# Patient Record
Sex: MALE | Race: WHITE | Employment: UNEMPLOYED | URBAN - METROPOLITAN AREA
[De-identification: names, ages, dates, MRNs, and addresses within clinical notes are randomized per-mention and may not be internally consistent; named-entity substitution may affect disease eponyms.]

---

## 2017-06-23 ENCOUNTER — HOSPITAL ENCOUNTER (EMERGENCY)
Age: 27
Discharge: STILL ADMITTED OR ADMINISTRATIVE | End: 2017-06-23
Attending: EMERGENCY MEDICINE | Admitting: EMERGENCY MEDICINE

## 2017-06-23 PROCEDURE — 75810000275 HC EMERGENCY DEPT VISIT NO LEVEL OF CARE

## 2017-06-23 NOTE — ED NOTES
Pt called into to be triaged. \"im leaving, I feel much better. \" encouraged to comeback if needed.

## 2017-06-24 ENCOUNTER — HOSPITAL ENCOUNTER (EMERGENCY)
Age: 27
Discharge: HOME HEALTH | End: 2017-06-24
Attending: EMERGENCY MEDICINE
Payer: SELF-PAY

## 2017-06-24 VITALS
TEMPERATURE: 98.4 F | HEIGHT: 68 IN | RESPIRATION RATE: 15 BRPM | DIASTOLIC BLOOD PRESSURE: 81 MMHG | WEIGHT: 180 LBS | BODY MASS INDEX: 27.28 KG/M2 | SYSTOLIC BLOOD PRESSURE: 124 MMHG | OXYGEN SATURATION: 98 % | HEART RATE: 92 BPM

## 2017-06-24 DIAGNOSIS — J45.21 MILD INTERMITTENT ASTHMA WITH ACUTE EXACERBATION: Primary | ICD-10-CM

## 2017-06-24 PROCEDURE — 99282 EMERGENCY DEPT VISIT SF MDM: CPT

## 2017-06-24 PROCEDURE — 74011000250 HC RX REV CODE- 250: Performed by: EMERGENCY MEDICINE

## 2017-06-24 PROCEDURE — 77030029684 HC NEB SM VOL KT MONA -A

## 2017-06-24 PROCEDURE — 94640 AIRWAY INHALATION TREATMENT: CPT

## 2017-06-24 RX ORDER — IPRATROPIUM BROMIDE AND ALBUTEROL SULFATE 2.5; .5 MG/3ML; MG/3ML
3 SOLUTION RESPIRATORY (INHALATION)
Status: COMPLETED | OUTPATIENT
Start: 2017-06-24 | End: 2017-06-24

## 2017-06-24 RX ORDER — IPRATROPIUM BROMIDE AND ALBUTEROL SULFATE 2.5; .5 MG/3ML; MG/3ML
3 SOLUTION RESPIRATORY (INHALATION)
Qty: 30 NEBULE | Refills: 3 | Status: SHIPPED | OUTPATIENT
Start: 2017-06-24 | End: 2017-12-30

## 2017-06-24 RX ORDER — ALBUTEROL SULFATE 90 UG/1
2 AEROSOL, METERED RESPIRATORY (INHALATION)
Qty: 1 INHALER | Refills: 6 | Status: SHIPPED | OUTPATIENT
Start: 2017-06-24 | End: 2017-09-05

## 2017-06-24 RX ORDER — IPRATROPIUM BROMIDE AND ALBUTEROL SULFATE 2.5; .5 MG/3ML; MG/3ML
3 SOLUTION RESPIRATORY (INHALATION)
COMMUNITY
End: 2017-06-24

## 2017-06-24 RX ORDER — FLUTICASONE PROPIONATE AND SALMETEROL 100; 50 UG/1; UG/1
1 POWDER RESPIRATORY (INHALATION) EVERY 12 HOURS
Qty: 1 INHALER | Refills: 6 | Status: SHIPPED | OUTPATIENT
Start: 2017-06-24 | End: 2017-06-29

## 2017-06-24 RX ORDER — FLUTICASONE PROPIONATE AND SALMETEROL 100; 50 UG/1; UG/1
1 POWDER RESPIRATORY (INHALATION) EVERY 12 HOURS
COMMUNITY
End: 2017-06-24

## 2017-06-24 RX ORDER — ALBUTEROL SULFATE 90 UG/1
AEROSOL, METERED RESPIRATORY (INHALATION)
COMMUNITY
End: 2017-06-24

## 2017-06-24 RX ADMIN — IPRATROPIUM BROMIDE AND ALBUTEROL SULFATE 3 ML: .5; 3 SOLUTION RESPIRATORY (INHALATION) at 09:44

## 2017-06-24 NOTE — ED PROVIDER NOTES
HPI Comments: 9:26 AM Moni Kern is a 33 y.o. male with a hx of asthma and COPD who presents to the ED  c/o SOB due to allergies. The pt is also complaining of wheezes. He states that he ran out of the following medications and is requesting a refill: ADVAIR DISKUS, VENTOLIN HFA and DUO-NEB. No other complaints or concerns at this time. PCP:  Caitlyn Farah MD      The history is provided by the patient. Past Medical History:   Diagnosis Date    Asthma     Chronic obstructive pulmonary disease ([address]        Past Surgical History:   Procedure Laterality Date    HX FREE SKIN GRAFT      HX ORTHOPAEDIC      facial reconstruction         History reviewed. No pertinent family history. Social History     Social History    Marital status: SINGLE     Spouse name: N/A    Number of children: N/A    Years of education: N/A     Occupational History    Not on file. Social History Main Topics    Smoking status: Never Smoker    Smokeless tobacco: Former User    Alcohol use Yes    Drug use: No    Sexual activity: Not Currently     Other Topics Concern    Not on file     Social History Narrative    No narrative on file         ALLERGIES: Penicillin g    Review of Systems   Constitutional: Negative. HENT: Negative. Eyes: Negative. Respiratory: Positive for shortness of breath and wheezing. Cardiovascular: Negative. Gastrointestinal: Negative. Endocrine: Negative. Genitourinary: Negative. Musculoskeletal: Negative. Skin: Negative. Allergic/Immunologic: Negative. Neurological: Negative. Hematological: Negative. Psychiatric/Behavioral: Negative. All other systems reviewed and are negative. Vitals:    06/24/17 0915   BP: 129/85   Pulse: 92   Resp: 16   Temp: 98.1 °F (36.7 °C)   SpO2: 95%   Weight: 81.6 kg (180 lb)   Height: 5' 8\" (1.727 m)            Physical Exam   Constitutional: He is oriented to person, place, and time.  He appears well-developed and well-nourished. HENT:   Head: Normocephalic and atraumatic. Nose: Nose normal.   Mouth/Throat: Oropharynx is clear and moist.   Eyes: Conjunctivae and EOM are normal. Pupils are equal, round, and reactive to light. Neck: Normal range of motion. Neck supple. Cardiovascular: Normal rate, regular rhythm, normal heart sounds and intact distal pulses. Pulmonary/Chest: Effort normal. He has wheezes in the right upper field, the right middle field, the right lower field, the left upper field, the left middle field and the left lower field. Abdominal: Soft. Bowel sounds are normal.   Neurological: He is alert and oriented to person, place, and time. Skin: Skin is warm and dry. Psychiatric: He has a normal mood and affect. His behavior is normal. Judgment and thought content normal.   Nursing note and vitals reviewed. [facility]  ED Course       Procedures    Vitals:  Patient Vitals for the past 12 hrs:   Temp Pulse Resp BP SpO2   06/24/17 0915 98.1 °F (36.7 °C) 92 16 129/85 95 %   95% on RA, indicating adequate oxygenation. Medications ordered:   Medications   albuterol-ipratropium (DUO-NEB) 2.5 MG-0.5 MG/3 ML (not administered)         Lab findings:  No results found for this or any previous visit (from the past 12 hour(s)). EKG interpretation by ED Physician:        X-Ray, CT or other radiology findings or impressions:  No orders to display         Progress notes, Consult notes or additional Procedure notes:  9:46 AM I have reassessed the patient and discussed their results and diagnosis. Pt will be discharged in stable condition. Patient is to return to emergency department if any new or worsening condition. Patient understands and verbalizes agreement with plan. Disposition:  Diagnosis:   1.  Mild intermittent asthma with acute exacerbation        Disposition: Discharge     SCRIBE ATTESTATION STATEMENT  Documented by: Caitlyn Day scribing for, and in the presence of, Geeta Conner MD 06/24/17 9:45 AM     Signed by: Meryle Maizes, Scribe, 06/24/17 9:30 AM     PROVIDER ATTESTATION STATEMENT  I personally performed the services described in the documentation, reviewed the documentation, as recorded by the scribe in my presence, and it accurately and completely records my words and actions.   Geeta Conner MD

## 2017-06-24 NOTE — DISCHARGE INSTRUCTIONS
Asthma Attack: Care Instructions  Your Care Instructions    During an asthma attack, the airways swell and narrow. This makes it hard to breathe. Severe asthma attacks can be life-threatening, but you can help prevent them by keeping your asthma under control and treating symptoms before they get bad. Symptoms include being short of breath, having chest tightness, coughing, and wheezing. Noting and treating these symptoms can also help you avoid future trips to the emergency room. The doctor has checked you carefully, but problems can develop later. If you notice any problems or new symptoms, get medical treatment right away. Follow-up care is a key part of your treatment and safety. Be sure to make and go to all appointments, and call your doctor if you are having problems. It's also a good idea to know your test results and keep a list of the medicines you take. How can you care for yourself at home? · Follow your asthma action plan to prevent and treat attacks. If you don't have an asthma action plan, work with your doctor to create one. · Take your asthma medicines exactly as prescribed. Talk to your doctor right away if you have any questions about how to take them. ¨ Use your quick-relief medicine when you have symptoms of an attack. Quick-relief medicine is usually an albuterol inhaler. Some people need to use quick-relief medicine before they exercise. ¨ Take your controller medicine every day, not just when you have symptoms. Controller medicine is usually an inhaled corticosteroid. The goal is to prevent problems before they occur. Don't use your controller medicine to treat an attack that has already started. It doesn't work fast enough to help. ¨ If your doctor prescribed corticosteroid pills to use during an attack, take them exactly as prescribed. It may take hours for the pills to work, but they may make the episode shorter and help you breathe better.   ¨ Keep your quick-relief medicine with you at all times. · Talk to your doctor before using other medicines. Some medicines, such as aspirin, can cause asthma attacks in some people. · If you have a peak flow meter, use it to check how well you are breathing. This can help you predict when an asthma attack is going to occur. Then you can take medicine to prevent the asthma attack or make it less severe. · Do not smoke or allow others to smoke around you. Avoid smoky places. Smoking makes asthma worse. If you need help quitting, talk to your doctor about stop-smoking programs and medicines. These can increase your chances of quitting for good. · Learn what triggers an asthma attack for you, and avoid the triggers when you can. Common triggers include colds, smoke, air pollution, dust, pollen, mold, pets, cockroaches, stress, and cold air. · Avoid colds and the flu. Get a pneumococcal vaccine shot. If you have had one before, ask your doctor if you need a second dose. Get a flu vaccine every fall. If you must be around people with colds or the flu, wash your hands often. When should you call for help? Call 911 anytime you think you may need emergency care. For example, call if:  · You have severe trouble breathing. Call your doctor now or seek immediate medical care if:  · Your symptoms do not get better after you have followed your asthma action plan. · You have new or worse trouble breathing. · Your coughing and wheezing get worse. · You cough up dark brown or bloody mucus (sputum). · You have a new or higher fever. Watch closely for changes in your health, and be sure to contact your doctor if:  · You need to use quick-relief medicine on more than 2 days a week (unless it is just for exercise). · You cough more deeply or more often, especially if you notice more mucus or a change in the color of your mucus. · You are not getting better as expected. Where can you learn more?   Go to http://jason-maritza.info/. Enter T871 in the search box to learn more about \"Asthma Attack: Care Instructions. \"  Current as of: March 25, 2017  Content Version: 11.3  © 2805-8057 [employer] Care instructions adapted under license by [employer] (which disclaims liability or warranty for this information). If you have questions about a medical condition or this instruction, always ask your healthcare professional. [place] [address] any warranty or liability for your use of this information. Learning About Asthma Triggers  What are triggers? When you have asthma, certain things can make your symptoms worse. These are called triggers. They include:  · Cigarette smoke or air pollution. · Things you are allergic to, such as:  ¨ Pollen, mold, or dust mites. ¨ Pet hair, skin, or saliva. · Illnesses, like colds, flu, or pneumonia. · Exercise. · Dry, cold air. How do triggers affect asthma? Triggers can make it harder for your lungs to work as they should and can lead to sudden difficulty breathing and other symptoms. When you are around a trigger, an asthma attack is more likely. If your symptoms are severe, you may need emergency treatment or have to go to the hospital for treatment. If you know what your triggers are and can avoid them, you may be able to prevent asthma attacks, reduce how often you have them, and make them less severe. What can you do to avoid triggers? The first thing is to know your triggers. When you are having symptoms, note the things around you that might be causing them. Then look for patterns in what may be triggering your symptoms. Record your triggers on a piece of paper or in an asthma diary. When you have your list of possible triggers, work with your doctor to find ways to avoid them. You also can check how well your lungs are working by measuring your peak expiratory flow (PEF) throughout the day.  Your PEF may drop when you are near things that trigger symptoms. Here are some ways to avoid a few common triggers. · Do not smoke or allow others to smoke around you. If you need help quitting, talk to your doctor about stop-smoking programs and medicines. These can increase your chances of quitting for good. · If there is a lot of pollution, pollen, or dust outside, stay at home and keep your windows closed. Use an air conditioner or air filter in your home. Check your local weather report or newspaper for air quality and pollen reports. · Get a flu shot every year. Talk to your doctor about getting a pneumococcal shot. Wash your hands often to prevent infections. · Avoid exercising outdoors in cold weather. If you are outdoors in cold weather, wear a scarf around your face and breathe through your nose. How can you manage an asthma attack? · If you have an asthma action plan, follow the plan. In general:  ¨ Use your quick-relief inhaler as directed by your doctor. If your symptoms do not get better after you use your medicine, have someone take you to the emergency room. Call an ambulance if needed. ¨ If your doctor has given you other inhaled medicines or steroid pills, take them as directed. Where can you learn more? Go to Redeem[employer]Get[employer]be  Enter M564 in the search box to learn more about \"Learning About Asthma Triggers. \"   © 7580-0332 [employer]. Care instructions adapted under license by [employer] (which disclaims liability or warranty for this information). This care instruction is for use with your licensed healthcare professional. If you have questions about a medical condition or this instruction, always ask your healthcare professional. [place] [address] any warranty or liability for your use of this information. Content Version: 27.3.314225;  Last Revised: February 23, 2012

## 2017-06-28 ENCOUNTER — HOSPITAL ENCOUNTER (EMERGENCY)
Age: 27
Discharge: HOME | End: 2017-06-29
Attending: EMERGENCY MEDICINE | Admitting: EMERGENCY MEDICINE
Payer: SELF-PAY

## 2017-06-28 ENCOUNTER — APPOINTMENT (OUTPATIENT)
Dept: GENERAL RADIOLOGY | Age: 27
End: 2017-06-28
Attending: EMERGENCY MEDICINE
Payer: SELF-PAY

## 2017-06-28 DIAGNOSIS — J45.901 ASTHMA WITH SEVERE EXACERBATION: Primary | ICD-10-CM

## 2017-06-28 LAB
ANION GAP BLD CALC-SCNC: 8 MMOL/L (ref 3–18)
BASOPHILS # BLD AUTO: 0 K/UL (ref 0–0.1)
BASOPHILS # BLD: 0 % (ref 0–3)
BUN SERPL-MCNC: 12 MG/DL (ref 7–18)
BUN/CREAT SERPL: 11 (ref 12–20)
CALCIUM SERPL-MCNC: 9.3 MG/DL (ref 8.5–10.1)
CHLORIDE SERPL-SCNC: 106 MMOL/L (ref 100–108)
CO2 SERPL-SCNC: 27 MMOL/L (ref 21–32)
CREAT SERPL-MCNC: 1.09 MG/DL (ref 0.6–1.3)
DIFFERENTIAL METHOD BLD: ABNORMAL
EOSINOPHIL # BLD: 2 K/UL (ref 0–0.4)
EOSINOPHIL NFR BLD: 8 % (ref 0–5)
ERYTHROCYTE [DISTWIDTH] IN BLOOD BY AUTOMATED COUNT: 13.1 % (ref 11.6–14.5)
GLUCOSE SERPL-MCNC: 127 MG/DL (ref 74–99)
HCT VFR BLD AUTO: 55.9 % (ref 36–48)
HGB BLD-MCNC: 19.3 G/DL (ref 13–16)
LYMPHOCYTES # BLD AUTO: 35 % (ref 20–51)
LYMPHOCYTES # BLD: 8.9 K/UL (ref 0.8–3.5)
MCH RBC QN AUTO: 28.7 PG (ref 24–34)
MCHC RBC AUTO-ENTMCNC: 34.5 G/DL (ref 31–37)
MCV RBC AUTO: 83.1 FL (ref 74–97)
MONOCYTES # BLD: 0.5 K/UL (ref 0–1)
MONOCYTES NFR BLD AUTO: 2 % (ref 2–9)
NEUTS SEG # BLD: 14 K/UL (ref 1.8–8)
NEUTS SEG NFR BLD AUTO: 55 % (ref 42–75)
PLATELET # BLD AUTO: 365 K/UL (ref 135–420)
PLATELET COMMENTS,PCOM: ABNORMAL
PMV BLD AUTO: 9.8 FL (ref 9.2–11.8)
POTASSIUM SERPL-SCNC: 4.4 MMOL/L (ref 3.5–5.5)
RBC # BLD AUTO: 6.73 M/UL (ref 4.7–5.5)
RBC MORPH BLD: ABNORMAL
SODIUM SERPL-SCNC: 141 MMOL/L (ref 136–145)
WBC # BLD AUTO: 25.4 K/UL (ref 4.6–13.2)

## 2017-06-28 PROCEDURE — 99285 EMERGENCY DEPT VISIT HI MDM: CPT

## 2017-06-28 PROCEDURE — 71010 XR CHEST PORT: CPT

## 2017-06-28 PROCEDURE — 74011000250 HC RX REV CODE- 250: Performed by: EMERGENCY MEDICINE

## 2017-06-28 PROCEDURE — 94640 AIRWAY INHALATION TREATMENT: CPT

## 2017-06-28 PROCEDURE — 96375 TX/PRO/DX INJ NEW DRUG ADDON: CPT

## 2017-06-28 PROCEDURE — 74011000250 HC RX REV CODE- 250

## 2017-06-28 PROCEDURE — 96365 THER/PROPH/DIAG IV INF INIT: CPT

## 2017-06-28 PROCEDURE — 80048 BASIC METABOLIC PNL TOTAL CA: CPT | Performed by: EMERGENCY MEDICINE

## 2017-06-28 PROCEDURE — 74011250636 HC RX REV CODE- 250/636: Performed by: EMERGENCY MEDICINE

## 2017-06-28 PROCEDURE — 96361 HYDRATE IV INFUSION ADD-ON: CPT

## 2017-06-28 PROCEDURE — 85025 COMPLETE CBC W/AUTO DIFF WBC: CPT | Performed by: EMERGENCY MEDICINE

## 2017-06-28 RX ORDER — IPRATROPIUM BROMIDE AND ALBUTEROL SULFATE 2.5; .5 MG/3ML; MG/3ML
SOLUTION RESPIRATORY (INHALATION)
Status: COMPLETED
Start: 2017-06-28 | End: 2017-06-28

## 2017-06-28 RX ORDER — IPRATROPIUM BROMIDE 0.5 MG/2.5ML
0.5 SOLUTION RESPIRATORY (INHALATION)
Status: COMPLETED | OUTPATIENT
Start: 2017-06-28 | End: 2017-06-28

## 2017-06-28 RX ORDER — ALBUTEROL SULFATE 0.83 MG/ML
10 SOLUTION RESPIRATORY (INHALATION)
Status: COMPLETED | OUTPATIENT
Start: 2017-06-28 | End: 2017-06-28

## 2017-06-28 RX ORDER — ALBUTEROL SULFATE 0.83 MG/ML
5 SOLUTION RESPIRATORY (INHALATION)
Status: COMPLETED | OUTPATIENT
Start: 2017-06-28 | End: 2017-06-28

## 2017-06-28 RX ORDER — ALBUTEROL SULFATE 0.83 MG/ML
7.5 SOLUTION RESPIRATORY (INHALATION)
Status: COMPLETED | OUTPATIENT
Start: 2017-06-28 | End: 2017-06-28

## 2017-06-28 RX ORDER — MAGNESIUM SULFATE HEPTAHYDRATE 40 MG/ML
2 INJECTION, SOLUTION INTRAVENOUS ONCE
Status: COMPLETED | OUTPATIENT
Start: 2017-06-28 | End: 2017-06-28

## 2017-06-28 RX ADMIN — SODIUM CHLORIDE 2000 ML: 900 INJECTION, SOLUTION INTRAVENOUS at 21:40

## 2017-06-28 RX ADMIN — SODIUM CHLORIDE 1000 ML: 900 INJECTION, SOLUTION INTRAVENOUS at 21:03

## 2017-06-28 RX ADMIN — ALBUTEROL SULFATE 10 MG: 2.5 SOLUTION RESPIRATORY (INHALATION) at 21:05

## 2017-06-28 RX ADMIN — METHYLPREDNISOLONE SODIUM SUCCINATE 125 MG: 125 INJECTION, POWDER, FOR SOLUTION INTRAMUSCULAR; INTRAVENOUS at 21:18

## 2017-06-28 RX ADMIN — ALBUTEROL SULFATE 7.5 MG: 2.5 SOLUTION RESPIRATORY (INHALATION) at 21:19

## 2017-06-28 RX ADMIN — MAGNESIUM SULFATE IN WATER 2 G: 40 INJECTION, SOLUTION INTRAVENOUS at 21:03

## 2017-06-28 RX ADMIN — ALBUTEROL SULFATE 5 MG: 2.5 SOLUTION RESPIRATORY (INHALATION) at 22:31

## 2017-06-28 RX ADMIN — IPRATROPIUM BROMIDE 0.5 MG: 0.5 SOLUTION RESPIRATORY (INHALATION) at 21:19

## 2017-06-28 RX ADMIN — IPRATROPIUM BROMIDE 0.5 MG: 0.5 SOLUTION RESPIRATORY (INHALATION) at 21:10

## 2017-06-28 RX ADMIN — IPRATROPIUM BROMIDE AND ALBUTEROL SULFATE: .5; 3 SOLUTION RESPIRATORY (INHALATION) at 21:00

## 2017-06-29 VITALS
RESPIRATION RATE: 17 BRPM | HEIGHT: 68 IN | SYSTOLIC BLOOD PRESSURE: 112 MMHG | WEIGHT: 175 LBS | BODY MASS INDEX: 26.52 KG/M2 | TEMPERATURE: 98.1 F | HEART RATE: 105 BPM | OXYGEN SATURATION: 99 % | DIASTOLIC BLOOD PRESSURE: 78 MMHG

## 2017-06-29 RX ORDER — BUDESONIDE 0.25 MG/2ML
500 INHALANT ORAL 2 TIMES DAILY
Qty: 120 ML | Refills: 6 | Status: SHIPPED | OUTPATIENT
Start: 2017-06-29 | End: 2017-08-12

## 2017-06-29 RX ORDER — ALBUTEROL SULFATE 0.83 MG/ML
2.5 SOLUTION RESPIRATORY (INHALATION)
Qty: 24 EACH | Refills: 6 | Status: SHIPPED | OUTPATIENT
Start: 2017-06-29 | End: 2017-09-05

## 2017-06-29 RX ORDER — PREDNISONE 10 MG/1
TABLET ORAL
Qty: 63 TAB | Refills: 0 | Status: SHIPPED | OUTPATIENT
Start: 2017-06-29 | End: 2017-08-12

## 2017-06-29 NOTE — ED TRIAGE NOTES
Patient presents to the ED in severe respiratory distress with labored respirations and utilizing accessory muscles to breathe. Hx of asthma. Reports progressive exacerbation throughout the day.

## 2017-06-29 NOTE — ED PROVIDER NOTES
HPI Comments: Mekhi Hernandez is a 33 y.o. Male with h/o asthma with c/o severe sob, wheezing that started earlier today not relieved with home treatments. Admitted over a year ago. No intubations. No recent steroids. Sx are constant, severe, worse with any exertion. No recent fever, vomiting, diarrhea, syncope    The history is provided by the patient and the spouse. Past Medical History:   Diagnosis Date    Asthma     Chronic obstructive pulmonary disease ([address]        Past Surgical History:   Procedure Laterality Date    HX FREE SKIN GRAFT      HX ORTHOPAEDIC      facial reconstruction         No family history on file. Social History     Social History    Marital status: SINGLE     Spouse name: N/A    Number of children: N/A    Years of education: N/A     Occupational History    Not on file. Social History Main Topics    Smoking status: Never Smoker    Smokeless tobacco: Former User    Alcohol use Yes    Drug use: No    Sexual activity: Not Currently     Other Topics Concern    Not on file     Social History Narrative    No narrative on file         ALLERGIES: Penicillin g    Review of Systems   Constitutional: Negative for fever. HENT: Negative for sore throat and trouble swallowing. Eyes: Negative for visual disturbance. Respiratory: Positive for cough, chest tightness, shortness of breath and wheezing. Cardiovascular: Negative for leg swelling. Gastrointestinal: Negative for abdominal pain. Endocrine: Negative for polyuria. Genitourinary: Negative for difficulty urinating. Musculoskeletal: Negative for gait problem. Skin: Negative for rash. Allergic/Immunologic: Negative for immunocompromised state. Neurological: Negative for syncope. Hematological: Does not bruise/bleed easily. Psychiatric/Behavioral: Positive for sleep disturbance.        Vitals:    06/29/17 0010 06/29/17 0020 06/29/17 0030 06/29/17 0039   BP: 113/73 107/71 112/78    Pulse: (!) 111 (!) 113 (!) 105    Resp: 19 21 17    Temp:    98.1 °F (36.7 °C)   SpO2: 95% 98% 99%    Weight:       Height:                Physical Exam   Constitutional: He is oriented to person, place, and time. Non-toxic appearance. He does not appear ill. He appears distressed. HENT:   Head: Normocephalic and atraumatic. Right Ear: External ear normal.   Left Ear: External ear normal.   Nose: Nose normal.   Mouth/Throat: Oropharynx is clear and moist. No oropharyngeal exudate. Eyes: Conjunctivae are normal.   Neck: Normal range of motion. Neck supple. Cardiovascular: Regular rhythm, normal heart sounds and intact distal pulses. Tachycardia present. Pulmonary/Chest: Accessory muscle usage present. No stridor. Tachypnea noted. He is in respiratory distress. He has decreased breath sounds. He has wheezes. He has no rhonchi. He has no rales. Abdominal: Soft. There is no tenderness. Musculoskeletal: Normal range of motion. He exhibits no edema. Neurological: He is alert and oriented to person, place, and time. Skin: Skin is warm. He is diaphoretic. Psychiatric: His behavior is normal.   Nursing note and vitals reviewed.        [facility]  ED Course       Procedures           Vitals:  Patient Vitals for the past 12 hrs:   Temp Pulse Resp BP SpO2   06/29/17 0039 98.1 °F (36.7 °C) - - - -   06/29/17 0030 - (!) 105 17 112/78 99 %   06/29/17 0020 - (!) 113 21 107/71 98 %   06/29/17 0010 - (!) 111 19 113/73 95 %   06/29/17 0000 - (!) 108 23 109/72 100 %   06/28/17 2330 - (!) 111 18 110/67 100 %   06/28/17 2315 - (!) 115 21 - 100 %   06/28/17 2300 - (!) 116 20 127/68 100 %   06/28/17 2245 - (!) 116 24 - 100 %   06/28/17 2230 - (!) 115 22 104/77 100 %   06/28/17 2215 - (!) 119 25 - 100 %   06/28/17 2200 - (!) 121 21 116/78 100 %   06/28/17 2145 - (!) 129 25 - 100 %   06/28/17 2130 - (!) 130 19 128/82 -   06/28/17 2115 - (!) 127 28 - 98 %   06/28/17 2105 - - - (!) 141/112 -   06/28/17 2100 - (!) 135 (!) 40 (!) 141/112 91 %         Medications ordered:   Medications   albuterol-ipratropium (DUO-NEB) 2.5 mg-0.5 mg/3 ml nebulizer solution (  Given 6/28/17 2100)   albuterol (PROVENTIL VENTOLIN) nebulizer solution 10 mg (10 mg Nebulization Given 6/28/17 2105)   ipratropium (ATROVENT) 0.02 % nebulizer solution 0.5 mg (0.5 mg Nebulization Given 6/28/17 2110)   methylPREDNISolone (PF) (SOLU-MEDROL) injection 125 mg (125 mg IntraVENous Given 6/28/17 2118)   magnesium sulfate 2 g/50 ml IVPB (premix or compounded) (0 g IntraVENous IV Completed 6/28/17 2143)   sodium chloride 0.9 % bolus infusion 1,000 mL (0 mL IntraVENous IV Completed 6/28/17 2204)   albuterol (PROVENTIL VENTOLIN) nebulizer solution 7.5 mg (7.5 mg Nebulization Given 6/28/17 2119)   ipratropium (ATROVENT) 0.02 % nebulizer solution 0.5 mg (0.5 mg Nebulization Given 6/28/17 2119)   sodium chloride 0.9 % bolus infusion 2,000 mL (0 mL IntraVENous IV Completed 6/28/17 2339)   albuterol (PROVENTIL VENTOLIN) nebulizer solution 5 mg (5 mg Nebulization Given 6/28/17 2231)         Lab findings:  Recent Results (from the past 12 hour(s))   CBC WITH AUTOMATED DIFF    Collection Time: 06/28/17  9:00 PM   Result Value Ref Range    WBC 25.4 (H) 4.6 - 13.2 K/uL    RBC 6.73 (H) 4.70 - 5.50 M/uL    HGB 19.3 (H) 13.0 - 16.0 g/dL    HCT 55.9 (HH) 36.0 - 48.0 %    MCV 83.1 74.0 - 97.0 FL    MCH 28.7 24.0 - 34.0 PG    MCHC 34.5 31.0 - 37.0 g/dL    RDW 13.1 11.6 - 14.5 %    PLATELET 565 366 - 806 K/uL    MPV 9.8 9.2 - 11.8 FL    NEUTROPHILS 55 42 - 75 %    LYMPHOCYTES 35 20 - 51 %    MONOCYTES 2 2 - 9 %    EOSINOPHILS 8 (H) 0 - 5 %    BASOPHILS 0 0 - 3 %    ABS. NEUTROPHILS 14.0 (H) 1.8 - 8.0 K/UL    ABS. LYMPHOCYTES 8.9 (H) 0.8 - 3.5 K/UL    ABS. MONOCYTES 0.5 0 - 1.0 K/UL    ABS. EOSINOPHILS 2.0 (H) 0.0 - 0.4 K/UL    ABS.  BASOPHILS 0.0 0.0 - 0.1 K/UL    PLATELET COMMENTS ADEQUATE PLATELETS      RBC COMMENTS NORMOCYTIC, NORMOCHROMIC      DF MANUAL     METABOLIC PANEL, BASIC    Collection Time: 06/28/17  9:00 PM   Result Value Ref Range    Sodium 141 136 - 145 mmol/L    Potassium 4.4 3.5 - 5.5 mmol/L    Chloride 106 100 - 108 mmol/L    CO2 27 21 - 32 mmol/L    Anion gap 8 3.0 - 18 mmol/L    Glucose 127 (H) 74 - 99 mg/dL    BUN 12 7.0 - 18 MG/DL    Creatinine 1.09 0.6 - 1.3 MG/DL    BUN/Creatinine ratio 11 (L) 12 - 20      GFR est AA >60 >60 ml/min/1.73m2    GFR est non-AA >60 >60 ml/min/1.73m2    Calcium 9.3 8.5 - 10.1 MG/DL       EKG interpretation by ED Physician:      X-Ray, CT or other radiology findings or impressions:  XR CHEST PORT    (Results Pending)   nl appearance to me    Progress notes, Consult notes or additional Procedure notes:   Sig improved, mult nebs, iv meds, fluids  ED Critical Care Note    System at risk for life threatening failure:cardiac, pulm, neuro  Associated problems: tachycardia, hypoxia, metabolix    Critical Care services provided: bedside management, documentation, mult visits to bedside, d/w case  Excluded procedures (time not included in critical care): pulse ox interp    Total Critical Care Time (in minutes) 47      Doubt need for admission. Will place on prednisone taper, additional meds for home  I have discussed with patient and/or family/sig other the results, interpretation of any imaging if performed, suspected diagnosis and treatment plan to include instructions regarding the diagnoses listed to which understanding was expressed with all questions answered      Reevaluation of patient:   Stable for dc    Disposition:  Diagnosis:   1. Asthma with severe exacerbation        Disposition: home      Follow-up Information     Follow up With Details Comments Contact Info    [facility] EMERGENCY DEPT  If symptoms worsen [id number] [address] [phone]            Patient's Medications   Start Taking    ALBUTEROL (PROVENTIL VENTOLIN) 2.5 MG /3 ML (0.083 %) NEBULIZER SOLUTION    3 mL by Nebulization route every four (4) hours as needed for Wheezing. BUDESONIDE (PULMICORT) 0.25 MG/2 ML NBSP    4 mL by Nebulization route two (2) times a day. PREDNISONE (DELTASONE) 10 MG TABLET    6 po every day f3, 5 po every day f3, 4 po every day f3, 3 po every day f3, 2 po every day f3, 1 po every day f3   Continue Taking    ALBUTEROL (VENTOLIN HFA) 90 MCG/ACTUATION INHALER    Take 2 Puffs by inhalation every six (6) hours as needed for Wheezing. ALBUTEROL-IPRATROPIUM (DUO-NEB) 2.5 MG-0.5 MG/3 ML NEBU    3 mL by Nebulization route every six (6) hours as needed. These Medications have changed    No medications on file   Stop Taking    FLUTICASONE-SALMETEROL (ADVAIR DISKUS) 100-50 MCG/DOSE DISKUS INHALER    Take 1 Puff by inhalation every twelve (12) hours.

## 2017-08-12 ENCOUNTER — HOSPITAL ENCOUNTER (EMERGENCY)
Age: 27
Discharge: HOME | End: 2017-08-12
Attending: EMERGENCY MEDICINE
Payer: SELF-PAY

## 2017-08-12 VITALS
DIASTOLIC BLOOD PRESSURE: 83 MMHG | OXYGEN SATURATION: 97 % | TEMPERATURE: 97.8 F | HEIGHT: 68 IN | BODY MASS INDEX: 25.76 KG/M2 | SYSTOLIC BLOOD PRESSURE: 126 MMHG | RESPIRATION RATE: 22 BRPM | WEIGHT: 170 LBS | HEART RATE: 131 BPM

## 2017-08-12 DIAGNOSIS — J45.21 MILD INTERMITTENT ASTHMA WITH ACUTE EXACERBATION: Primary | ICD-10-CM

## 2017-08-12 PROCEDURE — 74011000250 HC RX REV CODE- 250

## 2017-08-12 PROCEDURE — 94640 AIRWAY INHALATION TREATMENT: CPT

## 2017-08-12 PROCEDURE — 74011250636 HC RX REV CODE- 250/636: Performed by: EMERGENCY MEDICINE

## 2017-08-12 PROCEDURE — 96365 THER/PROPH/DIAG IV INF INIT: CPT

## 2017-08-12 PROCEDURE — 99284 EMERGENCY DEPT VISIT MOD MDM: CPT

## 2017-08-12 PROCEDURE — 74011000250 HC RX REV CODE- 250: Performed by: EMERGENCY MEDICINE

## 2017-08-12 PROCEDURE — 77030029684 HC NEB SM VOL KT MONA -A

## 2017-08-12 PROCEDURE — 96375 TX/PRO/DX INJ NEW DRUG ADDON: CPT

## 2017-08-12 RX ORDER — PREDNISONE 20 MG/1
60 TABLET ORAL DAILY
Qty: 15 TAB | Refills: 0 | Status: SHIPPED | OUTPATIENT
Start: 2017-08-12 | End: 2017-08-17

## 2017-08-12 RX ORDER — IPRATROPIUM BROMIDE AND ALBUTEROL SULFATE 2.5; .5 MG/3ML; MG/3ML
SOLUTION RESPIRATORY (INHALATION)
Status: COMPLETED
Start: 2017-08-12 | End: 2017-08-12

## 2017-08-12 RX ORDER — ALBUTEROL SULFATE 90 UG/1
2 AEROSOL, METERED RESPIRATORY (INHALATION)
Qty: 1 INHALER | Refills: 0 | Status: SHIPPED | OUTPATIENT
Start: 2017-08-12 | End: 2017-12-30

## 2017-08-12 RX ORDER — MAGNESIUM SULFATE HEPTAHYDRATE 40 MG/ML
2 INJECTION, SOLUTION INTRAVENOUS
Status: COMPLETED | OUTPATIENT
Start: 2017-08-12 | End: 2017-08-12

## 2017-08-12 RX ORDER — IPRATROPIUM BROMIDE AND ALBUTEROL SULFATE 2.5; .5 MG/3ML; MG/3ML
3 SOLUTION RESPIRATORY (INHALATION) ONCE
Status: COMPLETED | OUTPATIENT
Start: 2017-08-12 | End: 2017-08-12

## 2017-08-12 RX ADMIN — IPRATROPIUM BROMIDE AND ALBUTEROL SULFATE 3 ML: .5; 3 SOLUTION RESPIRATORY (INHALATION) at 02:16

## 2017-08-12 RX ADMIN — MAGNESIUM SULFATE HEPTAHYDRATE 2 G: 40 INJECTION, SOLUTION INTRAVENOUS at 02:06

## 2017-08-12 RX ADMIN — IPRATROPIUM BROMIDE AND ALBUTEROL SULFATE 3 ML: .5; 3 SOLUTION RESPIRATORY (INHALATION) at 02:06

## 2017-08-12 RX ADMIN — METHYLPREDNISOLONE SODIUM SUCCINATE 125 MG: 125 INJECTION, POWDER, FOR SOLUTION INTRAMUSCULAR; INTRAVENOUS at 02:05

## 2017-08-12 RX ADMIN — IPRATROPIUM BROMIDE AND ALBUTEROL SULFATE 3 ML: .5; 3 SOLUTION RESPIRATORY (INHALATION) at 01:56

## 2017-08-12 RX ADMIN — IPRATROPIUM BROMIDE AND ALBUTEROL SULFATE 3 ML: 2.5; .5 SOLUTION RESPIRATORY (INHALATION) at 01:56

## 2017-08-12 NOTE — LETTER
NOTIFICATION RETURN TO WORK 
 
8/12/2017 3:48 AM 
 
Mr. Constance Everett [address] [id number] To Whom It May Concern: 
 
Constance Everett is currently under the care of [facility] EMERGENCY DEPT. He is permitted to go to work on 08/12/17, but has spent long portion of the night in the emergency department. Mr. Viktoriya Rolle may be fatigued. If there are questions or concerns please have the patient contact our office.  
 
 
 
 
Sincerely, 
 
 
 
 
Marsha Hyde MD

## 2017-08-12 NOTE — DISCHARGE INSTRUCTIONS
SPECIFIC PATIENT INSTRUCTIONS FROM THE PHYSICIAN WHO TREATED YOU IN THE ER TODAY:  1. Return if any concerns or worsening of condition(s)  2. Prednisone as prescribed until finished. 3. Use your albuterol inhaler, if prescribed, and/or home nebulizer machine (if you have one) for wheezing. 4. FOLLOW UP APPOINTMENT:  Your primary doctor in 1-2 days. Asthma Attack: Care Instructions  Your Care Instructions    During an asthma attack, the airways swell and narrow. This makes it hard to breathe. Severe asthma attacks can be life-threatening, but you can help prevent them by keeping your asthma under control and treating symptoms before they get bad. Symptoms include being short of breath, having chest tightness, coughing, and wheezing. Noting and treating these symptoms can also help you avoid future trips to the emergency room. The doctor has checked you carefully, but problems can develop later. If you notice any problems or new symptoms, get medical treatment right away. Follow-up care is a key part of your treatment and safety. Be sure to make and go to all appointments, and call your doctor if you are having problems. It's also a good idea to know your test results and keep a list of the medicines you take. How can you care for yourself at home? · Follow your asthma action plan to prevent and treat attacks. If you don't have an asthma action plan, work with your doctor to create one. · Take your asthma medicines exactly as prescribed. Talk to your doctor right away if you have any questions about how to take them. ¨ Use your quick-relief medicine when you have symptoms of an attack. Quick-relief medicine is usually an albuterol inhaler. Some people need to use quick-relief medicine before they exercise. ¨ Take your controller medicine every day, not just when you have symptoms. Controller medicine is usually an inhaled corticosteroid. The goal is to prevent problems before they occur.  Don't use your controller medicine to treat an attack that has already started. It doesn't work fast enough to help. ¨ If your doctor prescribed corticosteroid pills to use during an attack, take them exactly as prescribed. It may take hours for the pills to work, but they may make the episode shorter and help you breathe better. ¨ Keep your quick-relief medicine with you at all times. · Talk to your doctor before using other medicines. Some medicines, such as aspirin, can cause asthma attacks in some people. · If you have a peak flow meter, use it to check how well you are breathing. This can help you predict when an asthma attack is going to occur. Then you can take medicine to prevent the asthma attack or make it less severe. · Do not smoke or allow others to smoke around you. Avoid smoky places. Smoking makes asthma worse. If you need help quitting, talk to your doctor about stop-smoking programs and medicines. These can increase your chances of quitting for good. · Learn what triggers an asthma attack for you, and avoid the triggers when you can. Common triggers include colds, smoke, air pollution, dust, pollen, mold, pets, cockroaches, stress, and cold air. · Avoid colds and the flu. Get a pneumococcal vaccine shot. If you have had one before, ask your doctor if you need a second dose. Get a flu vaccine every fall. If you must be around people with colds or the flu, wash your hands often. When should you call for help? Call 911 anytime you think you may need emergency care. For example, call if:  · You have severe trouble breathing. Call your doctor now or seek immediate medical care if:  · Your symptoms do not get better after you have followed your asthma action plan. · You have new or worse trouble breathing. · Your coughing and wheezing get worse. · You cough up dark brown or bloody mucus (sputum). · You have a new or higher fever.   Watch closely for changes in your health, and be sure to contact your doctor if:  · You need to use quick-relief medicine on more than 2 days a week (unless it is just for exercise). · You cough more deeply or more often, especially if you notice more mucus or a change in the color of your mucus. · You are not getting better as expected. Where can you learn more? Go to http://jason-maritza.info/. Enter W593 in the search box to learn more about \"Asthma Attack: Care Instructions. \"  Current as of: March 25, 2017  Content Version: 11.3  © 6617-0828 [employer]. Care instructions adapted under license by [employer] (which disclaims liability or warranty for this information). If you have questions about a medical condition or this instruction, always ask your healthcare professional. [address] any warranty or liability for your use of this information. Learning About Asthma Triggers  What are asthma triggers? When you have asthma, certain things can make your symptoms worse. These are called triggers. Learn what triggers an asthma attack for you, and avoid the triggers when you can. Common triggers include colds, smoke, air pollution, dust, pollen, pets, stress, and cold air. How do asthma triggers affect you? Triggers can make it harder for your lungs to work as they should. They can lead to sudden breathing problems and other symptoms. When you are around a trigger, an asthma attack is more likely. If your symptoms are severe, you may need emergency treatment or have to go to the hospital for treatment. What can you do to avoid triggers? The first thing is to know your triggers. When you are having symptoms, note the things around you that might be causing them. Then look for patterns that may be triggering your symptoms. Record your triggers on a piece of paper or in an asthma diary. When you have your list of possible triggers, work with your doctor to find ways to avoid them.   Avoid colds and flu. Get a pneumococcal vaccine shot. If you have had one before, ask your doctor whether you need a second dose. Get a flu vaccine every year, as soon as it's available. If you must be around people with colds or the flu, wash your hands often. Here are some ways to avoid a few common triggers. · Do not smoke or allow others to smoke around you. If you need help quitting, talk to your doctor about stop-smoking programs and medicines. These can increase your chances of quitting for good. · If there is a lot of pollution, pollen, or dust outside, stay at home and keep your windows closed. Use an air conditioner or air filter in your home. Check your local weather report or newspaper for air quality and pollen reports. What else should you know? · Take your controller medicine every day, not just when you have symptoms. It helps prevent problems before they occur. · Your doctor may suggest that you check how well your lungs are working by measuring your peak expiratory flow (PEF) throughout the day. Your PEF may drop when you are near things that trigger symptoms. Where can you learn more? Go to http://jasonAvokiamaritza.info/. Enter S768 in the search box to learn more about \"Learning About Asthma Triggers. \"  Current as of: March 25, 2017  Content Version: 11.3  © 4204-4922 [employer]. Care instructions adapted under license by [employer] (which disclaims liability or warranty for this information). If you have questions about a medical condition or this instruction, always ask your healthcare professional. [place] [address] any warranty or liability for your use of this information. Asthma Action Plan: After Your Visit  Your Care Instructions  An asthma action plan is based on peak flow and asthma symptoms.  Sorting symptoms and peak flow into red, yellow, and green \"zones\" can help you know how bad your asthma is and what actions you should take. Work with your doctor to make your plan. An action plan may include:  · The peak flow readings and symptoms for each zone. · What medicines to take in each zone. · When to call a doctor. · A list of emergency contact numbers. · A list of your asthma triggers. Follow-up care is a key part of your treatment and safety. Be sure to make and go to all appointments, and call your doctor if you are having problems. It's also a good idea to know your test results and keep a list of the medicines you take. How can you care for yourself at home? · Take your daily medicines to help minimize long-term damage and avoid asthma attacks. · Check your peak flow every morning and evening. This is the best way to know how well your lungs are working. · Check your action plan to see what zone you are in.  ¨ If you are in the green zone, keep taking your daily asthma medicines as prescribed. ¨ If you are in the yellow zone, you may be having or will soon have an asthma attack. You may not have any symptoms, but your lungs are not working as well as they should. Take the medicines listed in your action plan. If you stay in the yellow zone, your doctor may need to increase the dose or add a medicine. ¨ If you are in the red zone, follow your action plan. If your symptoms or peak flow don't improve soon, you may need to go to the emergency room or be admitted to the hospital.  · Use an asthma diary. Write down your peak flow readings in the asthma diary. If you have an attack, write down what caused it (if you know), the symptoms, and what medicine you took. · Make sure you know how and when to call your doctor or go to the hospital.  · Take both the asthma action plan and the asthma diary--along with your peak flow meter and medicines--when you see your doctor. Tell your doctor if you are having trouble following your action plan. When should you call for help? Call 911 anytime you think you may need emergency care. For example, call if:  · You have severe trouble breathing. Call your doctor now or seek immediate medical care if:  · Your symptoms do not get better after you have followed your asthma action plan. · You cough up yellow, dark brown, or bloody mucus (sputum). Watch closely for changes in your health, and be sure to contact your doctor if:  · Your coughing and wheezing get worse. · You need to use quick-relief medicine on more than 2 days a week (unless it is just for exercise). · You need help figuring out what is triggering your asthma attacks. Where can you learn more? Go to COTA.be  Enter B511 in the search box to learn more about \"Asthma Action Plan: After Your Visit. \"   © 2186-4220 [employer]. Care instructions adapted under license by [employer] (which disclaims liability or warranty for this information). This care instruction is for use with your licensed healthcare professional. If you have questions about a medical condition or this instruction, always ask your healthcare professional. [place] [address] any warranty or liability for your use of this information. Content Version: 15.3.108228;  Last Revised: March 9, 2012

## 2017-08-12 NOTE — ED NOTES
Wheezing. Hx of asthma. Pt states this happens to him monthly for the past 2 years.  Had 3 duo neb treatments at home prior to arrival.

## 2017-08-12 NOTE — ED PROVIDER NOTES
[facility] EMERGENCY DEPT      33 y.o. male with noted past medical history of asthma and COPD, who presents to the emergency department with wheezing. Patient has no associated symptoms. Patient states that allergens trigger his asthma. Patient states that a friend had moved into his home and they own pets which may have triggered his asthma. Patient claims he does not smoke or use drugs. Patient claims to occasionally consume alcohol and used to used smokeless tobacco. Patient uses an inhaler. No other complaints. Nursing nurses regarding the HPI and triage nursing notes were reviewed. Current Facility-Administered Medications   Medication Dose Route Frequency    albuterol-ipratropium (DUO-NEB) 2.5 mg-0.5 mg/3 ml nebulizer solution         Current Outpatient Prescriptions   Medication Sig    albuterol (PROVENTIL VENTOLIN) 2.5 mg /3 mL (0.083 %) nebulizer solution 3 mL by Nebulization route every four (4) hours as needed for Wheezing.  albuterol (VENTOLIN HFA) 90 mcg/actuation inhaler Take 2 Puffs by inhalation every six (6) hours as needed for Wheezing.  albuterol-ipratropium (DUO-NEB) 2.5 mg-0.5 mg/3 ml nebu 3 mL by Nebulization route every six (6) hours as needed. Past Medical History:   Diagnosis Date    Asthma     Chronic obstructive pulmonary disease ([address]        Past Surgical History:   Procedure Laterality Date    HX FREE SKIN GRAFT      HX ORTHOPAEDIC      facial reconstruction       History reviewed. No pertinent family history. Social History     Social History    Marital status: SINGLE     Spouse name: N/A    Number of children: N/A    Years of education: N/A     Occupational History    Not on file.      Social History Main Topics    Smoking status: Never Smoker    Smokeless tobacco: Former User    Alcohol use Yes    Drug use: No    Sexual activity: Not Currently     Other Topics Concern    Not on file     Social History Narrative       Allergies Allergen Reactions    Penicillin G Anaphylaxis       Patient's primary care provider (as noted in EPIC):  None    REVIEW OF SYSTEMS:    Constitutional:  Negative for diaphoresis. Eyes:  Negative for diploplia. HENT:  Negative for congestion. Respiratory:  Negative for stridor. Cardiovascular:  Negative for palpitations. Gastrointestinal:  Negative for diarrhea. Genitourinary:  Negative for flank pain. Musculoskeletal:  Negative for back pain. Skin:  Negative for pallor. Neurological:  Negative for weakness. Psychiatric:  Negative for hallucinations. Visit Vitals    /81 (BP 1 Location: Right arm, BP Patient Position: At rest)    Pulse (!) 131    Temp 97.8 °F (36.6 °C)    Resp 22    Ht 5' 8\" (1.727 m)    Wt 77.1 kg (170 lb)    SpO2 96%    BMI 25.85 kg/m2       PHYSICAL EXAM:    CONSTITUTIONAL:  Alert, in no apparent distress;  well developed;  well nourished. HEAD:  Normocephalic, atraumatic. EYES:  EOMI. Non-icteric sclera. Normal conjunctiva. ENTM:  Nose:  no rhinorrhea. Throat:  no erythema or exudate, mucous membranes moist.  NECK:  No JVD. Supple    RESPIRATORY:  Expiratory wheezes but good air movement. No rales or rhonchi. CARDIOVASCULAR:  Regular rate and rhythm. No murmurs, rubs, or gallops. GI:  Normal bowel sounds, abdomen soft and non-tender. No rebound or guarding. BACK:  Non-tender. UPPER EXT:  Normal inspection. LOWER EXT:  No edema, no calf tenderness. Distal pulses intact. NEURO:  Moves all four extremities, and grossly normal motor exam.  SKIN:  No rashes;  Normal for age. PSYCH:  Alert and normal affect. DIFFERENTIAL DIAGNOSES/ MEDICAL DECISION MAKING:  Acute asthma exacerbation, acute bronchitis, pneumonia, upper respiratory infection, pulmonary embolism, bronchospasm, various cardiac etiologies verus numerous other etiologies versus combination of the above.     Abnormal lab results from this emergency department encounter:  Labs Reviewed - No data to display    Lab values for this patient within approximately the last 12 hours:  No results found for this or any previous visit (from the past 12 hour(s)). Radiologist and cardiologist interpretations if available at time of this note:  No results found. Medication(s) ordered for patient during this emergency visit encounter:  Medications   albuterol-ipratropium (DUO-NEB) 2.5 mg-0.5 mg/3 ml nebulizer solution (not administered)       ED COURSE AND MEDICAL DECISION MAKING:  Patient's breathing improved and wheezing resolved in the emergency department with the noted albuterol and atrovent HHN treatments. Patient's initial steroid therapy may have been started in the ED as well. Patient is well and can be discharged home. There are no other medical conditions that I believe are significantly contributing to the patient's shortness of breath except the noted acute asthma exacerbation and any noted triggers. IMPRESSION AND MEDICAL DECISION MAKING:  Based upon the patient's presentation with noted HPI and PE, along with the work up done in the emergency department, I believe that the patient is having an acute asthma exacerbation in an asthmatic patient. DIAGNOSIS:  1. Acute asthma exacerbation. SPECIFIC PATIENT INSTRUCTIONS FROM THE PHYSICIAN WHO TREATED YOU IN THE ER TODAY:  1. Return if any concerns or worsening of condition(s)  2. Prednisone as prescribed until finished. 3. Use your albuterol inhaler, if prescribed, and/or home nebulizer machine (if you have one) for wheezing. 4. FOLLOW UP APPOINTMENT:  Your primary doctor in 1-2 days. Alice Spaulding M.D.     Provider Attestation:  If a scribe was utilized in generation of this patient record, I personally performed the services described in the documentation, reviewed the documentation, as recorded by the scribe in my presence, and it accurately records the patient's history of presenting illness, review of systems, patient physical examination, and procedures performed by me as the attending physician. Purnima Hernandez M.D. AB Board Certified Emergency Physician  8/12/2017.  1:54 AM    Scribe [address] [place] [address] acting as a scribe for and in the presence of Prince Bautista MD      August 12, 2017 at 2:01 AM       Provider Attestation:      I personally performed the services described in the documentation, reviewed the documentation, as recorded by the scribe in my presence, and it accurately and completely records my words and actions.  August 12, 2017 at 2:01 AM - Prince Bautista MD

## 2017-09-05 ENCOUNTER — HOSPITAL ENCOUNTER (EMERGENCY)
Age: 27
Discharge: HOME | End: 2017-09-05
Attending: EMERGENCY MEDICINE
Payer: SELF-PAY

## 2017-09-05 ENCOUNTER — HOSPITAL ENCOUNTER (INPATIENT)
Age: 27
LOS: 2 days | Discharge: HOME | DRG: 202 | End: 2017-09-07
Attending: EMERGENCY MEDICINE | Admitting: HOSPITALIST
Payer: SELF-PAY

## 2017-09-05 ENCOUNTER — APPOINTMENT (OUTPATIENT)
Dept: GENERAL RADIOLOGY | Age: 27
End: 2017-09-05
Attending: EMERGENCY MEDICINE
Payer: SELF-PAY

## 2017-09-05 VITALS
SYSTOLIC BLOOD PRESSURE: 127 MMHG | DIASTOLIC BLOOD PRESSURE: 59 MMHG | RESPIRATION RATE: 26 BRPM | OXYGEN SATURATION: 97 % | HEART RATE: 144 BPM

## 2017-09-05 DIAGNOSIS — J45.32 MILD PERSISTENT ASTHMA WITH STATUS ASTHMATICUS: Primary | ICD-10-CM

## 2017-09-05 DIAGNOSIS — J06.9 VIRAL UPPER RESPIRATORY TRACT INFECTION: ICD-10-CM

## 2017-09-05 DIAGNOSIS — R06.03 RESPIRATORY DISTRESS: ICD-10-CM

## 2017-09-05 DIAGNOSIS — J45.31 MILD PERSISTENT ASTHMA WITH ACUTE EXACERBATION: Primary | ICD-10-CM

## 2017-09-05 DIAGNOSIS — R00.0 TACHYCARDIA: ICD-10-CM

## 2017-09-05 PROBLEM — E87.20 METABOLIC ACIDOSIS WITH RESPIRATORY ALKALOSIS: Status: ACTIVE | Noted: 2017-09-05

## 2017-09-05 PROBLEM — E87.3 METABOLIC ACIDOSIS WITH RESPIRATORY ALKALOSIS: Status: ACTIVE | Noted: 2017-09-05

## 2017-09-05 PROBLEM — E87.20 LACTIC ACIDOSIS: Status: ACTIVE | Noted: 2017-09-05

## 2017-09-05 PROBLEM — J45.902 STATUS ASTHMATICUS: Status: ACTIVE | Noted: 2017-09-05

## 2017-09-05 LAB
ALBUMIN SERPL-MCNC: 3.3 G/DL (ref 3.4–5)
ALBUMIN/GLOB SERPL: 0.9 {RATIO} (ref 0.8–1.7)
ALP SERPL-CCNC: 102 U/L (ref 45–117)
ALT SERPL-CCNC: 37 U/L (ref 16–61)
AMPHET UR QL SCN: NEGATIVE
ANION GAP SERPL CALC-SCNC: 13 MMOL/L (ref 3–18)
ARTERIAL PATENCY WRIST A: YES
AST SERPL-CCNC: 13 U/L (ref 15–37)
ATRIAL RATE: 153 BPM
BARBITURATES UR QL SCN: NEGATIVE
BASE DEFICIT BLD-SCNC: 5 MMOL/L
BDY SITE: ABNORMAL
BENZODIAZ UR QL: NEGATIVE
BILIRUB SERPL-MCNC: 0.3 MG/DL (ref 0.2–1)
BNP SERPL-MCNC: 281 PG/ML (ref 0–450)
BODY TEMPERATURE: 98
BUN SERPL-MCNC: 10 MG/DL (ref 7–18)
BUN/CREAT SERPL: 9 (ref 12–20)
CALCIUM SERPL-MCNC: 9.2 MG/DL (ref 8.5–10.1)
CALCULATED P AXIS, ECG09: 67 DEGREES
CALCULATED R AXIS, ECG10: -87 DEGREES
CALCULATED T AXIS, ECG11: 72 DEGREES
CANNABINOIDS UR QL SCN: NEGATIVE
CHLORIDE SERPL-SCNC: 108 MMOL/L (ref 100–108)
CO2 SERPL-SCNC: 21 MMOL/L (ref 21–32)
COCAINE UR QL SCN: NEGATIVE
CREAT SERPL-MCNC: 1.06 MG/DL (ref 0.6–1.3)
DIAGNOSIS, 93000: NORMAL
ERYTHROCYTE [DISTWIDTH] IN BLOOD BY AUTOMATED COUNT: 13.3 % (ref 11.6–14.5)
FLUAV AG NPH QL IA: NEGATIVE
FLUBV AG NOSE QL IA: NEGATIVE
GAS FLOW.O2 O2 DELIVERY SYS: ABNORMAL L/MIN
GLOBULIN SER CALC-MCNC: 3.8 G/DL (ref 2–4)
GLUCOSE SERPL-MCNC: 127 MG/DL (ref 74–99)
HCO3 BLD-SCNC: 20.5 MMOL/L (ref 22–26)
HCT VFR BLD AUTO: 50.3 % (ref 36–48)
HDSCOM,HDSCOM: NORMAL
HGB BLD-MCNC: 17.4 G/DL (ref 13–16)
LACTATE BLD-SCNC: 5.6 MMOL/L (ref 0.4–2)
MCH RBC QN AUTO: 29.1 PG (ref 24–34)
MCHC RBC AUTO-ENTMCNC: 34.6 G/DL (ref 31–37)
MCV RBC AUTO: 84.3 FL (ref 74–97)
METHADONE UR QL: NEGATIVE
O2/TOTAL GAS SETTING VFR VENT: 0.3 %
OPIATES UR QL: NEGATIVE
P-R INTERVAL, ECG05: 120 MS
PCO2 BLD: 37.3 MMHG (ref 35–45)
PCP UR QL: NEGATIVE
PEEP RESPIRATORY: 6 CMH2O
PH BLD: 7.35 [PH] (ref 7.35–7.45)
PIP ISTAT,IPIP: 12
PLATELET # BLD AUTO: 354 K/UL (ref 135–420)
PMV BLD AUTO: 9.5 FL (ref 9.2–11.8)
PO2 BLD: 163 MMHG (ref 80–100)
POTASSIUM SERPL-SCNC: 4.1 MMOL/L (ref 3.5–5.5)
PRESSURE SUPPORT SETTING VENT: 6 CMH2O
PROT SERPL-MCNC: 7.1 G/DL (ref 6.4–8.2)
Q-T INTERVAL, ECG07: 336 MS
QRS DURATION, ECG06: 80 MS
QTC CALCULATION (BEZET), ECG08: 536 MS
RBC # BLD AUTO: 5.97 M/UL (ref 4.7–5.5)
SAO2 % BLD: 99 % (ref 92–97)
SERVICE CMNT-IMP: ABNORMAL
SODIUM SERPL-SCNC: 142 MMOL/L (ref 136–145)
SPECIMEN TYPE: ABNORMAL
TOTAL RESP. RATE, ITRR: 24
VENTRICULAR RATE, ECG03: 153 BPM
WBC # BLD AUTO: 22.4 K/UL (ref 4.6–13.2)

## 2017-09-05 PROCEDURE — 94640 AIRWAY INHALATION TREATMENT: CPT

## 2017-09-05 PROCEDURE — 99285 EMERGENCY DEPT VISIT HI MDM: CPT

## 2017-09-05 PROCEDURE — 82785 ASSAY OF IGE: CPT | Performed by: INTERNAL MEDICINE

## 2017-09-05 PROCEDURE — 77030029684 HC NEB SM VOL KT MONA -A

## 2017-09-05 PROCEDURE — 74011250636 HC RX REV CODE- 250/636: Performed by: HOSPITALIST

## 2017-09-05 PROCEDURE — 74011000250 HC RX REV CODE- 250: Performed by: EMERGENCY MEDICINE

## 2017-09-05 PROCEDURE — 84145 PROCALCITONIN (PCT): CPT | Performed by: INTERNAL MEDICINE

## 2017-09-05 PROCEDURE — 85027 COMPLETE CBC AUTOMATED: CPT | Performed by: INTERNAL MEDICINE

## 2017-09-05 PROCEDURE — 80307 DRUG TEST PRSMV CHEM ANLYZR: CPT | Performed by: NURSE PRACTITIONER

## 2017-09-05 PROCEDURE — 74011000250 HC RX REV CODE- 250: Performed by: INTERNAL MEDICINE

## 2017-09-05 PROCEDURE — 36415 COLL VENOUS BLD VENIPUNCTURE: CPT | Performed by: INTERNAL MEDICINE

## 2017-09-05 PROCEDURE — 74011000250 HC RX REV CODE- 250

## 2017-09-05 PROCEDURE — 96375 TX/PRO/DX INJ NEW DRUG ADDON: CPT

## 2017-09-05 PROCEDURE — 83880 ASSAY OF NATRIURETIC PEPTIDE: CPT | Performed by: INTERNAL MEDICINE

## 2017-09-05 PROCEDURE — 74011250636 HC RX REV CODE- 250/636: Performed by: EMERGENCY MEDICINE

## 2017-09-05 PROCEDURE — 99284 EMERGENCY DEPT VISIT MOD MDM: CPT

## 2017-09-05 PROCEDURE — 74011000250 HC RX REV CODE- 250: Performed by: PHYSICIAN ASSISTANT

## 2017-09-05 PROCEDURE — 83605 ASSAY OF LACTIC ACID: CPT

## 2017-09-05 PROCEDURE — 80053 COMPREHEN METABOLIC PANEL: CPT | Performed by: INTERNAL MEDICINE

## 2017-09-05 PROCEDURE — 94660 CPAP INITIATION&MGMT: CPT

## 2017-09-05 PROCEDURE — 36600 WITHDRAWAL OF ARTERIAL BLOOD: CPT

## 2017-09-05 PROCEDURE — 93005 ELECTROCARDIOGRAM TRACING: CPT

## 2017-09-05 PROCEDURE — 87804 INFLUENZA ASSAY W/OPTIC: CPT | Performed by: INTERNAL MEDICINE

## 2017-09-05 PROCEDURE — 65660000001 HC RM ICU INTERMED STEPDOWN

## 2017-09-05 PROCEDURE — 74011250636 HC RX REV CODE- 250/636

## 2017-09-05 PROCEDURE — 82803 BLOOD GASES ANY COMBINATION: CPT

## 2017-09-05 PROCEDURE — 71010 XR CHEST PORT: CPT

## 2017-09-05 PROCEDURE — 77030013033 HC MSK BPAP/CPAP MMKA -B

## 2017-09-05 PROCEDURE — 96365 THER/PROPH/DIAG IV INF INIT: CPT

## 2017-09-05 PROCEDURE — 86003 ALLG SPEC IGE CRUDE XTRC EA: CPT | Performed by: INTERNAL MEDICINE

## 2017-09-05 RX ORDER — DEXAMETHASONE SODIUM PHOSPHATE 4 MG/ML
10 INJECTION, SOLUTION INTRA-ARTICULAR; INTRALESIONAL; INTRAMUSCULAR; INTRAVENOUS; SOFT TISSUE
Status: COMPLETED | OUTPATIENT
Start: 2017-09-05 | End: 2017-09-05

## 2017-09-05 RX ORDER — IPRATROPIUM BROMIDE AND ALBUTEROL SULFATE 2.5; .5 MG/3ML; MG/3ML
3 SOLUTION RESPIRATORY (INHALATION) ONCE
Status: COMPLETED | OUTPATIENT
Start: 2017-09-05 | End: 2017-09-05

## 2017-09-05 RX ORDER — MAGNESIUM SULFATE HEPTAHYDRATE 40 MG/ML
INJECTION, SOLUTION INTRAVENOUS
Status: COMPLETED
Start: 2017-09-05 | End: 2017-09-05

## 2017-09-05 RX ORDER — ARFORMOTEROL TARTRATE 15 UG/2ML
15 SOLUTION RESPIRATORY (INHALATION)
Status: DISCONTINUED | OUTPATIENT
Start: 2017-09-05 | End: 2017-09-07

## 2017-09-05 RX ORDER — DEXAMETHASONE SODIUM PHOSPHATE 4 MG/ML
10 INJECTION, SOLUTION INTRA-ARTICULAR; INTRALESIONAL; INTRAMUSCULAR; INTRAVENOUS; SOFT TISSUE
Status: DISCONTINUED | OUTPATIENT
Start: 2017-09-05 | End: 2017-09-05

## 2017-09-05 RX ORDER — IPRATROPIUM BROMIDE AND ALBUTEROL SULFATE 2.5; .5 MG/3ML; MG/3ML
SOLUTION RESPIRATORY (INHALATION)
Status: COMPLETED
Start: 2017-09-05 | End: 2017-09-05

## 2017-09-05 RX ORDER — DEXAMETHASONE 6 MG/1
TABLET ORAL
Qty: 2 TAB | Refills: 0 | Status: SHIPPED | OUTPATIENT
Start: 2017-09-07 | End: 2017-09-07

## 2017-09-05 RX ORDER — ENOXAPARIN SODIUM 100 MG/ML
40 INJECTION SUBCUTANEOUS EVERY 24 HOURS
Status: DISCONTINUED | OUTPATIENT
Start: 2017-09-05 | End: 2017-09-06

## 2017-09-05 RX ORDER — BUDESONIDE 0.5 MG/2ML
500 INHALANT ORAL
Status: DISCONTINUED | OUTPATIENT
Start: 2017-09-05 | End: 2017-09-07

## 2017-09-05 RX ORDER — ALBUTEROL SULFATE 0.83 MG/ML
2.5 SOLUTION RESPIRATORY (INHALATION)
Status: DISCONTINUED | OUTPATIENT
Start: 2017-09-05 | End: 2017-09-05

## 2017-09-05 RX ORDER — IPRATROPIUM BROMIDE AND ALBUTEROL SULFATE 2.5; .5 MG/3ML; MG/3ML
3 SOLUTION RESPIRATORY (INHALATION)
Status: DISCONTINUED | OUTPATIENT
Start: 2017-09-05 | End: 2017-09-07 | Stop reason: HOSPADM

## 2017-09-05 RX ORDER — SODIUM CHLORIDE 0.9 % (FLUSH) 0.9 %
5-10 SYRINGE (ML) INJECTION EVERY 8 HOURS
Status: DISCONTINUED | OUTPATIENT
Start: 2017-09-05 | End: 2017-09-07 | Stop reason: HOSPADM

## 2017-09-05 RX ORDER — ALBUTEROL SULFATE 0.83 MG/ML
2.5 SOLUTION RESPIRATORY (INHALATION) CONTINUOUS
Status: DISCONTINUED | OUTPATIENT
Start: 2017-09-05 | End: 2017-09-05 | Stop reason: DRUGHIGH

## 2017-09-05 RX ORDER — ALBUTEROL SULFATE 0.83 MG/ML
2.5 SOLUTION RESPIRATORY (INHALATION)
Qty: 24 EACH | Refills: 6 | Status: SHIPPED | OUTPATIENT
Start: 2017-09-05 | End: 2017-12-06

## 2017-09-05 RX ORDER — MAGNESIUM SULFATE HEPTAHYDRATE 40 MG/ML
2 INJECTION, SOLUTION INTRAVENOUS ONCE
Status: COMPLETED | OUTPATIENT
Start: 2017-09-05 | End: 2017-09-05

## 2017-09-05 RX ORDER — SODIUM CHLORIDE 0.9 % (FLUSH) 0.9 %
5-10 SYRINGE (ML) INJECTION AS NEEDED
Status: DISCONTINUED | OUTPATIENT
Start: 2017-09-05 | End: 2017-09-07 | Stop reason: HOSPADM

## 2017-09-05 RX ORDER — ALBUTEROL SULFATE 0.83 MG/ML
2.5 SOLUTION RESPIRATORY (INHALATION)
Status: DISCONTINUED | OUTPATIENT
Start: 2017-09-05 | End: 2017-09-07 | Stop reason: HOSPADM

## 2017-09-05 RX ORDER — IPRATROPIUM BROMIDE AND ALBUTEROL SULFATE 2.5; .5 MG/3ML; MG/3ML
3 SOLUTION RESPIRATORY (INHALATION)
Status: COMPLETED | OUTPATIENT
Start: 2017-09-05 | End: 2017-09-05

## 2017-09-05 RX ORDER — ALBUTEROL SULFATE 0.83 MG/ML
5 SOLUTION RESPIRATORY (INHALATION)
Status: COMPLETED | OUTPATIENT
Start: 2017-09-05 | End: 2017-09-05

## 2017-09-05 RX ADMIN — IPRATROPIUM BROMIDE AND ALBUTEROL SULFATE 3 ML: .5; 3 SOLUTION RESPIRATORY (INHALATION) at 12:25

## 2017-09-05 RX ADMIN — IPRATROPIUM BROMIDE AND ALBUTEROL SULFATE 3 ML: 2.5; .5 SOLUTION RESPIRATORY (INHALATION) at 05:51

## 2017-09-05 RX ADMIN — SODIUM CHLORIDE 1000 ML: 900 INJECTION, SOLUTION INTRAVENOUS at 06:09

## 2017-09-05 RX ADMIN — MAGNESIUM SULFATE HEPTAHYDRATE 2 G: 40 INJECTION, SOLUTION INTRAVENOUS at 12:29

## 2017-09-05 RX ADMIN — METHYLPREDNISOLONE SODIUM SUCCINATE 40 MG: 40 INJECTION, POWDER, FOR SOLUTION INTRAMUSCULAR; INTRAVENOUS at 23:53

## 2017-09-05 RX ADMIN — IPRATROPIUM BROMIDE AND ALBUTEROL SULFATE 3 ML: .5; 3 SOLUTION RESPIRATORY (INHALATION) at 05:51

## 2017-09-05 RX ADMIN — ENOXAPARIN SODIUM 40 MG: 40 INJECTION SUBCUTANEOUS at 14:10

## 2017-09-05 RX ADMIN — ALBUTEROL SULFATE 2.5 MG: 2.5 SOLUTION RESPIRATORY (INHALATION) at 13:29

## 2017-09-05 RX ADMIN — MAGNESIUM SULFATE HEPTAHYDRATE 2 G: 40 INJECTION, SOLUTION INTRAVENOUS at 05:55

## 2017-09-05 RX ADMIN — ALBUTEROL SULFATE 5 MG: 2.5 SOLUTION RESPIRATORY (INHALATION) at 06:04

## 2017-09-05 RX ADMIN — DEXAMETHASONE SODIUM PHOSPHATE 10 MG: 4 INJECTION, SOLUTION INTRAMUSCULAR; INTRAVENOUS at 05:53

## 2017-09-05 RX ADMIN — Medication 10 ML: at 23:53

## 2017-09-05 RX ADMIN — BUDESONIDE 500 MCG: 0.5 INHALANT RESPIRATORY (INHALATION) at 19:56

## 2017-09-05 RX ADMIN — ALBUTEROL SULFATE 5 MG: 2.5 SOLUTION RESPIRATORY (INHALATION) at 07:01

## 2017-09-05 RX ADMIN — SODIUM CHLORIDE 1000 ML: 900 INJECTION, SOLUTION INTRAVENOUS at 14:16

## 2017-09-05 RX ADMIN — ALBUTEROL SULFATE 2.5 MG: 2.5 SOLUTION RESPIRATORY (INHALATION) at 15:29

## 2017-09-05 RX ADMIN — Medication 10 ML: at 15:10

## 2017-09-05 RX ADMIN — IPRATROPIUM BROMIDE AND ALBUTEROL SULFATE 3 ML: 2.5; .5 SOLUTION RESPIRATORY (INHALATION) at 12:25

## 2017-09-05 RX ADMIN — ALBUTEROL SULFATE 2.5 MG: 2.5 SOLUTION RESPIRATORY (INHALATION) at 12:52

## 2017-09-05 RX ADMIN — ALBUTEROL SULFATE 5 MG: 2.5 SOLUTION RESPIRATORY (INHALATION) at 05:51

## 2017-09-05 RX ADMIN — IPRATROPIUM BROMIDE AND ALBUTEROL SULFATE 3 ML: .5; 3 SOLUTION RESPIRATORY (INHALATION) at 18:07

## 2017-09-05 RX ADMIN — METHYLPREDNISOLONE SODIUM SUCCINATE 125 MG: 125 INJECTION, POWDER, FOR SOLUTION INTRAMUSCULAR; INTRAVENOUS at 12:29

## 2017-09-05 RX ADMIN — ALBUTEROL SULFATE 2.5 MG: 2.5 SOLUTION RESPIRATORY (INHALATION) at 12:25

## 2017-09-05 RX ADMIN — ALBUTEROL SULFATE 2.5 MG: 2.5 SOLUTION RESPIRATORY (INHALATION) at 19:56

## 2017-09-05 RX ADMIN — ARFORMOTEROL TARTRATE 15 MCG: 15 SOLUTION RESPIRATORY (INHALATION) at 19:56

## 2017-09-05 RX ADMIN — METHYLPREDNISOLONE SODIUM SUCCINATE 40 MG: 40 INJECTION, POWDER, FOR SOLUTION INTRAMUSCULAR; INTRAVENOUS at 18:24

## 2017-09-05 RX ADMIN — ALBUTEROL SULFATE 2.5 MG: 2.5 SOLUTION RESPIRATORY (INHALATION) at 12:48

## 2017-09-05 NOTE — ED NOTES
Verbal shift change report given to Mauri Thapa RN (oncoming nurse) by Tobias Gibbs (offgoing nurse). Report included the following information SBAR.

## 2017-09-05 NOTE — H&P
Internal Medicine History and Physical          Subjective     HPI: Constance Everett is a 33 y.o. male with a PMHx of asthma who presented to the ED with the acute onset of dyspnea early this morning. He admits to having upper respiratory symptoms over the last two days, including runny nose and post-nasal drip. He was worried it would turn into this. He has neb treatment and albuterol at home. He admits to daily use of albuterol at least 3x/d and up at night multiple times. He has been Rx ICS/LABA but has not been able to afford it. He presented to the ED early this morning and was given neb treatments and d/c home after resolution of his sx. He returned bc of worsening sx. In the ED, he was placed on BiPAP and given multiple nebs. PMHx:  Past Medical History:   Diagnosis Date    Asthma     Chronic obstructive pulmonary disease ([facility] [address]        PSurgHx:  Past Surgical History:   Procedure Laterality Date    HX FREE SKIN GRAFT      HX ORTHOPAEDIC      facial reconstruction       SocialHx:  Social History     Social History    Marital status: SINGLE     Spouse name: N/A    Number of children: N/A    Years of education: N/A     Occupational History    Not on file. Social History Main Topics    Smoking status: Never Smoker    Smokeless tobacco: Former User    Alcohol use Yes    Drug use: No    Sexual activity: Not Currently     Other Topics Concern    Not on file     Social History Narrative       FamilyHx:  History reviewed. No pertinent family history. Prior to Admission Medications   Prescriptions Last Dose Informant Patient Reported? Taking? albuterol (PROVENTIL HFA, VENTOLIN HFA, PROAIR HFA) 90 mcg/actuation inhaler   No No   Sig: Take 2 Puffs by inhalation every four (4) hours as needed for Wheezing. albuterol (PROVENTIL VENTOLIN) 2.5 mg /3 mL (0.083 %) nebulizer solution   No No   Sig: 3 mL by Nebulization route every four (4) hours as needed for Wheezing. albuterol-ipratropium (DUO-NEB) 2.5 mg-0.5 mg/3 ml nebu   No No   Sig: 3 mL by Nebulization route every six (6) hours as needed. dexamethasone (DECADRON) 6 mg tablet   No No   Sig: Take 2 tablets on 9/7/17      Facility-Administered Medications: None       Review of Systems:  CONST: Fever, weight loss, fatigue or chills  GI: Nausea, vomiting, abdominal pain, change in bowel habits, hematochezia, melena, and GERD   INTEG: Dermatitis, abnormal moles  HEENT: Recent changes in vision, vertigo, epistaxis, dysphagia, runny nose, post-nasal drip, and hoarseness  CV: Chest pain, palpitations, HTN, edema and varicosities  RESP: Cough, shortness of breath, wheezing, hemoptysis, snoring and reactive airway disease  : Hematuria, dysuria, frequency, urgency, nocturia and stress urinary incontinence   MS: Weakness, joint pain and arthritis  ENDO: Diabetes, thyroid disease, polyuria, polydipsia, polyphagia, poor wound healing, heat intolerance, cold intolerance  LYMPH/HEME: Anemia, bruising and history of blood transfusions  NEURO: Dizziness, headache, fainting, seizures and stroke  PSYCH: Anxiety and depression      Objective      Visit Vitals    /74    Pulse (!) 122    Temp 97.7 °F (36.5 °C)    Resp 26    Wt 77.1 kg (170 lb)    SpO2 100%    BMI 25.85 kg/m2       Physical Exam:  General Appearance: NAD, conversant on BiPAP  HENT: normocephalic/atraumatic, moist mucus membranes  Lungs:  Tachypnea w/ decreased air movement, B/L wheeze  Cardiovascular: Tachycardic w/ RR, no m/r/g, capillary refill < 2 sec, B/L DP/PT pusles +3/4  Abdomen: soft, non-tender, normal bowel sounds  Extremities: no cyanosis, no peripheral edema  Neuro: moves all extremities, no focal deficits  Psych: appropriate affect, alert and oriented to person, place and time    Laboratory Studies:  CMP:   Lab Results   Component Value Date/Time     09/05/2017 12:25 PM    K 4.1 09/05/2017 12:25 PM     09/05/2017 12:25 PM    CO2 21 09/05/2017 12:25 PM    AGAP 13 09/05/2017 12:25 PM     (H) 09/05/2017 12:25 PM    BUN 10 09/05/2017 12:25 PM    CREA 1.06 09/05/2017 12:25 PM    GFRAA >60 09/05/2017 12:25 PM    GFRNA >60 09/05/2017 12:25 PM    CA 9.2 09/05/2017 12:25 PM    ALB 3.3 (L) 09/05/2017 12:25 PM    TP 7.1 09/05/2017 12:25 PM    GLOB 3.8 09/05/2017 12:25 PM    AGRAT 0.9 09/05/2017 12:25 PM    SGOT 13 (L) 09/05/2017 12:25 PM    ALT 37 09/05/2017 12:25 PM     CBC:   Lab Results   Component Value Date/Time    WBC 22.4 (H) 09/05/2017 12:25 PM    HGB 17.4 (H) 09/05/2017 12:25 PM    HCT 50.3 (H) 09/05/2017 12:25 PM     09/05/2017 12:25 PM     All Cardiac Markers in the last 24 hours: No results found for: CPK, CKMMB, CKMB, RCK3, CKMBT, CKNDX, CKND1, JENNIFER, TROPT, TROIQ, HIRAM, TROPT, TNIPOC, BNP, BNPP    Imaging Reviewed:  Xr Chest Port    Result Date: 9/5/2017  PORTABLE CHEST AT 0555 HOURS: Indication:  Chest pain and shortness of breath. Technique:  Portable, erect AP view. Comparison:  06/28/2017 Findings:  Lungs are adequately expanded without focal consolidation, pulmonary edema or pneumothorax. The cardiac silhouette and mediastinal structures are within normal limits for technique. The visualized bones and soft tissues are unremarkable. IMPRESSION: Unremarkable portable chest. Stable exam.      Assessment/Plan     Active Hospital Problems    Diagnosis Date Noted    Status asthmaticus 09/05/2017    Lactic acidosis 30/77/4770    Metabolic acidosis with respiratory alkalosis 09/05/2017     - Cont IV steroids, nebs and BiPAP  - Titrate off while able  - Procalc and IgE ordered, along w/ flu  - Pulm/CC on board  - Observe off antix for now  - Cont acceptable home medications for chronic conditions   - DVT protocol    I have personally reviewed all pertinent labs, films and EKGs that have officially resulted.  I reviewed available electronic documentation outlining the initial presentation as well as the emergency room physician's encounter.     Sinai Nicholas DO  Internal Medicine, Hospitalist  Pager: [phone] [facility]

## 2017-09-05 NOTE — ROUTINE PROCESS
(1740) Assumed care of pt. Received report from Adri Wells, [facility].     (2469) Patient given urinal at this time. [phone] Patient placed on regular diet. Dietary paged for a tray. (1922) Bedside and Verbal shift change report given to Nilda Hale RN (oncoming nurse) by Nash Ramirez RN (offgoing nurse). Report included the following information SBAR, Intake/Output, MAR, Recent Results, Cardiac Rhythm NSR and Alarm Parameters .

## 2017-09-05 NOTE — ACP (ADVANCE CARE PLANNING)
Patient has designated _____friend___________________ to participate in his/her discharge plan and to receive any needed information.      Name: Tray Elsi  Address:  Phone number: [phone]

## 2017-09-05 NOTE — MED STUDENT NOTES
History and Physical    Patient: Abel Goldberg MRN: [id number]  SSN: [id number]    YOB: 1990  Age: 33 y.o. Sex: male      Subjective:      Abel Goldberg is a 33 y.o. male who has a 14 year hx of asthma reporting to the ED with 1 day hx of progressive SOB, dizziness, and 7/10 chest pain. Asthma symptoms have worsened over past 2 years including nocturnal sxn 3-4 times per night. Nothing has made sxn better. Has tried albuterol inhaler and rest.  When without sxn, pt able to exercise and conduct ADLs. He does not have insurance and cannot afford prescribed prednisone. He currently has roommates with 2 cats, 2 dogs, and a hedgehog. He has a hx of allergies to cats and dogs, but current symptoms began prior to moving in with roommates. Symptoms will occur spontaneously. PMH: Asthma since age 16; several broken bones as child    PSH: Significant childhood surgical hx including jaw reconstruction, skin graft on back, pin in cervical spine, plastic patella placement    Meds: Albuterol 2-3 puffs/day; nebulizer \"a few hours each day\". Will take prednisone following ED visit. All:  Penicillins    Fam Hx: Father-COPD, Half-sister-Asthma and COPD    Social Hx:  Works as , no recent travel, denies tobacco/alcohol/drugs. Vaccinations and flu shot up-to-date. Lives with girlfriend, roommates, and animals. Past Medical History:   Diagnosis Date    Asthma     Chronic obstructive pulmonary disease ([facility] [address]      Past Surgical History:   Procedure Laterality Date    HX FREE SKIN GRAFT      HX ORTHOPAEDIC      facial reconstruction      History reviewed. No pertinent family history. Social History   Substance Use Topics    Smoking status: Never Smoker    Smokeless tobacco: Former User    Alcohol use Yes      Prior to Admission medications    Medication Sig Start Date End Date Taking?  Authorizing Provider   dexamethasone (DECADRON) 6 mg tablet Take 2 tablets on 9/7/17 9/7/17   Nacho Richardson, DO   albuterol (PROVENTIL VENTOLIN) 2.5 mg /3 mL (0.083 %) nebulizer solution 3 mL by Nebulization route every four (4) hours as needed for Wheezing. 9/5/17   Fatou Hankins, DO   albuterol (PROVENTIL HFA, VENTOLIN HFA, PROAIR HFA) 90 mcg/actuation inhaler Take 2 Puffs by inhalation every four (4) hours as needed for Wheezing. 8/12/17   Cameron Rod MD   albuterol-ipratropium (DUO-NEB) 2.5 mg-0.5 mg/3 ml nebu 3 mL by Nebulization route every six (6) hours as needed. 6/24/17   Adolfo Morfin MD        Allergies   Allergen Reactions    Penicillin G Anaphylaxis       Review of Systems:     GI: Pt denies nausea/vomitting/diahrea   Neurologic: Pt denies headaches  Respiratory: shortness of breath and wheezes, using accessory muscles    Objective:     Vitals:    09/05/17 1245 09/05/17 1256 09/05/17 1300 09/05/17 1315   BP: 136/72  125/69 118/75   Pulse: (!) 135  (!) 135 (!) 133   Resp: 25      Temp:  98 °F (36.7 °C)     SpO2: 98%  100% 99%   Weight:            Physical Exam:  GENERAL: alert, cooperative, mild distress, appears stated age, pale, mildly obese  EYE: conjunctivae/corneas clear. PERRL, EOM's intact. Fundi benign  LUNG: expiratory wheezes R apex, R anterior, R base, L apex, L anterior, L base  HEART: Tachycardic, S1, S2 normal, no S3 or S4, no murmurs, no click, no rub  ABDOMEN: soft, non-tender. Bowel sounds normal. No masses,  no organomegaly  EXTREMITIES:  extremities normal, atraumatic, no cyanosis or edema    Assessment:     Hospital Problems  Never Reviewed          Codes Class Noted POA    Status asthmaticus ICD-10-CM: J45.902  ICD-9-CM: 493.91  9/5/2017 Unknown            Labs significant for pH 7.347 (acidosis); increased WBC (22.4), Hmg (17.4), Hct (50.3), glucose (127), lactic acid (5.5); decreased BUN/Cr (9), HCO3 (20.5), AST (13), Albumin (3.3). PCO2 WNL (37.3).      DDX: Status asthmaticus, acute asthma exacerbation, COPD, GERD, refractory sever persistent asthma, drug overdose  Plan:     Order PEF to follow clinical course, CXR to r/o PNA and PTX. Continue oxygen, albuterol, prednisone, ipratropium. Reassess after a couple hours of treatment. If PEF>70%, d/c with JELENA and tapered oral steroid. If PEF70>40, admit to valencia with JELENA/ICS for observation. If PEF<40, admit to ICU for intubation. Once stable, d/c with JELENA, and high dose ICS or PO steroids. Signed By: Jose Pepper     September 5, 2017      *ATTENTION:  This note has been created by a medical student for educational purposes only. Please do not refer to the content of this note for clinical decision-making, billing, or other purposes. Please see attending physicians note to obtain clinical information on this patient. *

## 2017-09-05 NOTE — PROGRESS NOTES
[place]   Discharge Planning/ Assessment    Reasons for Intervention: Chart reviewed and I talked with Pt. He is self pay and has no PCP. He was not able to afford his medications, and now has an asthma flare up. Dr Arsh Moss says he has been here frequently with flare ups. He has his girlfriend here Lacy Tomlinson [phone], whom he lives with. She has a dog, cat and another pet in the home. He says he is independent with ADL, has not had Home Health in the past. He does have nebulizers but no o2 or CPap at home.  consult placed. His plan is to go home, with girlfriend to participate in dc process.     High Risk Criteria  [x] Yes  []No   Physician Referral  [] Yes  [x]No        Date    Nursing Referral  [] Yes  [x]No        Date    Patient/Family Request  [] Yes  [x]No        Date       Resources:    Medicare  [] Yes  [x]No   Medicaid  [] Yes  [x]No   No Resources  [x] Yes  []No   Private Insurance  [] Yes  [x]No    Name/Phone Number    Other  [] Yes  [x]No        (i.e. Workman's Comp)         Prior Services:    Prior Services  [] Yes  [x]No   Home Health  [] Yes  [x]No   [address]  [] Yes  [x]No        Number of [address]  [] Yes  [x]No       Meals on Wheels  [] Yes  [x]No   Office on Aging  [] Yes  [x]No   Transportation Services  [] Yes  [x]No   Nursing Home  [] Yes  [x]No        Nursing Home Name    [address]  [] Yes  [x]No        [address] Name    Other       Information Source:      Information obtained from  [x] Patient  [] Parent   [] [address]   [] Child  [] Spouse   [] Significant Other/Partner   [] Friend      [] EMS    [] Nursing Home Chart          [] Other:   Chart Review  [x] Yes  []No     Family/Support System:    Patient lives with  [] Alone    [] Spouse   [x] Significant Other  [] Children  [] Caretaker   [] Parent  [] Sibling     [] Other       Other Support System:    Is the patient responsible for care of others  [] Yes  [x]No   Information of person caring for patient on  discharge    Managers financial affairs independently  [x] Yes  []No   If no, explain:      Status Prior to Admission:    Mental Status  [x] Awake  [] Alert  [] Oriented  [] Quiet/Calm [] Lethargic/Sedated   [] Disoriented  [] Restless/Anxious  [] Combative   Personal Care  [] Dependent  [x] [address]  [] Requires Assistance   Meal Preparation Ability  [x] Independent   [] Standby Assistance   [] Minimal Assistance   [] Moderate Assistance  [] Maximum Assistance     [] Total Assistance   Chores  [x] Independent with Chores   [] [address] Resident   [] Requires Assistance   Bowel/Bladder  [x] Continent  [] Catheter  [] Incontinent  [] Ostomy Self-Care    [] Urine Diversion Self-Care  [] Maximum Assistance     [] Total Assistance   Number of Persons needed for assistance    DME at home  [] Tye Hernandez, Epimenio Bitter  [] Keyure David, Straight   [] Commode    [] Bathroom/Grab Bars  [] Hospital Bed  [] Nebulizer  [] Oxygen           [] Raised Toilet Seat  [] Shower Chair  [] Side Rails for Bed   [] Tub Transfer Bench   [] Fabrizio Carson  [] [place], Standard      [] Other:   Vendor      Treatment Presently Receiving:    Current Treatments  [] Chemotherapy  [] Dialysis  [] Insulin  [] IVAB [x] IVF   [x] O2  [] PCA   [] PT   [] RT   [] Tube Feedings   [] Wound Care     Psychosocial Evaluation:    Verbalized Knowledge of Disease Process  [] Patient  []Family   Coping with Disease Process  [] Patient  []Family   Requires Further Counseling Coping with Disease Process  [] Patient  []Family     Identified Projected Needs:    Home Health Aid  [] Yes  [x]No   Transportation  [] Yes  [x]No   Education  [] Yes  [x]No        Specific Education     Financial Counseling  [] Yes  [x]No   Inability to Care for Self/Will Require 24 hour care  [] Yes  [x]No   Pain Management  [] Yes  [x]No   Home Infusion Therapy  [] Yes  [x]No   Oxygen Therapy [] Yes  [x]No   DME  [] Yes  [x]No   Long Term Care Placement  [] Yes  [x]No   Rehab  [] Yes  [x]No   Physical Therapy  [] Yes  [x]No   Needs Anticipated At This Time  [] Yes  [x]No     Intra-Hospital Referral:    [address]  [] Yes  [x]No     [] Yes  [x]No   Patient Representative  [] Yes  [x]No   Staff for Teaching Needs  [] Yes  [x]No   Specialty Teaching Needs     Diabetic Educator  [] Yes  [x]No   Referral for Diabetic Educator Needed  [] Yes  [x]No  If Yes, place order for Nutritionist or Diabetic Consult     Tentative Discharge Plan:    Home with No Services  [] Yes  [x]No   Home with Home Health Follow-up  [x] Yes  []No        If Yes, specify type    Home Care Program  [] Yes  [x]No        If Yes, specify type    Meals on Wheels  [] Yes  [x]No   Office of Aging  [] Yes  [x]No   NHP  [] Yes  [x]No   Return to the Nursing Home  [] Yes  [x]No   Rehab Therapy  [] Yes  [x]No   Acute Rehab  [] Yes  [x]No   Subacute Rehab  [] Yes  [x]No   Private Care  [] Yes  [x]No   Substance Abuse Referral  [] Yes  [x]No   Transportation  [] Yes  [x]No   Chore Service  [] Yes  [x]No   Inpatient Hospice  [] Yes  [x]No   OP RT  [] Yes  [x] No   OP Hemo  [] Yes  [x] No   OP PT  [] Yes  [x]No   Support Group  [] Yes  [x]No   Reach to Recovery  [] Yes  [x]No   OP Oncology Clinic  [] Yes  [x]No   Clinic Appointment  [] Yes  [x]No   DME  [] Yes  [x]No   Comments    Name of D/C Planner or  Given to Patient or Family Liz Osuna. Annalee Justice RN   Phone Number         [phone]   Date 09/05/17   Time    If you are discharged home, whom do you designate to participate in your discharge plan and receive any information needed?      Enter name of designee GF        Phone # of designee         Address of designee         Updated         Patient refused to designate any           individual

## 2017-09-05 NOTE — ED PROVIDER NOTES
[facility]  EMERGENCY DEPARTMENT HISTORY AND PHYSICAL EXAM       Date: 9/5/2017   Patient Name: Juventino Patino   YOB: 1990  Medical Record Number: [id number]    HISTORY OF PRESENTING ILLNESS:     Chief Complaint   Patient presents with    Shortness of Breath        Juventino Patino is a 33 y.o. male presenting with the noted PMH to the ED c/o moderate SOB and respiratory distress starting one day ago. Pt notes sx similar to other asthma attacks in the setting of a cough and rhinorrhea x 3 days with no fever. Pt used 2 albuterol treatments PTA. Primary Care Provider: None   Specialist:    Past Medical History:   Past Medical History:   Diagnosis Date    Asthma     Chronic obstructive pulmonary disease ([place] [address]         Past Surgical History:   Past Surgical History:   Procedure Laterality Date    HX FREE SKIN GRAFT      HX ORTHOPAEDIC      facial reconstruction        Social History:   Social History   Substance Use Topics    Smoking status: Never Smoker    Smokeless tobacco: Former User    Alcohol use Yes        Allergies: Allergies   Allergen Reactions    Penicillin G Anaphylaxis        REVIEW OF SYSTEMS:  Review of Systems   Constitutional: Negative for chills and fever. HENT: Positive for rhinorrhea. Negative for ear pain and sore throat. Eyes: Negative for pain and visual disturbance. Respiratory: Positive for cough, shortness of breath and wheezing. Cardiovascular: Negative for chest pain and palpitations. Gastrointestinal: Negative for abdominal pain, diarrhea, nausea and vomiting. Genitourinary: Negative for flank pain. Musculoskeletal: Negative for back pain and neck pain. Neurological: Negative for syncope and headaches. Psychiatric/Behavioral: Negative for agitation. The patient is not nervous/anxious.           PHYSICAL EXAM:  Vitals:    09/05/17 0547 09/05/17 0551   BP: 151/85 122/85   Pulse: (!) 149 (!) 149   Resp: (!) 34    SpO2: 100%        Physical Exam   Constitutional: He is oriented to person, place, and time. He appears well-developed and well-nourished. HENT:   Head: Normocephalic and atraumatic. Mouth/Throat: Oropharynx is clear and moist.   Eyes: Pupils are equal, round, and reactive to light. No scleral icterus. Neck: Neck supple. No tracheal deviation present. Cardiovascular: Regular rhythm. No murmur heard. Pulmonary/Chest: No respiratory distress. He has wheezes. Wheezes in all lung fields, + tachypnea   Abdominal: Soft. There is no tenderness. Musculoskeletal: Normal range of motion. He exhibits no deformity. Neurological: He is alert and oriented to person, place, and time. No gross neuro deficit   Skin: Skin is warm and dry. No rash noted. He is not diaphoretic. Psychiatric: He has a normal mood and affect. His behavior is normal.   Nursing note and vitals reviewed. Medications   magnesium sulfate 2 g/50 ml IVPB (premix or compounded) (2 g IntraVENous New Bag 9/5/17 0555)   albuterol (PROVENTIL VENTOLIN) nebulizer solution 5 mg (5 mg Nebulization Given 9/5/17 0604)   sodium chloride 0.9 % bolus infusion 1,000 mL (1,000 mL IntraVENous New Bag 9/5/17 0609)   dexamethasone (DECADRON) 4 mg/mL injection 10 mg (10 mg IntraVENous Given 9/5/17 0553)   albuterol-ipratropium (DUO-NEB) 2.5 MG-0.5 MG/3 ML (3 mL Nebulization Given 9/5/17 0551)       RESULTS:    EKG interpretation:    06:02 sinus tachycardi, rate 153; intervals wnl aside from prolonged QTc at 536ms; LAFB;  no acute ST elevations. Labs -   Labs Reviewed - No data to display     Radiology -   XR CHEST PORT    As read by Addis Brooks DO, no acute cardiopulmonary abnormalities. ,          MEDICAL DECISION MAKING    DDX: viral URI, asthma exacerbation, pneumonia, dehydration, PTX.    33 y.o. male with noted past medical history who presented with SOB and respiratory distress.     Vitals were notable for tachycardia likely due to multiple albuterol nebs. PE notable for wheezes in all fields. The differential above was considered, I was most suspicious for viral URI and asthma exacerbation. The patient was given continuous albuterol nebs, steroids and magnesium. Diagnostics notable for CXR that showed no acute bacterial infection or pneumothorax. On re-evaluation pt breathing easier with clear lungs and tolerated ambulatory 02 trial satting 97%. States he feels back to baseline and is ready for d/c. Patient has no new complaints, changes, or physical findings. Results were reviewed with the patient. Pt's questions and concerns were addressed. Care plan was outlined, including follow-up with PCP/specialist and return precautions were discussed. Patient is felt to be stable for discharge at this time. Diagnosis   Clinical Impression:   1. Viral upper respiratory tract infection    2. Mild persistent asthma with acute exacerbation           Follow-up Information     Follow up With Details Comments Contact Info    [facility] EMERGENCY DEPT  If symptoms worsen [address] [place] [address] Schedule an appointment as soon as possible for a visit To establish primary care Fredy  [phone]          Current Discharge Medication List      START taking these medications    Details   dexamethasone (DECADRON) 6 mg tablet Take 2 tablets on 9/7/17  Qty: 2 Tab, Refills: 0             _______________________________   Attestations:         Scribe Attestation      Michelle James acting as a scribe for and in the presence of Faith Barker DO      September 05, 2017 at 6:16 AM       Provider Attestation:      I personally performed the services described in the documentation, reviewed the documentation, as recorded by the scribe in my presence, and it accurately and completely records my words and actions.  September 05, 2017 at [address] [place] [address], DO

## 2017-09-05 NOTE — PROGRESS NOTES
1536: Received pt on BiPAP with the following settings: IPAP 12, EPAP 6, FIO2 30%. Pt has diminished breath sounds with expiratory wheezing (sounds tight with little air movement). Pt able to talk while on BiPAP. Pt given scheduled Albuterol treatment inline with BiPAP. No adverse effects observed. Plan is to keep pt on BiPAP and attempt to remove at later time as pt states he is hungry and would like to eat. Suction set up and working. 1714: Pt removed from BiPAP, placed on 3 LPM NC, transported from 2607 to 2702 with no observed adverse effects. Pt placed on 3 LPM NC with bubble humidifier. Pt able to talk freely. BiPAP is in the room and ready to be used if needed. Plan is to keep pt off BiPAP so he can eat. 1811: Pt felt short of breath, requested breathing treatment. Pt given PRN DuoNeb treatment via breath actuated nebulizer. No adverse effects observed. Pt has expiratory wheezing.

## 2017-09-05 NOTE — ROUTINE PROCESS
TRANSFER - IN REPORT:    Verbal report received from Brianna GUTIERREZ RN(name) on Barbara Cavazos  being received from ER(unit) for routine progression of care      Report consisted of patients Situation, Background, Assessment and   Recommendations(SBAR). Information from the following report(s) SBAR, ED Summary and Cardiac Rhythm Sinus Tach was reviewed with the receiving nurse. Opportunity for questions and clarification was provided. Assessment completed upon patients arrival to unit and care assumed. 1740-Bedside shift change report given to [address] [place] [address] (oncoming nurse) by Leslie Moreno (offgoing nurse). Report included the following information SBAR, MAR and Cardiac Rhythm Sinus Tach.

## 2017-09-05 NOTE — CONSULTS
[facility]  Pulmonary, Critical Care, and Sleep Medicine    Name: Sonia Ortiz MRN: [id number]   : 1990 Hospital: [facility]   Date: 2017        Critical Care Initial Patient Consult      IMPRESSION:      Patient Active Problem List   Diagnosis Code    Status asthmaticus J45.902        RECOMMENDATIONS:   Neuro    Cardiovascular  Tachycardic, otherwise hemodynamically stable  Lactic acid elevated, will trend    Pulmonary  Status asthmaticus  On continuous nebulizers and bipap  Continuous nebulizer while in distress, then decrease to q5  Start pulmicort nebts  Start IV steroids   Start racemic epi PRN  Will order procalcitonin and IgE level  Will order Flu A/B panel  CXR with no acute infiltrates  Will order zone 2 allergen panel     GI  NPO while on bipap  Advance diet when transitioned to NC    Renal  No acute issues    ID  No fever  WBC elevated, actually improved from last hospitalization  ? Infectious, ? Steroids, for completeness will order peripheral smear given was also elevated in     Heme  Lovenox ppx  Leukocytosis         Subjective/History: This patient has been seen and evaluated at the request of ODILIA Suresh for status asthmaticus. Patient is a 33 y.o. male with ho poorly controlled asthma who came to ED around 6 am with dyspnea. He received nebulizer treatments and dyspnea resolved so was discharged home around 8 am.  He returned to ED around noon with worsening shortness of breath and was placed on bipap. Pt with tachycardia and rates in 130s to 140s. Pt has multiple recent ED visits with similar complaints including visits on , , , today . He reports he has no insurance and has not been able to establish a PCP, pulmonologist, or get med refills outside the ED since moving to [place] in .       Past Medical History:   Diagnosis Date    Asthma     Chronic obstructive pulmonary disease ([facility] [address]       Past Surgical History: Procedure Laterality Date    HX FREE SKIN GRAFT      HX ORTHOPAEDIC      facial reconstruction      Prior to Admission medications    Medication Sig Start Date End Date Taking? Authorizing Provider   dexamethasone (DECADRON) 6 mg tablet Take 2 tablets on 17   Fatou Hankins DO   albuterol (PROVENTIL VENTOLIN) 2.5 mg /3 mL (0.083 %) nebulizer solution 3 mL by Nebulization route every four (4) hours as needed for Wheezing. 17   Fatou Hankins DO   albuterol (PROVENTIL HFA, VENTOLIN HFA, PROAIR HFA) 90 mcg/actuation inhaler Take 2 Puffs by inhalation every four (4) hours as needed for Wheezing. 17   Surinder Pena MD   albuterol-ipratropium (DUO-NEB) 2.5 mg-0.5 mg/3 ml nebu 3 mL by Nebulization route every six (6) hours as needed. 17   Susan Fernandez MD     Current Facility-Administered Medications   Medication Dose Route Frequency    albuterol (PROVENTIL VENTOLIN) nebulizer solution 2.5 mg  2.5 mg Nebulization CONTINUOUS    sodium chloride (NS) flush 5-10 mL  5-10 mL IntraVENous Q8H    methylPREDNISolone (PF) (SOLU-MEDROL) injection 40 mg  40 mg IntraVENous Q6H    enoxaparin (LOVENOX) injection 40 mg  40 mg SubCUTAneous Q24H    budesonide (PULMICORT) 500 mcg/2 ml nebulizer suspension  500 mcg Nebulization BID RT     Allergies   Allergen Reactions    Penicillin G Anaphylaxis      Social History   Substance Use Topics    Smoking status: Never Smoker    Smokeless tobacco: Former User    Alcohol use Yes      History reviewed. No pertinent family history. Review of Systems:  A comprehensive review of systems was negative except for that written in the HPI.     Objective:   Vital Signs:    Visit Vitals    /72    Pulse (!) 135    Temp 98 °F (36.7 °C)    Resp 25    Wt 77.1 kg (170 lb)    SpO2 98%    BMI 25.85 kg/m2       O2 Device: BIPAP       Temp (24hrs), Av.5 °F (36.9 °C), Min:98 °F (36.7 °C), Max:99 °F (37.2 °C)       Intake/Output:   Last shift:         Last 3 shifts:    No intake or output data in the 24 hours ending 09/05/17 1258      Ventilator Settings:  Mode Rate Tidal Volume Pressure FiO2 PEEP            30 %       Peak airway pressure:      Minute ventilation: 13.9 l/min      ARDS network Guidelines: Lung protective strategy and Pl pressure goals____________    Physical Exam:    General:  Alert, cooperative, no distress, appears stated age. Head:  Normocephalic, without obvious abnormality, atraumatic. Eyes:  Conjunctivae/corneas clear. PERRL, EOMs intact. Nose: Nares normal. Septum midline. Mucosa normal. No drainage or sinus tenderness. Throat: Lips, mucosa, and tongue normal. Teeth and gums normal.   Neck: Supple, symmetrical, trachea midline, no adenopathy, thyroid: no enlargment/tenderness/nodules, no carotid bruit and no JVD. Back:   Symmetric, no curvature. ROM normal.   Lungs:   Coarse harsh wheezes bilaterally, moderate air movement. Chest wall:  No tenderness or deformity. Heart:  Regular rate and rhythm, S1, S2 normal, no murmur, click, rub or gallop. Abdomen:   Soft, non-tender. Bowel sounds normal. No masses,  No organomegaly. Extremities: Extremities normal, atraumatic, no cyanosis or edema. Pulses: 2+ and symmetric all extremities.    Skin: Skin color, texture, turgor normal. No rashes or lesions   Lymph nodes:      Cervical, supraclavicular, and axillary nodes normal.   Neurologic: Grossly nonfocal       Data:     Recent Results (from the past 24 hour(s))   EKG, 12 LEAD, SUBSEQUENT    Collection Time: 09/05/17  6:02 AM   Result Value Ref Range    Ventricular Rate 153 BPM    Atrial Rate 153 BPM    P-R Interval 120 ms    QRS Duration 80 ms    Q-T Interval 336 ms    QTC Calculation (Bezet) 536 ms    Calculated P Axis 67 degrees    Calculated R Axis -87 degrees    Calculated T Axis 72 degrees    Diagnosis       Sinus tachycardia  Left anterior fascicular block  Poor R Wave Progression  Abnormal ECG  No previous ECGs available  Confirmed by Yojana Méndez ([id number]) on 9/5/2017 12:10:35 PM     POC LACTIC ACID    Collection Time: 09/05/17 12:29 PM   Result Value Ref Range    Lactic Acid (POC) 5.6 (HH) 0.4 - 2.0 mmol/L           No results for input(s): FIO2I, IFO2, HCO3I, IHCO3, HCOPOC, PCO2I, PCOPOC, IPHI, PHI, PHPOC, PO2I, PO2POC in the last 72 hours.     No lab exists for component: IPOC2  Telemetry: sinus tachycardia    Imaging:  I have personally reviewed the patients radiographs and have reviewed the reports:  CXR        Total critical care time exclusive of procedures: 32 minutes  Cristi Ham MD

## 2017-09-05 NOTE — IP AVS SNAPSHOT
[address] [address] 
[phone] Patient: Constance Everett MRN: [id number] XU You are allergic to the following Allergen Reactions Penicillin G Anaphylaxis Recent Documentation Height Weight BMI Smoking Status 1.727 m 77.1 kg 25.85 kg/m2 Never Smoker Emergency Contacts Name Discharge Info Relation Home Work Mobile Caitie Hawthorne CAREGIVER [3] Friend [5]   [phone] About your hospitalization You were admitted on:  2017 You last received care in the:  [address] 2 [facility] You were discharged on:  2017 Unit phone number:  [phone] Why you were hospitalized Your primary diagnosis was:  Status Asthmaticus Your diagnoses also included:  Lactic Acidosis, Metabolic Acidosis With Respiratory Alkalosis Providers Seen During Your Hospitalizations Provider Role Specialty Primary office phone Carolina Erwin MD Attending Provider Emergency Medicine [phone] Rosalie Sierra DO Attending Provider Internal Medicine [phone] Your Primary Care Physician (PCP) Primary Care Physician Office Phone Office Fax Binta Tavarez [phone] Follow-up Information Follow up With Details Comments Contact Info Shannan Mejia MD On 2017 830am  [address] [facility] [address] [place] [address] 83 [phone] Your Appointments 2017  9:00 AM EDT New Patient with Shannan Mejia MD  
[address] [address]) [address] [facility] [address] [facility] [phone]  
[phone] Current Discharge Medication List  
  
START taking these medications Dose & Instructions Dispensing Information Comments Morning Noon Evening Bedtime fluticasone-salmeterol 250-50 mcg/dose diskus inhaler Commonly known as:  ADVAIR Your last dose was: Your next dose is:    
   
   
 Dose:  1 Puff Take 1 Puff by inhalation two (2) times a day. Quantity:  1 Inhaler Refills:  0  
     
   
   
   
  
 predniSONE 10 mg tablet Commonly known as:  Rob Caballero Your last dose was: Your next dose is: Take 5 tabs PO daily x 5 d, then 4 tabs PO daily x 4 d, then 3 tabs PO daily x 3 d, then 2 tabs PO daily x 3 d, then 1 tab PO daily x 1 d Quantity:  60 Tab Refills:  0 CONTINUE these medications which have NOT CHANGED Dose & Instructions Dispensing Information Comments Morning Noon Evening Bedtime * albuterol 90 mcg/actuation inhaler Commonly known as:  PROVENTIL HFA, VENTOLIN HFA, PROAIR HFA Your last dose was: Your next dose is:    
   
   
 Dose:  2 Puff Take 2 Puffs by inhalation every four (4) hours as needed for Wheezing. Quantity:  1 Inhaler Refills:  0  
     
   
   
   
  
 * albuterol 2.5 mg /3 mL (0.083 %) nebulizer solution Commonly known as:  PROVENTIL VENTOLIN Your last dose was: Your next dose is:    
   
   
 Dose:  2.5 mg  
3 mL by Nebulization route every four (4) hours as needed for Wheezing. Quantity:  24 Each Refills:  6  
     
   
   
   
  
 albuterol-ipratropium 2.5 mg-0.5 mg/3 ml Nebu Commonly known as:  Laurent Finn Your last dose was: Your next dose is:    
   
   
 Dose:  3 mL  
3 mL by Nebulization route every six (6) hours as needed. Quantity:  30 Nebule Refills:  3  
     
   
   
   
  
 * Notice: This list has 2 medication(s) that are the same as other medications prescribed for you. Read the directions carefully, and ask your doctor or other care provider to review them with you. STOP taking these medications   
 dexamethasone 6 mg tablet Commonly known as:  DECADRON  
   
  
  
  
 Where to Get Your Medications Information on where to get these meds will be given to you by the nurse or doctor. ! Ask your nurse or doctor about these medications  
  fluticasone-salmeterol 250-50 mcg/dose diskus inhaler  
 predniSONE 10 mg tablet Discharge Instructions Learning About COPD, Asthma, and Air Pollution How does air pollution affect COPD and asthma? When you have COPD or asthma, air pollution may make your symptoms worse. If it does, it means that air pollution is a trigger for you. It is important to know what your triggers are and how to deal with them. If air pollution is a trigger for you, you need to learn about air quality and pay attention to weather forecasts that include how bad the air is expected to be. How can you manage a flare-up caused by air pollution? · Do not panic. Quick treatment at home may help you prevent serious breathing problems. · Take your medicines exactly as your doctor tells you. ¨ Use your quick-relief inhaler as directed by your doctor. If your symptoms do not get better after you use your medicine, have someone take you to the emergency room. Call an ambulance if necessary. ¨ With inhaled medicines, a spacer or a nebulizer may help you get more medicine to your lungs. Ask your doctor or pharmacist how to use them properly. Practice using the spacer in front of a mirror before you have a flare-up. This may help you get the medicine into your lungs quickly. ¨ If your doctor has given you steroid pills, take them as directed. ¨ Talk to your doctor if you have any problems with your medicine. What can you do to prevent flare-ups? · Try not to be outside when air pollution levels are high. Stay at home with your windows closed. · Do not smoke. This is the most important step you can take to prevent more damage to your lungs and prevent problems.  If you already smoke, it is never too late to stop. If you need help quitting, talk to your doctor about stop-smoking programs and medicines. These can increase your chances of quitting for good. · Avoid secondhand smoke; cold, dry air; and high altitudes. · Take your daily medicines as prescribed. · Avoid colds and flu. ¨ Get a pneumococcal vaccine. ¨ Get a flu vaccine each year, as soon as it is available. Ask those you live or work with to do the same, so they will not get the flu and infect you. ¨ Try to stay away from people with colds or the flu. ¨ Wash your hands often. Follow-up care is a key part of your treatment and safety. Be sure to make and go to all appointments, and call your doctor if you are having problems. It's also a good idea to know your test results and keep a list of the medicines you take. Where can you learn more? Go to http://[employer]ar[employer]za.info/. Enter  in the search box to learn more about \"Learning About COPD, Asthma, and Air Pollution. \" Current as of: March 25, 2017 Content Version: 11.3 © 4780-8533 [employer]. Care instructions adapted under license by [employer] (which disclaims liability or warranty for this information). If you have questions about a medical condition or this instruction, always ask your healthcare professional. [address] [address] any warranty or liability for your use of this information. Patient armband removed and shredded DISCHARGE SUMMARY from Nurse The following personal items are in your possession at time of discharge: 
 
Dental Appliances: None Visual Aid: None, At bedside Home Medications: None Jewelry: None Clothing: At bedside Other Valuables: None PATIENT INSTRUCTIONS: 
 
 
F-face looks uneven A-arms unable to move or move unevenly S-speech slurred or non-existent T-time-call 911 as soon as signs and symptoms begin-DO NOT go Back to bed or wait to see if you get better-TIME IS BRAIN. Warning Signs of HEART ATTACK Call 911 if you have these symptoms: 
? Chest discomfort. Most heart attacks involve discomfort in the center of the chest that lasts more than a few minutes, or that goes away and comes back. It can feel like uncomfortable pressure, squeezing, fullness, or pain. ? Discomfort in other areas of the upper body. Symptoms can include pain or discomfort in one or both arms, the back, neck, jaw, or stomach. ? Shortness of breath with or without chest discomfort. ? Other signs may include breaking out in a cold sweat, nausea, or lightheadedness. Don't wait more than five minutes to call [address]! Fast action can save your life. Calling 911 is almost always the fastest way to get lifesaving treatment. Emergency Medical Services staff can begin treatment when they arrive  up to an hour sooner than if someone gets to the hospital by car. The discharge information has been reviewed with the patient. The patient verbalized understanding. Discharge medications reviewed with the patient and appropriate educational materials and side effects teaching were provided. Discharge Orders None Introducing [facility] SERVICES! Abdon Zimmerman introduces [employer] patient portal. Now you can access parts of your medical record, email your doctor's office, and request medication refills online. 1. In your internet browser, go to [email or web address] 2. Click on the First Time User? Click Here link in the Sign In box. You will see the New Member Sign Up page. 3. Enter your [employer] Access Code exactly as it appears below. You will not need to use this code after youve completed the sign-up process. If you do not sign up before the expiration date, you must request a new code. · [employer] Access Code: [email or web address] Expires: 9/22/2017  9:06 AM 
 
4. Enter the last four digits of your Social Security Number (xxxx) and Date of Birth (mm/dd/yyyy) as indicated and click Submit. You will be taken to the next sign-up page. 5. Create a [employer] ID. This will be your [employer] login ID and cannot be changed, so think of one that is secure and easy to remember. 6. Create a [employer] password. You can change your password at any time. 7. Enter your Password Reset Question and Answer. This can be used at a later time if you forget your password. 8. Enter your e-mail address. You will receive e-mail notification when new information is available in [address]. 9. Click Sign Up. You can now view and download portions of your medical record. 10. Click the Download Summary menu link to download a portable copy of your medical information. If you have questions, please visit the Frequently Asked Questions section of the [employer] website. Remember, [employer] is NOT to be used for urgent needs. For medical emergencies, dial 911. Now available from your iPhone and Android! General Information Please provide this summary of care documentation to your next provider. Patient Signature:  ____________________________________________________________ Date:  ____________________________________________________________  
  
Reji Robert Provider Signature:  ____________________________________________________________ Date:  ____________________________________________________________

## 2017-09-05 NOTE — LETTER
NOTIFICATION RETURN TO WORK / SCHOOL 
 
9/5/2017 7:42 AM 
 
[facility] [address] [place] [phone] To Whom It May Concern: 
 
Audrey Mendez is currently under the care of [facility] EMERGENCY DEPT. He will return to work/school on: 9/6/17 If there are questions or concerns please have the patient contact our office. Sincerely, Johnny Colindres, DO

## 2017-09-05 NOTE — IP AVS SNAPSHOT
Reji Richardson 
 
 
 [address] [place] [address] 
[phone] Patient: Lily Jon MRN: [id number] WILLY:[id number] Current Discharge Medication List  
  
START taking these medications Dose & Instructions Dispensing Information Comments Morning Noon Evening Bedtime  
 fluticasone-salmeterol 250-50 mcg/dose diskus inhaler Commonly known as:  ADVAIR Your last dose was: Your next dose is:    
   
   
 Dose:  1 Puff Take 1 Puff by inhalation two (2) times a day. Quantity:  1 Inhaler Refills:  0  
     
   
   
   
  
 predniSONE 10 mg tablet Commonly known as:  Ketan Herrera Your last dose was: Your next dose is: Take 5 tabs PO daily x 5 d, then 4 tabs PO daily x 4 d, then 3 tabs PO daily x 3 d, then 2 tabs PO daily x 3 d, then 1 tab PO daily x 1 d Quantity:  60 Tab Refills:  0 CONTINUE these medications which have NOT CHANGED Dose & Instructions Dispensing Information Comments Morning Noon Evening Bedtime * albuterol 90 mcg/actuation inhaler Commonly known as:  PROVENTIL HFA, VENTOLIN HFA, PROAIR HFA Your last dose was: Your next dose is:    
   
   
 Dose:  2 Puff Take 2 Puffs by inhalation every four (4) hours as needed for Wheezing. Quantity:  1 Inhaler Refills:  0  
     
   
   
   
  
 * albuterol 2.5 mg /3 mL (0.083 %) nebulizer solution Commonly known as:  PROVENTIL VENTOLIN Your last dose was: Your next dose is:    
   
   
 Dose:  2.5 mg  
3 mL by Nebulization route every four (4) hours as needed for Wheezing. Quantity:  24 Each Refills:  6  
     
   
   
   
  
 albuterol-ipratropium 2.5 mg-0.5 mg/3 ml Nebu Commonly known as:  Valjean Left Your last dose was: Your next dose is:    
   
   
 Dose:  3 mL  
3 mL by Nebulization route every six (6) hours as needed. Quantity:  30 Nebule Refills:  3  
     
   
   
   
  
 * Notice: This list has 2 medication(s) that are the same as other medications prescribed for you. Read the directions carefully, and ask your doctor or other care provider to review them with you. STOP taking these medications   
 dexamethasone 6 mg tablet Commonly known as:  DECADRON Where to Get Your Medications Information on where to get these meds will be given to you by the nurse or doctor. ! Ask your nurse or doctor about these medications  
  fluticasone-salmeterol 250-50 mcg/dose diskus inhaler  
 predniSONE 10 mg tablet

## 2017-09-05 NOTE — ED NOTES
Assumed care of patient. Pt sitting up, finished last neb tx, reports that he does not have any further sob. Lung sounds clear BL. Will continue to monitor.

## 2017-09-05 NOTE — ED PROVIDER NOTES
[facility] EMERGENCY DEPT      33 y.o. male with a PMH of Asthma presents to the ED c/o worsening SOB. States that he was just discharged this morning at 8am, went home, and his breathing worsened. He notes having wheezing and SOB. He notes having had been intubated for his asthma in the past.  Denies fever, chills, cough, other symptoms. Current Facility-Administered Medications   Medication Dose Route Frequency    albuterol (PROVENTIL VENTOLIN) nebulizer solution 2.5 mg  2.5 mg Nebulization CONTINUOUS    sodium chloride (NS) flush 5-10 mL  5-10 mL IntraVENous Q8H    sodium chloride (NS) flush 5-10 mL  5-10 mL IntraVENous PRN    enoxaparin (LOVENOX) injection 40 mg  40 mg SubCUTAneous Q24H    budesonide (PULMICORT) 500 mcg/2 ml nebulizer suspension  500 mcg Nebulization BID RT    methylPREDNISolone (PF) (SOLU-MEDROL) injection 40 mg  40 mg IntraVENous Q6H    racEPINEPHrine (VAPONEFRIN) 2.25% nebulizer solution  0.5 mL Nebulization Q1H PRN     Current Outpatient Prescriptions   Medication Sig    [START ON 9/7/2017] dexamethasone (DECADRON) 6 mg tablet Take 2 tablets on 9/7/17    albuterol (PROVENTIL VENTOLIN) 2.5 mg /3 mL (0.083 %) nebulizer solution 3 mL by Nebulization route every four (4) hours as needed for Wheezing.  albuterol (PROVENTIL HFA, VENTOLIN HFA, PROAIR HFA) 90 mcg/actuation inhaler Take 2 Puffs by inhalation every four (4) hours as needed for Wheezing.  albuterol-ipratropium (DUO-NEB) 2.5 mg-0.5 mg/3 ml nebu 3 mL by Nebulization route every six (6) hours as needed. Past Medical History:   Diagnosis Date    Asthma     Chronic obstructive pulmonary disease ([address]        Past Surgical History:   Procedure Laterality Date    HX FREE SKIN GRAFT      HX ORTHOPAEDIC      facial reconstruction       History reviewed. No pertinent family history.     Social History     Social History    Marital status: SINGLE     Spouse name: N/A    Number of children: N/A    Years of education: N/A     Occupational History    Not on file. Social History Main Topics    Smoking status: Never Smoker    Smokeless tobacco: Former User    Alcohol use Yes    Drug use: No    Sexual activity: Not Currently     Other Topics Concern    Not on file     Social History Narrative       Allergies   Allergen Reactions    Penicillin G Anaphylaxis       Patient's primary care provider (as noted in EPIC):  None    Constitutional:  Denies malaise, fever, chills. Cardiac:  Denies chest pain or palpitations. Respiratory: + wheezing, SOB. Denies cough. GI/ABD:  Denies injury, pain, distention, nausea, vomiting, diarrhea. Neuro:  Denies dizziness, neurologic symptoms/deficits/paresthesias. Skin: Denies injury, rash, itching or skin changes. All other systems negative as reviewed. Visit Vitals    /75    Pulse (!) 133    Temp 98 °F (36.7 °C)    Resp 25    Wt 77.1 kg (170 lb)    SpO2 99%    BMI 25.85 kg/m2       PHYSICAL EXAM:    CONSTITUTIONAL:  Alert, in respiratory distress;  well developed;  well nourished. HEAD:  Normocephalic, atraumatic. EYES:  EOMI. Non-icteric sclera. Normal conjunctiva. ENTM:  Mouth: mucous membranes moist.  NECK:  Supple  RESPIRATORY:  Expiratory wheezes and tight, with decreased air movement. No rales or rhonchi. CARDIOVASCULAR:  Tachycardic with regular rhythm. No murmurs, rubs, or gallops. NEURO:  Moves all four extremities, and grossly normal motor exam.  SKIN:  No rashes;  Normal for age. PSYCH:  Alert and normal affect. DIFFERENTIAL DIAGNOSES/ MEDICAL DECISION MAKING:  Acute asthma exacerbation, acute bronchitis, pneumonia, upper respiratory infection, pulmonary embolism, bronchospasm, various cardiac etiologies verus numerous other etiologies versus combination of the above. ED COURSE AND MEDICAL DECISION MAKING:      Pt tachycardic in the 140's. Wheezes throughout, decreased air movement. Pt initially on NRB then given nebs. Still tachypneic and tachycardic.   12:37 Respiratory down in ED and patient placed on Bipap. Recent Results (from the past 12 hour(s))   EKG, 12 LEAD, SUBSEQUENT    Collection Time: 09/05/17  6:02 AM   Result Value Ref Range    Ventricular Rate 153 BPM    Atrial Rate 153 BPM    P-R Interval 120 ms    QRS Duration 80 ms    Q-T Interval 336 ms    QTC Calculation (Bezet) 536 ms    Calculated P Axis 67 degrees    Calculated R Axis -87 degrees    Calculated T Axis 72 degrees    Diagnosis       Sinus tachycardia  Left anterior fascicular block  Poor R Wave Progression  Abnormal ECG  No previous ECGs available  Confirmed by [place] ([id number]) on 9/5/2017 12:10:35 PM     CBC W/O DIFF    Collection Time: 09/05/17 12:25 PM   Result Value Ref Range    WBC 22.4 (H) 4.6 - 13.2 K/uL    RBC 5.97 (H) 4.70 - 5.50 M/uL    HGB 17.4 (H) 13.0 - 16.0 g/dL    HCT 50.3 (H) 36.0 - 48.0 %    MCV 84.3 74.0 - 97.0 FL    MCH 29.1 24.0 - 34.0 PG    MCHC 34.6 31.0 - 37.0 g/dL    RDW 13.3 11.6 - 14.5 %    PLATELET 163 686 - 890 K/uL    MPV 9.5 9.2 - 03.4 FL   METABOLIC PANEL, COMPREHENSIVE    Collection Time: 09/05/17 12:25 PM   Result Value Ref Range    Sodium 142 136 - 145 mmol/L    Potassium 4.1 3.5 - 5.5 mmol/L    Chloride 108 100 - 108 mmol/L    CO2 21 21 - 32 mmol/L    Anion gap 13 3.0 - 18 mmol/L    Glucose 127 (H) 74 - 99 mg/dL    BUN 10 7.0 - 18 MG/DL    Creatinine 1.06 0.6 - 1.3 MG/DL    BUN/Creatinine ratio 9 (L) 12 - 20      GFR est AA >60 >60 ml/min/1.73m2    GFR est non-AA >60 >60 ml/min/1.73m2    Calcium 9.2 8.5 - 10.1 MG/DL    Bilirubin, total 0.3 0.2 - 1.0 MG/DL    ALT (SGPT) 37 16 - 61 U/L    AST (SGOT) 13 (L) 15 - 37 U/L    Alk.  phosphatase 102 45 - 117 U/L    Protein, total 7.1 6.4 - 8.2 g/dL    Albumin 3.3 (L) 3.4 - 5.0 g/dL    Globulin 3.8 2.0 - 4.0 g/dL    A-G Ratio 0.9 0.8 - 1.7     NT-PRO BNP    Collection Time: 09/05/17 12:25 PM   Result Value Ref Range    NT pro- 0 - 450 PG/ML   POC LACTIC ACID Collection Time: 09/05/17 12:29 PM   Result Value Ref Range    Lactic Acid (POC) 5.6 (HH) 0.4 - 2.0 mmol/L   POC G3    Collection Time: 09/05/17 12:58 PM   Result Value Ref Range    Device: BIPAP      FIO2 (POC) 0.30 %    pH (POC) 7.347 (L) 7.35 - 7.45      pCO2 (POC) 37.3 35.0 - 45.0 MMHG    pO2 (POC) 163 (H) 80 - 100 MMHG    HCO3 (POC) 20.5 (L) 22 - 26 MMOL/L    sO2 (POC) 99 (H) 92 - 97 %    Base deficit (POC) 5 mmol/L    PEEP/CPAP (POC) 6 cmH2O    PIP (POC) 12      Pressure support 6 cmH2O    Allens test (POC) YES      Total resp. rate 24      Site RIGHT RADIAL      Patient temp. 98.0      Specimen type (POC) ARTERIAL      Performed by Kandi Rosa    INFLUENZA A & B AG (RAPID TEST)    Collection Time: 09/05/17  1:23 PM   Result Value Ref Range    Influenza A Antigen NEGATIVE  NEG      Influenza B Antigen NEGATIVE  NEG          Consult 12:47 PM:   I have discussed case with Dr. Virgil Aviles, Hospitalist.  Standard discussion regarding this patient's presenting symptoms, PMHX, exam, vital signs, available ancillary and diagnostic studies. Consulting MD states: he will accept the patient, requesting ABG result, states pt will need ICU. Consult 12:51 PM:   I have discussed case with Dr. Jessee Choudhary. Standard discussion regarding this patient's presenting symptoms, PMHX, exam, vital signs, available ancillary and diagnostic studies. Consulting MD states: he will accept the patient in the ICU. Diagnosis:   1. Mild persistent asthma with status asthmaticus      Disposition: Admission to ICU    Patient's Medications   Start Taking    No medications on file   Continue Taking    ALBUTEROL (PROVENTIL HFA, VENTOLIN HFA, PROAIR HFA) 90 MCG/ACTUATION INHALER    Take 2 Puffs by inhalation every four (4) hours as needed for Wheezing. ALBUTEROL (PROVENTIL VENTOLIN) 2.5 MG /3 ML (0.083 %) NEBULIZER SOLUTION    3 mL by Nebulization route every four (4) hours as needed for Wheezing.     ALBUTEROL-IPRATROPIUM (DUO-NEB) 2.5 MG-0.5 MG/3 ML NEBU    3 mL by Nebulization route every six (6) hours as needed.     DEXAMETHASONE (DECADRON) 6 MG TABLET    Take 2 tablets on 9/7/17   These Medications have changed    No medications on file   Stop Taking    No medications on file     ODILIA Miranda

## 2017-09-05 NOTE — PROGRESS NOTES
1226 Called to the ED to start patient on BIPAP due to respiratory distress. Pt settings include 12/6 and FiO2 30%. ABG on BIPAP. Results for Meseret Chavira (MRN [id number]) as of 9/5/2017 13:03   Ref. Range 9/5/2017 12:58   pH (POC) Latest Ref Range: 7.35 - 7.45   7.347 (L)   pCO2 (POC) Latest Ref Range: 35.0 - 45.0 MMHG 37.3   pO2 (POC) Latest Ref Range: 80 - 100 MMHG 163 (H)   HCO3 (POC) Latest Ref Range: 22 - 26 MMOL/L 20.5 (L)   sO2 (POC) Latest Ref Range: 92 - 97 % 99 (H)   Base deficit (POC) Latest Units: mmol/L 5   FIO2 (POC) Latest Units: % 0.30   Patient temp. Latest Units:   98.0   Specimen type (POC) Latest Units:   ARTERIAL   Site Latest Units:   RIGHT RADIAL   Device: Latest Units:   BIPAP   Total resp. rate Latest Units:   24   PIP (POC) Latest Units:   12   PEEP/CPAP (POC) Latest Units: cmH2O 6   Pressure support Latest Units: cmH2O 6   Allens test (POC) Latest Units:   YES       Will continue to monitor patient closely.

## 2017-09-05 NOTE — PROGRESS NOTES
conducted an initial consultation and Spiritual Assessment for Ryan Martinez, who is a 33 y.o.,male. Patients Primary Language is: [place]. According to the patients EMR Congregation Affiliation is: No Catholic. The reason the Patient came to the hospital is:   Patient Active Problem List    Diagnosis Date Noted    Status asthmaticus 09/05/2017    Lactic acidosis 56/12/0837    Metabolic acidosis with respiratory alkalosis 09/05/2017        The  provided the following Interventions:  Initiated a relationship of care and support. Explored issues of brii, spirituality and/or Faith needs while hospitalized. Listened empathically. Provided chaplaincy education. Provided information about Spiritual Care Services. Offered prayer and assurance of continued prayers on patient's behalf. Chart reviewed. The following outcomes were achieved:  Patient shared some information about their medical narrative and spiritual journey/beliefs. Patient processed feeling about current hospitalization. Patient expressed gratitude for the 's visit. Assessment:  Patient did not indicate any spiritual or Faith issues which require Spiritual Care Services interventions at this time. Patient does not have any Faith/cultural needs that will affect patients preferences in health care. Plan:  Chaplains will continue to follow and will provide pastoral care on an as needed or requested basis.  recommends bedside caregivers page  on duty if patient shows signs of acute spiritual or emotional distress.     [address] [facility]   Staff [address] [facility]   [phone]

## 2017-09-05 NOTE — DISCHARGE INSTRUCTIONS

## 2017-09-06 LAB
ANION GAP SERPL CALC-SCNC: 12 MMOL/L (ref 3–18)
BASOPHILS # BLD: 0 K/UL (ref 0–0.06)
BASOPHILS NFR BLD: 0 % (ref 0–2)
BUN SERPL-MCNC: 13 MG/DL (ref 7–18)
BUN/CREAT SERPL: 15 (ref 12–20)
CALCIUM SERPL-MCNC: 9.3 MG/DL (ref 8.5–10.1)
CHLORIDE SERPL-SCNC: 106 MMOL/L (ref 100–108)
CO2 SERPL-SCNC: 23 MMOL/L (ref 21–32)
CREAT SERPL-MCNC: 0.88 MG/DL (ref 0.6–1.3)
DIFFERENTIAL METHOD BLD: ABNORMAL
EOSINOPHIL # BLD: 0 K/UL (ref 0–0.4)
EOSINOPHIL NFR BLD: 0 % (ref 0–5)
ERYTHROCYTE [DISTWIDTH] IN BLOOD BY AUTOMATED COUNT: 13.7 % (ref 11.6–14.5)
GLUCOSE SERPL-MCNC: 107 MG/DL (ref 74–99)
HCT VFR BLD AUTO: 53.2 % (ref 36–48)
HGB BLD-MCNC: 17.9 G/DL (ref 13–16)
IGE SERPL-ACNC: 387 IU/ML (ref 0–100)
LACTATE SERPL-SCNC: 1.9 MMOL/L (ref 0.4–2)
LYMPHOCYTES # BLD: 1.9 K/UL (ref 0.9–3.6)
LYMPHOCYTES NFR BLD: 7 % (ref 21–52)
MCH RBC QN AUTO: 28.8 PG (ref 24–34)
MCHC RBC AUTO-ENTMCNC: 33.6 G/DL (ref 31–37)
MCV RBC AUTO: 85.7 FL (ref 74–97)
MONOCYTES # BLD: 0.8 K/UL (ref 0.05–1.2)
MONOCYTES NFR BLD: 3 % (ref 3–10)
NEUTS SEG # BLD: 24.3 K/UL (ref 1.8–8)
NEUTS SEG NFR BLD: 90 % (ref 40–73)
PERIPHERAL SMEAR,PSM: NORMAL
PLATELET # BLD AUTO: 332 K/UL (ref 135–420)
PMV BLD AUTO: 9.7 FL (ref 9.2–11.8)
POTASSIUM SERPL-SCNC: 4.4 MMOL/L (ref 3.5–5.5)
PROCALCITONIN SERPL-MCNC: 0.06 NG/ML (ref 0–0.08)
RBC # BLD AUTO: 6.21 M/UL (ref 4.7–5.5)
SODIUM SERPL-SCNC: 141 MMOL/L (ref 136–145)
WBC # BLD AUTO: 27 K/UL (ref 4.6–13.2)

## 2017-09-06 PROCEDURE — 74011000250 HC RX REV CODE- 250: Performed by: INTERNAL MEDICINE

## 2017-09-06 PROCEDURE — 74011250637 HC RX REV CODE- 250/637: Performed by: HOSPITALIST

## 2017-09-06 PROCEDURE — 94660 CPAP INITIATION&MGMT: CPT

## 2017-09-06 PROCEDURE — 94760 N-INVAS EAR/PLS OXIMETRY 1: CPT

## 2017-09-06 PROCEDURE — 94667 MNPJ CHEST WALL 1ST: CPT

## 2017-09-06 PROCEDURE — 85025 COMPLETE CBC W/AUTO DIFF WBC: CPT | Performed by: INTERNAL MEDICINE

## 2017-09-06 PROCEDURE — 74011250637 HC RX REV CODE- 250/637: Performed by: INTERNAL MEDICINE

## 2017-09-06 PROCEDURE — 83605 ASSAY OF LACTIC ACID: CPT | Performed by: INTERNAL MEDICINE

## 2017-09-06 PROCEDURE — 77010033678 HC OXYGEN DAILY

## 2017-09-06 PROCEDURE — 74011250636 HC RX REV CODE- 250/636: Performed by: HOSPITALIST

## 2017-09-06 PROCEDURE — 36415 COLL VENOUS BLD VENIPUNCTURE: CPT | Performed by: INTERNAL MEDICINE

## 2017-09-06 PROCEDURE — 77030029684 HC NEB SM VOL KT MONA -A

## 2017-09-06 PROCEDURE — 80048 BASIC METABOLIC PNL TOTAL CA: CPT | Performed by: INTERNAL MEDICINE

## 2017-09-06 PROCEDURE — 94640 AIRWAY INHALATION TREATMENT: CPT

## 2017-09-06 PROCEDURE — 65270000029 HC RM PRIVATE

## 2017-09-06 PROCEDURE — 77030035694 HC MSK BIPAP FLL FAC PERFMAX PHIL -B

## 2017-09-06 RX ORDER — LORAZEPAM 1 MG/1
1 TABLET ORAL EVERY 24 HOURS
Status: DISCONTINUED | OUTPATIENT
Start: 2017-09-06 | End: 2017-09-07 | Stop reason: HOSPADM

## 2017-09-06 RX ORDER — CALCIUM CARBONATE 200(500)MG
200 TABLET,CHEWABLE ORAL
Status: DISCONTINUED | OUTPATIENT
Start: 2017-09-06 | End: 2017-09-07 | Stop reason: HOSPADM

## 2017-09-06 RX ORDER — IBUPROFEN 600 MG/1
600 TABLET ORAL
Status: DISCONTINUED | OUTPATIENT
Start: 2017-09-06 | End: 2017-09-07 | Stop reason: HOSPADM

## 2017-09-06 RX ORDER — DEXTROMETHORPHAN HYDROBROMIDE, GUAIFENESIN 5; 100 MG/5ML; MG/5ML
650 LIQUID ORAL
Status: DISCONTINUED | OUTPATIENT
Start: 2017-09-06 | End: 2017-09-07 | Stop reason: HOSPADM

## 2017-09-06 RX ADMIN — ALBUTEROL SULFATE 2.5 MG: 2.5 SOLUTION RESPIRATORY (INHALATION) at 07:18

## 2017-09-06 RX ADMIN — ALBUTEROL SULFATE 2.5 MG: 2.5 SOLUTION RESPIRATORY (INHALATION) at 11:16

## 2017-09-06 RX ADMIN — Medication 10 ML: at 05:29

## 2017-09-06 RX ADMIN — ALBUTEROL SULFATE 2.5 MG: 2.5 SOLUTION RESPIRATORY (INHALATION) at 16:09

## 2017-09-06 RX ADMIN — ENOXAPARIN SODIUM 40 MG: 40 INJECTION SUBCUTANEOUS at 12:23

## 2017-09-06 RX ADMIN — IBUPROFEN 600 MG: 600 TABLET, FILM COATED ORAL at 08:23

## 2017-09-06 RX ADMIN — BUDESONIDE 500 MCG: 0.5 INHALANT RESPIRATORY (INHALATION) at 19:01

## 2017-09-06 RX ADMIN — CALCIUM CARBONATE (ANTACID) CHEW TAB 500 MG 200 MG: 500 CHEW TAB at 16:17

## 2017-09-06 RX ADMIN — METHYLPREDNISOLONE SODIUM SUCCINATE 40 MG: 40 INJECTION, POWDER, FOR SOLUTION INTRAMUSCULAR; INTRAVENOUS at 23:32

## 2017-09-06 RX ADMIN — METHYLPREDNISOLONE SODIUM SUCCINATE 40 MG: 40 INJECTION, POWDER, FOR SOLUTION INTRAMUSCULAR; INTRAVENOUS at 12:23

## 2017-09-06 RX ADMIN — METHYLPREDNISOLONE SODIUM SUCCINATE 40 MG: 40 INJECTION, POWDER, FOR SOLUTION INTRAMUSCULAR; INTRAVENOUS at 05:29

## 2017-09-06 RX ADMIN — LORAZEPAM 1 MG: 1 TABLET ORAL at 21:15

## 2017-09-06 RX ADMIN — ARFORMOTEROL TARTRATE 15 MCG: 15 SOLUTION RESPIRATORY (INHALATION) at 07:34

## 2017-09-06 RX ADMIN — ARFORMOTEROL TARTRATE 15 MCG: 15 SOLUTION RESPIRATORY (INHALATION) at 19:01

## 2017-09-06 RX ADMIN — IPRATROPIUM BROMIDE AND ALBUTEROL SULFATE 3 ML: .5; 3 SOLUTION RESPIRATORY (INHALATION) at 23:42

## 2017-09-06 RX ADMIN — Medication 10 ML: at 21:15

## 2017-09-06 RX ADMIN — CALCIUM CARBONATE (ANTACID) CHEW TAB 500 MG 200 MG: 500 CHEW TAB at 13:15

## 2017-09-06 RX ADMIN — ALBUTEROL SULFATE 2.5 MG: 2.5 SOLUTION RESPIRATORY (INHALATION) at 19:01

## 2017-09-06 RX ADMIN — ALBUTEROL SULFATE 2.5 MG: 2.5 SOLUTION RESPIRATORY (INHALATION) at 00:30

## 2017-09-06 RX ADMIN — ALBUTEROL SULFATE 2.5 MG: 2.5 SOLUTION RESPIRATORY (INHALATION) at 04:23

## 2017-09-06 RX ADMIN — Medication 10 ML: at 16:17

## 2017-09-06 RX ADMIN — METHYLPREDNISOLONE SODIUM SUCCINATE 40 MG: 40 INJECTION, POWDER, FOR SOLUTION INTRAMUSCULAR; INTRAVENOUS at 17:46

## 2017-09-06 RX ADMIN — BUDESONIDE 500 MCG: 0.5 INHALANT RESPIRATORY (INHALATION) at 07:18

## 2017-09-06 NOTE — PROGRESS NOTES
[address] [place] [address] [facility]. Pulmonary, Critical Care, and Sleep Medicine    Name: Constance Everett MRN: [id number]   : 1990 Hospital: [address] [place] [address]   Date: 2017  Admission Date: 2017     Chart and notes reviewed. Data reviewed. I have evaluated all findings. []I have reviewed the flowsheet and previous days notes. []The patient is unable to give any meaningful history or review of systems because the patient is:  []Intubated []Sedated   []Unresponsive      []The patient is critically ill on      []Mechanical ventilation []Pressors   []BiPAP []                 ROS:A comprehensive review of systems was negative. Events and notes from last 24 hours reviewed. Care plan discussed on multidisciplinary rounds.   Patient Active Problem List   Diagnosis Code    Status asthmaticus J45.902    Lactic acidosis H82.7    Metabolic acidosis with respiratory alkalosis E87.2, E87.3       Vital Signs:  Visit Vitals    /69    Pulse (!) 106    Temp 97.5 °F (36.4 °C)    Resp 21    Wt 77.1 kg (170 lb)    SpO2 96%    BMI 25.85 kg/m2       O2 Device: Room air (attempt to wean from O2)   O2 Flow Rate (L/min): 2 l/min   Temp (24hrs), Av.9 °F (36.6 °C), Min:97.5 °F (36.4 °C), Max:99 °F (37.2 °C)       Intake/Output:   Last shift:      701 - 1900  In: -   Out: 800 [Urine:800]  Last 3 shifts: 1901 -  07  In: -   Out: 1400 [Urine:1400]    Intake/Output Summary (Last 24 hours) at 17 0857  Last data filed at 17 0730   Gross per 24 hour   Intake                0 ml   Output             2200 ml   Net            -2200 ml      Ventilator Settings:        Ventilator Volumes  Vt Spont (ml): 598 ml  Ve Observed (l/min): 13.7 l/min       Current Facility-Administered Medications   Medication Dose Route Frequency    sodium chloride (NS) flush 5-10 mL  5-10 mL IntraVENous Q8H    enoxaparin (LOVENOX) injection 40 mg  40 mg SubCUTAneous Q24H    budesonide (PULMICORT) 500 mcg/2 ml nebulizer suspension  500 mcg Nebulization BID RT    methylPREDNISolone (PF) (SOLU-MEDROL) injection 40 mg  40 mg IntraVENous Q6H    arformoterol (BROVANA) neb solution 15 mcg  15 mcg Nebulization BID RT    albuterol (PROVENTIL VENTOLIN) nebulizer solution 2.5 mg  2.5 mg Nebulization Q4H RT       Telemetry:     Physical Exam:   General:  Alert, cooperative, no distress, appears stated age. Head:  Normocephalic, without obvious abnormality, atraumatic. Eyes:  Conjunctivae/corneas clear. PERRL, EOMs intact. Nose: Nares normal. Septum midline. Mucosa normal. No drainage or sinus tenderness. Throat: Lips, mucosa, and tongue normal. Teeth and gums normal.   Neck: Supple, symmetrical, trachea midline, no adenopathy, thyroid: no enlargment/tenderness/nodules, no carotid bruit and no JVD. Back:   Symmetric, no curvature. ROM normal.   Lungs:   Coarse bilaterally with scattered wheezes, good air movement. Chest wall:  No tenderness or deformity. Heart:  Regular rate and rhythm, S1, S2 normal, no murmur, click, rub or gallop. Abdomen:   Soft, non-tender. Bowel sounds normal. No masses,  No organomegaly. Extremities: Extremities normal, atraumatic, no cyanosis or edema. Pulses: 2+ and symmetric all extremities.    Skin: Skin color, texture, turgor normal. No rashes or lesions   Lymph nodes:        Cervical, supraclavicular, and axillary nodes normal.   Neurologic: Grossly nonfocal        DATA:  MAR reviewed and pertinent medications noted or modified as needed    Labs:  Recent Labs      09/06/17   0400  09/05/17   1225   WBC  27.0*  22.4*   HGB  17.9*  17.4*   HCT  53.2*  50.3*   PLT  332  354     Recent Labs      09/06/17   0400  09/05/17   1225   NA  141  142   K  4.4  4.1   CL  106  108   CO2  23  21   GLU  107*  127*   BUN  13  10   CREA  0.88  1.06   CA  9.3  9.2   ALB   --   3.3*   SGOT   --   13*   ALT   --   37     No results for input(s): PH, PCO2, PO2, HCO3, FIO2 in the last 72 hours. Recent Labs      09/05/17   1258   FIO2I  0.30   HCO3I  20.5*   PCO2I  37.3   PHI  7.347*   PO2I  163*     Imaging:  [x]                           I have personally reviewed the patients radiographs and reports    High complexity decision making was performed during this consultation and evaluation. [x]       Pt is at high risk for further organ failure and dysfunction. Critical care time spent  minutes with patient exclusive of procedures. IMPRESSION:   Status asthmaticus J45.902         PLAN:   Neuro  No issues     Cardiovascular  Tachycardic, otherwise hemodynamically stable  Reports he is always tachycardic when taking albuterol nebulized  Lactic acid improved     Pulmonary  Status asthmaticus, resolved  Speaking full sentences and comfortable on NC  Likely 2/2 lack of medication as outpt, no insurance so no PCP or pulmonologist and no way to get refills  Continue pulmicort/brovana nebs, albuterol nebs  Continue IV steroids, to PO tomorrow   Has not required racemic epi PRN, will d/c  procalcitonin pending  IgE level elevated at 387  Flu A/B panel negative   CXR with no acute infiltrates  zone 2 allergen panel pending - sendout     GI  Regular diet     Renal  No acute issues     ID  No fever  WBC elevated, actually improved from last hospitalization  ? Infectious, ?  Steroids, for completeness will order peripheral smear given was also elevated in June  Peripheral smear pending     Heme  Lovenox ppx  Leukocytosis    Social  Pt has no insurance, no PCP, no pulmonologist and no way to get refills outside the hospital  Discussed with case management, will attempt to assist with med refill program and possibly begin process to establish insurance  Without outpatient management of asthma it is essentially guaranteed he will have frequent hospitalizations    Ok for floor          Tito Holguin MD

## 2017-09-06 NOTE — ROUTINE PROCESS
Bedside and Verbal shift change report given to SHLOMO Ontiveros (oncoming nurse) by Ethel Aguila RN (offgoing nurse).  Report included the following information SBAR, Kardex, Intake/Output, MAR, Recent Results and Cardiac Rhythm ST.

## 2017-09-06 NOTE — PROGRESS NOTES
Problem: Falls - Risk of  Goal: *Absence of Falls  Document Nita Fall Risk and appropriate interventions in the flowsheet.    Outcome: Progressing Towards Goal  Fall Risk Interventions:                    Elimination Interventions: Call light in reach, Toileting schedule/hourly rounds, Patient to call for help with toileting needs, Urinal in reach

## 2017-09-06 NOTE — PROGRESS NOTES
Patient requested to be placed on BiPAp for SOB - placed on BiPAP, patient appears to be resting comfortably at this time    0035 - patient remains on BiPAP - no s/s respiratory distress noted at this time

## 2017-09-06 NOTE — PROGRESS NOTES
Physical Exam   Skin:           Primary Nurse Carlene Walton and Susanna Henry RN performed a dual skin assessment on this patient. No impairment noted. Scott score is 22.

## 2017-09-06 NOTE — PROGRESS NOTES
Bipap  - patient doing well off bipap   - transferred from ICU to step-down    - bipap to prn status per transfer

## 2017-09-06 NOTE — PROGRESS NOTES
Internal Medicine Progress Note    Patient's Name: Boris Mclean  Admit Date: 9/5/2017  Length of Stay: 1      Assessment/Plan     Active Hospital Problems    Diagnosis Date Noted    Status asthmaticus 09/05/2017    Lactic acidosis 21/66/5035    Metabolic acidosis with respiratory alkalosis 09/05/2017     - Cont nebs, inhalers  - Cont IV steroids, transition PO edgar  - IgE elevated at 387  - Zone 2 allergen pending  - Case mgmt to help with getting insurance, possible first month of ICS/LABA inhaler (advair or symbicort) covered if able?  - Cont acceptable home medications for chronic conditions   - DVT protocol    I have personally reviewed all pertinent labs and films that have officially resulted over the last 24 hours. I have personally checked for all pending labs that are awaiting final results.     Subjective     Pt s/e @ bedside  No major events overnight  On room air and feeling much better  Denies CP or SOB    Objective     Visit Vitals    /68    Pulse (!) 123    Temp 98.8 °F (37.1 °C)    Resp 25    Wt 77.1 kg (170 lb)    SpO2 (!) 78%    BMI 25.85 kg/m2       Physical Exam:  General Appearance: NAD, conversant  Lungs: B/L wheeze with normal respiratory effort  CV: Tachycardic w/ RR, no m/r/g  Abdomen: soft, non-tender, normal bowel sounds  Extremities: no cyanosis, no peripheral edema  Neuro: No focal deficits, motor/sensory intact    Lab/Data Reviewed:  BMP:   Lab Results   Component Value Date/Time     09/06/2017 04:00 AM    K 4.4 09/06/2017 04:00 AM     09/06/2017 04:00 AM    CO2 23 09/06/2017 04:00 AM    AGAP 12 09/06/2017 04:00 AM     (H) 09/06/2017 04:00 AM    BUN 13 09/06/2017 04:00 AM    CREA 0.88 09/06/2017 04:00 AM    GFRAA >60 09/06/2017 04:00 AM    GFRNA >60 09/06/2017 04:00 AM     CBC:   Lab Results   Component Value Date/Time    WBC 27.0 (H) 09/06/2017 04:00 AM    HGB 17.9 (H) 09/06/2017 04:00 AM    HCT 53.2 (H) 09/06/2017 04:00 AM     09/06/2017 04:00 AM       Imaging Reviewed:  No results found.     Medications Reviewed:  Current Facility-Administered Medications   Medication Dose Route Frequency    ibuprofen (MOTRIN) tablet 600 mg  600 mg Oral Q6H PRN    acetaminophen (TYLENOL) SR tablet 650 mg  650 mg Oral Q8H PRN    calcium carbonate (TUMS) chewable tablet 200 mg [elemental]  200 mg Oral TID WITH MEALS    sodium chloride (NS) flush 5-10 mL  5-10 mL IntraVENous Q8H    sodium chloride (NS) flush 5-10 mL  5-10 mL IntraVENous PRN    budesonide (PULMICORT) 500 mcg/2 ml nebulizer suspension  500 mcg Nebulization BID RT    methylPREDNISolone (PF) (SOLU-MEDROL) injection 40 mg  40 mg IntraVENous Q6H    arformoterol (BROVANA) neb solution 15 mcg  15 mcg Nebulization BID RT    albuterol (PROVENTIL VENTOLIN) nebulizer solution 2.5 mg  2.5 mg Nebulization Q4H RT    albuterol-ipratropium (DUO-NEB) 2.5 MG-0.5 MG/3 ML  3 mL Nebulization Q4H PRN           Giselle Healy,   Internal Medicine, Hospitalist  Pager: [phone]  [address] [place] [address]

## 2017-09-06 NOTE — PROGRESS NOTES
0730 Bedside and Verbal shift change report given to Marine Salter, SHLOMO and Gwyn Castañeda, RN (oncoming nurse) by Vidal Mattson RN (offgoing nurse). Report included the following information SBAR, Kardex, Intake/Output, MAR and Recent Results. 0800 assessment, oral care given. Pt resting quietly. Girlfriend at bedside. Lights dimmed, call bell within reach. 4644 ibuprofen given for pain rating of 6/10. See MAR  0918 pain reassessment rating of 3/10.  1100 pt resting, lights dimmed  1200 assessment completed. Pt resting. Girlfriend at bedside. 0145 TRANSFER - OUT REPORT:    Verbal report given to Tiff Diaz RN (name) on Yesenia Guillen  being transferred to  (unit) for routine progression of care       Report consisted of patients Situation, Background, Assessment and   Recommendations(SBAR). Information from the following report(s) SBAR, Intake/Output, MAR and Recent Results was reviewed with the receiving nurse. Lines:   Peripheral IV 09/05/17 Right Antecubital (Active)   Site Assessment Clean, dry, & intact 9/6/2017 12:00 PM   Phlebitis Assessment 0 9/6/2017 12:00 PM   Infiltration Assessment 0 9/6/2017 12:00 PM   Dressing Status Clean, dry, & intact 9/6/2017 12:00 PM   Dressing Type Tape;Transparent 9/6/2017 12:00 PM   Hub Color/Line Status Green 9/6/2017 12:00 PM   Action Taken Open ports on tubing capped 9/6/2017 12:00 PM   Alcohol Cap Used Yes 9/6/2017 12:00 PM       Peripheral IV 09/05/17 Left Hand (Active)   Site Assessment Clean, dry, & intact 9/6/2017 12:00 PM   Phlebitis Assessment 0 9/6/2017 12:00 PM   Infiltration Assessment 0 9/6/2017 12:00 PM   Dressing Status Clean, dry, & intact 9/6/2017 12:00 PM   Dressing Type Tape;Transparent 9/6/2017 12:00 PM   Hub Color/Line Status Green 9/6/2017 12:00 PM   Action Taken Open ports on tubing capped 9/6/2017 12:00 PM   Alcohol Cap Used Yes 9/6/2017 12:00 PM        Opportunity for questions and clarification was provided.       Patient transported with: Tech

## 2017-09-06 NOTE — PROGRESS NOTES
2031 pt.received breathing treatment but complaining of havin a hard time breathing. 2033 RT called Bipap placed. 2100 call light within reach. needs attended. 2200 pt. resting quietly in bed. 0000 pt.sleeping.  0500 pt.off Bipap.tolerating well.  0600 AM care done. Bedside and Verbal shift change report given to  Yolande Turner RN  (oncoming nurse) by Mark Anthony Moyer RN (offgoing nurse). Report included the following information SBAR, Kardex, Intake/Output, MAR and Recent Results.

## 2017-09-06 NOTE — ROUTINE PROCESS
TRANSFER - IN REPORT:    Verbal report received from Loren Christensen RN (name) on Singh Mars  being received from ICU(unit) for routine progression of care      Report consisted of patients Situation, Background, Assessment and   Recommendations(SBAR). Information from the following report(s) SBAR, Kardex, Intake/Output, MAR, Recent Results and Cardiac Rhythm ST was reviewed with the receiving nurse. Opportunity for questions and clarification was provided. Assessment completed upon patients arrival to unit and care assumed.

## 2017-09-06 NOTE — PROGRESS NOTES
Problem: Falls - Risk of  Goal: *Absence of Falls  Document Nita Fall Risk and appropriate interventions in the flowsheet.    Outcome: Progressing Towards Goal  Fall Risk Interventions:                    Elimination Interventions: Call light in reach, Patient to call for help with toileting needs, Toileting schedule/hourly rounds

## 2017-09-06 NOTE — ROUTINE PROCESS
1330- Received patient via wheelchair. Patient alert and oriented x4. No signs of distress or reports of pain. 1550-Patient stating that he is feeling tightness in his chest and slightly wheezing, and asking when his next breathing treatment is, which was at 1600. Called Respiratory Therapy to see if they would be able to see this patient. RT was able to come up and give patient his breathing treatment immediately.

## 2017-09-07 VITALS
HEART RATE: 104 BPM | OXYGEN SATURATION: 97 % | HEIGHT: 68 IN | DIASTOLIC BLOOD PRESSURE: 82 MMHG | SYSTOLIC BLOOD PRESSURE: 119 MMHG | RESPIRATION RATE: 16 BRPM | TEMPERATURE: 98 F | BODY MASS INDEX: 25.76 KG/M2 | WEIGHT: 170 LBS

## 2017-09-07 LAB
A ALTERNATA IGE QN: <0.1 KU/L
A FUMIGATUS IGE QN: 0.12 KU/L
AMER ROACH IGE QN: <0.1 KU/L
AMER SYCAMORE IGE QN: 0.39 KU/L
ANION GAP SERPL CALC-SCNC: 8 MMOL/L (ref 3–18)
BAHIA GRASS IGE QN: 0.4 KU/L
BASOPHILS # BLD: 0 K/UL (ref 0–0.06)
BASOPHILS NFR BLD: 0 % (ref 0–2)
BERMUDA GRASS IGE QN: 0.49 KU/L
BOXELDER IGE QN: 0.31 KU/L
BUN SERPL-MCNC: 21 MG/DL (ref 7–18)
BUN/CREAT SERPL: 23 (ref 12–20)
C HERBARUM IGE QN: <0.1 KU/L
CALCIUM SERPL-MCNC: 8.8 MG/DL (ref 8.5–10.1)
CAT DANDER IGG QN: 59.7 KU/L
CHLORIDE SERPL-SCNC: 105 MMOL/L (ref 100–108)
CLASS DESCRIPTION, 600268: ABNORMAL
CO2 SERPL-SCNC: 27 MMOL/L (ref 21–32)
COMMON RAGWEED IGE QN: 0.3 KU/L
CREAT SERPL-MCNC: 0.92 MG/DL (ref 0.6–1.3)
D FARINAE IGE QN: <0.1 KU/L
D PTERONYSS IGE QN: 0.13 KU/L
DEPRECATED IGE QN: 0.43 KU/L
DIFFERENTIAL METHOD BLD: ABNORMAL
DOG DANDER IGE QN: 16.2 KU/L
ENGL PLANTAIN IGE QN: 0.37 KU/L
EOSINOPHIL # BLD: 0 K/UL (ref 0–0.4)
EOSINOPHIL NFR BLD: 0 % (ref 0–5)
ERYTHROCYTE [DISTWIDTH] IN BLOOD BY AUTOMATED COUNT: 13.7 % (ref 11.6–14.5)
GLUCOSE SERPL-MCNC: 99 MG/DL (ref 74–99)
HCT VFR BLD AUTO: 46.8 % (ref 36–48)
HGB BLD-MCNC: 15.5 G/DL (ref 13–16)
JOHNSON GRASS IGE QN: 0.14 KU/L
LYMPHOCYTES # BLD: 1.5 K/UL (ref 0.9–3.6)
LYMPHOCYTES NFR BLD: 6 % (ref 21–52)
M RACEMOSUS IGE QN: <0.1 KU/L
MCH RBC QN AUTO: 28.6 PG (ref 24–34)
MCHC RBC AUTO-ENTMCNC: 33.1 G/DL (ref 31–37)
MCV RBC AUTO: 86.3 FL (ref 74–97)
MONOCYTES # BLD: 1.4 K/UL (ref 0.05–1.2)
MONOCYTES NFR BLD: 5 % (ref 3–10)
MT JUNIPER IGE QN: 0.3 KU/L
MUGWORT IGE QN: 0.2 KU/L
NETTLE IGE QN: 0.19 KU/L
NEUTS SEG # BLD: 23.5 K/UL (ref 1.8–8)
NEUTS SEG NFR BLD: 89 % (ref 40–73)
P NOTATUM IGE QN: <0.1 KU/L
PLATELET # BLD AUTO: 365 K/UL (ref 135–420)
PMV BLD AUTO: 9.6 FL (ref 9.2–11.8)
POTASSIUM SERPL-SCNC: 4.6 MMOL/L (ref 3.5–5.5)
RBC # BLD AUTO: 5.42 M/UL (ref 4.7–5.5)
S BOTRYOSUM IGE QN: 0.49 KU/L
SHEEP SORREL IGE QN: 0.48 KU/L
SODIUM SERPL-SCNC: 140 MMOL/L (ref 136–145)
SWEET GUM IGE QN: 0.31 KU/L
TIMOTHY IGE QN: 0.19 KU/L
WBC # BLD AUTO: 26.4 K/UL (ref 4.6–13.2)
WHITE BIRCH IGE QN: 0.39 KU/L
WHITE ELM IGG QN: 0.42 KU/L
WHITE HICKORY IGE QN: 0.35 KU/L
WHITE MULBERRY IGE QN: <0.1 KU/L
WHITE OAK IGE QN: 0.53 KU/L

## 2017-09-07 PROCEDURE — 74011250636 HC RX REV CODE- 250/636: Performed by: HOSPITALIST

## 2017-09-07 PROCEDURE — 94668 MNPJ CHEST WALL SBSQ: CPT

## 2017-09-07 PROCEDURE — 94640 AIRWAY INHALATION TREATMENT: CPT

## 2017-09-07 PROCEDURE — 74011250637 HC RX REV CODE- 250/637: Performed by: HOSPITALIST

## 2017-09-07 PROCEDURE — 85025 COMPLETE CBC W/AUTO DIFF WBC: CPT | Performed by: INTERNAL MEDICINE

## 2017-09-07 PROCEDURE — 80048 BASIC METABOLIC PNL TOTAL CA: CPT | Performed by: INTERNAL MEDICINE

## 2017-09-07 PROCEDURE — 36415 COLL VENOUS BLD VENIPUNCTURE: CPT | Performed by: INTERNAL MEDICINE

## 2017-09-07 PROCEDURE — 74011000250 HC RX REV CODE- 250: Performed by: INTERNAL MEDICINE

## 2017-09-07 RX ORDER — FLUTICASONE PROPIONATE AND SALMETEROL 250; 50 UG/1; UG/1
1 POWDER RESPIRATORY (INHALATION) 2 TIMES DAILY
Status: DISCONTINUED | OUTPATIENT
Start: 2017-09-07 | End: 2017-09-07

## 2017-09-07 RX ORDER — BUDESONIDE AND FORMOTEROL FUMARATE DIHYDRATE 80; 4.5 UG/1; UG/1
2 AEROSOL RESPIRATORY (INHALATION) 2 TIMES DAILY
Status: DISCONTINUED | OUTPATIENT
Start: 2017-09-07 | End: 2017-09-07 | Stop reason: HOSPADM

## 2017-09-07 RX ORDER — FLUTICASONE PROPIONATE AND SALMETEROL 250; 50 UG/1; UG/1
1 POWDER RESPIRATORY (INHALATION) 2 TIMES DAILY
Status: DISCONTINUED | OUTPATIENT
Start: 2017-09-07 | End: 2017-09-07 | Stop reason: SDUPTHER

## 2017-09-07 RX ORDER — PREDNISONE 10 MG/1
TABLET ORAL
Qty: 60 TAB | Refills: 0 | Status: SHIPPED | OUTPATIENT
Start: 2017-09-07 | End: 2017-12-06

## 2017-09-07 RX ORDER — FLUTICASONE PROPIONATE AND SALMETEROL 250; 50 UG/1; UG/1
1 POWDER RESPIRATORY (INHALATION) 2 TIMES DAILY
Qty: 1 INHALER | Refills: 0 | Status: SHIPPED | OUTPATIENT
Start: 2017-09-07 | End: 2017-12-06

## 2017-09-07 RX ADMIN — Medication 10 ML: at 05:33

## 2017-09-07 RX ADMIN — BUDESONIDE 500 MCG: 0.5 INHALANT RESPIRATORY (INHALATION) at 08:52

## 2017-09-07 RX ADMIN — ALBUTEROL SULFATE 2.5 MG: 2.5 SOLUTION RESPIRATORY (INHALATION) at 03:19

## 2017-09-07 RX ADMIN — ARFORMOTEROL TARTRATE 15 MCG: 15 SOLUTION RESPIRATORY (INHALATION) at 08:52

## 2017-09-07 RX ADMIN — CALCIUM CARBONATE (ANTACID) CHEW TAB 500 MG 200 MG: 500 CHEW TAB at 09:31

## 2017-09-07 RX ADMIN — METHYLPREDNISOLONE SODIUM SUCCINATE 40 MG: 40 INJECTION, POWDER, FOR SOLUTION INTRAMUSCULAR; INTRAVENOUS at 05:33

## 2017-09-07 RX ADMIN — ALBUTEROL SULFATE 2.5 MG: 2.5 SOLUTION RESPIRATORY (INHALATION) at 08:52

## 2017-09-07 NOTE — MED STUDENT NOTES
Progress Note    Patient: Maco Fisher MRN: [id number]  SSN: [id number]    YOB: 1990  Age: 33 y.o. Sex: male      Admit Date: 9/5/2017    LOS: 2 days     Subjective:     Pt is alert, afebrile, and well appearing. He reports a productive cough with clear sputum. He denies pain or shortness of breath on room air. He is hoping to be discharged today. Objective:     Vitals:    09/06/17 2345 09/07/17 0320 09/07/17 0545 09/07/17 0852   BP:   119/82    Pulse:   (!) 104    Resp:   16    Temp:   98 °F (36.7 °C)    SpO2: 92% 94% 94% 97%   Weight:       Height:            Intake and Output:  Current Shift:    Last three shifts: 09/05 1901 - 09/07 0700  In: 480 [P.O.:480]  Out: 2050 [Urine:2050]    Physical Exam:   GENERAL: alert, cooperative, no distress, appears stated age, mildly obese  LUNG: wheezes R base, L base  HEART: Tachycardic; S1, S2 normal, no click, no rub, no murmor  ABDOMEN: soft, non-tender. Bowel sounds normal. No masses,  no organomegaly  EXTREMITIES:  extremities normal, atraumatic, no cyanosis or edema    Lab/Data Review:  Lab results reviewed. For significant abnormal values and values requiring intervention, see assessment and plan. Allergen Test positive for multiple irritants. WBC increased (26.4). 9/4 IgE increased (387). Assessment:     Principal Problem:    Status asthmaticus (9/5/2017)    Active Problems:    Lactic acidosis (7/5/3734)      Metabolic acidosis with respiratory alkalosis (9/5/2017)        Plan:     Discuss Zone 2 allergen test results with patient prior to discharge. If symptoms return, order CXR and peak-flow. Continue DVT protocol until discharge. Taper steroids. Work with  to discharge with JELENA/ICS/LABA. Have pt schedule with PCP to add ICS/LABA to current JELENA. Signed By: Anabelle Baum     September 7, 2017          *ATTENTION:  This note has been created by a medical student for educational purposes only. Please do not refer to the content of this note for clinical decision-making, billing, or other purposes. Please see attending physicians note to obtain clinical information on this patient. *

## 2017-09-07 NOTE — PROGRESS NOTES
Assumed patient care. Received patient awake, alert. Patient denies any pain/ discomfort at this time. Bed is locked and in lowest position and call bell is within reach. Not in acute distress.

## 2017-09-07 NOTE — PROGRESS NOTES
Beverly Grossman was under my care from 09/05/2017 to 09/07/2017 during his hospitalization. Please excuse him from any work related matters. If you have any questions, please don't hesitate to call.          Marcel Giang, DO  Internal Medicine, Hospitalist  Pager: [phone] [facility]

## 2017-09-07 NOTE — PROGRESS NOTES
Pt with [address] to pay for some of pt's meds, ( Prednisone, Duo neb and pro air respi-clip) to help keep him out of the hospital and ER. Faxed to [address]. and pt to  later today.

## 2017-09-07 NOTE — DISCHARGE INSTRUCTIONS
Learning About COPD, Asthma, and Air Pollution  How does air pollution affect COPD and asthma? When you have COPD or asthma, air pollution may make your symptoms worse. If it does, it means that air pollution is a trigger for you. It is important to know what your triggers are and how to deal with them. If air pollution is a trigger for you, you need to learn about air quality and pay attention to weather forecasts that include how bad the air is expected to be. How can you manage a flare-up caused by air pollution? · Do not panic. Quick treatment at home may help you prevent serious breathing problems. · Take your medicines exactly as your doctor tells you. ¨ Use your quick-relief inhaler as directed by your doctor. If your symptoms do not get better after you use your medicine, have someone take you to the emergency room. Call an ambulance if necessary. ¨ With inhaled medicines, a spacer or a nebulizer may help you get more medicine to your lungs. Ask your doctor or pharmacist how to use them properly. Practice using the spacer in front of a mirror before you have a flare-up. This may help you get the medicine into your lungs quickly. ¨ If your doctor has given you steroid pills, take them as directed. ¨ Talk to your doctor if you have any problems with your medicine. What can you do to prevent flare-ups? · Try not to be outside when air pollution levels are high. Stay at home with your windows closed. · Do not smoke. This is the most important step you can take to prevent more damage to your lungs and prevent problems. If you already smoke, it is never too late to stop. If you need help quitting, talk to your doctor about stop-smoking programs and medicines. These can increase your chances of quitting for good. · Avoid secondhand smoke; cold, dry air; and high altitudes. · Take your daily medicines as prescribed. · Avoid colds and flu. ¨ Get a pneumococcal vaccine.   ¨ Get a flu vaccine each year, as soon as it is available. Ask those you live or work with to do the same, so they will not get the flu and infect you. ¨ Try to stay away from people with colds or the flu. ¨ Wash your hands often. Follow-up care is a key part of your treatment and safety. Be sure to make and go to all appointments, and call your doctor if you are having problems. It's also a good idea to know your test results and keep a list of the medicines you take. Where can you learn more? Go to http://jason-maritza.info/. Enter  in the search box to learn more about \"Learning About COPD, Asthma, and Air Pollution. \"  Current as of: March 25, 2017  Content Version: 11.3  © 1156-0753 [employer]. Care instructions adapted under license by [employer] (which disclaims liability or warranty for this information). If you have questions about a medical condition or this instruction, always ask your healthcare professional. [place] [address] any warranty or liability for your use of this information. Patient armband removed and shredded    DISCHARGE SUMMARY from Nurse    The following personal items are in your possession at time of discharge:    Dental Appliances: None  Visual Aid: None, At bedside     Home Medications: None  Jewelry: None  Clothing: At bedside  Other Valuables: None             PATIENT INSTRUCTIONS:    After general anesthesia or intravenous sedation, for 24 hours or while taking prescription Narcotics:  · Limit your activities  · Do not drive and operate hazardous machinery  · Do not make important personal or business decisions  · Do  not drink alcoholic beverages  · If you have not urinated within 8 hours after discharge, please contact your surgeon on call.     Report the following to your surgeon:  · Excessive pain, swelling, redness or odor of or around the surgical area  · Temperature over 100.5  · Nausea and vomiting lasting longer than 4 hours or if unable to take medications  · Any signs of decreased circulation or nerve impairment to extremity: change in color, persistent  numbness, tingling, coldness or increase pain  · Any questions        What to do at Home:  Recommended activity: Activity as tolerated,  Or as physician advised    If you experience any of the following symptoms  Of a Stroke, Warning Signs of a Heart Attack and When to Call for Help, please follow up with your primary care physician or call 911. *  Please give a list of your current medications to your Primary Care Provider. *  Please update this list whenever your medications are discontinued, doses are      changed, or new medications (including over-the-counter products) are added. *  Please carry medication information at all times in case of emergency situations. These are general instructions for a healthy lifestyle:    No smoking/ No tobacco products/ Avoid exposure to second hand smoke    Surgeon General's Warning:  Quitting smoking now greatly reduces serious risk to your health. Obesity, smoking, and sedentary lifestyle greatly increases your risk for illness    A healthy diet, regular physical exercise & weight monitoring are important for maintaining a healthy lifestyle    You may be retaining fluid if you have a history of heart failure or if you experience any of the following symptoms:  Weight gain of 3 pounds or more overnight or 5 pounds in a week, increased swelling in our hands or feet or shortness of breath while lying flat in bed. Please call your doctor as soon as you notice any of these symptoms; do not wait until your next office visit. Recognize signs and symptoms of STROKE:    F-face looks uneven    A-arms unable to move or move unevenly    S-speech slurred or non-existent    T-time-call 911 as soon as signs and symptoms begin-DO NOT go       Back to bed or wait to see if you get better-TIME IS BRAIN.     Warning Signs of HEART ATTACK Call 911 if you have these symptoms:   Chest discomfort. Most heart attacks involve discomfort in the center of the chest that lasts more than a few minutes, or that goes away and comes back. It can feel like uncomfortable pressure, squeezing, fullness, or pain.  Discomfort in other areas of the upper body. Symptoms can include pain or discomfort in one or both arms, the back, neck, jaw, or stomach.  Shortness of breath with or without chest discomfort.  Other signs may include breaking out in a cold sweat, nausea, or lightheadedness. Don't wait more than five minutes to call 911 - MINUTES MATTER! Fast action can save your life. Calling 911 is almost always the fastest way to get lifesaving treatment. Emergency Medical Services staff can begin treatment when they arrive -- up to an hour sooner than if someone gets to the hospital by car. The discharge information has been reviewed with the patient. The patient verbalized understanding. Discharge medications reviewed with the patient and appropriate educational materials and side effects teaching were provided.

## 2017-09-07 NOTE — PROGRESS NOTES
Problem: Falls - Risk of  Goal: *Absence of Falls  Document Nita Fall Risk and appropriate interventions in the flowsheet.    Outcome: Progressing Towards Goal  Fall Risk Interventions:                    Elimination Interventions: Call light in reach, Urinal in reach

## 2017-09-07 NOTE — PROGRESS NOTES
Chart reviewed and I spoke with pt. He has not seen his allergy testing results and would like to have a copy to take home. I printed it up for him. Case Management is not able to pay for either Advair or Symbicort since both of them cost hundreds of dollars apiece. But if home solumedrol is ordered, we can assist.  Ramila Wade has set up MD follow up visit for pt. FYI placed to request home health skilled Nurse for disease Management and medication education to be ordered.

## 2017-09-07 NOTE — DISCHARGE SUMMARY
Internal Medicine Discharge Summary        Patient: Alvin Dow    YOB: 1990    Age:  33 y.o. Admit Date: 9/5/2017    Discharge Date: 9/7/2017    LOS:  LOS: 2 days     Discharge To: Home    Consults: Pulmonary/Critical Care    Admission Diagnoses: Status asthmaticus    Discharge Diagnoses:    Problem List as of 9/7/2017  Never Reviewed          Codes Class Noted - Resolved    * (Principal)Status asthmaticus ICD-10-CM: J45.902  ICD-9-CM: 493.91  9/5/2017 - Present        Lactic acidosis ICD-10-CM: E87.2  ICD-9-CM: 276.2  9/5/2017 - Present        Metabolic acidosis with respiratory alkalosis ICD-10-CM: E87.2, E87.3  ICD-9-CM: 276.2, 276.3  9/5/2017 - Present              Discharge Condition:  Improved    Procedures: None         HPI: Alvin Dow is a 33 y.o. male with a PMHx of asthma who presented to the ED with the acute onset of dyspnea early this morning. He admits to having upper respiratory symptoms over the last two days, including runny nose and post-nasal drip. He was worried it would turn into this. He has neb treatment and albuterol at home. He admits to daily use of albuterol at least 3x/d and up at night multiple times. He has been Rx ICS/LABA but has not been able to afford it. He presented to the ED early this morning and was given neb treatments and d/c home after resolution of his sx. He returned bc of worsening sx. In the ED, he was placed on BiPAP and given multiple nebs. Hospital Course (Including Significant Findings): The patient was admitted to the hospital for further management of their presenting condition. He was able to be transitioned off of BiPAP to nasal canula and eventually back to room air. He was discharged on a steroid taper and inhaled corticosteroid/long-acting beta agonist along with his rescue inhaler. He was feeling much better and had no evidence of bacterial infection.  This event was likely due to upper respiratory viral bronchitis. Unfortunately, the patient does not have insurance and did not plan on purchasing any of the medications which he will need, including OTC allergy medication or inhalers. I expect he will continue to be hospitalized for similar situations. The rest of the patient's chronic conditions were managed appropriately during their admission. They were medically stable at the time of discharge. Visit Vitals    /82 (BP 1 Location: Left arm, BP Patient Position: Sitting)    Pulse (!) 104    Temp 98 °F (36.7 °C)    Resp 16    Ht 5' 8\" (1.727 m)    Wt 77.1 kg (170 lb)    SpO2 97%    BMI 25.85 kg/m2       Physical Exam at Discharge:  General Appearance: NAD, conversant  HENT: normocephalic/atraumatic, moist mucus membranes  Lungs: Mild scattered wheeze with normal respiratory effort  CV: RRR, no m/r/g  Abdomen: soft, non-tender, normal bowel sounds  Extremities: no cyanosis, no peripheral edema  Neuro: moves all extremities, no focal deficits  Psych: appropriate affect, alert and oriented to person, place and time    Labs Prior to Discharge:  Labs: Results:       Chemistry Recent Labs      09/07/17 0525 09/06/17 0400 09/05/17   1225   GLU  99  107*  127*   NA  140  141  142   K  4.6  4.4  4.1   CL  105  106  108   CO2  27  23  21   BUN  21*  13  10   CREA  0.92  0.88  1.06   CA  8.8  9.3  9.2   AGAP  8  12  13   BUCR  23*  15  9*   AP   --    --   102   TP   --    --   7.1   ALB   --    --   3.3*   GLOB   --    --   3.8   AGRAT   --    --   0.9      CBC w/Diff Recent Labs      09/07/17 0525 09/06/17 0400 09/05/17   1225   WBC  26.4*  27.0*  22.4*   RBC  5.42  6.21*  5.97*   HGB  15.5  17.9*  17.4*   HCT  46.8  53.2*  50.3*   PLT  365  332  354   GRANS  89*  90*   --    LYMPH  6*  7*   --    EOS  0  0   --       Cardiac Enzymes No results for input(s): CPK, CKND1, JENNIFER in the last 72 hours.     No lab exists for component: CKRMB, TROIP   Coagulation No results for input(s): PTP, INR, APTT in the last 72 hours. No lab exists for component: INREXT    Lipid Panel No results found for: CHOL, CHOLPOCT, CHOLX, CHLST, CHOLV, 602723, HDL, LDL, LDLC, DLDLP, 989048, VLDLC, VLDL, TGLX, TRIGL, TRIGP, TGLPOCT, CHHD, CHHDX   BNP No results for input(s): BNPP in the last 72 hours. Liver Enzymes Recent Labs      09/05/17   1225   TP  7.1   ALB  3.3*   AP  102   SGOT  13*      Thyroid Studies No results found for: T4, T3U, TSH, TSHEXT         Significant Imaging:  Xr Chest Port    Result Date: 9/5/2017  PORTABLE CHEST AT 0555 HOURS: Indication:  Chest pain and shortness of breath. Technique:  Portable, erect AP view. Comparison:  06/28/2017 Findings:  Lungs are adequately expanded without focal consolidation, pulmonary edema or pneumothorax. The cardiac silhouette and mediastinal structures are within normal limits for technique. The visualized bones and soft tissues are unremarkable.      IMPRESSION: Unremarkable portable chest. Stable exam.           Discharge Medications:     Discharge Medication List as of 9/7/2017 11:09 AM      START taking these medications    Details   fluticasone-salmeterol (ADVAIR) 250-50 mcg/dose diskus inhaler Take 1 Puff by inhalation two (2) times a day., Darwin Disp-1 Inhaler, R-0      predniSONE (DELTASONE) 10 mg tablet Take 5 tabs PO daily x 5 d, then 4 tabs PO daily x 4 d, then 3 tabs PO daily x 3 d, then 2 tabs PO daily x 3 d, then 1 tab PO daily x 1 d, Darwin Disp-60 Tab, R-0         CONTINUE these medications which have NOT CHANGED    Details   albuterol (PROVENTIL VENTOLIN) 2.5 mg /3 mL (0.083 %) nebulizer solution 3 mL by Nebulization route every four (4) hours as needed for Wheezing., Darwin Disp-24 Each, R-6      albuterol (PROVENTIL HFA, VENTOLIN HFA, PROAIR HFA) 90 mcg/actuation inhaler Take 2 Puffs by inhalation every four (4) hours as needed for Wheezing., Darwin, Disp-1 Inhaler, R-0      albuterol-ipratropium (DUO-NEB) 2.5 mg-0.5 mg/3 ml nebu 3 mL by Nebulization route every six (6) hours as needed. , Print, Disp-30 Nebule, R-3         STOP taking these medications       dexamethasone (DECADRON) 6 mg tablet Comments:   Reason for Stopping:               Activity: Activity as tolerated    Diet: Resume previous diet    Wound Care: None needed    Follow-up:   Please follow up with your PCP within 7 days to discuss your recent hospitalization. Patient to arrange.          Total time spent including time spent on final examination and discharge discussion, discharge documentation and records reviewed and medication reconciliation: > 30 minutes    Junior Chloe DO  Internal Medicine, Hospitalist  Pager: [phone] [facility]

## 2017-12-06 ENCOUNTER — HOSPITAL ENCOUNTER (EMERGENCY)
Age: 27
Discharge: HOME | End: 2017-12-06
Attending: EMERGENCY MEDICINE
Payer: SELF-PAY

## 2017-12-06 VITALS
DIASTOLIC BLOOD PRESSURE: 99 MMHG | WEIGHT: 180 LBS | TEMPERATURE: 98.1 F | BODY MASS INDEX: 27.28 KG/M2 | RESPIRATION RATE: 16 BRPM | SYSTOLIC BLOOD PRESSURE: 148 MMHG | OXYGEN SATURATION: 97 % | HEIGHT: 68 IN | HEART RATE: 93 BPM

## 2017-12-06 DIAGNOSIS — R06.2 WHEEZING: ICD-10-CM

## 2017-12-06 DIAGNOSIS — R03.0 ELEVATED BLOOD PRESSURE READING: ICD-10-CM

## 2017-12-06 DIAGNOSIS — Z76.0 MEDICATION REFILL: ICD-10-CM

## 2017-12-06 DIAGNOSIS — L05.91 PILONIDAL CYST: Primary | ICD-10-CM

## 2017-12-06 PROCEDURE — 77030029684 HC NEB SM VOL KT MONA -A

## 2017-12-06 PROCEDURE — 75810000116 HC INC/DRN PILONIDAL CYST SIMPLE

## 2017-12-06 PROCEDURE — 99283 EMERGENCY DEPT VISIT LOW MDM: CPT

## 2017-12-06 PROCEDURE — 74011000250 HC RX REV CODE- 250: Performed by: PHYSICIAN ASSISTANT

## 2017-12-06 PROCEDURE — 74011250637 HC RX REV CODE- 250/637: Performed by: PHYSICIAN ASSISTANT

## 2017-12-06 PROCEDURE — 94640 AIRWAY INHALATION TREATMENT: CPT

## 2017-12-06 PROCEDURE — 77030018836 HC SOL IRR NACL ICUM -A

## 2017-12-06 RX ORDER — FLUTICASONE PROPIONATE AND SALMETEROL 250; 50 UG/1; UG/1
1 POWDER RESPIRATORY (INHALATION) 2 TIMES DAILY
Qty: 1 INHALER | Refills: 0 | Status: SHIPPED | OUTPATIENT
Start: 2017-12-06 | End: 2018-01-09

## 2017-12-06 RX ORDER — IPRATROPIUM BROMIDE AND ALBUTEROL SULFATE 2.5; .5 MG/3ML; MG/3ML
3 SOLUTION RESPIRATORY (INHALATION)
Status: COMPLETED | OUTPATIENT
Start: 2017-12-06 | End: 2017-12-06

## 2017-12-06 RX ORDER — OXYCODONE AND ACETAMINOPHEN 5; 325 MG/1; MG/1
1 TABLET ORAL
Status: COMPLETED | OUTPATIENT
Start: 2017-12-06 | End: 2017-12-06

## 2017-12-06 RX ORDER — ALBUTEROL SULFATE 0.83 MG/ML
2.5 SOLUTION RESPIRATORY (INHALATION)
Qty: 24 EACH | Refills: 6 | Status: SHIPPED | OUTPATIENT
Start: 2017-12-06 | End: 2018-10-29

## 2017-12-06 RX ORDER — HYDROCODONE BITARTRATE AND ACETAMINOPHEN 5; 325 MG/1; MG/1
1 TABLET ORAL
Qty: 12 TAB | Refills: 0 | Status: SHIPPED | OUTPATIENT
Start: 2017-12-06 | End: 2018-01-09

## 2017-12-06 RX ORDER — SULFAMETHOXAZOLE AND TRIMETHOPRIM 800; 160 MG/1; MG/1
1 TABLET ORAL 2 TIMES DAILY
Qty: 14 TAB | Refills: 0 | Status: SHIPPED | OUTPATIENT
Start: 2017-12-06 | End: 2017-12-13

## 2017-12-06 RX ADMIN — OXYCODONE HYDROCHLORIDE AND ACETAMINOPHEN 1 TABLET: 5; 325 TABLET ORAL at 10:59

## 2017-12-06 RX ADMIN — IPRATROPIUM BROMIDE AND ALBUTEROL SULFATE 3 ML: .5; 3 SOLUTION RESPIRATORY (INHALATION) at 10:59

## 2017-12-06 NOTE — DISCHARGE INSTRUCTIONS
Pilonidal Abscess: Care Instructions  Your Care Instructions    A pilonidal abscess is an infection caused by an ingrown hair. The abscess occurs in the area of the tailbone and the top of the buttocks. The infection causes a pocket of pus to form. It can be quite painful. Your doctor may have opened and drained the abscess. You can take care of yourself at home to help the area heal. In some cases, the abscess returns. Your doctor may suggest surgery to remove the site of the infection if it comes back. You may have had a sedative to help you relax. You may be unsteady after having sedation. It can take a few hours for the medicine's effects to wear off. Common side effects of sedation include nausea, vomiting, and feeling sleepy or tired. The doctor has checked you carefully, but problems can develop later. If you notice any problems or new symptoms, get medical treatment right away. Follow-up care is a key part of your treatment and safety. Be sure to make and go to all appointments, and call your doctor if you are having problems. It's also a good idea to know your test results and keep a list of the medicines you take. How can you care for yourself at home? · If the doctor gave you a sedative:  ¨ For 24 hours, don't do anything that requires attention to detail. It takes time for the medicine's effects to completely wear off. ¨ For your safety, do not drive or operate any machinery that could be dangerous. Wait until the medicine wears off and you can think clearly and react easily. · If your doctor prescribed antibiotics, take them exactly as directed. Do not stop taking them just because you feel better. You need to take the full course of antibiotics. · Be safe with medicines. Take pain medicines exactly as directed. ¨ If the doctor gave you a prescription medicine for pain, take it as prescribed.   ¨ If you are not taking a prescription pain medicine, ask your doctor if you can take an over-the-counter medicine. · If your doctor opened and drained your abscess, you may have gauze or other packing material inside your wound. Follow all instructions from your doctor on how to care for your wound. · Keep the area of your wound very clean. Use wet cotton balls, a warm washcloth, or baby wipes. Clean the area gently, especially after a bowel movement. When should you call for help? Call 911 anytime you think you may need emergency care. For example, call if:  ? · You have trouble breathing. ? · You passed out (lost consciousness). ?Call your doctor now or seek immediate medical care if:  ? · You have new or worse nausea or vomiting. ? · You have symptoms of infection, such as:  ¨ Increased pain, swelling, warmth, or redness. ¨ Red streaks leading from the area. ¨ Pus draining from the area. ¨ A fever. ? Watch closely for changes in your health, and be sure to contact your doctor if:  ? · You do not get better as expected. Where can you learn more? Go to http://jason-maritza.info/. Enter 22 [id number] in the search box to learn more about \"Pilonidal Abscess: Care Instructions. \"  Current as of: October 13, 2016  Content Version: 11.4  © 1940-5062 [employer]). Care instructions adapted under license by [employer] (which disclaims liability or warranty for this information). If you have questions about a medical condition or this instruction, always ask your healthcare professional. [place] [address] any warranty or liability for your use of this information. Elevated Blood Pressure: Care Instructions  Your Care Instructions    Blood pressure is a measure of how hard the blood pushes against the walls of your arteries. It's normal for blood pressure to go up and down throughout the day. But if it stays up over time, you have high blood pressure. Two numbers tell you your blood pressure. The first number is the systolic pressure. It shows how hard the blood pushes when your heart is pumping. The second number is the diastolic pressure. It shows how hard the blood pushes between heartbeats, when your heart is relaxed and filling with blood. An ideal blood pressure in adults is less than 120/80 (say \"120 over 80\"). High blood pressure is 140/90 or higher. You have high blood pressure if your top number is 140 or higher or your bottom number is 90 or higher, or both. The main test for high blood pressure is simple, fast, and painless. To diagnose high blood pressure, your doctor will test your blood pressure at different times. After testing your blood pressure, your doctor may ask you to test it again when you are home. If you are diagnosed with high blood pressure, you can work with your doctor to make a long-term plan to manage it. Follow-up care is a key part of your treatment and safety. Be sure to make and go to all appointments, and call your doctor if you are having problems. It's also a good idea to know your test results and keep a list of the medicines you take. How can you care for yourself at home? · Do not smoke. Smoking increases your risk for heart attack and stroke. If you need help quitting, talk to your doctor about stop-smoking programs and medicines. These can increase your chances of quitting for good. · Stay at a healthy weight. · Try to limit how much sodium you eat to less than 2,300 milligrams (mg) a day. Your doctor may ask you to try to eat less than 1,500 mg a day. · Be physically active. Get at least 30 minutes of exercise on most days of the week. Walking is a good choice. You also may want to do other activities, such as running, swimming, cycling, or playing tennis or team sports. · Avoid or limit alcohol. Talk to your doctor about whether you can drink any alcohol. · Eat plenty of fruits, vegetables, and low-fat dairy products. Eat less saturated and total fats.   · Learn how to check your blood pressure at home. When should you call for help? Call your doctor now or seek immediate medical care if:  ? · Your blood pressure is much higher than normal (such as 180/110 or higher). ? · You think high blood pressure is causing symptoms such as:  ¨ Severe headache. ¨ Blurry vision. ? Watch closely for changes in your health, and be sure to contact your doctor if:  ? · You do not get better as expected. Where can you learn more? Go to http://jason-maritza.info/. Enter E207 in the search box to learn more about \"Elevated Blood Pressure: Care Instructions. \"  Current as of: September 21, 2016  Content Version: 11.4  © 3233-8695 [employer]. Care instructions adapted under license by [employer] (which disclaims liability or warranty for this information). If you have questions about a medical condition or this instruction, always ask your healthcare professional. [address] any warranty or liability for your use of this information.

## 2017-12-06 NOTE — ED TRIAGE NOTES
\"I have this cyst on my lower back. I had popped it 5 years ago but its come back and its worse now. I also was going to see about getting my asthma meds refilled. \"

## 2017-12-06 NOTE — ED PROVIDER NOTES
HPI Comments: Patient is a 34 y/o male w/ PMH Asthma, COPD, recurrent abscesses, who presents to the ER c/o abscess just above his buttocks. Patient states the pain and swelling started several weeks ago, however significantly worsened within the past few days. He reports trying to \"squeeze it in the shower\", however it was too painful and he was not successful. Patient also states he is out of his asthma medications and would like a refill if possible. Patient rates his pain 6/10. It worsens with sitting. He denied any recent fevers, chills, SOB, chest pain, palpitations, abd pain, N/V and has no other complaints. Patient is a 33 y.o. male presenting with skin problem and medication refill. The history is provided by the patient. Skin Problem      Medication Refill   Pertinent negatives include no chest pain, no abdominal pain, no headaches and no shortness of breath. Past Medical History:   Diagnosis Date    Asthma     Chronic obstructive pulmonary disease ([facility] [address]        Past Surgical History:   Procedure Laterality Date    HX FREE SKIN GRAFT      HX ORTHOPAEDIC      facial reconstruction         History reviewed. No pertinent family history. Social History     Social History    Marital status: SINGLE     Spouse name: N/A    Number of children: N/A    Years of education: N/A     Occupational History    Not on file. Social History Main Topics    Smoking status: Never Smoker    Smokeless tobacco: Former User    Alcohol use Yes    Drug use: No    Sexual activity: Not Currently     Other Topics Concern    Not on file     Social History Narrative         ALLERGIES: Penicillin g    Review of Systems   Constitutional: Negative for chills, fatigue and fever. HENT: Negative. Negative for sore throat. Eyes: Negative. Respiratory: Negative for cough and shortness of breath. Cardiovascular: Negative for chest pain and palpitations.    Gastrointestinal: Negative for abdominal pain, nausea and vomiting. Genitourinary: Negative for dysuria. Musculoskeletal: Negative. Skin: Positive for wound. Pilonidal cyst   Neurological: Negative for dizziness, weakness, light-headedness and headaches. Psychiatric/Behavioral: Negative. All other systems reviewed and are negative. Vitals:    12/06/17 1004   BP: (!) 148/99   Pulse: 93   Resp: 16   Temp: 98.1 °F (36.7 °C)   SpO2: 97%   Weight: 81.6 kg (180 lb)   Height: 5' 8\" (1.727 m)            Physical Exam   Constitutional: He is oriented to person, place, and time. He appears well-developed and well-nourished. No distress. HENT:   Head: Normocephalic and atraumatic. Mouth/Throat: Oropharynx is clear and moist.   Eyes: Conjunctivae are normal. No scleral icterus. Neck: Neck supple. No JVD present. No tracheal deviation present. Cardiovascular: Normal rate, regular rhythm and normal heart sounds. Pulmonary/Chest: Effort normal. No respiratory distress. He has wheezes. He has no rales. He exhibits no tenderness. Abdominal: Soft. There is no tenderness. Musculoskeletal: Normal range of motion. Neurological: He is alert and oriented to person, place, and time. He has normal strength. Gait normal. GCS eye subscore is 4. GCS verbal subscore is 5. GCS motor subscore is 6. Skin: Skin is warm and dry. He is not diaphoretic. Psychiatric: He has a normal mood and affect. Nursing note and vitals reviewed. MDM  Number of Diagnoses or Management Options  Elevated blood pressure reading:   Medication refill:   Pilonidal cyst:   Wheezing:   Diagnosis management comments: 10:43 AM  32 y/o male c/o abscess to buttocks and requesting med refill. Noted to be wheezing on exam.  Will plan on nebulizer, I/D of pilonidal cyst.    Deysi Woods PA-C    11:59 AM  Pt tolerated I/D well. Discussed wound care instructions and return precautions. Will give prescription for asthma meds.   Advised pt needs to establish himself with PCP for continued refills. All questions answered and patient in agreement with plan of care. Will plan for discharge. Misael Fontaine PA-C    Clinical Impression:  Pilonidal cyst, wheezing, med refill, elevated BP reading    One or more blood pressure readings were noted elevated during the Pt's presentation in the emergency department this date. This abnormal reading has been cited in the Pt's diagnosis, and they have been encouraged to follow up with their primary care physician, or referred to a consultant for further evaluation and treatment. Risk of Complications, Morbidity, and/or Mortality  Presenting problems: moderate  Diagnostic procedures: moderate  Management options: moderate    Patient Progress  Patient progress: stable    ED Course       I&D Abcess Complex  Date/Time: 12/6/2017 11:56 AM  Performed by: Lulu Zimmerman by: Tonya Mcneil     Consent:     Consent obtained:  Written    Consent given by:  Patient    Risks discussed:  Bleeding, infection, incomplete drainage and pain    Alternatives discussed:  No treatment  Location:     Type:  Pilonidal cyst    Size:  3 cm    Location:  Anogenital    Anogenital location:  Pilonidal  Pre-procedure details:     Skin preparation:  Betadine  Anesthesia (see MAR for exact dosages): Anesthesia method:  Local infiltration    Local anesthetic:  Lidocaine 1% w/o epi  Procedure type:     Complexity:  Complex  Procedure details:     Needle aspiration: no      Incision types:  Stab incision    Incision depth:  Subcutaneous    Scalpel blade:  11    Wound management:  Probed and deloculated and extensive cleaning    Drainage:  Bloody and purulent    Drainage amount: Moderate    Wound treatment:  Wound left open    Packing materials:  None  Post-procedure details:     Patient tolerance of procedure:   Tolerated well, no immediate complications                 Vitals:  Patient Vitals for the past 12 hrs:   Temp Pulse Resp BP SpO2 12/06/17 1004 98.1 °F (36.7 °C) 93 16 (!) 148/99 97 %         Medications ordered:   Medications   oxyCODONE-acetaminophen (PERCOCET) 5-325 mg per tablet 1 Tab (1 Tab Oral Given 12/6/17 1059)   albuterol-ipratropium (DUO-NEB) 2.5 MG-0.5 MG/3 ML (3 mL Nebulization Given 12/6/17 1059)         Lab findings:  No results found for this or any previous visit (from the past 12 hour(s)). EKG interpretation by ED Physician:      X-Ray, CT or other radiology findings or impressions:  No orders to display       Progress notes, Consult notes or additional Procedure notes:       Reevaluation of patient:       Disposition:  Diagnosis:   1. Pilonidal cyst    2. Wheezing    3. Elevated blood pressure reading    4. Medication refill        Disposition: Discharged    Follow-up Information     Follow up With Details Comments Contact [facility] EMERGENCY DEPT  If symptoms worsen [facility] [address] [place] [address] Call in 2 days ER follow up and establish primary care [facility] [place] [address]  [phone]           Patient's Medications   Start Taking    HYDROCODONE-ACETAMINOPHEN (NORCO) 5-325 MG PER TABLET    Take 1 Tab by mouth every six (6) hours as needed for Pain. Max Daily Amount: 4 Tabs. TRIMETHOPRIM-SULFAMETHOXAZOLE (BACTRIM DS) 160-800 MG PER TABLET    Take 1 Tab by mouth two (2) times a day for 7 days. Continue Taking    ALBUTEROL (PROVENTIL HFA, VENTOLIN HFA, PROAIR HFA) 90 MCG/ACTUATION INHALER    Take 2 Puffs by inhalation every four (4) hours as needed for Wheezing. ALBUTEROL-IPRATROPIUM (DUO-NEB) 2.5 MG-0.5 MG/3 ML NEBU    3 mL by Nebulization route every six (6) hours as needed.    These Medications have changed    Modified Medication Previous Medication    ALBUTEROL (PROVENTIL VENTOLIN) 2.5 MG /3 ML (0.083 %) NEBULIZER SOLUTION albuterol (PROVENTIL VENTOLIN) 2.5 mg /3 mL (0.083 %) nebulizer solution       3 mL by Nebulization route every four (4) hours as needed for Wheezing. 3 mL by Nebulization route every four (4) hours as needed for Wheezing. FLUTICASONE-SALMETEROL (ADVAIR) 250-50 MCG/DOSE DISKUS INHALER fluticasone-salmeterol (ADVAIR) 250-50 mcg/dose diskus inhaler       Take 1 Puff by inhalation two (2) times a day. Take 1 Puff by inhalation two (2) times a day.    Stop Taking    PREDNISONE (DELTASONE) 10 MG TABLET    Take 5 tabs PO daily x 5 d, then 4 tabs PO daily x 4 d, then 3 tabs PO daily x 3 d, then 2 tabs PO daily x 3 d, then 1 tab PO daily x 1 d

## 2017-12-30 ENCOUNTER — APPOINTMENT (OUTPATIENT)
Dept: GENERAL RADIOLOGY | Age: 27
End: 2017-12-30
Attending: EMERGENCY MEDICINE
Payer: SELF-PAY

## 2017-12-30 ENCOUNTER — HOSPITAL ENCOUNTER (EMERGENCY)
Age: 27
Discharge: HOME | End: 2017-12-30
Attending: EMERGENCY MEDICINE | Admitting: EMERGENCY MEDICINE
Payer: SELF-PAY

## 2017-12-30 VITALS
OXYGEN SATURATION: 91 % | HEIGHT: 68 IN | DIASTOLIC BLOOD PRESSURE: 84 MMHG | RESPIRATION RATE: 18 BRPM | HEART RATE: 109 BPM | SYSTOLIC BLOOD PRESSURE: 132 MMHG | BODY MASS INDEX: 28.79 KG/M2 | WEIGHT: 190 LBS | TEMPERATURE: 97.7 F

## 2017-12-30 DIAGNOSIS — J45.901 MILD ASTHMA WITH ACUTE EXACERBATION, UNSPECIFIED WHETHER PERSISTENT: Primary | ICD-10-CM

## 2017-12-30 PROCEDURE — 99283 EMERGENCY DEPT VISIT LOW MDM: CPT

## 2017-12-30 PROCEDURE — 94640 AIRWAY INHALATION TREATMENT: CPT

## 2017-12-30 PROCEDURE — 74011000250 HC RX REV CODE- 250: Performed by: EMERGENCY MEDICINE

## 2017-12-30 PROCEDURE — 71010 XR CHEST PORT: CPT

## 2017-12-30 PROCEDURE — 96365 THER/PROPH/DIAG IV INF INIT: CPT

## 2017-12-30 PROCEDURE — 77030029684 HC NEB SM VOL KT MONA -A

## 2017-12-30 PROCEDURE — 74011250636 HC RX REV CODE- 250/636: Performed by: EMERGENCY MEDICINE

## 2017-12-30 PROCEDURE — 96361 HYDRATE IV INFUSION ADD-ON: CPT

## 2017-12-30 RX ORDER — IPRATROPIUM BROMIDE AND ALBUTEROL SULFATE 2.5; .5 MG/3ML; MG/3ML
3 SOLUTION RESPIRATORY (INHALATION)
Status: COMPLETED | OUTPATIENT
Start: 2017-12-30 | End: 2017-12-30

## 2017-12-30 RX ORDER — ALBUTEROL SULFATE 90 UG/1
2 AEROSOL, METERED RESPIRATORY (INHALATION)
Qty: 1 INHALER | Refills: 0 | Status: SHIPPED | OUTPATIENT
Start: 2017-12-30 | End: 2018-03-26 | Stop reason: SDUPTHER

## 2017-12-30 RX ORDER — DEXAMETHASONE 4 MG/1
10 TABLET ORAL EVERY 12 HOURS
Status: DISCONTINUED | OUTPATIENT
Start: 2017-12-30 | End: 2017-12-30

## 2017-12-30 RX ORDER — MAGNESIUM SULFATE HEPTAHYDRATE 40 MG/ML
2 INJECTION, SOLUTION INTRAVENOUS ONCE
Status: COMPLETED | OUTPATIENT
Start: 2017-12-30 | End: 2017-12-30

## 2017-12-30 RX ORDER — DEXAMETHASONE 4 MG/1
10 TABLET ORAL EVERY 12 HOURS
Status: DISCONTINUED | OUTPATIENT
Start: 2017-12-30 | End: 2017-12-31 | Stop reason: HOSPADM

## 2017-12-30 RX ORDER — IPRATROPIUM BROMIDE AND ALBUTEROL SULFATE 2.5; .5 MG/3ML; MG/3ML
3 SOLUTION RESPIRATORY (INHALATION)
Qty: 30 NEBULE | Refills: 3 | Status: SHIPPED | OUTPATIENT
Start: 2017-12-30 | End: 2018-01-09

## 2017-12-30 RX ORDER — PREDNISONE 20 MG/1
60 TABLET ORAL DAILY
Qty: 18 TAB | Refills: 0 | Status: ON HOLD | OUTPATIENT
Start: 2017-12-30 | End: 2018-01-07

## 2017-12-30 RX ADMIN — SODIUM CHLORIDE 1000 ML: 900 INJECTION, SOLUTION INTRAVENOUS at 19:22

## 2017-12-30 RX ADMIN — IPRATROPIUM BROMIDE AND ALBUTEROL SULFATE 3 ML: .5; 3 SOLUTION RESPIRATORY (INHALATION) at 18:29

## 2017-12-30 RX ADMIN — IPRATROPIUM BROMIDE AND ALBUTEROL SULFATE 3 ML: .5; 3 SOLUTION RESPIRATORY (INHALATION) at 18:37

## 2017-12-30 RX ADMIN — DEXAMETHASONE 10 MG: 4 TABLET ORAL at 18:37

## 2017-12-30 RX ADMIN — IPRATROPIUM BROMIDE AND ALBUTEROL SULFATE 3 ML: .5; 3 SOLUTION RESPIRATORY (INHALATION) at 18:18

## 2017-12-30 RX ADMIN — MAGNESIUM SULFATE HEPTAHYDRATE 2 G: 40 INJECTION, SOLUTION INTRAVENOUS at 18:28

## 2017-12-30 RX ADMIN — IPRATROPIUM BROMIDE AND ALBUTEROL SULFATE 3 ML: .5; 3 SOLUTION RESPIRATORY (INHALATION) at 20:33

## 2017-12-30 NOTE — ED TRIAGE NOTES
Pt c/o sob. accesory muscles visualized, pt taken straight to room 16, attached to monitor, triage performed, Ervin Tao RN retrieving nebs from pyxis.

## 2017-12-31 ENCOUNTER — APPOINTMENT (OUTPATIENT)
Dept: GENERAL RADIOLOGY | Age: 27
DRG: 202 | End: 2017-12-31
Attending: EMERGENCY MEDICINE
Payer: SELF-PAY

## 2017-12-31 ENCOUNTER — HOSPITAL ENCOUNTER (INPATIENT)
Age: 27
LOS: 7 days | Discharge: HOME | DRG: 202 | End: 2018-01-07
Attending: EMERGENCY MEDICINE | Admitting: HOSPITALIST
Payer: SELF-PAY

## 2017-12-31 DIAGNOSIS — R09.02 HYPOXIA: ICD-10-CM

## 2017-12-31 DIAGNOSIS — R06.03 RESPIRATORY DISTRESS: Primary | ICD-10-CM

## 2017-12-31 DIAGNOSIS — J45.52 SEVERE PERSISTENT ASTHMA WITH STATUS ASTHMATICUS: ICD-10-CM

## 2017-12-31 PROBLEM — J96.02 ACUTE RESPIRATORY ACIDOSIS (HCC): Status: ACTIVE | Noted: 2017-12-31

## 2017-12-31 PROBLEM — J45.901 ACUTE SEVERE EXACERBATION OF ASTHMA: Status: ACTIVE | Noted: 2017-12-31

## 2017-12-31 LAB
ALBUMIN SERPL-MCNC: 3.2 G/DL (ref 3.4–5)
ALBUMIN/GLOB SERPL: 0.8 {RATIO} (ref 0.8–1.7)
ALP SERPL-CCNC: 98 U/L (ref 45–117)
ALT SERPL-CCNC: 66 U/L (ref 16–61)
ANION GAP SERPL CALC-SCNC: 7 MMOL/L (ref 3–18)
ARTERIAL PATENCY WRIST A: YES
ARTERIAL PATENCY WRIST A: YES
AST SERPL-CCNC: 27 U/L (ref 15–37)
ATRIAL RATE: 118 BPM
BASE DEFICIT BLD-SCNC: 5 MMOL/L
BASE EXCESS BLD CALC-SCNC: 0 MMOL/L
BDY SITE: ABNORMAL
BDY SITE: ABNORMAL
BILIRUB SERPL-MCNC: 0.3 MG/DL (ref 0.2–1)
BODY TEMPERATURE: 97
BODY TEMPERATURE: 99.2
BUN SERPL-MCNC: 14 MG/DL (ref 7–18)
BUN/CREAT SERPL: 10 (ref 12–20)
CALCIUM SERPL-MCNC: 8.3 MG/DL (ref 8.5–10.1)
CALCULATED P AXIS, ECG09: 55 DEGREES
CALCULATED R AXIS, ECG10: -39 DEGREES
CALCULATED T AXIS, ECG11: 64 DEGREES
CHLORIDE SERPL-SCNC: 106 MMOL/L (ref 100–108)
CO2 SERPL-SCNC: 28 MMOL/L (ref 21–32)
CREAT SERPL-MCNC: 1.39 MG/DL (ref 0.6–1.3)
DIAGNOSIS, 93000: NORMAL
ERYTHROCYTE [DISTWIDTH] IN BLOOD BY AUTOMATED COUNT: 12.9 % (ref 11.6–14.5)
FLUAV AG NPH QL IA: NEGATIVE
FLUBV AG NOSE QL IA: NEGATIVE
GAS FLOW.O2 O2 DELIVERY SYS: ABNORMAL L/MIN
GAS FLOW.O2 O2 DELIVERY SYS: ABNORMAL L/MIN
GLOBULIN SER CALC-MCNC: 4 G/DL (ref 2–4)
GLUCOSE SERPL-MCNC: 214 MG/DL (ref 74–99)
HCO3 BLD-SCNC: 22.3 MMOL/L (ref 22–26)
HCO3 BLD-SCNC: 25.1 MMOL/L (ref 22–26)
HCT VFR BLD AUTO: 53 % (ref 36–48)
HGB BLD-MCNC: 17.9 G/DL (ref 13–16)
MCH RBC QN AUTO: 29 PG (ref 24–34)
MCHC RBC AUTO-ENTMCNC: 33.8 G/DL (ref 31–37)
MCV RBC AUTO: 85.8 FL (ref 74–97)
O2/TOTAL GAS SETTING VFR VENT: 40 %
O2/TOTAL GAS SETTING VFR VENT: 50 %
P-R INTERVAL, ECG05: 128 MS
PCO2 BLD: 44.7 MMHG (ref 35–45)
PCO2 BLD: 47.5 MMHG (ref 35–45)
PEEP RESPIRATORY: 14 CMH2O
PEEP RESPIRATORY: 16 CMH2O
PH BLD: 7.28 [PH] (ref 7.35–7.45)
PH BLD: 7.36 [PH] (ref 7.35–7.45)
PIP ISTAT,IPIP: 20
PIP ISTAT,IPIP: 21
PLATELET # BLD AUTO: 445 K/UL (ref 135–420)
PMV BLD AUTO: 9.4 FL (ref 9.2–11.8)
PO2 BLD: 208 MMHG (ref 80–100)
PO2 BLD: 232 MMHG (ref 80–100)
POTASSIUM SERPL-SCNC: 5 MMOL/L (ref 3.5–5.5)
PRESSURE SUPPORT SETTING VENT: 14 CMH2O
PRESSURE SUPPORT SETTING VENT: 4 CMH2O
PROT SERPL-MCNC: 7.2 G/DL (ref 6.4–8.2)
Q-T INTERVAL, ECG07: 314 MS
QRS DURATION, ECG06: 66 MS
QTC CALCULATION (BEZET), ECG08: 440 MS
RBC # BLD AUTO: 6.18 M/UL (ref 4.7–5.5)
SAO2 % BLD: 100 % (ref 92–97)
SAO2 % BLD: 100 % (ref 92–97)
SERVICE CMNT-IMP: ABNORMAL
SERVICE CMNT-IMP: ABNORMAL
SODIUM SERPL-SCNC: 141 MMOL/L (ref 136–145)
SPECIMEN TYPE: ABNORMAL
SPECIMEN TYPE: ABNORMAL
TOTAL RESP. RATE, ITRR: 22
TOTAL RESP. RATE, ITRR: 25
VENTRICULAR RATE, ECG03: 118 BPM
WBC # BLD AUTO: 24.4 K/UL (ref 4.6–13.2)

## 2017-12-31 PROCEDURE — 65610000006 HC RM INTENSIVE CARE

## 2017-12-31 PROCEDURE — 77030035694 HC MSK BIPAP FLL FAC PERFMAX PHIL -B

## 2017-12-31 PROCEDURE — 99285 EMERGENCY DEPT VISIT HI MDM: CPT

## 2017-12-31 PROCEDURE — 74011250636 HC RX REV CODE- 250/636: Performed by: INTERNAL MEDICINE

## 2017-12-31 PROCEDURE — 36600 WITHDRAWAL OF ARTERIAL BLOOD: CPT

## 2017-12-31 PROCEDURE — 94640 AIRWAY INHALATION TREATMENT: CPT

## 2017-12-31 PROCEDURE — 93005 ELECTROCARDIOGRAM TRACING: CPT

## 2017-12-31 PROCEDURE — 82803 BLOOD GASES ANY COMBINATION: CPT

## 2017-12-31 PROCEDURE — 87804 INFLUENZA ASSAY W/OPTIC: CPT | Performed by: EMERGENCY MEDICINE

## 2017-12-31 PROCEDURE — 74011000250 HC RX REV CODE- 250: Performed by: EMERGENCY MEDICINE

## 2017-12-31 PROCEDURE — 80053 COMPREHEN METABOLIC PANEL: CPT | Performed by: EMERGENCY MEDICINE

## 2017-12-31 PROCEDURE — 96365 THER/PROPH/DIAG IV INF INIT: CPT

## 2017-12-31 PROCEDURE — 74011250636 HC RX REV CODE- 250/636: Performed by: EMERGENCY MEDICINE

## 2017-12-31 PROCEDURE — 85027 COMPLETE CBC AUTOMATED: CPT | Performed by: EMERGENCY MEDICINE

## 2017-12-31 PROCEDURE — 94660 CPAP INITIATION&MGMT: CPT

## 2017-12-31 PROCEDURE — 74011250637 HC RX REV CODE- 250/637: Performed by: INTERNAL MEDICINE

## 2017-12-31 PROCEDURE — 74011000250 HC RX REV CODE- 250: Performed by: INTERNAL MEDICINE

## 2017-12-31 PROCEDURE — 71010 XR CHEST SNGL V: CPT

## 2017-12-31 RX ORDER — MAGNESIUM SULFATE HEPTAHYDRATE 40 MG/ML
2 INJECTION, SOLUTION INTRAVENOUS
Status: DISCONTINUED | OUTPATIENT
Start: 2017-12-31 | End: 2017-12-31 | Stop reason: SDUPTHER

## 2017-12-31 RX ORDER — BUDESONIDE 0.5 MG/2ML
500 INHALANT ORAL
Status: DISCONTINUED | OUTPATIENT
Start: 2017-12-31 | End: 2018-01-01

## 2017-12-31 RX ORDER — ALBUTEROL SULFATE 0.83 MG/ML
SOLUTION RESPIRATORY (INHALATION)
Status: DISPENSED
Start: 2017-12-31 | End: 2018-01-01

## 2017-12-31 RX ORDER — IPRATROPIUM BROMIDE AND ALBUTEROL SULFATE 2.5; .5 MG/3ML; MG/3ML
3 SOLUTION RESPIRATORY (INHALATION)
Status: DISCONTINUED | OUTPATIENT
Start: 2017-12-31 | End: 2018-01-07 | Stop reason: HOSPADM

## 2017-12-31 RX ORDER — LORAZEPAM 2 MG/ML
0.5 INJECTION INTRAMUSCULAR
Status: COMPLETED | OUTPATIENT
Start: 2017-12-31 | End: 2017-12-31

## 2017-12-31 RX ORDER — MAGNESIUM SULFATE HEPTAHYDRATE 40 MG/ML
2 INJECTION, SOLUTION INTRAVENOUS
Status: COMPLETED | OUTPATIENT
Start: 2017-12-31 | End: 2017-12-31

## 2017-12-31 RX ORDER — IPRATROPIUM BROMIDE AND ALBUTEROL SULFATE 2.5; .5 MG/3ML; MG/3ML
3 SOLUTION RESPIRATORY (INHALATION)
Status: COMPLETED | OUTPATIENT
Start: 2017-12-31 | End: 2017-12-31

## 2017-12-31 RX ORDER — ALBUTEROL SULFATE 2.5 MG/.5ML
2.5 SOLUTION RESPIRATORY (INHALATION)
Status: DISCONTINUED | OUTPATIENT
Start: 2017-12-31 | End: 2018-01-07 | Stop reason: HOSPADM

## 2017-12-31 RX ORDER — MONTELUKAST SODIUM 10 MG/1
10 TABLET ORAL
Status: DISCONTINUED | OUTPATIENT
Start: 2017-12-31 | End: 2018-01-07 | Stop reason: HOSPADM

## 2017-12-31 RX ORDER — MORPHINE SULFATE 2 MG/ML
1-4 INJECTION, SOLUTION INTRAMUSCULAR; INTRAVENOUS
Status: DISCONTINUED | OUTPATIENT
Start: 2017-12-31 | End: 2018-01-04

## 2017-12-31 RX ORDER — ARFORMOTEROL TARTRATE 15 UG/2ML
15 SOLUTION RESPIRATORY (INHALATION)
Status: DISCONTINUED | OUTPATIENT
Start: 2017-12-31 | End: 2018-01-07 | Stop reason: HOSPADM

## 2017-12-31 RX ADMIN — MAGNESIUM SULFATE HEPTAHYDRATE 2 G: 40 INJECTION, SOLUTION INTRAVENOUS at 08:40

## 2017-12-31 RX ADMIN — ALBUTEROL SULFATE 2.5 MG: 2.5 SOLUTION RESPIRATORY (INHALATION) at 18:41

## 2017-12-31 RX ADMIN — BUDESONIDE 500 MCG: 0.5 INHALANT RESPIRATORY (INHALATION) at 20:00

## 2017-12-31 RX ADMIN — IPRATROPIUM BROMIDE AND ALBUTEROL SULFATE 3 ML: .5; 3 SOLUTION RESPIRATORY (INHALATION) at 20:00

## 2017-12-31 RX ADMIN — ALBUTEROL SULFATE 2.5 MG: 2.5 SOLUTION RESPIRATORY (INHALATION) at 17:23

## 2017-12-31 RX ADMIN — ALBUTEROL SULFATE 2.5 MG: 2.5 SOLUTION RESPIRATORY (INHALATION) at 09:54

## 2017-12-31 RX ADMIN — ARFORMOTEROL TARTRATE 15 MCG: 15 SOLUTION RESPIRATORY (INHALATION) at 20:00

## 2017-12-31 RX ADMIN — ALBUTEROL SULFATE 2.5 MG: 2.5 SOLUTION RESPIRATORY (INHALATION) at 21:31

## 2017-12-31 RX ADMIN — SODIUM CHLORIDE 500 MG: 900 INJECTION, SOLUTION INTRAVENOUS at 09:35

## 2017-12-31 RX ADMIN — METHYLPREDNISOLONE SODIUM SUCCINATE 60 MG: 40 INJECTION, POWDER, FOR SOLUTION INTRAMUSCULAR; INTRAVENOUS at 12:18

## 2017-12-31 RX ADMIN — METHYLPREDNISOLONE SODIUM SUCCINATE 60 MG: 40 INJECTION, POWDER, FOR SOLUTION INTRAMUSCULAR; INTRAVENOUS at 17:23

## 2017-12-31 RX ADMIN — LORAZEPAM 0.5 MG: 2 INJECTION, SOLUTION INTRAMUSCULAR; INTRAVENOUS at 23:11

## 2017-12-31 RX ADMIN — IPRATROPIUM BROMIDE AND ALBUTEROL SULFATE 3 ML: .5; 3 SOLUTION RESPIRATORY (INHALATION) at 15:41

## 2017-12-31 RX ADMIN — ALBUTEROL SULFATE 2.5 MG: 2.5 SOLUTION RESPIRATORY (INHALATION) at 22:34

## 2017-12-31 RX ADMIN — ARFORMOTEROL TARTRATE 15 MCG: 15 SOLUTION RESPIRATORY (INHALATION) at 09:09

## 2017-12-31 RX ADMIN — MONTELUKAST SODIUM 10 MG: 10 TABLET, FILM COATED ORAL at 10:23

## 2017-12-31 RX ADMIN — ALBUTEROL SULFATE 2.5 MG: 2.5 SOLUTION RESPIRATORY (INHALATION) at 13:23

## 2017-12-31 RX ADMIN — IPRATROPIUM BROMIDE AND ALBUTEROL SULFATE 3 ML: .5; 3 SOLUTION RESPIRATORY (INHALATION) at 08:36

## 2017-12-31 RX ADMIN — MORPHINE SULFATE 2 MG: 2 INJECTION, SOLUTION INTRAMUSCULAR; INTRAVENOUS at 23:35

## 2017-12-31 RX ADMIN — IPRATROPIUM BROMIDE AND ALBUTEROL SULFATE 3 ML: .5; 3 SOLUTION RESPIRATORY (INHALATION) at 09:09

## 2017-12-31 RX ADMIN — IPRATROPIUM BROMIDE AND ALBUTEROL SULFATE 3 ML: .5; 3 SOLUTION RESPIRATORY (INHALATION) at 09:04

## 2017-12-31 RX ADMIN — IPRATROPIUM BROMIDE AND ALBUTEROL SULFATE 3 ML: .5; 3 SOLUTION RESPIRATORY (INHALATION) at 08:42

## 2017-12-31 RX ADMIN — BUDESONIDE 500 MCG: 0.5 INHALANT RESPIRATORY (INHALATION) at 09:09

## 2017-12-31 NOTE — ED NOTES
The Sepsis Screening has been completed on arrival in the Emergency Department. Vital signs:  Patient Vitals for the past 4 hrs:   Temp Pulse Resp BP SpO2   12/31/17 0900 - (!) 116 22 - 100 %   12/31/17 0845 - (!) 124 20 125/83 100 %   12/31/17 0840 - - - - 100 %   12/31/17 0830 97 °F (36.1 °C) (!) 119 26 (!) 176/107 98 %       MAP (Monitor): 94    =monitored (data validate)       =calculated (manual entry)    Is patient is 29y/o or older, meets 2 or more ABNORMAL VITAL SIGNS below, associated with SUSPECTED INFECTION?   NO     Temperature < 96.8°F (36°C) or > 100.9°F (38.3°C)   Heart Rate > 90 beats per minute   Respiratory Rate > 20 beats per minute   BP < 90 systolic    MAP < 65    IF ANSWER IS YES, CALL A CODE SEPSIS  POC LACTIC ACID ASAP AND BEGIN NURSE DRIVEN SEPSIS ORDERS WHILE AWAITING PROVIDER

## 2017-12-31 NOTE — PROGRESS NOTES
12/31/2017   08:40 AM    RT called to ER. Patient arrived via EMS   And is placed on Bipap without complication. 12/31/2017  09:33 AM  ABG drawn and results given to physican  Dr. Radha Malhotra. 12/31/2017  1:30 PM  BIPAP check done. Heartrate 11  RR 20  Oxygen Saturation 98%.

## 2017-12-31 NOTE — H&P
Internal Medicine History and Physical          Subjective     HPI: Lili Philip is a 33 y.o. male with a PMHx of asthma who presented to the ED with the acute onset of dyspnea and cough three days ago. He came to the ED yesterday and was treated with breathing treatments and discharged with therapy. He only has a rescue inhaler at home. He was admitted here in September for the same thing and still has not been able to afford the medications he needs to keep his disease controlled. He was transferred here by EMS after his respiratory distress and apparently stopped breathing. BiPAP was placed and he was brought to ED where he was given neb treatments and continued BiPAP. He admits to feeling a little bit better. PMHx:  Past Medical History:   Diagnosis Date    Asthma     Chronic obstructive pulmonary disease ([facility] [address]        PSurgHx:  Past Surgical History:   Procedure Laterality Date    HX FREE SKIN GRAFT      HX ORTHOPAEDIC      facial reconstruction       SocialHx:  Social History     Social History    Marital status: SINGLE     Spouse name: N/A    Number of children: N/A    Years of education: N/A     Occupational History    Not on file. Social History Main Topics    Smoking status: Never Smoker    Smokeless tobacco: Former User    Alcohol use Yes    Drug use: No    Sexual activity: Not Currently     Other Topics Concern    Not on file     Social History Narrative       FamilyHx:  No family history on file. Prior to Admission Medications   Prescriptions Last Dose Informant Patient Reported? Taking? HYDROcodone-acetaminophen (NORCO) 5-325 mg per tablet   No No   Sig: Take 1 Tab by mouth every six (6) hours as needed for Pain. Max Daily Amount: 4 Tabs. albuterol (PROVENTIL HFA, VENTOLIN HFA, PROAIR HFA) 90 mcg/actuation inhaler   No No   Sig: Take 2 Puffs by inhalation every four (4) hours as needed for Wheezing.    albuterol (PROVENTIL VENTOLIN) 2.5 mg /3 mL (0.083 %) nebulizer solution   No No   Sig: 3 mL by Nebulization route every four (4) hours as needed for Wheezing. albuterol-ipratropium (DUO-NEB) 2.5 mg-0.5 mg/3 ml nebu   No No   Sig: 3 mL by Nebulization route every six (6) hours as needed. fluticasone-salmeterol (ADVAIR) 250-50 mcg/dose diskus inhaler   No No   Sig: Take 1 Puff by inhalation two (2) times a day. predniSONE (DELTASONE) 20 mg tablet   No No   Sig: Take 3 Tabs by mouth daily for 18 doses. 4 days of 60 mg daily, then 2 days of 40 mg daily, then 2 days of 20 mg daily      Facility-Administered Medications: None       Review of Systems:  CONST: Fever, weight loss, fatigue or chills  GI: Nausea, vomiting, abdominal pain, change in bowel habits, hematochezia, melena, and GERD   INTEG: Dermatitis, abnormal moles  HEENT: Recent changes in vision, vertigo, epistaxis, dysphagia, runny nose, post-nasal drip, and hoarseness  CV: Chest pain, palpitations, HTN, edema and varicosities  RESP: Cough, shortness of breath, wheezing, hemoptysis, snoring and reactive airway disease  : Hematuria, dysuria, frequency, urgency, nocturia and stress urinary incontinence   MS: Weakness, joint pain and arthritis  ENDO: Diabetes, thyroid disease, polyuria, polydipsia, polyphagia, poor wound healing, heat intolerance, cold intolerance  LYMPH/HEME: Anemia, bruising and history of blood transfusions  NEURO: Dizziness, headache, fainting, seizures and stroke  PSYCH: Anxiety and depression      Objective      Visit Vitals    /82    Pulse (!) 112    Temp 97 °F (36.1 °C)    Resp 23    Ht 5' 8\" (1.727 m)    Wt 86.2 kg (190 lb 0.6 oz)    SpO2 98%    BMI 28.89 kg/m2       Physical Exam:  General Appearance: NAD, conversant on BiPAP  HENT: normocephalic/atraumatic, moist mucus membranes  Lungs:  Tachypnea w/ decreased air movement, B/L wheeze  Cardiovascular: Tachycardic w/ RR, no m/r/g, capillary refill < 2 sec, B/L DP/PT pusles +3/4  Abdomen: soft, non-tender, normal bowel sounds  Extremities: no cyanosis, no peripheral edema  Neuro: moves all extremities, no focal deficits  Psych: appropriate affect, alert and oriented to person, place and time    Laboratory Studies:  BMP:   Lab Results   Component Value Date/Time     12/31/2017 08:37 AM    K 5.0 12/31/2017 08:37 AM     12/31/2017 08:37 AM    CO2 28 12/31/2017 08:37 AM    AGAP 7 12/31/2017 08:37 AM     (H) 12/31/2017 08:37 AM    BUN 14 12/31/2017 08:37 AM    CREA 1.39 (H) 12/31/2017 08:37 AM    GFRAA >60 12/31/2017 08:37 AM    GFRNA >60 12/31/2017 08:37 AM     CBC:   Lab Results   Component Value Date/Time    WBC 24.4 (H) 12/31/2017 08:37 AM    HGB 17.9 (H) 12/31/2017 08:37 AM    HCT 53.0 (H) 12/31/2017 08:37 AM     (H) 12/31/2017 08:37 AM       Imaging Reviewed:  Xr Chest Sngl V    Result Date: 12/31/2017  History: Sepsis Comparison: 12/30/2017. Exam performed AP portable at 0845. Findings: The lungs appear clear. The cardiac silhouette and pulmonary vascularity appear within normal limits. No evidence for pneumothorax or pleural effusion. Impression: No acute cardiopulmonary disease. Xr Chest Port    Result Date: 12/31/2017  History: Short of breath Comparison: 9/5/2017. Exam performed AP portable at 1917. Findings: The lungs appear clear. The cardiac silhouette and pulmonary vascularity appear within normal limits. No evidence for pneumothorax or pleural effusion. Impression: No acute cardiopulmonary disease.          Assessment/Plan     Active Hospital Problems    Diagnosis Date Noted    Acute severe exacerbation of asthma 12/31/2017    Acute respiratory acidosis 12/31/2017    Status asthmaticus 09/05/2017     - Cont IV steroids, nebs and BiPAP  - Certainly concern for complete respiratory failure given acute resp acidosis in asthmatic thus will need close monitoring in ICU   - Titrate off while able  - Pulm/CC on board  - Observe off antix for now  - Cont acceptable home medications for chronic conditions   - DVT protocol    I have personally reviewed all pertinent labs, films and EKGs that have officially resulted. I reviewed available electronic documentation outlining the initial presentation as well as the emergency room physician's encounter.     Mario Ribeiro DO  Internal Medicine, Hospitalist  Pager: [phone] [facility]

## 2017-12-31 NOTE — ED NOTES
Verbal shift change report given to [place] (oncoming nurse) by Marj Denise RN (offgoing nurse). Report included the following information SBAR. Pt sitting up in stretcher, female visitor at bedside. States feeling better. Awaiting bed assignment.

## 2017-12-31 NOTE — CONSULTS
Critical Care Consultation  Requesting Clinician:  Jessica Gar MD  Indication:  Respiratory distress    History  The patient is a 32 y/o male with history of asthma. He has been intubated once for an exacerbation. He was seen last night in the ER with a 3 day history of worsening SOB,  He was treated with steroids, nebs and magnesium. He felt improved, and was discharged. He presents this AM in respiratory extremis. He denies smoking. He speaks only a word or two at a time. Typically he simply nods or shakes his head to questions. He cannot identify a trigger for the exacerbation. R/O/S  Deferred due to degree of respiratory distress    Past Medical History  Asthma    Allergies:  PCN    Current Facility-Administered Medications on File Prior to Encounter   Medication Dose Route Frequency Provider Last Rate Last Dose    [COMPLETED] albuterol-ipratropium (DUO-NEB) 2.5 MG-0.5 MG/3 ML  3 mL Nebulization Q10MIN Fatou Hankins DO   3 mL at 12/30/17 1837    [COMPLETED] magnesium sulfate 2 g/50 ml IVPB (premix or compounded)  2 g IntraVENous ONCE Slime Cummings DO   Stopped at 12/30/17 1906    [COMPLETED] sodium chloride 0.9 % bolus infusion 1,000 mL  1,000 mL IntraVENous ONCE Dorys Caballero MD   Stopped at 12/30/17 2226    [COMPLETED] albuterol-ipratropium (DUO-NEB) 2.5 MG-0.5 MG/3 ML  3 mL Nebulization NOW Dorys Caballero MD   3 mL at 12/30/17 2033     Current Outpatient Prescriptions on File Prior to Encounter   Medication Sig Dispense Refill    albuterol (PROVENTIL HFA, VENTOLIN HFA, PROAIR HFA) 90 mcg/actuation inhaler Take 2 Puffs by inhalation every four (4) hours as needed for Wheezing. 1 Inhaler 0    albuterol-ipratropium (DUO-NEB) 2.5 mg-0.5 mg/3 ml nebu 3 mL by Nebulization route every six (6) hours as needed. 30 Nebule 3    predniSONE (DELTASONE) 20 mg tablet Take 3 Tabs by mouth daily for 18 doses.  4 days of 60 mg daily, then 2 days of 40 mg daily, then 2 days of 20 mg daily 18 Tab 0    fluticasone-salmeterol (ADVAIR) 250-50 mcg/dose diskus inhaler Take 1 Puff by inhalation two (2) times a day. 1 Inhaler 0    albuterol (PROVENTIL VENTOLIN) 2.5 mg /3 mL (0.083 %) nebulizer solution 3 mL by Nebulization route every four (4) hours as needed for Wheezing. 24 Each 6    HYDROcodone-acetaminophen (NORCO) 5-325 mg per tablet Take 1 Tab by mouth every six (6) hours as needed for Pain. Max Daily Amount: 4 Tabs. 12 Tab 0     Social History  Non-smoker    Family History      Exam  Alert. He is able to help reposition his BiPAP mask. He is showing no improvement on NRB mask  Appears very anxious  There were no vitals taken for this visit. Extreme accessory muscle muscle. Chest hyperinflated  Intercostal retractions        Assessment  AE asthma in extremis    Plan  BiPAP  Steroids  Nebs    Hopefully with BiPAP we can buy enough time for the medications to \"kick in\" and we can avoid intubation. So far bedside evaluation and therapy have decreased his respiratory rate and work of breathing     I saw the patient later in the day. He still was BiPAP dependent and required further BiPAP adjustment. The patient is critically ill with organ failure and at very high risk of decompensation. Total critical care time without physician overlap or procedure time included:  75 minutes.

## 2017-12-31 NOTE — ED PROVIDER NOTES
EMERGENCY DEPARTMENT HISTORY AND PHYSICAL EXAM    8:34 AM      Date: 12/31/2017  Patient Name: Emeka Aquino    History of Presenting Illness     Chief Complaint   Patient presents with    Respiratory Distress         History Provided By: Patient and EMS    Chief Complaint: Respiratory distress  Duration:  Minutes PTA  Timing:  Acute  Location: Chest  Quality: SOB with temporary respiratory arrest  Severity: Severe  Modifying Factors: improvement after combi treatment, mag sulfate, solumedrol  Associated Symptoms: denies any other associated signs or symptoms  Hx limited by the condition of the pt: respiratory distress, pt with NRB mask    Additional History (Context): Emeka Aquino is a 33 y.o. male with asthma who presents per EMS with respiratory distress starting minutes PTA. Pt was initially hypoxic and went into temporary respiratory arrest which resolved after brief manual ventilation. EMS administered 125mg Solumedrol, mag sulfate, combi treatment and albuterol, and 2mg Narcan which did not change pt's condition. Sats improving to 94% in transport with pt breathing independently. Hx 1 past intubation, no hx clot . Pt does not use tobacco or drugs.      PCP: None    Current Facility-Administered Medications   Medication Dose Route Frequency Provider Last Rate Last Dose    albuterol-ipratropium (DUO-NEB) 2.5 MG-0.5 MG/3 ML  3 mL Nebulization Q6H RT Rip MD Gibson   3 mL at 12/31/17 0909    methylPREDNISolone (PF) (SOLU-MEDROL) injection 60 mg  60 mg IntraVENous Q6H Rip MD Gibson        albuterol CONCENTRATE 2.5mg/0.5 mL neb soln  2.5 mg Nebulization Q1H PRN Rip MD Gibson   2.5 mg at 12/31/17 0954    montelukast (SINGULAIR) tablet 10 mg  10 mg Oral QHS Rip MD Gibson        azithromycin (ZITHROMAX) 500 mg in 0.9% sodium chloride (MBP/ADV) 250 mL adv  500 mg IntraVENous Q24H Rip MD Gibson 250 mL/hr at 12/31/17 0935 500 mg at 12/31/17 0935    arformoterol (BROVANA) neb solution 15 mcg  15 mcg Nebulization BID RT Tony Pitts MD   15 mcg at 12/31/17 0909    budesonide (PULMICORT) 500 mcg/2 ml nebulizer suspension  500 mcg Nebulization BID RT Tony Pitts MD   500 mcg at 12/31/17 0909     Current Outpatient Prescriptions   Medication Sig Dispense Refill    albuterol (PROVENTIL HFA, VENTOLIN HFA, PROAIR HFA) 90 mcg/actuation inhaler Take 2 Puffs by inhalation every four (4) hours as needed for Wheezing. 1 Inhaler 0    albuterol-ipratropium (DUO-NEB) 2.5 mg-0.5 mg/3 ml nebu 3 mL by Nebulization route every six (6) hours as needed. 30 Nebule 3    predniSONE (DELTASONE) 20 mg tablet Take 3 Tabs by mouth daily for 18 doses. 4 days of 60 mg daily, then 2 days of 40 mg daily, then 2 days of 20 mg daily 18 Tab 0    fluticasone-salmeterol (ADVAIR) 250-50 mcg/dose diskus inhaler Take 1 Puff by inhalation two (2) times a day. 1 Inhaler 0    albuterol (PROVENTIL VENTOLIN) 2.5 mg /3 mL (0.083 %) nebulizer solution 3 mL by Nebulization route every four (4) hours as needed for Wheezing. 24 Each 6    HYDROcodone-acetaminophen (NORCO) 5-325 mg per tablet Take 1 Tab by mouth every six (6) hours as needed for Pain. Max Daily Amount: 4 Tabs. 12 Tab 0       Past History     Past Medical History:  Past Medical History:   Diagnosis Date    Asthma     Chronic obstructive pulmonary disease ([facility] [address]        Past Surgical History:  Past Surgical History:   Procedure Laterality Date    HX FREE SKIN GRAFT      HX ORTHOPAEDIC      facial reconstruction       Family History:  No family history on file. Social History:  Social History   Substance Use Topics    Smoking status: Never Smoker    Smokeless tobacco: Former User    Alcohol use Yes       Allergies:   Allergies   Allergen Reactions    Penicillin G Anaphylaxis         Review of Systems       Review of Systems   Unable to perform ROS: Acuity of condition (Respiratory distress)         Physical Exam     Visit Vitals    BP 125/83    Pulse (!) 116    Temp 97 °F (36.1 °C)    Resp 22    Ht 5' 8\" (1.727 m)    Wt 86.2 kg (190 lb 0.6 oz)    SpO2 100%    BMI 28.89 kg/m2         Physical Exam   Constitutional: He is oriented to person, place, and time. He appears well-developed and well-nourished. HENT:   Head: Normocephalic and atraumatic. Eyes: Conjunctivae are normal. Pupils are equal, round, and reactive to light. Neck: Normal range of motion. Neck supple. Cardiovascular: Normal rate and regular rhythm. Pulmonary/Chest: He is in respiratory distress. Diminished breath sounds, increased work of breathing, rib retractions and subglottic retractions     Abdominal: Soft. Bowel sounds are normal.   Musculoskeletal: Normal range of motion. Lymphadenopathy:     He has no cervical adenopathy. Neurological: He is alert and oriented to person, place, and time. Skin: Skin is warm. Psychiatric: He has a normal mood and affect.          Diagnostic Study Results     Labs -  Recent Results (from the past 12 hour(s))   CBC W/O DIFF    Collection Time: 12/31/17  8:37 AM   Result Value Ref Range    WBC 24.4 (H) 4.6 - 13.2 K/uL    RBC 6.18 (H) 4.70 - 5.50 M/uL    HGB 17.9 (H) 13.0 - 16.0 g/dL    HCT 53.0 (H) 36.0 - 48.0 %    MCV 85.8 74.0 - 97.0 FL    MCH 29.0 24.0 - 34.0 PG    MCHC 33.8 31.0 - 37.0 g/dL    RDW 12.9 11.6 - 14.5 %    PLATELET 758 (H) 885 - 420 K/uL    MPV 9.4 9.2 - 05.4 FL   METABOLIC PANEL, COMPREHENSIVE    Collection Time: 12/31/17  8:37 AM   Result Value Ref Range    Sodium 141 136 - 145 mmol/L    Potassium 5.0 3.5 - 5.5 mmol/L    Chloride 106 100 - 108 mmol/L    CO2 28 21 - 32 mmol/L    Anion gap 7 3.0 - 18 mmol/L    Glucose 214 (H) 74 - 99 mg/dL    BUN 14 7.0 - 18 MG/DL    Creatinine 1.39 (H) 0.6 - 1.3 MG/DL    BUN/Creatinine ratio 10 (L) 12 - 20      GFR est AA >60 >60 ml/min/1.73m2    GFR est non-AA >60 >60 ml/min/1.73m2    Calcium 8.3 (L) 8.5 - 10.1 MG/DL    Bilirubin, total 0.3 0.2 - 1.0 MG/DL    ALT (SGPT) 66 (H) 16 - 61 U/L    AST (SGOT) 27 15 - 37 U/L    Alk. phosphatase 98 45 - 117 U/L    Protein, total 7.2 6.4 - 8.2 g/dL    Albumin 3.2 (L) 3.4 - 5.0 g/dL    Globulin 4.0 2.0 - 4.0 g/dL    A-G Ratio 0.8 0.8 - 1.7     EKG, 12 LEAD, INITIAL    Collection Time: 12/31/17  8:37 AM   Result Value Ref Range    Ventricular Rate 118 BPM    Atrial Rate 118 BPM    P-R Interval 128 ms    QRS Duration 66 ms    Q-T Interval 314 ms    QTC Calculation (Bezet) 440 ms    Calculated P Axis 55 degrees    Calculated R Axis -39 degrees    Calculated T Axis 64 degrees    Diagnosis       Sinus tachycardia with occasional premature ventricular complexes  Left axis deviation  Nonspecific ST abnormality  Abnormal ECG  When compared with ECG of 05-SEP-2017 06:02,  premature ventricular complexes are now present  T wave amplitude has increased in Anterolateral leads     POC G3    Collection Time: 12/31/17  9:36 AM   Result Value Ref Range    Device: BIPAP      FIO2 (POC) 50 %    pH (POC) 7.276 (L) 7.35 - 7.45      pCO2 (POC) 47.5 (H) 35.0 - 45.0 MMHG    pO2 (POC) 232 (H) 80 - 100 MMHG    HCO3 (POC) 22.3 22 - 26 MMOL/L    sO2 (POC) 100 (H) 92 - 97 %    Base deficit (POC) 5 mmol/L    PEEP/CPAP (POC) 14 cmH2O    PIP (POC) 20      Pressure support 14 cmH2O    Allens test (POC) YES      Total resp. rate 22      Site RIGHT RADIAL      Patient temp. 97.0      Specimen type (POC) ARTERIAL      Performed by Shaquille England        Radiologic Studies -   XR CHEST SNGL V    As read by Jim Tucker MD, no acute process. Medical Decision Making   I am the first provider for this patient. I reviewed the vital signs, available nursing notes, past medical history, past surgical history, family history and social history. Vital Signs-Reviewed the patient's vital signs. Pulse Oximetry Analysis -  100% on O2 via Nonrebreather (Interpretation) normal    Cardiac Monitor:  Rate: 116    EKG: Interpreted by the EP.    Time Interpreted: 08:37   Rate: 118   Rhythm: Sinus Tachycardia    Interpretation: nml intervals, L axis, no obvious ischemia    Records Reviewed: Nursing Notes (Time of Review: 8:34 AM)    ED Course: Progress Notes, Reevaluation, and Consults:    08:30 Discussed with pulmonologist Dr. Rui Graham at bedside. BiPAP placed. 10:08 Consult:  Discussed care with Dr. Jimmy Frazier (Hospitalist). Standard discussion; including history of patients chief complaint, available diagnostic results, and treatment course. Agrees to admit to him now, talking about ICU. Provider Notes (Medical Decision Making):     Sever resp distress, r/o PTX, r/o PNA, symptomatic treatment aggressive      Critical Care Time:     09:00  I have spent 30 minutes of critical care time involved in lab review, consultations with specialist, family decision-making, and documentation. During this entire length of time I was immediately available to the patient. Critical Care: The reason for providing this level of medical care for this critically ill patient was due a critical illness that impaired one or more vital organ systems such that there was a high probability of imminent or life threatening deterioration in the patients condition. This care involved high complexity decision making to assess, manipulate, and support vital system functions, to treat this degreee vital organ system failure and to prevent further life threatening deterioration of the patients condition. For Hospitalized Patients:    1. Hospitalization Decision Time:  The decision to hospitalize the patient was made by Dr. Karina Holcomb at 09:40 on 12/31/2017    2. Aspirin: Aspirin was not given because the patient did not present with a stroke at the time of their Emergency Department evaluation    Diagnosis     Clinical Impression:   1. Respiratory distress    2. Hypoxia    3.  Severe persistent asthma with status asthmaticus        Disposition: Admit    Follow-up Information     None Patient's Medications   Start Taking    No medications on file   Continue Taking    ALBUTEROL (PROVENTIL HFA, VENTOLIN HFA, PROAIR HFA) 90 MCG/ACTUATION INHALER    Take 2 Puffs by inhalation every four (4) hours as needed for Wheezing. ALBUTEROL (PROVENTIL VENTOLIN) 2.5 MG /3 ML (0.083 %) NEBULIZER SOLUTION    3 mL by Nebulization route every four (4) hours as needed for Wheezing. ALBUTEROL-IPRATROPIUM (DUO-NEB) 2.5 MG-0.5 MG/3 ML NEBU    3 mL by Nebulization route every six (6) hours as needed. FLUTICASONE-SALMETEROL (ADVAIR) 250-50 MCG/DOSE DISKUS INHALER    Take 1 Puff by inhalation two (2) times a day. HYDROCODONE-ACETAMINOPHEN (NORCO) 5-325 MG PER TABLET    Take 1 Tab by mouth every six (6) hours as needed for Pain. Max Daily Amount: 4 Tabs. PREDNISONE (DELTASONE) 20 MG TABLET    Take 3 Tabs by mouth daily for 18 doses. 4 days of 60 mg daily, then 2 days of 40 mg daily, then 2 days of 20 mg daily   These Medications have changed    No medications on file   Stop Taking    No medications on file     _______________________________    Attestations:  [address] [place] [address] acting as a scribe for and in the presence of Valente Casillas MD      December 31, 2017 at 10:11 AM       Provider Attestation:      I personally performed the services described in the documentation, reviewed the documentation, as recorded by the scribe in my presence, and it accurately and completely records my words and actions.  December 31, 2017 at 10:11 AM - Valente Casillas MD    _______________________________

## 2017-12-31 NOTE — IP AVS SNAPSHOT
[address] [address] 
[phone] Patient: Jory Tsai MRN: [id number] BVT:2/25/[birth year]49 About your hospitalization You were admitted on:  December 31, 2017 You last received care in the:  [address] [place] [address] You were discharged on:  January 7, 2018 Why you were hospitalized Your primary diagnosis was:  Acute Severe Exacerbation Of Asthma Your diagnoses also included:  Status Asthmaticus, Acute Respiratory Acidosis Follow-up Information Follow up With Details Comments Contact Info Isatu Rocha MD On 1/9/2018 2pm [address] [facility] [address] [address] 
[phone] Nicole Gordon MD  pulmonology 1-2 months 235 [facility] [address] [facility] [address] [phone] Your Scheduled Appointments Tuesday January 09, 2018  2:30 PM EST New Patient with Isatu Rocha MD  
[address] [address] [address] [facility] [address] [place] [address] [address]  
[phone] Discharge Orders None A check denise indicates which time of day the medication should be taken. My Medications CHANGE how you take these medications Instructions Each Dose to Equal  
 Morning Noon Evening Bedtime  
 predniSONE 20 mg tablet Commonly known as:  Sharon Millwood What changed:   
- how much to take 
- how to take this - when to take this 
- additional instructions Your last dose was: Your next dose is:    
   
   
 4 days of 60 mg daily,  then 2 days of 40 mg daily,  then 2 days of 20 mg daily Then stop CONTINUE taking these medications Instructions Each Dose to Equal  
 Morning Noon Evening Bedtime * albuterol 2.5 mg /3 mL (0.083 %) nebulizer solution Commonly known as:  PROVENTIL VENTOLIN Your last dose was: Your next dose is: 3 mL by Nebulization route every four (4) hours as needed for Wheezing. 2.5 mg  
    
   
   
   
  
 * albuterol 90 mcg/actuation inhaler Commonly known as:  PROVENTIL HFA, VENTOLIN HFA, PROAIR HFA Your last dose was: Your next dose is: Take 2 Puffs by inhalation every four (4) hours as needed for Wheezing. 2 Puff  
    
   
   
   
  
 albuterol-ipratropium 2.5 mg-0.5 mg/3 ml Nebu Commonly known as:  Chares Melter Your last dose was: Your next dose is:    
   
   
 3 mL by Nebulization route every six (6) hours as needed. 3 mL  
    
   
   
   
  
 fluticasone-salmeterol 250-50 mcg/dose diskus inhaler Commonly known as:  ADVAIR Your last dose was: Your next dose is: Take 1 Puff by inhalation two (2) times a day. 1 Puff HYDROcodone-acetaminophen 5-325 mg per tablet Commonly known as:  Clarissa Vail Your last dose was: Your next dose is: Take 1 Tab by mouth every six (6) hours as needed for Pain. Max Daily Amount: 4 Tabs. 1 Tab * Notice: This list has 2 medication(s) that are the same as other medications prescribed for you. Read the directions carefully, and ask your doctor or other care provider to review them with you. Where to Get Your Medications Information on where to get these meds will be given to you by the nurse or doctor. ! Ask your nurse or doctor about these medications  
  predniSONE 20 mg tablet Discharge Instructions DISCHARGE SUMMARY from Nurse PATIENT INSTRUCTIONS: 
 
 
F-face looks uneven A-arms unable to move or move unevenly S-speech slurred or non-existent T-time-call 911 as soon as signs and symptoms begin-DO NOT go Back to bed or wait to see if you get better-TIME IS BRAIN. Warning Signs of HEART ATTACK Call 911 if you have these symptoms: 
? Chest discomfort. Most heart attacks involve discomfort in the center of the chest that lasts more than a few minutes, or that goes away and comes back. It can feel like uncomfortable pressure, squeezing, fullness, or pain. ? Discomfort in other areas of the upper body. Symptoms can include pain or discomfort in one or both arms, the back, neck, jaw, or stomach. ? Shortness of breath with or without chest discomfort. ? Other signs may include breaking out in a cold sweat, nausea, or lightheadedness. Don't wait more than five minutes to call [address]! Fast action can save your life. Calling 911 is almost always the fastest way to get lifesaving treatment. Emergency Medical Services staff can begin treatment when they arrive  up to an hour sooner than if someone gets to the hospital by car. The discharge information has been reviewed with the patient. The patient verbalized understanding. Discharge medications reviewed with the patient and appropriate educational materials and side effects teaching were provided. ___________________________________________________________________________________________________________________________________ MyChart Activation Thank you for enrolling in [address] Please follow the instructions below to securely access your online medical record. [employer] allows you to send messages to your doctor, view your test results, renew your prescriptions, schedule appointments, and more. How Do I Sign Up? 1. In your internet browser, go to [email or web address]. 2. Click on the First Time User? Click Here link in the Sign In box. You will see the New Member Sign Up page. 3. Enter your [employer] Access Code exactly as it appears below. You will not need to use this code after youve completed the sign-up process. If you do not sign up before the expiration date, you must request a new code. [employer] Access Code: [email or web address] Expires: 3/17/2018 12:53 PM  
 
4. Enter the last four digits of your Social Security Number (xxxx) and Date of Birth (mm/dd/yyyy) as indicated and click Submit. You will be taken to the next sign-up page. 5. Create a [employer] ID. This will be your [employer] login ID and cannot be changed, so think of one that is secure and easy to remember. 6. Create a [employer] password. You can change your password at any time. 7. Enter your Password Reset Question and Answer. This can be used at a later time if you forget your password. 8. Enter your e-mail address. You will receive e-mail notification when new information is available in [address] 9. Click Sign Up. You can now view your medical record. Additional Information Remember, [employer] is NOT to be used for urgent needs. For medical emergencies, dial 911. Now available from your iPhone and Android! Asthma Attack: Care Instructions Your Care Instructions During an asthma attack, the airways swell and narrow. This makes it hard to breathe. Severe asthma attacks can be life-threatening, but you can help prevent them by keeping your asthma under control and treating symptoms before they get bad. Symptoms include being short of breath, having chest tightness, coughing, and wheezing. Noting and treating these symptoms can also help you avoid future trips to the emergency room. The doctor has checked you carefully, but problems can develop later. If you notice any problems or new symptoms, get medical treatment right away. Follow-up care is a key part of your treatment and safety.  Be sure to make and go to all appointments, and call your doctor if you are having problems. It's also a good idea to know your test results and keep a list of the medicines you take. How can you care for yourself at home? · Follow your asthma action plan to prevent and treat attacks. If you don't have an asthma action plan, work with your doctor to create one. · Take your asthma medicines exactly as prescribed. Talk to your doctor right away if you have any questions about how to take them. ¨ Use your quick-relief medicine when you have symptoms of an attack. Quick-relief medicine is usually an albuterol inhaler. Some people need to use quick-relief medicine before they exercise. ¨ Take your controller medicine every day, not just when you have symptoms. Controller medicine is usually an inhaled corticosteroid. The goal is to prevent problems before they occur. Don't use your controller medicine to treat an attack that has already started. It doesn't work fast enough to help. ¨ If your doctor prescribed corticosteroid pills to use during an attack, take them exactly as prescribed. It may take hours for the pills to work, but they may make the episode shorter and help you breathe better. ¨ Keep your quick-relief medicine with you at all times. · Talk to your doctor before using other medicines. Some medicines, such as aspirin, can cause asthma attacks in some people. · If you have a peak flow meter, use it to check how well you are breathing. This can help you predict when an asthma attack is going to occur. Then you can take medicine to prevent the asthma attack or make it less severe. · Do not smoke or allow others to smoke around you. Avoid smoky places. Smoking makes asthma worse. If you need help quitting, talk to your doctor about stop-smoking programs and medicines. These can increase your chances of quitting for good. · Learn what triggers an asthma attack for you, and avoid the triggers when you can.  Common triggers include colds, smoke, air pollution, dust, pollen, mold, pets, cockroaches, stress, and cold air. · Avoid colds and the flu. Get a pneumococcal vaccine shot. If you have had one before, ask your doctor if you need a second dose. Get a flu vaccine every fall. If you must be around people with colds or the flu, wash your hands often. When should you call for help? Call 911 anytime you think you may need emergency care. For example, call if: 
? · You have severe trouble breathing. ?Call your doctor now or seek immediate medical care if: 
? · Your symptoms do not get better after you have followed your asthma action plan. ? · You have new or worse trouble breathing. ? · Your coughing and wheezing get worse. ? · You cough up dark brown or bloody mucus (sputum). ? · You have a new or higher fever. ? Watch closely for changes in your health, and be sure to contact your doctor if: 
? · You need to use quick-relief medicine on more than 2 days a week (unless it is just for exercise). ? · You cough more deeply or more often, especially if you notice more mucus or a change in the color of your mucus. ? · You are not getting better as expected. Where can you learn more? Go to http://jason-maritza.info/. Enter H502 in the search box to learn more about \"Asthma Attack: Care Instructions. \" Current as of: May 12, 2017 Content Version: 11.4 © 1113-0694 [employer]. Care instructions adapted under license by [employer] (which disclaims liability or warranty for this information). If you have questions about a medical condition or this instruction, always ask your healthcare professional. [address] any warranty or liability for your use of this information. Learning About Asthma Triggers What are triggers? When you have asthma, certain things can make your symptoms worse. These are called triggers. They include: · Cigarette smoke or air pollution. · Things you are allergic to, such as: 
¨ Pollen, mold, or dust mites. ¨ Pet hair, skin, or saliva. · Illnesses, like colds, flu, or pneumonia. · Exercise. · Dry, cold air. How do triggers affect asthma? Triggers can make it harder for your lungs to work as they should and can lead to sudden difficulty breathing and other symptoms. When you are around a trigger, an asthma attack is more likely. If your symptoms are severe, you may need emergency treatment or have to go to the hospital for treatment. If you know what your triggers are and can avoid them, you may be able to prevent asthma attacks, reduce how often you have them, and make them less severe. What can you do to avoid triggers? The first thing is to know your triggers. When you are having symptoms, note the things around you that might be causing them. Then look for patterns in what may be triggering your symptoms. Record your triggers on a piece of paper or in an asthma diary. When you have your list of possible triggers, work with your doctor to find ways to avoid them. You also can check how well your lungs are working by measuring your peak expiratory flow (PEF) throughout the day. Your PEF may drop when you are near things that trigger symptoms. Here are some ways to avoid a few common triggers. · Do not smoke or allow others to smoke around you. If you need help quitting, talk to your doctor about stop-smoking programs and medicines. These can increase your chances of quitting for good. · If there is a lot of pollution, pollen, or dust outside, stay at home and keep your windows closed. Use an air conditioner or air filter in your home. Check your local weather report or newspaper for air quality and pollen reports. · Get a flu shot every year. Talk to your doctor about getting a pneumococcal shot. Wash your hands often to prevent infections. · Avoid exercising outdoors in cold weather.  If you are outdoors in cold weather, wear a scarf around your face and breathe through your nose. How can you manage an asthma attack? · If you have an asthma action plan, follow the plan. In general: ¨ Use your quick-relief inhaler as directed by your doctor. If your symptoms do not get better after you use your medicine, have someone take you to the emergency room. Call an ambulance if needed. ¨ If your doctor has given you other inhaled medicines or steroid pills, take them as directed. Where can you learn more? Go to Gradalis.be Enter I613 in the search box to learn more about \"Learning About Asthma Triggers. \"  
© 4752-1309 Healthwise, Incorporated. Care instructions adapted under license by [employer] (which disclaims liability or warranty for this information). This care instruction is for use with your licensed healthcare professional. If you have questions about a medical condition or this instruction, always ask your healthcare professional. [address] any warranty or liability for your use of this information. Content Version: 68.7.477017; Last Revised: February 23, 2012 [employer] Announcement We are excited to announce that we are making your provider's discharge notes available to you in [employer]. You will see these notes when they are completed and signed by the physician that discharged you from your recent hospital stay. If you have any questions or concerns about any information you see in [employer], please call the Health Information Department where you were seen or reach out to your Primary Care Provider for more information about your plan of care. Introducing [facility]! [facility] introduces [employer] patient portal. Now you can access parts of your medical record, email your doctor's office, and request medication refills online. 1. In your internet browser, go to [email or web address] 2. Click on the First Time User? Click Here link in the Sign In box. You will see the New Member Sign Up page. 3. Enter your [employer] Access Code exactly as it appears below. You will not need to use this code after youve completed the sign-up process. If you do not sign up before the expiration date, you must request a new code. · [employer] Access Code: [email or web address] Expires: 3/17/2018 12:53 PM 
 
4. Enter the last four digits of your Social Security Number (xxxx) and Date of Birth (mm/dd/yyyy) as indicated and click Submit. You will be taken to the next sign-up page. 5. Create a [employer] ID. This will be your [employer] login ID and cannot be changed, so think of one that is secure and easy to remember. 6. Create a [employer] password. You can change your password at any time. 7. Enter your Password Reset Question and Answer. This can be used at a later time if you forget your password. 8. Enter your e-mail address. You will receive e-mail notification when new information is available in [address] 9. Click Sign Up. You can now view and download portions of your medical record. 10. Click the Download Summary menu link to download a portable copy of your medical information. If you have questions, please visit the Frequently Asked Questions section of the [employer] website. Remember, [employer] is NOT to be used for urgent needs. For medical emergencies, dial 911. Now available from your iPhone and Android! Providers Seen During Your Hospitalization Provider Specialty Primary office phone Delmi Mercer MD Emergency Medicine [phone] Sandra Cody DO Internal Medicine [phone] Gaudencio Fuentes, [address] [place] [address] Internal Medicine [phone] Your Primary Care Physician (PCP) Primary Care Physician Office Phone Office Fax Erin Hanksgar [phone] You are allergic to the following Allergen Reactions Penicillin G Anaphylaxis Recent Documentation Height Weight BMI Smoking Status 1.727 m 89.5 kg 30 kg/m2 Never Smoker Emergency Contacts Name Discharge Info Relation Home Work Mobile Boneta Sicard CAREGIVER [3] Friend [5] [phone] Patient Belongings The following personal items are in your possession at time of discharge: 
  Dental Appliances: None, At bedside  Visual Aid: None      Home Medications: None   Jewelry: None (piercing came out.)  Clothing: None, At bedside    Other Valuables: None, At bedside  Personal Items Sent to Safe: no 
 
  
  
 Please provide this summary of care documentation to your next provider. Signatures-by signing, you are acknowledging that this After Visit Summary has been reviewed with you and you have received a copy. Patient Signature:  ____________________________________________________________ Date:  ____________________________________________________________  
  
Javad Worthington Provider Signature:  ____________________________________________________________ Date:  ____________________________________________________________

## 2017-12-31 NOTE — DISCHARGE INSTRUCTIONS
Asthma Attack: Care Instructions  Your Care Instructions    During an asthma attack, the airways swell and narrow. This makes it hard to breathe. Severe asthma attacks can be life-threatening, but you can help prevent them by keeping your asthma under control and treating symptoms before they get bad. Symptoms include being short of breath, having chest tightness, coughing, and wheezing. Noting and treating these symptoms can also help you avoid future trips to the emergency room. The doctor has checked you carefully, but problems can develop later. If you notice any problems or new symptoms, get medical treatment right away. Follow-up care is a key part of your treatment and safety. Be sure to make and go to all appointments, and call your doctor if you are having problems. It's also a good idea to know your test results and keep a list of the medicines you take. How can you care for yourself at home? · Follow your asthma action plan to prevent and treat attacks. If you don't have an asthma action plan, work with your doctor to create one. · Take your asthma medicines exactly as prescribed. Talk to your doctor right away if you have any questions about how to take them. ¨ Use your quick-relief medicine when you have symptoms of an attack. Quick-relief medicine is usually an albuterol inhaler. Some people need to use quick-relief medicine before they exercise. ¨ Take your controller medicine every day, not just when you have symptoms. Controller medicine is usually an inhaled corticosteroid. The goal is to prevent problems before they occur. Don't use your controller medicine to treat an attack that has already started. It doesn't work fast enough to help. ¨ If your doctor prescribed corticosteroid pills to use during an attack, take them exactly as prescribed. It may take hours for the pills to work, but they may make the episode shorter and help you breathe better.   ¨ Keep your quick-relief medicine with you at all times. · Talk to your doctor before using other medicines. Some medicines, such as aspirin, can cause asthma attacks in some people. · If you have a peak flow meter, use it to check how well you are breathing. This can help you predict when an asthma attack is going to occur. Then you can take medicine to prevent the asthma attack or make it less severe. · Do not smoke or allow others to smoke around you. Avoid smoky places. Smoking makes asthma worse. If you need help quitting, talk to your doctor about stop-smoking programs and medicines. These can increase your chances of quitting for good. · Learn what triggers an asthma attack for you, and avoid the triggers when you can. Common triggers include colds, smoke, air pollution, dust, pollen, mold, pets, cockroaches, stress, and cold air. · Avoid colds and the flu. Get a pneumococcal vaccine shot. If you have had one before, ask your doctor if you need a second dose. Get a flu vaccine every fall. If you must be around people with colds or the flu, wash your hands often. When should you call for help? Call 911 anytime you think you may need emergency care. For example, call if:  ? · You have severe trouble breathing. ?Call your doctor now or seek immediate medical care if:  ? · Your symptoms do not get better after you have followed your asthma action plan. ? · You have new or worse trouble breathing. ? · Your coughing and wheezing get worse. ? · You cough up dark brown or bloody mucus (sputum). ? · You have a new or higher fever. ? Watch closely for changes in your health, and be sure to contact your doctor if:  ? · You need to use quick-relief medicine on more than 2 days a week (unless it is just for exercise). ? · You cough more deeply or more often, especially if you notice more mucus or a change in the color of your mucus. ? · You are not getting better as expected. Where can you learn more?   Go to http://jason-maritza.info/. Enter D004 in the search box to learn more about \"Asthma Attack: Care Instructions. \"  Current as of: May 12, 2017  Content Version: 11.4  © 5753-5820 Healthwise, [employer]. Care instructions adapted under license by [employer] (which disclaims liability or warranty for this information). If you have questions about a medical condition or this instruction, always ask your healthcare professional. [place] [address] any warranty or liability for your use of this information.

## 2017-12-31 NOTE — ED PROVIDER NOTES
EMERGENCY DEPARTMENT HISTORY AND PHYSICAL EXAM    8:08 PM      Date: 12/30/2017  Patient Name: Carlette Aase    History of Presenting Illness     Chief Complaint   Patient presents with    Shortness of Breath         History Provided By: Patient    Chief Complaint: Shortness of Breath  Duration:  3 days  Timing:  Progressive  Location: N/A  Quality: Tightness  Severity: Moderate  Modifying Factors: Nebulizer treatment, saline and magnesium improved Sx  Associated Symptoms: CP and cough      Additional History (Context): Carlette Aase is a 33 y.o. male with PMHx of  asthma who presents to the ED c/o progressive moderate SOB which started 3 days ago. Notes being hospitalized several times for asthma. Reports SOB as a \"tight wheezing\". Reports 4/10 CP \"from trying to breath so hard\". Notes PMHx of pneumonia. Reports Hx of intubation 14 years ago when first diagnosed with asthma. Admits to a cough and coughing up \"clear thin liquid\". Reports compliance with asthma medications. States Sx improvement after nebulizer treatment, saline and magnesium. Denies fever and chills. Denies any other medical problems. No further complaint and Sx. PCP: None    Current Facility-Administered Medications   Medication Dose Route Frequency Provider Last Rate Last Dose    dexamethasone (DECADRON) tablet 10 mg  10 mg Oral Q12H Fatou Hankins, DO   10 mg at 12/30/17 1544     Current Outpatient Prescriptions   Medication Sig Dispense Refill    albuterol (PROVENTIL HFA, VENTOLIN HFA, PROAIR HFA) 90 mcg/actuation inhaler Take 2 Puffs by inhalation every four (4) hours as needed for Wheezing. 1 Inhaler 0    albuterol-ipratropium (DUO-NEB) 2.5 mg-0.5 mg/3 ml nebu 3 mL by Nebulization route every six (6) hours as needed. 30 Nebule 3    predniSONE (DELTASONE) 20 mg tablet Take 3 Tabs by mouth daily for 18 doses.  4 days of 60 mg daily, then 2 days of 40 mg daily, then 2 days of 20 mg daily 18 Tab 0    fluticasone-salmeterol (ADVAIR) 250-50 mcg/dose diskus inhaler Take 1 Puff by inhalation two (2) times a day. 1 Inhaler 0    albuterol (PROVENTIL VENTOLIN) 2.5 mg /3 mL (0.083 %) nebulizer solution 3 mL by Nebulization route every four (4) hours as needed for Wheezing. 24 Each 6    HYDROcodone-acetaminophen (NORCO) 5-325 mg per tablet Take 1 Tab by mouth every six (6) hours as needed for Pain. Max Daily Amount: 4 Tabs. 12 Tab 0       Past History     Past Medical History:  Past Medical History:   Diagnosis Date    Asthma     Chronic obstructive pulmonary disease ([facility] [address]        Past Surgical History:  Past Surgical History:   Procedure Laterality Date    HX FREE SKIN GRAFT      HX ORTHOPAEDIC      facial reconstruction       Family History:  History reviewed. No pertinent family history. Social History:  Social History   Substance Use Topics    Smoking status: Never Smoker    Smokeless tobacco: Former User    Alcohol use Yes       Allergies: Allergies   Allergen Reactions    Penicillin G Anaphylaxis         Review of Systems       Review of Systems   Constitutional: Negative for chills and fever. HENT: Negative for rhinorrhea. Respiratory: Positive for cough and shortness of breath. Cardiovascular: Positive for chest pain. Gastrointestinal: Negative for abdominal pain, nausea and vomiting. Endocrine: Negative for polyuria. Genitourinary: Negative for dysuria. Musculoskeletal: Negative for back pain. Skin: Negative for rash. Neurological: Negative for headaches. Physical Exam     Patient Vitals for the past 12 hrs:   Temp Pulse Resp BP SpO2   12/30/17 1822 97.7 °F (36.5 °C) (!) 109 18 (!) 128/103 95 %         Physical Exam   Constitutional: He is oriented to person, place, and time. He appears well-developed and well-nourished. Speaking in full sentences   HENT:   Head: Normocephalic and atraumatic. Eyes: Conjunctivae are normal. Pupils are equal, round, and reactive to light.    Neck: Normal range of motion. Neck supple. Cardiovascular: Normal rate and regular rhythm. Pulmonary/Chest: No respiratory distress. He has wheezes (Expiratory ). Abdominal: Soft. Bowel sounds are normal. He exhibits no distension. There is no tenderness. There is no rebound and no guarding. Musculoskeletal: Normal range of motion. Neurological: He is alert and oriented to person, place, and time. Skin: Skin is warm and dry. Psychiatric: He has a normal mood and affect. Thought content normal.   Nursing note and vitals reviewed. Diagnostic Study Results     Labs -  No results found for this or any previous visit (from the past 12 hour(s)). Radiologic Studies -   No results found. Medical Decision Making   I am the first provider for this patient. I reviewed the vital signs, available nursing notes, past medical history, past surgical history, family history and social history. Vital Signs-Reviewed the patient's vital signs. Records Reviewed: Nursing Notes (Time of Review: 8:08 PM)    Provider Notes (Medical Decision Making): Patient given nebs while in the ED and said he feels much better. Has been admitted int he past and he said this does not feel like those times. Given steroids while in the ED as well as magnesium. Says he is out of his usual medications and requesting prescriptions and does not have a primary care physician. Patient breathing normally and wheezing improved. CXR does not show any acute infiltrate. Looks similar to prior CXR. Patient without accessory muscle use. ED Course: Progress Notes, Reevaluation, and Consults:  Patient reevaluated at 10:20pm : Patient feels well, would like to go home      Diagnosis     Clinical Impression:   1.  Mild asthma with acute exacerbation, unspecified whether persistent        Disposition: Discharged    Follow-up Information     Follow up With Details Comments [address] Schedule an appointment as soon as possible for a visit  [address] [place] [address] Drive  [phone]           Patient's Medications   Start Taking    PREDNISONE (DELTASONE) 20 MG TABLET    Take 3 Tabs by mouth daily for 18 doses. 4 days of 60 mg daily, then 2 days of 40 mg daily, then 2 days of 20 mg daily   Continue Taking    ALBUTEROL (PROVENTIL VENTOLIN) 2.5 MG /3 ML (0.083 %) NEBULIZER SOLUTION    3 mL by Nebulization route every four (4) hours as needed for Wheezing. FLUTICASONE-SALMETEROL (ADVAIR) 250-50 MCG/DOSE DISKUS INHALER    Take 1 Puff by inhalation two (2) times a day. HYDROCODONE-ACETAMINOPHEN (NORCO) 5-325 MG PER TABLET    Take 1 Tab by mouth every six (6) hours as needed for Pain. Max Daily Amount: 4 Tabs. These Medications have changed    Modified Medication Previous Medication    ALBUTEROL (PROVENTIL HFA, VENTOLIN HFA, PROAIR HFA) 90 MCG/ACTUATION INHALER albuterol (PROVENTIL HFA, VENTOLIN HFA, PROAIR HFA) 90 mcg/actuation inhaler       Take 2 Puffs by inhalation every four (4) hours as needed for Wheezing. Take 2 Puffs by inhalation every four (4) hours as needed for Wheezing. ALBUTEROL-IPRATROPIUM (DUO-NEB) 2.5 MG-0.5 MG/3 ML NEBU albuterol-ipratropium (DUO-NEB) 2.5 mg-0.5 mg/3 ml nebu       3 mL by Nebulization route every six (6) hours as needed. 3 mL by Nebulization route every six (6) hours as needed. Stop Taking    No medications on file     _______________________________    Attestations:  Scribe Attestation     Javier Mcneil and Yrn Key acting as a scribe for and in the presence of Sandoval Haywood MD      December 30, 2017 at 8:08 PM       Provider Attestation:      I personally performed the services described in the documentation, reviewed the documentation, as recorded by the scribe in my presence, and it accurately and completely records my words and actions.  December 30, 2017 at 8:08 PM - Sandoval Peter, MD    _______________________________

## 2018-01-01 LAB
ANION GAP SERPL CALC-SCNC: 9 MMOL/L (ref 3–18)
BASOPHILS # BLD: 0 K/UL (ref 0–0.06)
BASOPHILS NFR BLD: 0 % (ref 0–2)
BUN SERPL-MCNC: 20 MG/DL (ref 7–18)
BUN/CREAT SERPL: 18 (ref 12–20)
CALCIUM SERPL-MCNC: 8.8 MG/DL (ref 8.5–10.1)
CHLORIDE SERPL-SCNC: 104 MMOL/L (ref 100–108)
CO2 SERPL-SCNC: 27 MMOL/L (ref 21–32)
CREAT SERPL-MCNC: 1.09 MG/DL (ref 0.6–1.3)
DIFFERENTIAL METHOD BLD: ABNORMAL
EOSINOPHIL # BLD: 0 K/UL (ref 0–0.4)
EOSINOPHIL NFR BLD: 0 % (ref 0–5)
ERYTHROCYTE [DISTWIDTH] IN BLOOD BY AUTOMATED COUNT: 13.3 % (ref 11.6–14.5)
GLUCOSE BLD STRIP.AUTO-MCNC: 114 MG/DL (ref 70–110)
GLUCOSE SERPL-MCNC: 122 MG/DL (ref 74–99)
HCT VFR BLD AUTO: 51.5 % (ref 36–48)
HGB BLD-MCNC: 17.1 G/DL (ref 13–16)
LYMPHOCYTES # BLD: 1.9 K/UL (ref 0.9–3.6)
LYMPHOCYTES NFR BLD: 7 % (ref 21–52)
MCH RBC QN AUTO: 28.6 PG (ref 24–34)
MCHC RBC AUTO-ENTMCNC: 33.2 G/DL (ref 31–37)
MCV RBC AUTO: 86.1 FL (ref 74–97)
MONOCYTES # BLD: 1.1 K/UL (ref 0.05–1.2)
MONOCYTES NFR BLD: 4 % (ref 3–10)
NEUTS SEG # BLD: 24 K/UL (ref 1.8–8)
NEUTS SEG NFR BLD: 89 % (ref 40–73)
PLATELET # BLD AUTO: 333 K/UL (ref 135–420)
PMV BLD AUTO: 9.7 FL (ref 9.2–11.8)
POTASSIUM SERPL-SCNC: 4.9 MMOL/L (ref 3.5–5.5)
RBC # BLD AUTO: 5.98 M/UL (ref 4.7–5.5)
RBC MORPH BLD: ABNORMAL
SODIUM SERPL-SCNC: 140 MMOL/L (ref 136–145)
WBC # BLD AUTO: 27 K/UL (ref 4.6–13.2)

## 2018-01-01 PROCEDURE — 94762 N-INVAS EAR/PLS OXIMTRY CONT: CPT

## 2018-01-01 PROCEDURE — 74011250637 HC RX REV CODE- 250/637: Performed by: INTERNAL MEDICINE

## 2018-01-01 PROCEDURE — 80048 BASIC METABOLIC PNL TOTAL CA: CPT | Performed by: HOSPITALIST

## 2018-01-01 PROCEDURE — 74011250636 HC RX REV CODE- 250/636: Performed by: HOSPITALIST

## 2018-01-01 PROCEDURE — 94640 AIRWAY INHALATION TREATMENT: CPT

## 2018-01-01 PROCEDURE — 74011250636 HC RX REV CODE- 250/636: Performed by: INTERNAL MEDICINE

## 2018-01-01 PROCEDURE — 65610000006 HC RM INTENSIVE CARE

## 2018-01-01 PROCEDURE — 74011000250 HC RX REV CODE- 250: Performed by: HOSPITALIST

## 2018-01-01 PROCEDURE — 85025 COMPLETE CBC W/AUTO DIFF WBC: CPT | Performed by: HOSPITALIST

## 2018-01-01 PROCEDURE — 82962 GLUCOSE BLOOD TEST: CPT

## 2018-01-01 PROCEDURE — 74011000250 HC RX REV CODE- 250: Performed by: INTERNAL MEDICINE

## 2018-01-01 PROCEDURE — 94660 CPAP INITIATION&MGMT: CPT

## 2018-01-01 PROCEDURE — 36415 COLL VENOUS BLD VENIPUNCTURE: CPT | Performed by: HOSPITALIST

## 2018-01-01 RX ORDER — SODIUM CHLORIDE 0.9 % (FLUSH) 0.9 %
5-10 SYRINGE (ML) INJECTION EVERY 8 HOURS
Status: DISCONTINUED | OUTPATIENT
Start: 2018-01-01 | End: 2018-01-07 | Stop reason: HOSPADM

## 2018-01-01 RX ORDER — ALBUTEROL SULFATE 0.83 MG/ML
2.5 SOLUTION RESPIRATORY (INHALATION)
Status: DISCONTINUED | OUTPATIENT
Start: 2018-01-01 | End: 2018-01-01

## 2018-01-01 RX ORDER — ENOXAPARIN SODIUM 100 MG/ML
40 INJECTION SUBCUTANEOUS EVERY 24 HOURS
Status: DISCONTINUED | OUTPATIENT
Start: 2018-01-01 | End: 2018-01-07 | Stop reason: HOSPADM

## 2018-01-01 RX ORDER — SODIUM CHLORIDE 0.9 % (FLUSH) 0.9 %
5-10 SYRINGE (ML) INJECTION AS NEEDED
Status: DISCONTINUED | OUTPATIENT
Start: 2018-01-01 | End: 2018-01-07 | Stop reason: HOSPADM

## 2018-01-01 RX ORDER — LORAZEPAM 2 MG/ML
0.5 INJECTION INTRAMUSCULAR
Status: DISCONTINUED | OUTPATIENT
Start: 2018-01-01 | End: 2018-01-04

## 2018-01-01 RX ADMIN — METHYLPREDNISOLONE SODIUM SUCCINATE 60 MG: 40 INJECTION, POWDER, FOR SOLUTION INTRAMUSCULAR; INTRAVENOUS at 06:21

## 2018-01-01 RX ADMIN — ALBUTEROL SULFATE 2.5 MG: 2.5 SOLUTION RESPIRATORY (INHALATION) at 01:59

## 2018-01-01 RX ADMIN — MORPHINE SULFATE 2 MG: 2 INJECTION, SOLUTION INTRAMUSCULAR; INTRAVENOUS at 01:30

## 2018-01-01 RX ADMIN — IPRATROPIUM BROMIDE AND ALBUTEROL SULFATE 3 ML: .5; 3 SOLUTION RESPIRATORY (INHALATION) at 01:05

## 2018-01-01 RX ADMIN — MONTELUKAST SODIUM 10 MG: 10 TABLET, FILM COATED ORAL at 22:00

## 2018-01-01 RX ADMIN — Medication 5 ML: at 14:00

## 2018-01-01 RX ADMIN — Medication 10 ML: at 22:00

## 2018-01-01 RX ADMIN — ENOXAPARIN SODIUM 40 MG: 40 INJECTION SUBCUTANEOUS at 03:00

## 2018-01-01 RX ADMIN — SODIUM CHLORIDE 500 MG: 900 INJECTION, SOLUTION INTRAVENOUS at 10:51

## 2018-01-01 RX ADMIN — LORAZEPAM 0.5 MG: 2 INJECTION INTRAMUSCULAR; INTRAVENOUS at 19:49

## 2018-01-01 RX ADMIN — MORPHINE SULFATE 4 MG: 2 INJECTION, SOLUTION INTRAMUSCULAR; INTRAVENOUS at 15:33

## 2018-01-01 RX ADMIN — ALBUTEROL SULFATE 2.5 MG: 2.5 SOLUTION RESPIRATORY (INHALATION) at 13:21

## 2018-01-01 RX ADMIN — METHYLPREDNISOLONE SODIUM SUCCINATE 60 MG: 40 INJECTION, POWDER, FOR SOLUTION INTRAMUSCULAR; INTRAVENOUS at 18:16

## 2018-01-01 RX ADMIN — MORPHINE SULFATE 2 MG: 2 INJECTION, SOLUTION INTRAMUSCULAR; INTRAVENOUS at 13:50

## 2018-01-01 RX ADMIN — ARFORMOTEROL TARTRATE 15 MCG: 15 SOLUTION RESPIRATORY (INHALATION) at 19:58

## 2018-01-01 RX ADMIN — IPRATROPIUM BROMIDE AND ALBUTEROL SULFATE 3 ML: .5; 3 SOLUTION RESPIRATORY (INHALATION) at 19:58

## 2018-01-01 RX ADMIN — MORPHINE SULFATE 2 MG: 2 INJECTION, SOLUTION INTRAMUSCULAR; INTRAVENOUS at 07:39

## 2018-01-01 RX ADMIN — Medication 10 ML: at 06:21

## 2018-01-01 RX ADMIN — Medication 10 ML: at 06:20

## 2018-01-01 RX ADMIN — IPRATROPIUM BROMIDE AND ALBUTEROL SULFATE 3 ML: .5; 3 SOLUTION RESPIRATORY (INHALATION) at 13:21

## 2018-01-01 RX ADMIN — ALBUTEROL SULFATE 2.5 MG: 2.5 SOLUTION RESPIRATORY (INHALATION) at 15:38

## 2018-01-01 RX ADMIN — LORAZEPAM 0.5 MG: 2 INJECTION INTRAMUSCULAR; INTRAVENOUS at 11:49

## 2018-01-01 RX ADMIN — METHYLPREDNISOLONE SODIUM SUCCINATE 60 MG: 40 INJECTION, POWDER, FOR SOLUTION INTRAMUSCULAR; INTRAVENOUS at 11:49

## 2018-01-01 RX ADMIN — ALBUTEROL SULFATE 2.5 MG: 2.5 SOLUTION RESPIRATORY (INHALATION) at 06:30

## 2018-01-01 RX ADMIN — ALBUTEROL SULFATE 2.5 MG: 2.5 SOLUTION RESPIRATORY (INHALATION) at 04:33

## 2018-01-01 RX ADMIN — BUDESONIDE 500 MCG: 0.5 INHALANT RESPIRATORY (INHALATION) at 07:06

## 2018-01-01 RX ADMIN — ARFORMOTEROL TARTRATE 15 MCG: 15 SOLUTION RESPIRATORY (INHALATION) at 07:06

## 2018-01-01 RX ADMIN — METHYLPREDNISOLONE SODIUM SUCCINATE 60 MG: 40 INJECTION, POWDER, FOR SOLUTION INTRAMUSCULAR; INTRAVENOUS at 00:40

## 2018-01-01 RX ADMIN — IPRATROPIUM BROMIDE AND ALBUTEROL SULFATE 3 ML: .5; 3 SOLUTION RESPIRATORY (INHALATION) at 07:06

## 2018-01-01 RX ADMIN — MORPHINE SULFATE 2 MG: 2 INJECTION, SOLUTION INTRAMUSCULAR; INTRAVENOUS at 18:14

## 2018-01-01 RX ADMIN — ALBUTEROL SULFATE 2.5 MG: 2.5 SOLUTION RESPIRATORY (INHALATION) at 07:06

## 2018-01-01 RX ADMIN — METHYLPREDNISOLONE SODIUM SUCCINATE 60 MG: 40 INJECTION, POWDER, FOR SOLUTION INTRAMUSCULAR; INTRAVENOUS at 23:57

## 2018-01-01 NOTE — CONSULTS
[facility]  [facility]  1/1/2018    HPI (12/31/2017)  The patient is a 32 y/o male with history of asthma. He has been intubated once for an exacerbation. He was seen last night in the ER with a 3 day history of worsening SOB,  He was treated with steroids, nebs and magnesium. He felt improved, and was discharged. He presents this AM in respiratory extremis. He denies smoking. He speaks only a word or two at a time. Typically he simply nods or shakes his head to questions. He cannot identify a trigger for the exacerbation. ROS  Denies any other signif sx. Says he can't afford asthma meds. Admist sx have been going on for some time. Past Medical History  Asthma  Non-smoker     Allergies:  PCN    No current facility-administered medications on file prior to encounter. Current Outpatient Prescriptions on File Prior to Encounter   Medication Sig Dispense Refill    albuterol (PROVENTIL HFA, VENTOLIN HFA, PROAIR HFA) 90 mcg/actuation inhaler Take 2 Puffs by inhalation every four (4) hours as needed for Wheezing. 1 Inhaler 0    albuterol-ipratropium (DUO-NEB) 2.5 mg-0.5 mg/3 ml nebu 3 mL by Nebulization route every six (6) hours as needed. 30 Nebule 3    predniSONE (DELTASONE) 20 mg tablet Take 3 Tabs by mouth daily for 18 doses. 4 days of 60 mg daily, then 2 days of 40 mg daily, then 2 days of 20 mg daily 18 Tab 0    fluticasone-salmeterol (ADVAIR) 250-50 mcg/dose diskus inhaler Take 1 Puff by inhalation two (2) times a day. 1 Inhaler 0    albuterol (PROVENTIL VENTOLIN) 2.5 mg /3 mL (0.083 %) nebulizer solution 3 mL by Nebulization route every four (4) hours as needed for Wheezing. 24 Each 6    HYDROcodone-acetaminophen (NORCO) 5-325 mg per tablet Take 1 Tab by mouth every six (6) hours as needed for Pain. Max Daily Amount: 4 Tabs. 12 Tab 0       PEx:   Was very anxious earlier - much better after morphine  No distress; using electronic game device +Diffuse wheezing but ok airflow & no [facility] on BIPAP   * however talking, laughing or cough puts him back in severe paroxysm   Blood pressure 125/72, pulse (!) 120, temperature 99.2 °F (37.3 °C), resp. rate 28, height 5' 8\" (1.727 m), weight 83.9 kg (184 lb 15.5 oz), SpO2 99 %. HEENT: unremarkable; no ST, discharge, rash, etc  Neck soft no mass or swelling  Cor RRR  Abd soft  Extr: normal     IMPRESSION/ PLAN:  # Severe asthma bordering on status; poor outpt care. * doing well as meds have indeed started to work.  However it will take several more days probably on & off BIPAP before inflammation & associated bronchospasm are sufficiently under control     * clear liquids only    * see orders    * refer to [address] [place] [address] upon discharge    -[facility]   [phone]   cct 40 due to serial re-eval x2

## 2018-01-01 NOTE — PROGRESS NOTES
Internal Medicine Progress Note    Patient's Name: Kirstin Hill  Admit Date: 12/31/2017  Length of Stay: 1      Assessment/Plan     Active Hospital Problems    Diagnosis Date Noted    Acute severe exacerbation of asthma 12/31/2017    Acute respiratory acidosis 12/31/2017    Status asthmaticus 09/05/2017     - Cont steroids, duonebs  - BiPAP PRN and titrate off as able  - Cont azithromycin x 3 doses  - Trend CBC  - Ativan for anxiety  - Cont acceptable home medications for chronic conditions   - DVT protocol    I have personally reviewed all pertinent labs and films that have officially resulted over the last 24 hours. I have personally checked for all pending labs that are awaiting final results.     Subjective     Pt s/e @ bedside  No major events overnight  Remains on BiPAP  Feeling better, still w/ SOB  Denies CP    Objective     Visit Vitals    /80    Pulse (!) 112    Temp 99.2 °F (37.3 °C)    Resp 27    Ht 5' 8\" (1.727 m)    Wt 83.9 kg (184 lb 15.5 oz)    SpO2 99%    BMI 28.12 kg/m2       Physical Exam:  General Appearance: NAD, conversant on BiPAP  Lungs: Very minimal air movement B/L, + exp wheeze  CV: Tachycardic w/ RR, no m/r/g  Abdomen: soft, non-tender, normal bowel sounds  Extremities: no cyanosis, no peripheral edema  Neuro: No focal deficits, motor/sensory intact    Lab/Data Reviewed:  BMP:   Lab Results   Component Value Date/Time     01/01/2018 04:15 AM    K 4.9 01/01/2018 04:15 AM     01/01/2018 04:15 AM    CO2 27 01/01/2018 04:15 AM    AGAP 9 01/01/2018 04:15 AM     (H) 01/01/2018 04:15 AM    BUN 20 (H) 01/01/2018 04:15 AM    CREA 1.09 01/01/2018 04:15 AM    GFRAA >60 01/01/2018 04:15 AM    GFRNA >60 01/01/2018 04:15 AM     CBC:   Lab Results   Component Value Date/Time    WBC 27.0 (H) 01/01/2018 04:15 AM    HGB 17.1 (H) 01/01/2018 04:15 AM    HCT 51.5 (H) 01/01/2018 04:15 AM     01/01/2018 04:15 AM       Imaging Reviewed:  No results found.    Medications Reviewed:  Current Facility-Administered Medications   Medication Dose Route Frequency    sodium chloride (NS) flush 5-10 mL  5-10 mL IntraVENous Q8H    sodium chloride (NS) flush 5-10 mL  5-10 mL IntraVENous PRN    albuterol (PROVENTIL VENTOLIN) nebulizer solution 2.5 mg  2.5 mg Nebulization Q4H RT    enoxaparin (LOVENOX) injection 40 mg  40 mg SubCUTAneous Q24H    LORazepam (ATIVAN) injection 0.5 mg  0.5 mg IntraVENous Q8H PRN    albuterol-ipratropium (DUO-NEB) 2.5 MG-0.5 MG/3 ML  3 mL Nebulization Q6H RT    methylPREDNISolone (PF) (SOLU-MEDROL) injection 60 mg  60 mg IntraVENous Q6H    albuterol CONCENTRATE 2.5mg/0.5 mL neb soln  2.5 mg Nebulization Q1H PRN    montelukast (SINGULAIR) tablet 10 mg  10 mg Oral QHS    azithromycin (ZITHROMAX) 500 mg in 0.9% sodium chloride (MBP/ADV) 250 mL adv  500 mg IntraVENous Q24H    arformoterol (BROVANA) neb solution 15 mcg  15 mcg Nebulization BID RT    budesonide (PULMICORT) 500 mcg/2 ml nebulizer suspension  500 mcg Nebulization BID RT    morphine injection 1-4 mg  1-4 mg IntraVENous Q1H PRN           Clifton Ramirez DO  Internal Medicine, Hospitalist  Pager: [phone]  [address] [place] [address]

## 2018-01-01 NOTE — PROGRESS NOTES
Assumed respiratory care of pt on Bipap. Pt currently wearing Bipap at 20/16/40%. Pt is currently wheezing, but is moving air and able to speak in short sentences. Pt given HHN in-line with BiPap. Pt tolerating HHN and BiPap well. Will continue to monitor pt closely.   KFL

## 2018-01-01 NOTE — PROGRESS NOTES
[facility]   Discharge Planning/ Assessment    Reasons for Intervention: Chart reviewed. Met with pt., verified all demographics. States has NO ins. States has NO PCP, referral sent to . : Stanley Trevino, girl friend & Jen Perez, room mate, lives with them & designates either one to participate in his discharge process. States he would like to apply for disability, email sent to [employer]. States would like to have bipap @ home, made him aware he will need to qualify & that machine is expensive & he would have to pay out of pocket, verbalized understanding. Has nebulizer & inhaler @ home. Independent with ADL's prior to admit. PLAN: home when medically stable. Please try to utilize $4 "[employer]" drug list @ discharge, if possible, thanks. Will cont to follow for needs. Betsy LaguerreRN,ext. [phone].      High Risk Criteria  [x] Yes  []No   Physician Referral  [] Yes  [x]No        Date    Nursing Referral  [] Yes  [x]No        Date    Patient/Family Request  [] Yes  [x]No        Date       Resources:    Medicare  [] Yes  [x]No   Medicaid  [] Yes  [x]No   No Resources  [x] Yes  []No   Private Insurance  [] Yes  [x]No    Name/Phone Number    Other  [] Yes  [x]No        (i.e. Workman's Comp)         Prior Services:    Prior Services  [] Yes  [x]No   Home Health  [] Yes  [x]No   [address] [place]  [] Yes  [x]No        Number of [address]  [] Yes  [x]No       Meals on Wheels  [] Yes  [x]No   Office on Aging  [] Yes  [x]No   Transportation Services  [] Yes  [x]No   Nursing Home  [] Yes  [x]No        Nursing Home Name    [address] [employer]  [] Yes  [x]No        [address] Name    Other       Information Source:      Information obtained from  [x] Patient  [] Parent   [] [address]  [] Child  [] Spouse   [] Significant Other/Partner   [] Friend      [] EMS    [] Nursing Home Chart          [] Other:   Chart Review  [x] Yes  []No Family/Support System:    Patient lives with  [] Alone    [] Spouse   [] Significant Other  [] Children  [] Caretaker   [] Parent  [] Sibling     [x] Other girl friend / room mates      Other Support System:    Is the patient responsible for care of others  [] Yes  [x]No   Information of person caring for patient on  discharge    Managers financial affairs independently  [x] Yes  []No   If no, explain:      Status Prior to Admission:    Mental Status  [x] Awake  [x] Alert  [x] Oriented  [] Quiet/Calm [] Lethargic/Sedated   [] Disoriented  [] Restless/Anxious  [] Combative   Personal Care  [] Dependent  [x] [address]  [] Requires Assistance   Meal Preparation Ability  [x] Independent   [] Standby Assistance   [] Minimal Assistance   [] Moderate Assistance  [] Maximum Assistance     [] Total Assistance   Chores  [x] Independent with Chores   [] N/A Nursing Home Resident   [] Requires Assistance   Bowel/Bladder  [x] Continent  [] Catheter  [] Incontinent  [] Ostomy Self-Care    [] Urine Diversion Self-Care  [] Maximum Assistance     [] Total Assistance   Number of Persons needed for assistance    DME at home  [] Efrem Blanco  [] Butch Blanco   [] Commode    [] Bathroom/Grab Bars  [] Hospital Bed  [x] Nebulizer  [] Oxygen           [] Raised Toilet Seat  [] Shower Chair  [] Side Rails for Bed   [] Tub Transfer Bench   [] Brad Jara  [] Zhen Brink, Standard      [] Other:   Vendor      Treatment Presently Receiving:    Current Treatments  [] Chemotherapy  [] Dialysis  [] Insulin  [] IVAB [x] IVF   [x] O2  [] PCA   [] PT   [x] RT   [] Tube Feedings   [] Wound Care     Psychosocial Evaluation:    Verbalized Knowledge of Disease Process  [] Patient  []Family   Coping with Disease Process  [] Patient  []Family   Requires Further Counseling Coping with Disease Process  [] Patient  []Family     Identified Projected Needs:    Home Health Aid  [] Yes  [x]No   Transportation  [] Yes  [x]No   Education  [] Yes  [x]No        Specific Education     Financial Counseling  [] Yes  [x]No   Inability to Care for Self/Will Require 24 hour care  [] Yes  [x]No   Pain Management  [] Yes  [x]No   Home Infusion Therapy  [] Yes  [x]No   Oxygen Therapy  [] Yes  [x]No   DME  [] Yes  [x]No   Long Term Care Placement  [] Yes  [x]No   Rehab  [] Yes  [x]No   Physical Therapy  [] Yes  [x]No   Needs Anticipated At This Time  [] Yes  [x]No     Intra-Hospital Referral:    [facility]  [] Yes  [x]No     [x] Yes  []No   Patient Representative  [] Yes  [x]No   Staff for Teaching Needs  [] Yes  [x]No   Specialty Teaching Needs     Diabetic Educator  [] Yes  [x]No   Referral for Diabetic Educator Needed  [] Yes  [x]No  If Yes, place order for Nutritionist or Diabetic Consult     Tentative Discharge Plan:    Home with No Services  [x] Yes  []No   Home with Home Health Follow-up  [] Yes  [x]No        If Yes, specify type    Home Care Program  [] Yes  [x]No        If Yes, specify type    Meals on Wheels  [] Yes  [x]No   Office of Aging  [] Yes  [x]No   NHP  [] Yes  [x]No   Return to the Nursing Home  [] Yes  [x]No   Rehab Therapy  [] Yes  [x]No   Acute Rehab  [] Yes  [x]No   Subacute Rehab  [] Yes  [x]No   Private Care  [] Yes  [x]No   Substance Abuse Referral  [] Yes  [x]No   Transportation  [] Yes  [x]No   Chore Service  [] Yes  [x]No   Inpatient Hospice  [] Yes  [x]No   OP RT  [] Yes  [x] No   OP Hemo  [] Yes  [x] No   OP PT  [] Yes  [x]No   Support Group  [] Yes  [x]No   Reach to Recovery  [] Yes  [x]No   OP Oncology Clinic  [] Yes  [x]No   Clinic Appointment  [] Yes  [x]No   DME  [] Yes  [x]No   Comments    Name of D/C Planner or  Given to Patient or Family Kayla Gillian    Phone Number Pager: [phone]        Extension Ext [phone]    Date 1-1-2018   Time    If you are discharged home, whom do you designate to participate in your discharge plan and receive any information needed?      Enter name of Abad Healy Jaylene Everett / Ra Shirley        Phone # of designee  [phone] / [phone]        Address of simaee Izabel .  [place] [address]         Updated         Patient refused to designate any           individual

## 2018-01-01 NOTE — PROGRESS NOTES
0700: Bedside shift change report given to Darline Wheeler RN (oncoming nurse) by Randolph Haywood RN (offgoing nurse). Report included the following information SBAR, Kardex, MAR and Recent Results. 0800: Pt c/o anxiety- give morphine prn.    1340: PT c/o of anxiety- gave medication. 1420: Pt complained of feeling \"tight. \" I am unable to give any more medication. Pt received recently two breathing treatments, and HR currently 130s. Dr. Conte Peed in the room to evaluate patient. 1900: Bedside shift change report given to Randolph Haywood RN (oncoming nurse) by Darline Wheeler RN (offgoing nurse). Report included the following information SBAR, MAR and Recent Results.

## 2018-01-01 NOTE — ACP (ADVANCE CARE PLANNING)
Patient has designated ___his girl-friend or room mate_____________________ to participate in his/her discharge plan and to receive any needed information. Name:   Stanley Trevino / Jen Perez  Address:  [address]  [facility] [id number]  Phone number:  [phone] / [phone]

## 2018-01-01 NOTE — PROGRESS NOTES
Physical Exam   Constitutional: He appears well-developed. Face mask in place. HENT:   Head: Normocephalic. Cardiovascular: Regular rhythm, normal heart sounds and intact distal pulses. Tachycardia present. Pulmonary/Chest: Accessory muscle usage present. Tachypnea noted. He is in respiratory distress. Abdominal: Soft. Bowel sounds are normal.   Neurological: He is alert.    Skin:

## 2018-01-01 NOTE — PROGRESS NOTES
80-assisting primary RN with patient care, pt arrived to ICU in significant distress, medicated with 2mg IV morphine

## 2018-01-01 NOTE — PROGRESS NOTES
Problem: Falls - Risk of  Goal: *Absence of Falls  Document Nita Fall Risk and appropriate interventions in the flowsheet.    Outcome: Progressing Towards Goal  Fall Risk Interventions:            Medication Interventions: Assess postural VS orthostatic hypotension, Evaluate medications/consider consulting pharmacy, Bed/chair exit alarm

## 2018-01-01 NOTE — ROUTINE PROCESS
TRANSFER - IN REPORT:    Verbal report received from A. [address] [place] [address] ED  (name) on Elizabeth Galesa  being received from ED (unit) for routine progression of care      Report consisted of patients Situation, Background, Assessment and   Recommendations(SBAR). Information from the following report(s) SBAR, Kardex, ED Summary, Intake/Output, MAR, Accordion and Recent Results  was reviewed with the receiving nurse. Opportunity for questions and clarification was provided. Assessment completed upon patients arrival to unit and care assumed. Overnight:  Tried off BiPAP, then requested it back on. Cough productive. GF at patient side. Unable to stay off BiPAP.        0720:  Verbal Patient-side shift change report given to JENNIFER Duarte RN, (oncoming nurse) by Sanjay Bone RN  (offgoing nurse). Report included the following information SBAR, Kardex, ED Summary, Procedure Summary, Intake/Output, MAR, Recent Results and Med Rec Status. Included:  Intro, hx, two-RN eval of relevant assessment findings, LDAs, skin, diagnostics and infusions.

## 2018-01-01 NOTE — PROGRESS NOTES
Physical Exam   Skin:             Primary Nurse Shon Marrero and Zack Avila, RN performed a dual skin assessment on this patient No impairment noted  Scott score is 23

## 2018-01-01 NOTE — PROGRESS NOTES
Problem: Falls - Risk of  Goal: *Absence of Falls  Document Nita Fall Risk and appropriate interventions in the flowsheet.    Outcome: Progressing Towards Goal  Fall Risk Interventions:            Medication Interventions: Teach patient to arise slowly, Patient to call before getting OOB, Evaluate medications/consider consulting pharmacy, Bed/chair exit alarm

## 2018-01-02 LAB
ANION GAP SERPL CALC-SCNC: 7 MMOL/L (ref 3–18)
BUN SERPL-MCNC: 35 MG/DL (ref 7–18)
BUN/CREAT SERPL: 33 (ref 12–20)
CALCIUM SERPL-MCNC: 8.8 MG/DL (ref 8.5–10.1)
CHLORIDE SERPL-SCNC: 101 MMOL/L (ref 100–108)
CO2 SERPL-SCNC: 32 MMOL/L (ref 21–32)
CREAT SERPL-MCNC: 1.05 MG/DL (ref 0.6–1.3)
ERYTHROCYTE [DISTWIDTH] IN BLOOD BY AUTOMATED COUNT: 13.1 % (ref 11.6–14.5)
GLUCOSE SERPL-MCNC: 130 MG/DL (ref 74–99)
HCT VFR BLD AUTO: 52.6 % (ref 36–48)
HGB BLD-MCNC: 17.2 G/DL (ref 13–16)
MCH RBC QN AUTO: 28.7 PG (ref 24–34)
MCHC RBC AUTO-ENTMCNC: 32.7 G/DL (ref 31–37)
MCV RBC AUTO: 87.8 FL (ref 74–97)
PLATELET # BLD AUTO: 373 K/UL (ref 135–420)
PMV BLD AUTO: 9.8 FL (ref 9.2–11.8)
POTASSIUM SERPL-SCNC: 5.5 MMOL/L (ref 3.5–5.5)
RBC # BLD AUTO: 5.99 M/UL (ref 4.7–5.5)
SODIUM SERPL-SCNC: 140 MMOL/L (ref 136–145)
WBC # BLD AUTO: 27.5 K/UL (ref 4.6–13.2)

## 2018-01-02 PROCEDURE — 65610000006 HC RM INTENSIVE CARE

## 2018-01-02 PROCEDURE — 85027 COMPLETE CBC AUTOMATED: CPT | Performed by: PHYSICIAN ASSISTANT

## 2018-01-02 PROCEDURE — 74011000250 HC RX REV CODE- 250: Performed by: INTERNAL MEDICINE

## 2018-01-02 PROCEDURE — 94640 AIRWAY INHALATION TREATMENT: CPT

## 2018-01-02 PROCEDURE — 74011250636 HC RX REV CODE- 250/636: Performed by: INTERNAL MEDICINE

## 2018-01-02 PROCEDURE — 74011250637 HC RX REV CODE- 250/637: Performed by: INTERNAL MEDICINE

## 2018-01-02 PROCEDURE — 74011250636 HC RX REV CODE- 250/636: Performed by: HOSPITALIST

## 2018-01-02 PROCEDURE — 36415 COLL VENOUS BLD VENIPUNCTURE: CPT | Performed by: PHYSICIAN ASSISTANT

## 2018-01-02 PROCEDURE — 80048 BASIC METABOLIC PNL TOTAL CA: CPT | Performed by: PHYSICIAN ASSISTANT

## 2018-01-02 PROCEDURE — 74011000250 HC RX REV CODE- 250: Performed by: PHYSICIAN ASSISTANT

## 2018-01-02 PROCEDURE — 94660 CPAP INITIATION&MGMT: CPT

## 2018-01-02 RX ADMIN — MORPHINE SULFATE 4 MG: 2 INJECTION, SOLUTION INTRAMUSCULAR; INTRAVENOUS at 13:28

## 2018-01-02 RX ADMIN — Medication 10 ML: at 05:11

## 2018-01-02 RX ADMIN — MORPHINE SULFATE 4 MG: 2 INJECTION, SOLUTION INTRAMUSCULAR; INTRAVENOUS at 20:02

## 2018-01-02 RX ADMIN — LORAZEPAM 0.5 MG: 2 INJECTION INTRAMUSCULAR; INTRAVENOUS at 22:47

## 2018-01-02 RX ADMIN — METHYLPREDNISOLONE SODIUM SUCCINATE 60 MG: 40 INJECTION, POWDER, FOR SOLUTION INTRAMUSCULAR; INTRAVENOUS at 05:11

## 2018-01-02 RX ADMIN — METHYLPREDNISOLONE SODIUM SUCCINATE 60 MG: 40 INJECTION, POWDER, FOR SOLUTION INTRAMUSCULAR; INTRAVENOUS at 17:47

## 2018-01-02 RX ADMIN — FAMOTIDINE 20 MG: 10 INJECTION, SOLUTION INTRAVENOUS at 20:03

## 2018-01-02 RX ADMIN — IPRATROPIUM BROMIDE AND ALBUTEROL SULFATE 3 ML: .5; 3 SOLUTION RESPIRATORY (INHALATION) at 15:02

## 2018-01-02 RX ADMIN — ALBUTEROL SULFATE 2.5 MG: 2.5 SOLUTION RESPIRATORY (INHALATION) at 17:45

## 2018-01-02 RX ADMIN — MONTELUKAST SODIUM 10 MG: 10 TABLET, FILM COATED ORAL at 21:51

## 2018-01-02 RX ADMIN — MORPHINE SULFATE 4 MG: 2 INJECTION, SOLUTION INTRAMUSCULAR; INTRAVENOUS at 15:57

## 2018-01-02 RX ADMIN — LORAZEPAM 0.5 MG: 2 INJECTION INTRAMUSCULAR; INTRAVENOUS at 14:59

## 2018-01-02 RX ADMIN — MORPHINE SULFATE 4 MG: 2 INJECTION, SOLUTION INTRAMUSCULAR; INTRAVENOUS at 09:11

## 2018-01-02 RX ADMIN — IPRATROPIUM BROMIDE AND ALBUTEROL SULFATE 3 ML: .5; 3 SOLUTION RESPIRATORY (INHALATION) at 01:24

## 2018-01-02 RX ADMIN — MORPHINE SULFATE 4 MG: 2 INJECTION, SOLUTION INTRAMUSCULAR; INTRAVENOUS at 17:46

## 2018-01-02 RX ADMIN — FAMOTIDINE 20 MG: 10 INJECTION, SOLUTION INTRAVENOUS at 11:11

## 2018-01-02 RX ADMIN — METHYLPREDNISOLONE SODIUM SUCCINATE 60 MG: 40 INJECTION, POWDER, FOR SOLUTION INTRAMUSCULAR; INTRAVENOUS at 11:10

## 2018-01-02 RX ADMIN — SODIUM CHLORIDE 500 MG: 900 INJECTION, SOLUTION INTRAVENOUS at 09:02

## 2018-01-02 RX ADMIN — LORAZEPAM 0.5 MG: 2 INJECTION INTRAMUSCULAR; INTRAVENOUS at 05:56

## 2018-01-02 RX ADMIN — ALBUTEROL SULFATE 2.5 MG: 2.5 SOLUTION RESPIRATORY (INHALATION) at 09:21

## 2018-01-02 RX ADMIN — Medication 9 ML: at 13:31

## 2018-01-02 RX ADMIN — ALBUTEROL SULFATE 2.5 MG: 2.5 SOLUTION RESPIRATORY (INHALATION) at 03:08

## 2018-01-02 RX ADMIN — ALBUTEROL SULFATE 2.5 MG: 2.5 SOLUTION RESPIRATORY (INHALATION) at 22:00

## 2018-01-02 RX ADMIN — ARFORMOTEROL TARTRATE 15 MCG: 15 SOLUTION RESPIRATORY (INHALATION) at 07:16

## 2018-01-02 RX ADMIN — ALBUTEROL SULFATE 2.5 MG: 2.5 SOLUTION RESPIRATORY (INHALATION) at 10:41

## 2018-01-02 RX ADMIN — ALBUTEROL SULFATE 2.5 MG: 2.5 SOLUTION RESPIRATORY (INHALATION) at 05:05

## 2018-01-02 RX ADMIN — IPRATROPIUM BROMIDE AND ALBUTEROL SULFATE 3 ML: .5; 3 SOLUTION RESPIRATORY (INHALATION) at 19:28

## 2018-01-02 RX ADMIN — ALBUTEROL SULFATE 2.5 MG: 2.5 SOLUTION RESPIRATORY (INHALATION) at 15:18

## 2018-01-02 RX ADMIN — ENOXAPARIN SODIUM 40 MG: 40 INJECTION SUBCUTANEOUS at 02:30

## 2018-01-02 RX ADMIN — ARFORMOTEROL TARTRATE 15 MCG: 15 SOLUTION RESPIRATORY (INHALATION) at 19:27

## 2018-01-02 RX ADMIN — IPRATROPIUM BROMIDE AND ALBUTEROL SULFATE 3 ML: .5; 3 SOLUTION RESPIRATORY (INHALATION) at 07:16

## 2018-01-02 RX ADMIN — MORPHINE SULFATE 4 MG: 2 INJECTION, SOLUTION INTRAMUSCULAR; INTRAVENOUS at 11:11

## 2018-01-02 NOTE — CDMP QUERY
Please clarify if this patient is being treated/managed for:    =>Acute Hypoxic Resp Failure  sec  to  Exac of Asthma   requiring bi-pap   =>Other Explanation of clinical findings  =>Unable to Determine (no explanation of clinical findings)    The medical record reflects the following:    Risk:  admitted with persistent asthma    Clinical Indicators:  admitted with severe resp distress, brief moment of resp arrest in the field, pt with increased work of breathing with use of accessory muscles and intercostal retractions, unable to speak in complete sentences , placed on bi-pap and admitted to icu     Treatment:   bi-pap, abg's,   hhn,     Please clarify and document your clinical opinion in the progress notes and discharge summary including the definitive and/or presumptive diagnosis, (suspected or probable), related to the above clinical findings. Please include clinical findings supporting your diagnosis. If you DECLINE this query or would like to communicate with [facility], please utilize the \"[facility]ossom message box\" at the TOP of the Progress Note on the right.       Thank you,  Patricia Barahona RN/CCDS  [phone]

## 2018-01-02 NOTE — PROGRESS NOTES
[facility]  ICU Progress Note      Name: Jie Puckett   : 1990   MRN: [id number]   Date: 2018 3:07 PM     [x]I have reviewed the flowsheet and previous days notes. Events overnight reviewed and discussed with nursing staff. Vital signs and records reviewed.      34 yo male with PMH asthma admitted with severe exacerbation of asthma requiring BiPAP    2018  No overnight events  On BiPAP overnight and this am, just transitioned to nc  WBC 27.5, Hd stable, no fever  Requiring ativan and morphine prn for anxiety  Pt lives with roommates that have 2 cats and 2 dogs and smoke (although outside)  Has not been on prednisone recently just albuterol alone    [x]The patient is critically ill on      []Mechanical ventilation []Pressors   [x]BiPAP []                   Medication Review:  · Pressors -  · Sedation -ativan  · Antibiotics -zithromax  · Pain -morphine  · GI/ DVT -pepcid, lovenox  · Others (other gtts)    Safety Bundles: VAP Bundle/ CAUTI/ Severe Sepsis Protocol/ Electrolyte Replacement Protocol    Vital Signs:    Visit Vitals    BP (!) 127/96    Pulse 96    Temp 96.8 °F (36 °C)    Resp 23    Ht 5' 8\" (1.727 m)    Wt 88.7 kg (195 lb 8.8 oz)    SpO2 100%    BMI 29.73 kg/m2       O2 Device: BIPAP   O2 Flow Rate (L/min): 6 l/min   Temp (24hrs), Av.3 °F (36.3 °C), Min:96.4 °F (35.8 °C), Max:97.8 °F (36.6 °C)       Intake/Output:   Last shift:      701 - 1900  In: -   Out: 325 [Urine:325]  Last 3 shifts: 1901 -  07  In: 500 [I.V.:500]  Out: 1400 [Urine:1400]    Intake/Output Summary (Last 24 hours) at 18 1507  Last data filed at 18 1200   Gross per 24 hour   Intake                0 ml   Output             1325 ml   Net            -1325 ml       Ventilator Settings:        Ventilator Volumes  Vt Spont (ml): 523 ml  Ve Observed (l/min): 15.1 l/min       Physical Exam:    General:Awake and alert, appears anxious, no conversational dyspnea  HEENT:  Anicteric sclerae; pink palpebral conjunctivae; mucosa moist, nc in place  Resp:  Symmetrical chest expansion, no accessory muscle use; adequate airway entry; +inspiratory/exp wheezing, prolonged expiration  CV:  S1, S2 present; regular rate and rhythm  GI:  Abdomen soft, non-tender; (+) active bowel sounds  Extremities:  +2 pulses on all extremities; no edema/ cyanosis/ clubbing noted  Skin:  Warm; no rashes/ lesions noted  Neurologic:  Non-focal  Devices:  No NGT/OGT, Central line/ PICC, ETT/tracheostomy, chest tube      DATA:     Current Facility-Administered Medications   Medication Dose Route Frequency    famotidine (PF) (PEPCID) 20 mg in sodium chloride 0.9 % 10 mL injection  20 mg IntraVENous Q12H    sodium chloride (NS) flush 5-10 mL  5-10 mL IntraVENous Q8H    sodium chloride (NS) flush 5-10 mL  5-10 mL IntraVENous PRN    enoxaparin (LOVENOX) injection 40 mg  40 mg SubCUTAneous Q24H    LORazepam (ATIVAN) injection 0.5 mg  0.5 mg IntraVENous Q8H PRN    albuterol-ipratropium (DUO-NEB) 2.5 MG-0.5 MG/3 ML  3 mL Nebulization Q6H RT    methylPREDNISolone (PF) (SOLU-MEDROL) injection 60 mg  60 mg IntraVENous Q6H    albuterol CONCENTRATE 2.5mg/0.5 mL neb soln  2.5 mg Nebulization Q1H PRN    montelukast (SINGULAIR) tablet 10 mg  10 mg Oral QHS    arformoterol (BROVANA) neb solution 15 mcg  15 mcg Nebulization BID RT    morphine injection 1-4 mg  1-4 mg IntraVENous Q1H PRN         Labs: Results:       Chemistry Recent Labs      01/02/18   1030  01/01/18   0415  12/31/17   0837   GLU  130*  122*  214*   NA  140  140  141   K  5.5  4.9  5.0   CL  101  104  106   CO2  32  27  28   BUN  35*  20*  14   CREA  1.05  1.09  1.39*   CA  8.8  8.8  8.3*   AGAP  7  9  7   BUCR  33*  18  10*   AP   --    --   98   TP   --    --   7.2   ALB   --    --   3.2*   GLOB   --    --   4.0   AGRAT   --    --   0.8      CBC w/Diff Recent Labs      01/02/18   1030  01/01/18   0415  12/31/17   0837   WBC  27.5* 27.0*  24.4*   RBC  5.99*  5.98*  6.18*   HGB  17.2*  17.1*  17.9*   HCT  52.6*  51.5*  53.0*   PLT  373  333  445*   GRANS   --   89*   --    LYMPH   --   7*   --    EOS   --   0   --       Coagulation No results for input(s): PTP, INR, APTT in the last 72 hours. No lab exists for component: INREXT    Liver Enzymes Recent Labs      12/31/17   0837   TP  7.2   ALB  3.2*   AP  98   SGOT  27      ABG No results found for: PH, PHI, PCO2, PCO2I, PO2, PO2I, HCO3, HCO3I, FIO2, FIO2I   Microbiology No results for input(s): CULT in the last 72 hours. Telemetry: [x]Sinus to ST []A-flutter []Paced    []A-fib []Multiple PVCs                    Imaging:  CXR Results  (Last 48 hours)    None          CT Results  (Last 48 hours)    None        12/31 CXR  Findings: The lungs appear clear. The cardiac silhouette and pulmonary  vascularity appear within normal limits. No evidence for pneumothorax or pleural  effusion.     IMPRESSION  Impression:     No acute cardiopulmonary disease.       ALLERGEN PROFILE:  ZONE 2  9/7/2017  7:41 AM - [place], [facility]       Component Results      Component Value Flag Ref Range Units Status     D. pteronyssinus 0.13 (A) Class 0/I kU/L Final     D. farinae Mite <0.10  Class 0 kU/L Final     Cat Hair/Dander 59.70 (A) Class V kU/L Final     Dog Hair/Dander 16.20 (A) Class IV kU/L Final     Bermuda Grass 0.49 (A) Class I kU/L Final     Curt grass 0.19 (A) Class 0/I kU/L Final     Vinh Grass 0.14 (A) Class 0/I kU/L Final     Bahia Grass 0.40 (A) Class I kU/L Final     Cockroach,American <0.10  Class 0 kU/L Final     Comment:     (NOTE)      Penicillium notatum <0.10  Class 0 kU/L Final      Cladosporium herbarum <0.10  Class 0 kU/L Final     Aspergillus fumigatus 0.12 (A) Class 0/I kU/L Final     Mucor racemosus <0.10  Class 0 kU/L Final     Alternaria tenuis <0.10  Class 0 kU/L Final     Stemphylium botryosum 0.49 (A) Class I kU/L Final     White Birch 0.39 (A) Class I kU/L Final     [place] 0.53 (A) Class I kU/L Final     American White Elm 0.42 (A) Class I kU/L Final     Maple/Box Elder 0.31 (A) Class 0/I kU/L Final     White Hickory 0.35 (A) Class I kU/L Final     Comment:     (NOTE)   This test was developed and its performance characteristics   determined by [facility] has not been cleared or approved by the   U.S. Food and Drug Administration. The FDA has determined that such clearance or approval is not   necessary. This test is used for clinical purposes.  It should not   be regarded as investigational or for research.        American [place] 0.39 (A) Class I kU/L Final     [place] <0.10  Class 0 kU/L Final     Sweet Gum 0.31 (A) Class 0/I kU/L Final     Comment:     (NOTE)   This test was developed and its performance characteristics   determined by [facility] has not been cleared or approved by the   U.S. Food and Drug Administration. The FDA has determined that such clearance or approval is not   necessary.  This test is used for clinical purposes.  It should not   be regarded as investigational or for research.        [facility] 0.30 (A) Class 0/I kU/L Final     Ragweed, Short/Common 0.30 (A) Class 0/I kU/L Final     Mugwort 0.20 (A) Class 0/I kU/L Final     Plantain, English 0.37 (A) Class I kU/L Final     Pigweed, Rough 0.43 (A) Class I kU/L Final     Sheep Sorrel (Dock) 0.48 (A) Class I kU/L Final     Nettle 0.19 (A) Class 0/I kU/L Final     Comment:     (NOTE)   Performed At: [address] [id number]   Carlos Breaux MD [id number]        Class description Comment     Final     Comment:     (NOTE)      Levels of Specific IgE       Class  Description of Class      ---------------------------  -----  --------------------                     < 0.10         0         Negative             0.10 -    0.31         0/I       Equivocal/Low             0.32 -    0.55         I         Low             0.56 -    1.40         II        Moderate             1.41 -    3.90         III       High             3.91 -   19.00         IV        Very High            19.01 -  100.00         V         Very High                    >100.00         VI        Very High              IMPRESSION:   · Acute respiratory failure due to severe asthma exacerbation requiring BiPAP  · Leukocytosis (20K) chronic at least since 6/17, ?steroids v infection  · Hx asthma, poorly controlled with noncompliance to medical regimen due to cost barriers  · Anxiety        PLAN:   · Resp -  Continue with nebs ATC and prn, cont steroids, abx, singulair. CXR without acute process. Cont BiPAP for now and transition off when stable. Will need to ensure pt has medications upon discharge-has had difficulty affording in past or he will continue to have recurrent admissions. IGE level 387 in past (9/17)-see allergen profile as above. Discussed avoidance of cats, dogs and smoke in the home if possible. · ID - Leukocytosis noted (chronic with every admission-smear in past has showed excess neutrophils, no additional abn) Cont azithromycin course for now. Afebrile. Sputum if possible. Flu negative. · CVS - Monitor HD-currently stable  · Heme/onc -  hgb and plts stable.    · Metabolic - Replace lytes prn per protocol  · Renal - Follow BUN, cr  · Endocrine - Follow glucose on steroids, ISS prn  · Neuro/ Pain/ Sedation - Judicious use of ativan and morphine prn  · GI - clears for now when off BiPAP  · Prophylaxis - DVT-lovenox, GI-pepcid  · Discussed in interdisciplinary rounds  · FULL CODE  · Continue to monitor in stepdown for now        [x]See my orders for details    My assessment/plan was discussed with:  [x]nursing []PT/OT    [x]respiratory therapy [x]   []family []         ODILIA Palm

## 2018-01-02 NOTE — PROGRESS NOTES
Physical Exam   Skin:             Primary Nurse Pa Herzog and Roxann Jimenez, RN performed a dual skin assessment on this patient No impairment noted

## 2018-01-02 NOTE — PROGRESS NOTES
Daily Progress Note: 2018 3:50 PM   Admit Date: 2017    Patient seen in follow up for multiple medical problems as listed below:  Patient Active Problem List   Diagnosis Code    Status asthmaticus J45.902    Lactic acidosis E17.5    Metabolic acidosis with respiratory alkalosis E87.2, E87.3    Acute severe exacerbation of asthma J45. 0    Acute respiratory acidosis E87.2       Assesment      34 y/o male with history of asthma. Admitted to ICU with Resp failure on BIpap due to status asthmaticus. Acute severe asthma - Cont IV steroids, nebs and BiPAP per pulm    DVT Protocol Active: yes  Code Status:  Full Code     Disposition:req 48h hosp    Subjective:     CC: Respiratory Distress    Interval History: Still requiring continuous Bipap , lungs course with faint wheeze    ROS: 11 point ROS negative     Objective:     Visit Vitals    BP (!) 127/96    Pulse 96    Temp 96.8 °F (36 °C)    Resp 23    Ht 5' 8\" (1.727 m)    Wt 88.7 kg (195 lb 8.8 oz)    SpO2 98%    BMI 29.73 kg/m2       Temp (24hrs), Av.3 °F (36.3 °C), Min:96.4 °F (35.8 °C), Max:97.8 °F (36.6 °C)        Intake/Output Summary (Last 24 hours) at 18 1550  Last data filed at 18 1503   Gross per 24 hour   Intake                0 ml   Output             1825 ml   Net            -1825 ml       Gen: AOx3, NAD  HEENT:  GIULIA, EOMI. Neck: No Bruits/JVD   Lungs:   Course sounds, faint wheeze. poor respiratory effort  Heart:   RR S1 S2 without M/R/G  Abdomen: ND,NT, BSX4,   Extremities:   No LE edema. No cyanosis.   Skin:  no jaundice/lesions      Data Review:     Meds/Labs/Tests reviewed    Current Shift:   07 -  190  In: -   Out: 825 [Urine:825]  Last three shifts:   190 -  0700  In: 500 [I.V.:500]  Out: 1400 [Urine:1400]  Recent Labs      18   1030  18   0415  17   0837   WBC  27.5*  27.0*  24.4*   RBC  5.99*  5.98*  6.18*   HGB  17.2*  17.1*  17.9*   HCT  52.6*  51.5*  53.0* PLT  373  333  445*   GRANS   --   89*   --    LYMPH   --   7*   --    EOS   --   0   --        Recent Labs      01/02/18   1030  01/01/18   0415  12/31/17   0837   BUN  35*  20*  14   CREA  1.05  1.09  1.39*   CA  8.8  8.8  8.3*   ALB   --    --   3.2*   K  5.5  4.9  5.0   NA  140  140  141   CL  101  104  106   CO2  32  27  28   GLU  130*  122*  214*        Lab Results   Component Value Date/Time    Glucose 130 01/02/2018 10:30 AM    Glucose 122 01/01/2018 04:15 AM    Glucose 214 12/31/2017 08:37 AM    Glucose 99 09/07/2017 05:25 AM    Glucose 107 09/06/2017 04:00 AM          Care coordination with Nursing/Consultants/staff: 15  Prior history, labs, and charting reviewed: 15    Procedures/Imaging:  pCXR 12/31    Total time spent with chart review, patient examination/education, discussion with staff on case,documentation and medication management / adjustment  :  30 Minutes      Dr Vergara Erp  [phone]

## 2018-01-02 NOTE — ACP (ADVANCE CARE PLANNING)
ASSISTED PATIENT WITH THE COMPLETION OF AN ADVANCE DIRECTIVE FORM TODAY AT HIS REQUEST. COPIED AND CHARTED IN LIKE PAPER CHART. COPIES GIVEN TO PATIENT FOR HIS AGENTS LATER.     Jane Reyna   Spiritual Care   [phone]

## 2018-01-02 NOTE — PROGRESS NOTES
1040-Called to bedside - pt feeling SOB - requesting PRN neb  -administer neb    1500-Attempt trial off bipap  -remained at bedside with pt due to anxiety - educated on best breathing pattern & coached on strategies for identifying & managing asthma symptoms  Administer neb via aerosol mask -   Pt had dose of PRN ativan before removed to help manage anxiety    1540-Pt remains off bipap on 4lpm NC

## 2018-01-02 NOTE — ROUTINE PROCESS
Jitendra Mckeon   33 y.o.  1990    Full Code    Next of kin: Peyton Larson; [phone]    Allergies   Allergen Reactions    Penicillin G Anaphylaxis      There are currently no Active Isolations for Mr. Mclean. Treatment Team:    Hospitalist:  Herber Mc Patient. Intensivist:  Herber Morin. Care Mgt, . Admit for:  Acute severe exacerbation of asthma   17:  Came in for 3 d history of respiratory distress, d/c'd. At home, on commode, ? Stopped breathing, BVM by EMS. Returned to ED, placed on BiPAP. Continues wheezing. *    LOS:  2 (17)    Why in ICU:  Continuous BiPAP.    LOS:  2  (17)   *    PMH:  Asthma, COPD, prior intubation. Tobacco:  No smoking, quit smokeless. ETOH:  Y. Illicits:  Denied. .    Diagnostics:    Imaging:  CXR :  NAD. U-tox 09-15-17: ( - ). EK-31:  ST, LAD; QTc 440. Labs: Admit:  WBC 24.4; Glu 214; Cr 1.39;    Cultures:  Flu screen A, B ( - ). Inpat Anti-Infectives     Start     Ordered Stop    17 0930  azithromycin (ZITHROMAX) 500 mg in 0.9% sodium chloride (MBP/ADV) 250 mL adv  500 mg,   IntraVENous,   EVERY 24 HOURS      17 0908 18 0929           Prophylaxis:  DVT:  No SCD's; Anticoagulant:  Enoxaparin sub-cut every day. Antiplatelet:  (no ASA). GI:  (No PPI nor H2 blocker)    Lines:  :  R AC # 20.   :  R hand # 20. Central:  no    LDA's (other):  no    Planned procedures ?:  Wean off BiPAP    Disposition / planned LOS ?:  TBD: home with GF, f/u [facility]. / 3 days? Dayami Everett

## 2018-01-02 NOTE — PROGRESS NOTES
Problem: Falls - Risk of  Goal: *Absence of Falls  Document Nita Fall Risk and appropriate interventions in the flowsheet.    Outcome: Progressing Towards Goal  Fall Risk Interventions:            Medication Interventions: Teach patient to arise slowly, Patient to call before getting OOB, Evaluate medications/consider consulting pharmacy, Assess postural VS orthostatic hypotension

## 2018-01-02 NOTE — ROUTINE PROCESS
1915:  Rec'd Bushra Seals  from JENNIFER Duarte RN. SBAR reviewed with two-nurse check-offs done of meds, dressings, wounds, LDA's & revelant assessment findings. Tonight:  Anxious. Continues to wheeze. States he is breathing more comfortably; is using less anxiolytic.     0310:  Called to room to give prn albuterol. Less tight after treatment . 0600:  Continues to c/o  Feeling \"tight. \" anxiolytic tried. States more comfortable, declined further bronchodilator. 0725:  Verbal Patient-side shift change report given to JENNIFER Duarte RN, (oncoming nurse) by Jasmyn Magdaleno RN  (offgoing nurse). Report included the following information SBAR, Kardex, ED Summary, Procedure Summary, Intake/Output, MAR, Recent Results and Med Rec Status. Included:  Intro, hx, two-RN eval of relevant assessment findings, LDAs, skin, diagnostics and infusions.

## 2018-01-02 NOTE — PROGRESS NOTES
Physical Exam   Skin:             Primary Nurse Jay Vallecillo, SHLOMO and JENNIFER Duarte RN performed a dual skin assessment on this patient No impairment noted  Scott score is 23

## 2018-01-02 NOTE — PROGRESS NOTES
conducted an initial consultation and Spiritual Assessment for Carlette Aase, who is a 33 y.o.,male. Patients Primary Language is: [place]. According to the patients EMR Anglican Affiliation is: No Baptism. The reason the Patient came to the hospital is:   Patient Active Problem List    Diagnosis Date Noted    Acute severe exacerbation of asthma 12/31/2017    Acute respiratory acidosis 12/31/2017    Status asthmaticus 09/05/2017    Lactic acidosis 62/55/4824    Metabolic acidosis with respiratory alkalosis 09/05/2017        The  provided the following Interventions:  Initiated a relationship of care and support with patient in room 2704 this morning. Listened empathically as patient talked about his being here as he was on the C-pap machine. Patient also requested and completed an advance directive form today. Provided information about Spiritual Care Services. Offered prayer and assurance of continued prayers on patients behalf. The following outcomes were achieved:  Patient shared limited information about his medical narrative. .  Patient processed feeling about current hospitalization. Patient expressed gratitude for pastoral care visit. Patient completed an advance directive form today, copied and charted. Assessment:  Patient does not have any Adventist/cultural needs that will affect patients preferences in health care. There are no further spiritual or Adventist issues which require Spiritual Care Services interventions at this time. Plan:  Chaplains will continue to follow and will provide pastoral care on an as needed/requested basis    . Jane Reyna   Spiritual Care   [phone]

## 2018-01-02 NOTE — PROGRESS NOTES
0700: Bedside shift change report given to Davy Otoole RN (oncoming nurse) by Sharron Fernández RN (offgoing nurse). Report included the following information SBAR, MAR and Recent Results. 1000: Pt feeling very anxious- addressed during interdisciplinary rounds, no new orders received. Goal for the day is to wean off BiPap as tolerated, and try to control anxiety. 1900: Bedside shift change report given to Akash Friday, SHLOMO (oncoming nurse) by Davy Otoole RN (offgoing nurse). Report included the following information SBAR, Kardex, MAR and Recent Results.

## 2018-01-02 NOTE — PROGRESS NOTES
Problem: Falls - Risk of  Goal: *Absence of Falls  Document Nita Fall Risk and appropriate interventions in the flowsheet.    Fall Risk Interventions:            Medication Interventions: Assess postural VS orthostatic hypotension, Bed/chair exit alarm

## 2018-01-02 NOTE — DIABETES MGMT
Nutrition/Glycemic Control:  33 y.o. M   Patient Active Problem List   Diagnosis Code    Status asthmaticus J45.902    Lactic acidosis J62.8    Metabolic acidosis with respiratory alkalosis E87.2, E87.3    Acute severe exacerbation of asthma J45. 901    Acute respiratory acidosis E87.2      Pt screened for nutritional status. He is 5'8\" 195 lbs  Ideal wt 154 lbs. Pt is overweight related to excess caloric intake as evidenced by 126% ideal weight and BMI=29.6 6kg/m2. Pt is NPO while on bipap. Pt is well nourished at this time. Monitor for diet advancement.  Manjula LOVING

## 2018-01-03 LAB
ANION GAP SERPL CALC-SCNC: 7 MMOL/L (ref 3–18)
BASOPHILS # BLD: 0 K/UL (ref 0–0.06)
BASOPHILS NFR BLD: 0 % (ref 0–2)
BUN SERPL-MCNC: 33 MG/DL (ref 7–18)
BUN/CREAT SERPL: 34 (ref 12–20)
CALCIUM SERPL-MCNC: 8.7 MG/DL (ref 8.5–10.1)
CHLORIDE SERPL-SCNC: 101 MMOL/L (ref 100–108)
CO2 SERPL-SCNC: 31 MMOL/L (ref 21–32)
CREAT SERPL-MCNC: 0.98 MG/DL (ref 0.6–1.3)
DIFFERENTIAL METHOD BLD: ABNORMAL
EOSINOPHIL # BLD: 0 K/UL (ref 0–0.4)
EOSINOPHIL NFR BLD: 0 % (ref 0–5)
ERYTHROCYTE [DISTWIDTH] IN BLOOD BY AUTOMATED COUNT: 12.6 % (ref 11.6–14.5)
GLUCOSE SERPL-MCNC: 120 MG/DL (ref 74–99)
HCT VFR BLD AUTO: 52.6 % (ref 36–48)
HGB BLD-MCNC: 17.2 G/DL (ref 13–16)
LYMPHOCYTES # BLD: 1.1 K/UL (ref 0.9–3.6)
LYMPHOCYTES NFR BLD: 5 % (ref 21–52)
MCH RBC QN AUTO: 28.2 PG (ref 24–34)
MCHC RBC AUTO-ENTMCNC: 32.7 G/DL (ref 31–37)
MCV RBC AUTO: 86.2 FL (ref 74–97)
MONOCYTES # BLD: 1.3 K/UL (ref 0.05–1.2)
MONOCYTES NFR BLD: 6 % (ref 3–10)
NEUTS SEG # BLD: 20.3 K/UL (ref 1.8–8)
NEUTS SEG NFR BLD: 89 % (ref 40–73)
PLATELET # BLD AUTO: 320 K/UL (ref 135–420)
PMV BLD AUTO: 9.6 FL (ref 9.2–11.8)
POTASSIUM SERPL-SCNC: 5.2 MMOL/L (ref 3.5–5.5)
RBC # BLD AUTO: 6.1 M/UL (ref 4.7–5.5)
SODIUM SERPL-SCNC: 139 MMOL/L (ref 136–145)
WBC # BLD AUTO: 22.7 K/UL (ref 4.6–13.2)

## 2018-01-03 PROCEDURE — 85025 COMPLETE CBC W/AUTO DIFF WBC: CPT | Performed by: PHYSICIAN ASSISTANT

## 2018-01-03 PROCEDURE — 94640 AIRWAY INHALATION TREATMENT: CPT

## 2018-01-03 PROCEDURE — 74011250636 HC RX REV CODE- 250/636: Performed by: INTERNAL MEDICINE

## 2018-01-03 PROCEDURE — 74011250636 HC RX REV CODE- 250/636: Performed by: HOSPITALIST

## 2018-01-03 PROCEDURE — 36415 COLL VENOUS BLD VENIPUNCTURE: CPT | Performed by: PHYSICIAN ASSISTANT

## 2018-01-03 PROCEDURE — 80048 BASIC METABOLIC PNL TOTAL CA: CPT | Performed by: PHYSICIAN ASSISTANT

## 2018-01-03 PROCEDURE — 74011250637 HC RX REV CODE- 250/637: Performed by: PHYSICIAN ASSISTANT

## 2018-01-03 PROCEDURE — 74011000250 HC RX REV CODE- 250: Performed by: INTERNAL MEDICINE

## 2018-01-03 PROCEDURE — 94660 CPAP INITIATION&MGMT: CPT

## 2018-01-03 PROCEDURE — 65660000001 HC RM ICU INTERMED STEPDOWN

## 2018-01-03 RX ORDER — FAMOTIDINE 20 MG/1
20 TABLET, FILM COATED ORAL 2 TIMES DAILY
Status: DISCONTINUED | OUTPATIENT
Start: 2018-01-03 | End: 2018-01-07 | Stop reason: HOSPADM

## 2018-01-03 RX ORDER — BUDESONIDE 0.5 MG/2ML
1000 INHALANT ORAL
Status: DISCONTINUED | OUTPATIENT
Start: 2018-01-03 | End: 2018-01-07 | Stop reason: HOSPADM

## 2018-01-03 RX ADMIN — IPRATROPIUM BROMIDE AND ALBUTEROL SULFATE 3 ML: .5; 3 SOLUTION RESPIRATORY (INHALATION) at 13:29

## 2018-01-03 RX ADMIN — Medication 5 ML: at 06:06

## 2018-01-03 RX ADMIN — LORAZEPAM 0.5 MG: 2 INJECTION INTRAMUSCULAR; INTRAVENOUS at 13:59

## 2018-01-03 RX ADMIN — LORAZEPAM 0.5 MG: 2 INJECTION INTRAMUSCULAR; INTRAVENOUS at 21:58

## 2018-01-03 RX ADMIN — METHYLPREDNISOLONE SODIUM SUCCINATE 60 MG: 40 INJECTION, POWDER, FOR SOLUTION INTRAMUSCULAR; INTRAVENOUS at 00:31

## 2018-01-03 RX ADMIN — IPRATROPIUM BROMIDE AND ALBUTEROL SULFATE 3 ML: .5; 3 SOLUTION RESPIRATORY (INHALATION) at 19:12

## 2018-01-03 RX ADMIN — Medication 10 ML: at 14:02

## 2018-01-03 RX ADMIN — ALBUTEROL SULFATE 2.5 MG: 2.5 SOLUTION RESPIRATORY (INHALATION) at 08:37

## 2018-01-03 RX ADMIN — MORPHINE SULFATE 2 MG: 2 INJECTION, SOLUTION INTRAMUSCULAR; INTRAVENOUS at 06:10

## 2018-01-03 RX ADMIN — MORPHINE SULFATE 2 MG: 2 INJECTION, SOLUTION INTRAMUSCULAR; INTRAVENOUS at 11:23

## 2018-01-03 RX ADMIN — MORPHINE SULFATE 4 MG: 2 INJECTION, SOLUTION INTRAMUSCULAR; INTRAVENOUS at 19:43

## 2018-01-03 RX ADMIN — ARFORMOTEROL TARTRATE 15 MCG: 15 SOLUTION RESPIRATORY (INHALATION) at 07:08

## 2018-01-03 RX ADMIN — IPRATROPIUM BROMIDE AND ALBUTEROL SULFATE 3 ML: .5; 3 SOLUTION RESPIRATORY (INHALATION) at 01:28

## 2018-01-03 RX ADMIN — ALBUTEROL SULFATE 2.5 MG: 2.5 SOLUTION RESPIRATORY (INHALATION) at 03:49

## 2018-01-03 RX ADMIN — METHYLPREDNISOLONE SODIUM SUCCINATE 60 MG: 40 INJECTION, POWDER, FOR SOLUTION INTRAMUSCULAR; INTRAVENOUS at 14:01

## 2018-01-03 RX ADMIN — FAMOTIDINE 20 MG: 20 TABLET ORAL at 10:09

## 2018-01-03 RX ADMIN — Medication 5 ML: at 00:38

## 2018-01-03 RX ADMIN — METHYLPREDNISOLONE SODIUM SUCCINATE 60 MG: 40 INJECTION, POWDER, FOR SOLUTION INTRAMUSCULAR; INTRAVENOUS at 05:59

## 2018-01-03 RX ADMIN — ALBUTEROL SULFATE 2.5 MG: 2.5 SOLUTION RESPIRATORY (INHALATION) at 17:39

## 2018-01-03 RX ADMIN — MORPHINE SULFATE 4 MG: 2 INJECTION, SOLUTION INTRAMUSCULAR; INTRAVENOUS at 21:27

## 2018-01-03 RX ADMIN — METHYLPREDNISOLONE SODIUM SUCCINATE 60 MG: 40 INJECTION, POWDER, FOR SOLUTION INTRAMUSCULAR; INTRAVENOUS at 22:00

## 2018-01-03 RX ADMIN — ARFORMOTEROL TARTRATE 15 MCG: 15 SOLUTION RESPIRATORY (INHALATION) at 19:12

## 2018-01-03 RX ADMIN — IPRATROPIUM BROMIDE AND ALBUTEROL SULFATE 3 ML: .5; 3 SOLUTION RESPIRATORY (INHALATION) at 07:08

## 2018-01-03 RX ADMIN — MORPHINE SULFATE 4 MG: 2 INJECTION, SOLUTION INTRAMUSCULAR; INTRAVENOUS at 03:40

## 2018-01-03 RX ADMIN — ENOXAPARIN SODIUM 40 MG: 40 INJECTION SUBCUTANEOUS at 03:24

## 2018-01-03 RX ADMIN — FAMOTIDINE 20 MG: 20 TABLET ORAL at 17:33

## 2018-01-03 RX ADMIN — BUDESONIDE 0.5 MCG: 0.5 INHALANT RESPIRATORY (INHALATION) at 21:00

## 2018-01-03 NOTE — PROGRESS NOTES
Patient called stating she is having breakthrough bleeding with birth control. States she is not scheduled to take her placebo pills until this Sunday and she has been bleeding since last Thursday. Patient was told to call if this happened again as it happened in August too. Please advise.   Rec'd pt on 5 lpm NC  -Pt awake & alert - exp wheeze present  -pt needed bipap overnight approx 3.5 hrs & returned to NC  -administer neb  -wean O2= 4 lpm    0835-Called to bedside for PRN neb - pt wheezing, feeling tight & SOB, administer neb    1315-called to bedside - pt requesting neb for symptoms of SOB, tightness  -administer DuoNeb     1350-Pt stated feeling anxsiou- requesting ativan - advised would refer to RN    7500-Called to bedside for SOB- assessed & administered neb

## 2018-01-03 NOTE — PROGRESS NOTES
Rec'd pt resting in bed awake and oriented x 4.  RR mid 20's, still dyspneic with moving/exertion. VSS, reviewed pt meds, treatments with pt. Added humidifier to 4 liters n/c. Sats in 94 % range. Verbalizes understanding, will continue to monitor.

## 2018-01-03 NOTE — PROGRESS NOTES
[facility]  ICU Progress Note      Name: Caitlyn Diana   : 1990   MRN: [id number]   Date: 1/3/2018 3:07 PM     [x]I have reviewed the flowsheet and previous days notes. Events overnight reviewed and discussed with nursing staff. Vital signs and records reviewed.      32 yo male with PMH asthma admitted with severe exacerbation of asthma requiring BiPAP    1/3/2018  No overnight events, was on BiPAP 3.5 hours, now off, on 4L nc  WBC down, afebrile  Tolerating PO well, remains anxious  Requiring ativan (1.5mg in 24 hours) and morphine prn (30mg in 24 hours) for anxiety  Cough much less productive            Medication Review:  · Pressors -  · Sedation -ativan  · Antibiotics -  · Pain -morphine  · GI/ DVT -pepcid, lovenox  · Others (other gtts)    Safety Bundles: VAP Bundle/ CAUTI/ Severe Sepsis Protocol/ Electrolyte Replacement Protocol    Vital Signs:    Visit Vitals    /70    Pulse (!) 115    Temp 97.9 °F (36.6 °C)    Resp 27    Ht 5' 8\" (1.727 m)    Wt 88.7 kg (195 lb 8.8 oz)    SpO2 100%    BMI 29.73 kg/m2       O2 Device: Nasal cannula   O2 Flow Rate (L/min): 4 l/min   Temp (24hrs), Av.6 °F (36.4 °C), Min:96.8 °F (36 °C), Max:98.1 °F (36.7 °C)       Intake/Output:   Last shift:         Last 3 shifts:  1901 -  0700  In: 550 [P.O.:550]  Out: 2875 [Urine:2875]    Intake/Output Summary (Last 24 hours) at 18 0923  Last data filed at 18 0644   Gross per 24 hour   Intake              550 ml   Output             1875 ml   Net            -1325 ml       Ventilator Settings:        Ventilator Volumes  Vt Spont (ml): 765 ml  Ve Observed (l/min): 12.7 l/min       Physical Exam:    General:Awake and alert, appears less anxious, no conversational dyspnea  HEENT:  Anicteric sclerae; pink palpebral conjunctivae; mucosa moist, nc in place  Resp:  Symmetrical chest expansion, no accessory muscle use; adequate airway entry; ++exp wheezing  CV: Tachy, no m  GI:  Abdomen soft, non-tender; (+) active bowel sounds  Extremities:  +2 pulses on all extremities; no edema/ cyanosis/ clubbing noted  Skin:  Warm; no rashes/ lesions noted  Neurologic:  Non-focal. Moves all extremities      DATA:     Current Facility-Administered Medications   Medication Dose Route Frequency    famotidine (PF) (PEPCID) 20 mg in sodium chloride 0.9 % 10 mL injection  20 mg IntraVENous Q12H    sodium chloride (NS) flush 5-10 mL  5-10 mL IntraVENous Q8H    sodium chloride (NS) flush 5-10 mL  5-10 mL IntraVENous PRN    enoxaparin (LOVENOX) injection 40 mg  40 mg SubCUTAneous Q24H    LORazepam (ATIVAN) injection 0.5 mg  0.5 mg IntraVENous Q8H PRN    albuterol-ipratropium (DUO-NEB) 2.5 MG-0.5 MG/3 ML  3 mL Nebulization Q6H RT    methylPREDNISolone (PF) (SOLU-MEDROL) injection 60 mg  60 mg IntraVENous Q6H    albuterol CONCENTRATE 2.5mg/0.5 mL neb soln  2.5 mg Nebulization Q1H PRN    montelukast (SINGULAIR) tablet 10 mg  10 mg Oral QHS    arformoterol (BROVANA) neb solution 15 mcg  15 mcg Nebulization BID RT    morphine injection 1-4 mg  1-4 mg IntraVENous Q1H PRN         Labs: Results:       Chemistry Recent Labs      01/03/18   0330  01/02/18   1030  01/01/18   0415   GLU  120*  130*  122*   NA  139  140  140   K  5.2  5.5  4.9   CL  101  101  104   CO2  31  32  27   BUN  33*  35*  20*   CREA  0.98  1.05  1.09   CA  8.7  8.8  8.8   AGAP  7  7  9   BUCR  34*  33*  18      CBC w/Diff Recent Labs      01/03/18   0330  01/02/18   1030  01/01/18   0415   WBC  22.7*  27.5*  27.0*   RBC  6.10*  5.99*  5.98*   HGB  17.2*  17.2*  17.1*   HCT  52.6*  52.6*  51.5*   PLT  320  373  333   GRANS  89*   --   89*   LYMPH  5*   --   7*   EOS  0   --   0      Coagulation No results for input(s): PTP, INR, APTT in the last 72 hours. No lab exists for component: INREXT, INREXT    Liver Enzymes No results for input(s): TP, ALB, TBIL, AP, SGOT, GPT in the last 72 hours.     No lab exists for component: DBIL ABG No results found for: PH, PHI, PCO2, PCO2I, PO2, PO2I, HCO3, HCO3I, FIO2, FIO2I   Microbiology No results for input(s): CULT in the last 72 hours. Telemetry: [x]Sinus to ST []A-flutter []Paced    []A-fib []Multiple PVCs                    Imaging:  CXR Results  (Last 48 hours)    None          CT Results  (Last 48 hours)    None        12/31 CXR  Findings: The lungs appear clear. The cardiac silhouette and pulmonary  vascularity appear within normal limits. No evidence for pneumothorax or pleural  effusion.     IMPRESSION  Impression:     No acute cardiopulmonary disease. ALLERGEN PROFILE:  ZONE 2  9/7/2017  7:41 AM - Dwayne, Lab In [place]       Component Results      Component Value Flag Ref Range Units Status     D. pteronyssinus 0.13 (A) Class 0/I kU/L Final     D. farinae Mite <0.10  Class 0 kU/L Final     Cat Hair/Dander 59.70 (A) Class V kU/L Final     Dog Hair/Dander 16.20 (A) Class IV kU/L Final     [place] Grass 0.49 (A) Class I kU/L Final     Curt grass 0.19 (A) Class 0/I kU/L Final     Vinh Grass 0.14 (A) Class 0/I kU/L Final     Bahia Grass 0.40 (A) Class I kU/L Final     Cockroach,American <0.10  Class 0 kU/L Final     Comment:     (NOTE)      Penicillium notatum <0.10  Class 0 kU/L Final      Cladosporium herbarum <0.10  Class 0 kU/L Final     Aspergillus fumigatus 0.12 (A) Class 0/I kU/L Final     Mucor racemosus <0.10  Class 0 kU/L Final     Alternaria tenuis <0.10  Class 0 kU/L Final     Stemphylium botryosum 0.49 (A) Class I kU/L Final     White [place] 0.39 (A) Class I kU/L Final     [place] 0.53 (A) Class I kU/L Final     American White Elm 0.42 (A) Class I kU/L Final     Maple/Box Elder 0.31 (A) Class 0/I kU/L Final     White [place] 0.35 (A) Class I kU/L Final     Comment:     (NOTE)   This test was developed and its performance characteristics   determined by Yeison Hampton has not been cleared or approved by the   U.S. Food and Drug Administration.    The FDA has determined that such clearance or approval is not   necessary. This test is used for clinical purposes.  It should not   be regarded as investigational or for research.        American [place] 0.39 (A) Class I kU/L Final     [place] <0.10  Class 0 kU/L Final     Sweet Gum 0.31 (A) Class 0/I kU/L Final     Comment:     (NOTE)   This test was developed and its performance characteristics   determined by Cristina Mclaughlin has not been cleared or approved by the   U.S. Food and Drug Administration. The FDA has determined that such clearance or approval is not   necessary.  This test is used for clinical purposes.  It should not   be regarded as investigational or for research.        [facility] 0.30 (A) Class 0/I kU/L Final     Ragweed, Short/Common 0.30 (A) Class 0/I kU/L Final     Mugwort 0.20 (A) Class 0/I kU/L Final     Plantain, English 0.37 (A) Class I kU/L Final     Pigweed, Rough 0.43 (A) Class I kU/L Final     Sheep Sorrel (Dock) 0.48 (A) Class I kU/L Final     Nettle 0.19 (A) Class 0/I kU/L Final     Comment:     (NOTE)   Performed At: [address] [id number]   Torsten Blackmon MD EO:[id number]        Class description Comment     Final     Comment:     (NOTE)      Levels of Specific IgE       Class  Description of Class      ---------------------------  -----  --------------------                     < 0.10         0         Negative             0.10 -    0.31         0/I       Equivocal/Low             0.32 -    0.55         I         Low             0.56 -    1.40         II        Moderate             1.41 -    3.90         III       High             3.91 -   19.00         IV        Very High            19.01 -  100.00         V         Very High                    >100.00         VI        Very High              IMPRESSION:   · Acute respiratory failure due to severe asthma exacerbation requiring BiPAP  · Leukocytosis (20K) chronic at least since 6/17, ?steroids v infection  · Hx asthma, poorly controlled with noncompliance to medical regimen due to cost barriers  · Anxiety        PLAN:   · Resp -  Continue with nebs ATC and prn, cont steroids, abx, singulair. CXR without acute process. Cont BiPAP prn, wean nc for sat>92%. Will need to ensure pt has medications upon discharge-(has had difficulty affording in past) or he will continue to have recurrent admissions. IGE level 387 in past (9/17)-see allergen profile as above. Discussed avoidance of cats, dogs and smoke in the home if possible. · ID - Leukocytosis noted (chronic with every admission-smear in past has showed excess neutrophils, no additional abn) s/p azithromycin course. Afebrile. Sputum if possible. Flu negative. · CVS - Monitor HD-currently stable  · Heme/onc -  hgb and plts stable.    · Metabolic - Replace lytes prn per protocol  · Renal - Follow BUN, cr  · Endocrine - Follow glucose on steroids, ISS prn  · Neuro/ Pain/ Sedation - Judicious use of ativan and morphine prn  · GI -Advance if able to stay off BiPAP  · Prophylaxis - DVT-lovenox, GI-pepcid  · Discussed in interdisciplinary rounds  · FULL CODE  · Continue to monitor in stepdown for now  · To be referred to care will [place] upon discharge        [x]See my orders for details    My assessment/plan was discussed with:  [x]nursing []PT/OT    [x]respiratory therapy [x]   []family []         ODILIA Arguello

## 2018-01-03 NOTE — PROGRESS NOTES
Daily Progress Note: 1/3/2018 3:50 PM   Admit Date: 2017    Patient seen in follow up for multiple medical problems as listed below:  Patient Active Problem List   Diagnosis Code    Status asthmaticus J45.902    Lactic acidosis W77.5    Metabolic acidosis with respiratory alkalosis E87.2, E87.3    Acute severe exacerbation of asthma J45. Viru 65    Acute respiratory acidosis E87.2       Assesment      32 y/o male with history of asthma. Admitted to ICU with Resp failure on BIpap due to status asthmaticus. Acute severe asthma w/ hypoxic resp failure - Cont IV steroids, nebs and wean oxygen and Bipap per pulm  Leukocytosis  Anxiety    DVT Protocol Active: yes  Code Status:  Full Code     Disposition:req 24h hosp    Subjective:     CC: Respiratory Distress    Interval History: off Bipap on 3L. He is moving more air today. May be able to come out of ICU tomorrow    ROS: 11 point ROS negative     Objective:     Visit Vitals    /71    Pulse (!) 110    Temp 97.6 °F (36.4 °C)    Resp 20    Ht 5' 8\" (1.727 m)    Wt 88.7 kg (195 lb 8.8 oz)    SpO2 97%    BMI 29.73 kg/m2       Temp (24hrs), Av.7 °F (36.5 °C), Min:97.4 °F (36.3 °C), Max:98.1 °F (36.7 °C)        Intake/Output Summary (Last 24 hours) at 18 1506  Last data filed at 18 1005   Gross per 24 hour   Intake             1170 ml   Output             1050 ml   Net              120 ml       Gen: AOx3, NAD  HEENT:  GIULIA, EOMI. Neck: No Bruits/JVD   Lungs:   Course sounds, faint wheeze. poor respiratory effort  Heart:   RR S1 S2 without M/R/G  Abdomen: ND,NT, BSX4,   Extremities:   No LE edema. No cyanosis.   Skin:  no jaundice/lesions      Data Review:     Meds/Labs/Tests reviewed    Current Shift:  701 - 1900  In: 620 [P.O.:620]  Out: -   Last three shifts:  1901 -  0700  In: 550 [P.O.:550]  Out: 4448 [Urine:2875]  Recent Labs      18   0330  18   1030  18   0415   WBC  22.7*  27.5* 27.0*   RBC  6.10*  5.99*  5.98*   HGB  17.2*  17.2*  17.1*   HCT  52.6*  52.6*  51.5*   PLT  320  373  333   GRANS  89*   --   89*   LYMPH  5*   --   7*   EOS  0   --   0       Recent Labs      01/03/18   0330  01/02/18   1030  01/01/18   0415   BUN  33*  35*  20*   CREA  0.98  1.05  1.09   CA  8.7  8.8  8.8   K  5.2  5.5  4.9   NA  139  140  140   CL  101  101  104   CO2  31  32  27   GLU  120*  130*  122*        Lab Results   Component Value Date/Time    Glucose 120 01/03/2018 03:30 AM    Glucose 130 01/02/2018 10:30 AM    Glucose 122 01/01/2018 04:15 AM    Glucose 214 12/31/2017 08:37 AM    Glucose 99 09/07/2017 05:25 AM          Care coordination with Nursing/Consultants/staff: 15  Prior history, labs, and charting reviewed: 15    Procedures/Imaging:  pCXR 12/31    Total time spent with chart review, patient examination/education, discussion with staff on case,documentation and medication management / adjustment  :  30 Minutes      Dr Tonia Nascimento  [phone]

## 2018-01-03 NOTE — PROGRESS NOTES
Problem: Falls - Risk of  Goal: *Absence of Falls  Document Nita Fall Risk and appropriate interventions in the flowsheet.    Outcome: Progressing Towards Goal  Fall Risk Interventions:            Medication Interventions: Bed/chair exit alarm, Patient to call before getting OOB, Teach patient to arise slowly    Elimination Interventions: Bed/chair exit alarm, Call light in reach, Patient to call for help with toileting needs, Toileting schedule/hourly rounds

## 2018-01-04 LAB
ANION GAP SERPL CALC-SCNC: 10 MMOL/L (ref 3–18)
BASOPHILS # BLD: 0 K/UL (ref 0–0.06)
BASOPHILS NFR BLD: 0 % (ref 0–2)
BUN SERPL-MCNC: 28 MG/DL (ref 7–18)
BUN/CREAT SERPL: 33 (ref 12–20)
CALCIUM SERPL-MCNC: 8.5 MG/DL (ref 8.5–10.1)
CHLORIDE SERPL-SCNC: 100 MMOL/L (ref 100–108)
CO2 SERPL-SCNC: 29 MMOL/L (ref 21–32)
CREAT SERPL-MCNC: 0.84 MG/DL (ref 0.6–1.3)
DIFFERENTIAL METHOD BLD: ABNORMAL
EOSINOPHIL # BLD: 0 K/UL (ref 0–0.4)
EOSINOPHIL NFR BLD: 0 % (ref 0–5)
ERYTHROCYTE [DISTWIDTH] IN BLOOD BY AUTOMATED COUNT: 12.4 % (ref 11.6–14.5)
GLUCOSE SERPL-MCNC: 104 MG/DL (ref 74–99)
HCT VFR BLD AUTO: 50.5 % (ref 36–48)
HGB BLD-MCNC: 16.7 G/DL (ref 13–16)
LYMPHOCYTES # BLD: 1.3 K/UL (ref 0.9–3.6)
LYMPHOCYTES NFR BLD: 7 % (ref 21–52)
MCH RBC QN AUTO: 28.5 PG (ref 24–34)
MCHC RBC AUTO-ENTMCNC: 33.1 G/DL (ref 31–37)
MCV RBC AUTO: 86.2 FL (ref 74–97)
MONOCYTES # BLD: 1.3 K/UL (ref 0.05–1.2)
MONOCYTES NFR BLD: 7 % (ref 3–10)
NEUTS SEG # BLD: 16.9 K/UL (ref 1.8–8)
NEUTS SEG NFR BLD: 86 % (ref 40–73)
PLATELET # BLD AUTO: 312 K/UL (ref 135–420)
PMV BLD AUTO: 9.4 FL (ref 9.2–11.8)
POTASSIUM SERPL-SCNC: 5.1 MMOL/L (ref 3.5–5.5)
RBC # BLD AUTO: 5.86 M/UL (ref 4.7–5.5)
SODIUM SERPL-SCNC: 139 MMOL/L (ref 136–145)
WBC # BLD AUTO: 19.5 K/UL (ref 4.6–13.2)

## 2018-01-04 PROCEDURE — 74011250637 HC RX REV CODE- 250/637: Performed by: INTERNAL MEDICINE

## 2018-01-04 PROCEDURE — 94660 CPAP INITIATION&MGMT: CPT

## 2018-01-04 PROCEDURE — 74011250636 HC RX REV CODE- 250/636: Performed by: INTERNAL MEDICINE

## 2018-01-04 PROCEDURE — 74011250636 HC RX REV CODE- 250/636: Performed by: HOSPITALIST

## 2018-01-04 PROCEDURE — 85025 COMPLETE CBC W/AUTO DIFF WBC: CPT | Performed by: PHYSICIAN ASSISTANT

## 2018-01-04 PROCEDURE — 36415 COLL VENOUS BLD VENIPUNCTURE: CPT | Performed by: PHYSICIAN ASSISTANT

## 2018-01-04 PROCEDURE — 74011250636 HC RX REV CODE- 250/636: Performed by: PHYSICIAN ASSISTANT

## 2018-01-04 PROCEDURE — 74011000250 HC RX REV CODE- 250: Performed by: INTERNAL MEDICINE

## 2018-01-04 PROCEDURE — 94640 AIRWAY INHALATION TREATMENT: CPT

## 2018-01-04 PROCEDURE — 65660000001 HC RM ICU INTERMED STEPDOWN

## 2018-01-04 PROCEDURE — 80048 BASIC METABOLIC PNL TOTAL CA: CPT | Performed by: PHYSICIAN ASSISTANT

## 2018-01-04 PROCEDURE — 74011250637 HC RX REV CODE- 250/637: Performed by: PHYSICIAN ASSISTANT

## 2018-01-04 RX ORDER — ALPRAZOLAM 0.5 MG/1
0.5 TABLET ORAL EVERY 8 HOURS
Status: DISCONTINUED | OUTPATIENT
Start: 2018-01-04 | End: 2018-01-05

## 2018-01-04 RX ORDER — ALPRAZOLAM 0.5 MG/1
0.5 TABLET, EXTENDED RELEASE ORAL EVERY 8 HOURS
Status: DISCONTINUED | OUTPATIENT
Start: 2018-01-04 | End: 2018-01-04

## 2018-01-04 RX ORDER — ALPRAZOLAM 0.5 MG/1
0.5 TABLET ORAL EVERY 8 HOURS
Status: DISCONTINUED | OUTPATIENT
Start: 2018-01-04 | End: 2018-01-04

## 2018-01-04 RX ORDER — MORPHINE SULFATE 2 MG/ML
1 INJECTION, SOLUTION INTRAMUSCULAR; INTRAVENOUS
Status: DISCONTINUED | OUTPATIENT
Start: 2018-01-04 | End: 2018-01-06

## 2018-01-04 RX ADMIN — FAMOTIDINE 20 MG: 20 TABLET ORAL at 19:18

## 2018-01-04 RX ADMIN — IPRATROPIUM BROMIDE AND ALBUTEROL SULFATE 3 ML: .5; 3 SOLUTION RESPIRATORY (INHALATION) at 20:52

## 2018-01-04 RX ADMIN — METHYLPREDNISOLONE SODIUM SUCCINATE 60 MG: 40 INJECTION, POWDER, FOR SOLUTION INTRAMUSCULAR; INTRAVENOUS at 05:17

## 2018-01-04 RX ADMIN — FAMOTIDINE 20 MG: 20 TABLET ORAL at 09:43

## 2018-01-04 RX ADMIN — Medication 10 ML: at 15:43

## 2018-01-04 RX ADMIN — Medication 10 ML: at 05:15

## 2018-01-04 RX ADMIN — MONTELUKAST SODIUM 10 MG: 10 TABLET, FILM COATED ORAL at 21:34

## 2018-01-04 RX ADMIN — MORPHINE SULFATE 2 MG: 2 INJECTION, SOLUTION INTRAMUSCULAR; INTRAVENOUS at 05:24

## 2018-01-04 RX ADMIN — MORPHINE SULFATE 1 MG: 2 INJECTION, SOLUTION INTRAMUSCULAR; INTRAVENOUS at 15:41

## 2018-01-04 RX ADMIN — ARFORMOTEROL TARTRATE 15 MCG: 15 SOLUTION RESPIRATORY (INHALATION) at 09:14

## 2018-01-04 RX ADMIN — IPRATROPIUM BROMIDE AND ALBUTEROL SULFATE 3 ML: .5; 3 SOLUTION RESPIRATORY (INHALATION) at 13:08

## 2018-01-04 RX ADMIN — MONTELUKAST SODIUM 10 MG: 10 TABLET, FILM COATED ORAL at 00:08

## 2018-01-04 RX ADMIN — BUDESONIDE 1000 MCG: 0.5 INHALANT RESPIRATORY (INHALATION) at 09:14

## 2018-01-04 RX ADMIN — Medication 10 ML: at 00:06

## 2018-01-04 RX ADMIN — IPRATROPIUM BROMIDE AND ALBUTEROL SULFATE 3 ML: .5; 3 SOLUTION RESPIRATORY (INHALATION) at 09:14

## 2018-01-04 RX ADMIN — ALPRAZOLAM 0.5 MG: 0.5 TABLET ORAL at 21:34

## 2018-01-04 RX ADMIN — ALBUTEROL SULFATE 2.5 MG: 2.5 SOLUTION RESPIRATORY (INHALATION) at 15:23

## 2018-01-04 RX ADMIN — IPRATROPIUM BROMIDE AND ALBUTEROL SULFATE 3 ML: .5; 3 SOLUTION RESPIRATORY (INHALATION) at 01:38

## 2018-01-04 RX ADMIN — Medication 10 ML: at 21:34

## 2018-01-04 RX ADMIN — MORPHINE SULFATE 1 MG: 2 INJECTION, SOLUTION INTRAMUSCULAR; INTRAVENOUS at 20:30

## 2018-01-04 RX ADMIN — ENOXAPARIN SODIUM 40 MG: 40 INJECTION SUBCUTANEOUS at 02:34

## 2018-01-04 RX ADMIN — ALPRAZOLAM 0.5 MG: 0.5 TABLET ORAL at 12:55

## 2018-01-04 RX ADMIN — METHYLPREDNISOLONE SODIUM SUCCINATE 60 MG: 40 INJECTION, POWDER, FOR SOLUTION INTRAMUSCULAR; INTRAVENOUS at 20:19

## 2018-01-04 RX ADMIN — ARFORMOTEROL TARTRATE 15 MCG: 15 SOLUTION RESPIRATORY (INHALATION) at 20:52

## 2018-01-04 NOTE — PROGRESS NOTES
1523 PRn breathing treatment given to patient. After treatment patient stated the treatment did not help. Suggested to patient to use BIPAP and patient agreed to go on. Patient on BIPAP 20/14 Fio2 35%.

## 2018-01-04 NOTE — PROGRESS NOTES
[facility]  ICU Progress Note      Name: Melania Arreola   : 1990   MRN: [id number]   Date: 2018 3:07 PM     [x]I have reviewed the flowsheet and previous days notes. Events overnight reviewed and discussed with nursing staff. Vital signs and records reviewed.      32 yo male with PMH asthma admitted with severe exacerbation of asthma requiring BiPAP    2018  Anxiety overnight requiring BiPAP 1.5 hours, now on 4Lnc  WBC down  Feeling better overall, frustrated at slow progress  Has not yet been OOB            Medication Review:  · Pressors -  · Sedation -xanax  · Antibiotics -  · Pain -morphine  · GI/ DVT -pepcid, lovenox  · Others (other gtts)    Safety Bundles: VAP Bundle/ CAUTI/ Severe Sepsis Protocol/ Electrolyte Replacement Protocol    Vital Signs:    Visit Vitals    /87    Pulse 79    Temp 97.6 °F (36.4 °C)    Resp 18    Ht 5' 8\" (1.727 m)    Wt 88.7 kg (195 lb 8.8 oz)    SpO2 95%    BMI 29.73 kg/m2       O2 Device: Nasal cannula   O2 Flow Rate (L/min): 4 l/min   Temp (24hrs), Av.4 °F (36.3 °C), Min:96.5 °F (35.8 °C), Max:98 °F (36.7 °C)       Intake/Output:   Last shift:         Last 3 shifts:  1901 -  0700  In: 3160 [P.O.:3160]  Out: 3520 [Urine:3520]    Intake/Output Summary (Last 24 hours) at 18 1214  Last data filed at 18 0100   Gross per 24 hour   Intake             1990 ml   Output             1670 ml   Net              320 ml          Physical Exam:    General:Awake and alert, appears comfortable, no WOB, no conversational dyspnea  HEENT:  Anicteric sclerae; pink palpebral conjunctivae; mucosa moist, nc in place  Resp:  Symmetrical chest expansion, no accessory muscle use; improved airway entry; +quiet improved exp wheezing  CV: Regular, no m  GI:  Abdomen soft, non-tender; (+) active bowel sounds  Extremities:  +2 pulses on all extremities; no edema/ cyanosis/ clubbing noted  Skin:  Warm; no rashes/ lesions noted  Neurologic:  Non-focal. Moves all extremities      DATA:     Current Facility-Administered Medications   Medication Dose Route Frequency    methylPREDNISolone (PF) (SOLU-MEDROL) injection 60 mg  60 mg IntraVENous Q12H    ALPRAZolam (XANAX) tablet 0.5 mg  0.5 mg Oral Q8H    famotidine (PEPCID) tablet 20 mg  20 mg Oral BID    budesonide (PULMICORT) 500 mcg/2 ml nebulizer suspension  1,000 mcg Nebulization BID RT    sodium chloride (NS) flush 5-10 mL  5-10 mL IntraVENous Q8H    sodium chloride (NS) flush 5-10 mL  5-10 mL IntraVENous PRN    enoxaparin (LOVENOX) injection 40 mg  40 mg SubCUTAneous Q24H    LORazepam (ATIVAN) injection 0.5 mg  0.5 mg IntraVENous Q8H PRN    albuterol-ipratropium (DUO-NEB) 2.5 MG-0.5 MG/3 ML  3 mL Nebulization Q6H RT    albuterol CONCENTRATE 2.5mg/0.5 mL neb soln  2.5 mg Nebulization Q1H PRN    montelukast (SINGULAIR) tablet 10 mg  10 mg Oral QHS    arformoterol (BROVANA) neb solution 15 mcg  15 mcg Nebulization BID RT    morphine injection 1-4 mg  1-4 mg IntraVENous Q1H PRN         Labs: Results:       Chemistry Recent Labs      01/04/18   0400  01/03/18   0330  01/02/18   1030   GLU  104*  120*  130*   NA  139  139  140   K  5.1  5.2  5.5   CL  100  101  101   CO2  29  31  32   BUN  28*  33*  35*   CREA  0.84  0.98  1.05   CA  8.5  8.7  8.8   AGAP  10  7  7   BUCR  33*  34*  33*      CBC w/Diff Recent Labs      01/04/18   0400  01/03/18   0330  01/02/18   1030   WBC  19.5*  22.7*  27.5*   RBC  5.86*  6.10*  5.99*   HGB  16.7*  17.2*  17.2*   HCT  50.5*  52.6*  52.6*   PLT  312  320  373   GRANS  86*  89*   --    LYMPH  7*  5*   --    EOS  0  0   --       Coagulation No results for input(s): PTP, INR, APTT in the last 72 hours. No lab exists for component: INREXT, INREXT    Liver Enzymes No results for input(s): TP, ALB, TBIL, AP, SGOT, GPT in the last 72 hours.     No lab exists for component: DBIL   ABG No results found for: PH, PHI, PCO2, PCO2I, PO2, PO2I, HCO3, HCO3I, FIO2, FIO2I   Microbiology No results for input(s): CULT in the last 72 hours. Telemetry: [x]Sinus to ST []A-flutter []Paced    []A-fib []Multiple PVCs                    Imaging:  CXR Results  (Last 48 hours)    None          CT Results  (Last 48 hours)    None        12/31 CXR  Findings: The lungs appear clear. The cardiac silhouette and pulmonary  vascularity appear within normal limits. No evidence for pneumothorax or pleural  effusion.     IMPRESSION  Impression:     No acute cardiopulmonary disease. ALLERGEN PROFILE:  ZONE 2  9/7/2017  7:41 AM - Dwayne, Lab In [place]       Component Results      Component Value Flag Ref Range Units Status     D. pteronyssinus 0.13 (A) Class 0/I kU/L Final     D. farinae Mite <0.10  Class 0 kU/L Final     Cat Hair/Dander 59.70 (A) Class V kU/L Final     Dog Hair/Dander 16.20 (A) Class IV kU/L Final     [place] Grass 0.49 (A) Class I kU/L Final     Curt grass 0.19 (A) Class 0/I kU/L Final     Vinh Grass 0.14 (A) Class 0/I kU/L Final     Bahia Grass 0.40 (A) Class I kU/L Final     Cockroach,American <0.10  Class 0 kU/L Final     Comment:     (NOTE)      Penicillium notatum <0.10  Class 0 kU/L Final      Cladosporium herbarum <0.10  Class 0 kU/L Final     Aspergillus fumigatus 0.12 (A) Class 0/I kU/L Final     Mucor racemosus <0.10  Class 0 kU/L Final     Alternaria tenuis <0.10  Class 0 kU/L Final     Stemphylium botryosum 0.49 (A) Class I kU/L Final     [place] 0.39 (A) Class I kU/L Final     [place] 0.53 (A) Class I kU/L Final     American [place] 0.42 (A) Class I kU/L Final     Maple/[place] 0.31 (A) Class 0/I kU/L Final     [place] 0.35 (A) Class I kU/L Final     Comment:     (NOTE)   This test was developed and its performance characteristics   determined by Gi Lucas has not been cleared or approved by the   U.S. Food and Drug Administration. The FDA has determined that such clearance or approval is not   necessary.  This test is used for clinical purposes.  It should not   be regarded as investigational or for research.        American [place] 0.39 (A) Class I kU/L Final     [place] <0.10  Class 0 kU/L Final     Sweet Gum 0.31 (A) Class 0/I kU/L Final     Comment:     (NOTE)   This test was developed and its performance characteristics   determined by Bartolome Santos has not been cleared or approved by the   U.S. Food and Drug Administration. The FDA has determined that such clearance or approval is not   necessary.  This test is used for clinical purposes.  It should not   be regarded as investigational or for research.        [facility] 0.30 (A) Class 0/I kU/L Final     Ragweed, Short/Common 0.30 (A) Class 0/I kU/L Final     Mugwort 0.20 (A) Class 0/I kU/L Final     Plantain, English 0.37 (A) Class I kU/L Final     Pigweed, Rough 0.43 (A) Class I kU/L Final     Sheep Sorrel (Dock) 0.48 (A) Class I kU/L Final     Nettle 0.19 (A) Class 0/I kU/L Final     Comment:     (NOTE)   Performed At: [place] [id number] [place] [address]   Hadley Law MD YR:[id number]        Class description Comment     Final     Comment:     (NOTE)      Levels of Specific IgE       Class  Description of Class      ---------------------------  -----  --------------------                     < 0.10         0         Negative             0.10 -    0.31         0/I       Equivocal/Low             0.32 -    0.55         I         Low             0.56 -    1.40         II        Moderate             1.41 -    3.90         III       High             3.91 -   19.00         IV        Very High            19.01 -  100.00         V         Very High                    >100.00         VI        Very High              IMPRESSION:   · Acute respiratory failure due to severe asthma exacerbation requiring BiPAP  · Leukocytosis (20K) chronic at least since 6/17, ?steroids v infection, improved  · Hx asthma, poorly controlled with noncompliance to medical regimen due to cost barriers  · Anxiety        PLAN:   · Resp -  Continue with nebs ATC and prn, cont steroids-slow taper, s/p course abx, singulair. CXR without acute process. Cont BiPAP prn, wean nc for sat>92%. Will need to ensure pt has medications upon discharge- aware-to arrange brovana/albuterol, pulmicort nebs as outpt. IGE level 387 in past (9/17)-see allergen profile as above. Discussed avoidance of cats, dogs and smoke in the home if possible. · ID - Leukocytosis improved and afebrile. s/p azithromycin course. Flu negative. · CVS - Monitor HD-currently stable  · Heme/onc -  hgb and plts stable.    · Metabolic - Replace lytes prn per protocol  · Renal - Follow BUN, cr  · Endocrine - Follow glucose on steroids, ISS prn  · Neuro/ Pain/ Sedation - started on xanax ATC, morphine prn  · GI -Advance diet  · Prophylaxis - DVT-lovenox, GI-pepcid  · Discussed in interdisciplinary rounds  · FULL CODE  · Continue to monitor in stepdown for now  · To be referred to care a van upon discharge  · OOB and ambulate today        [x]See my orders for details    My assessment/plan was discussed with:  [x]nursing []PT/OT    [x]respiratory therapy [x]   []family []         ODILIA Glover

## 2018-01-04 NOTE — PROGRESS NOTES
Faxed medication/equipment DME orders to [facility] [address]. for itemized price list for pt with no health insurance and severe asthma. Once prices faxed back will submit to Mario Still.  For Meghann.

## 2018-01-04 NOTE — ROUTINE PROCESS
Bedside and Verbal shift change report given to [facility]     (oncoming nurse) by Beryle Hugger, RN   (offgoing nurse). Report included the following information SBAR, Kardex, Intake/Output, MAR and Recent Results.

## 2018-01-04 NOTE — PROGRESS NOTES
[id number] Rec'd pt resting in bed awake and alert oriented x4. Generalized weakness , not gotten out of bed yet. Improved tolerance when on exertion though still dyspneic at times. Rec'd morphine 4mg IV for pain to chest and back ./dry cough at current time. 2200  C/O SOB, has few wheezes-more of elevated anxiety, bipap placed back on pt. Will continue to monitor. 2230  Now wants bipap off again. Is apologizing for calling frequently and being restless. Rec'd ativan 0.5mg IV for anxiety. 2330  On N/C 02 withs high 90s sats. Tearful at times,said \" been a hard day. \"  Reassured pt he was improving, possibly may even go to floor tommw.

## 2018-01-05 LAB
ANION GAP SERPL CALC-SCNC: 7 MMOL/L (ref 3–18)
BASOPHILS # BLD: 0 K/UL (ref 0–0.06)
BASOPHILS NFR BLD: 0 % (ref 0–2)
BUN SERPL-MCNC: 30 MG/DL (ref 7–18)
BUN/CREAT SERPL: 34 (ref 12–20)
CALCIUM SERPL-MCNC: 8.5 MG/DL (ref 8.5–10.1)
CHLORIDE SERPL-SCNC: 102 MMOL/L (ref 100–108)
CO2 SERPL-SCNC: 30 MMOL/L (ref 21–32)
CREAT SERPL-MCNC: 0.87 MG/DL (ref 0.6–1.3)
DIFFERENTIAL METHOD BLD: ABNORMAL
EOSINOPHIL # BLD: 0 K/UL (ref 0–0.4)
EOSINOPHIL NFR BLD: 0 % (ref 0–5)
ERYTHROCYTE [DISTWIDTH] IN BLOOD BY AUTOMATED COUNT: 12.4 % (ref 11.6–14.5)
GLUCOSE SERPL-MCNC: 108 MG/DL (ref 74–99)
HCT VFR BLD AUTO: 52.8 % (ref 36–48)
HGB BLD-MCNC: 17.2 G/DL (ref 13–16)
LYMPHOCYTES # BLD: 1.2 K/UL (ref 0.9–3.6)
LYMPHOCYTES NFR BLD: 8 % (ref 21–52)
MCH RBC QN AUTO: 28.2 PG (ref 24–34)
MCHC RBC AUTO-ENTMCNC: 32.6 G/DL (ref 31–37)
MCV RBC AUTO: 86.6 FL (ref 74–97)
MONOCYTES # BLD: 0.9 K/UL (ref 0.05–1.2)
MONOCYTES NFR BLD: 6 % (ref 3–10)
NEUTS SEG # BLD: 13.1 K/UL (ref 1.8–8)
NEUTS SEG NFR BLD: 86 % (ref 40–73)
PLATELET # BLD AUTO: 301 K/UL (ref 135–420)
PMV BLD AUTO: 10 FL (ref 9.2–11.8)
POTASSIUM SERPL-SCNC: 5 MMOL/L (ref 3.5–5.5)
RBC # BLD AUTO: 6.1 M/UL (ref 4.7–5.5)
SODIUM SERPL-SCNC: 139 MMOL/L (ref 136–145)
WBC # BLD AUTO: 15.2 K/UL (ref 4.6–13.2)

## 2018-01-05 PROCEDURE — 36415 COLL VENOUS BLD VENIPUNCTURE: CPT | Performed by: PHYSICIAN ASSISTANT

## 2018-01-05 PROCEDURE — 74011250637 HC RX REV CODE- 250/637: Performed by: PHYSICIAN ASSISTANT

## 2018-01-05 PROCEDURE — 74011250636 HC RX REV CODE- 250/636: Performed by: HOSPITALIST

## 2018-01-05 PROCEDURE — 85025 COMPLETE CBC W/AUTO DIFF WBC: CPT | Performed by: PHYSICIAN ASSISTANT

## 2018-01-05 PROCEDURE — 97530 THERAPEUTIC ACTIVITIES: CPT

## 2018-01-05 PROCEDURE — 94664 DEMO&/EVAL PT USE INHALER: CPT

## 2018-01-05 PROCEDURE — 94640 AIRWAY INHALATION TREATMENT: CPT

## 2018-01-05 PROCEDURE — 77010033678 HC OXYGEN DAILY

## 2018-01-05 PROCEDURE — 65660000001 HC RM ICU INTERMED STEPDOWN

## 2018-01-05 PROCEDURE — 74011000250 HC RX REV CODE- 250: Performed by: INTERNAL MEDICINE

## 2018-01-05 PROCEDURE — 74011250636 HC RX REV CODE- 250/636: Performed by: PHYSICIAN ASSISTANT

## 2018-01-05 PROCEDURE — 74011250637 HC RX REV CODE- 250/637: Performed by: INTERNAL MEDICINE

## 2018-01-05 PROCEDURE — 80048 BASIC METABOLIC PNL TOTAL CA: CPT | Performed by: PHYSICIAN ASSISTANT

## 2018-01-05 PROCEDURE — 74011250636 HC RX REV CODE- 250/636: Performed by: INTERNAL MEDICINE

## 2018-01-05 PROCEDURE — 97162 PT EVAL MOD COMPLEX 30 MIN: CPT

## 2018-01-05 RX ORDER — ALPRAZOLAM 1 MG/1
1 TABLET ORAL 4 TIMES DAILY
Status: DISCONTINUED | OUTPATIENT
Start: 2018-01-05 | End: 2018-01-06

## 2018-01-05 RX ADMIN — IPRATROPIUM BROMIDE AND ALBUTEROL SULFATE 3 ML: .5; 3 SOLUTION RESPIRATORY (INHALATION) at 09:20

## 2018-01-05 RX ADMIN — IPRATROPIUM BROMIDE AND ALBUTEROL SULFATE 3 ML: .5; 3 SOLUTION RESPIRATORY (INHALATION) at 01:46

## 2018-01-05 RX ADMIN — BUDESONIDE 500 MCG: 0.5 INHALANT RESPIRATORY (INHALATION) at 09:20

## 2018-01-05 RX ADMIN — METHYLPREDNISOLONE SODIUM SUCCINATE 60 MG: 40 INJECTION, POWDER, FOR SOLUTION INTRAMUSCULAR; INTRAVENOUS at 08:55

## 2018-01-05 RX ADMIN — FAMOTIDINE 20 MG: 20 TABLET ORAL at 18:03

## 2018-01-05 RX ADMIN — Medication 10 ML: at 06:30

## 2018-01-05 RX ADMIN — MONTELUKAST SODIUM 10 MG: 10 TABLET, FILM COATED ORAL at 22:30

## 2018-01-05 RX ADMIN — METHYLPREDNISOLONE SODIUM SUCCINATE 60 MG: 40 INJECTION, POWDER, FOR SOLUTION INTRAMUSCULAR; INTRAVENOUS at 22:30

## 2018-01-05 RX ADMIN — ENOXAPARIN SODIUM 40 MG: 40 INJECTION SUBCUTANEOUS at 01:06

## 2018-01-05 RX ADMIN — ALPRAZOLAM 1 MG: 1 TABLET ORAL at 18:03

## 2018-01-05 RX ADMIN — MORPHINE SULFATE 1 MG: 2 INJECTION, SOLUTION INTRAMUSCULAR; INTRAVENOUS at 18:17

## 2018-01-05 RX ADMIN — ALPRAZOLAM 1 MG: 1 TABLET ORAL at 13:26

## 2018-01-05 RX ADMIN — ARFORMOTEROL TARTRATE 15 MCG: 15 SOLUTION RESPIRATORY (INHALATION) at 20:43

## 2018-01-05 RX ADMIN — FAMOTIDINE 20 MG: 20 TABLET ORAL at 08:56

## 2018-01-05 RX ADMIN — ALPRAZOLAM 0.5 MG: 0.5 TABLET ORAL at 06:30

## 2018-01-05 RX ADMIN — BUDESONIDE 1000 MCG: 0.5 INHALANT RESPIRATORY (INHALATION) at 20:42

## 2018-01-05 RX ADMIN — ARFORMOTEROL TARTRATE 15 MCG: 15 SOLUTION RESPIRATORY (INHALATION) at 09:19

## 2018-01-05 RX ADMIN — IPRATROPIUM BROMIDE AND ALBUTEROL SULFATE 3 ML: .5; 3 SOLUTION RESPIRATORY (INHALATION) at 15:15

## 2018-01-05 RX ADMIN — Medication 10 ML: at 22:30

## 2018-01-05 RX ADMIN — ALBUTEROL SULFATE 2.5 MG: 2.5 SOLUTION RESPIRATORY (INHALATION) at 18:17

## 2018-01-05 RX ADMIN — ALPRAZOLAM 1 MG: 1 TABLET ORAL at 22:30

## 2018-01-05 RX ADMIN — Medication 10 ML: at 13:27

## 2018-01-05 RX ADMIN — IPRATROPIUM BROMIDE AND ALBUTEROL SULFATE 3 ML: .5; 3 SOLUTION RESPIRATORY (INHALATION) at 20:30

## 2018-01-05 RX ADMIN — MORPHINE SULFATE 1 MG: 2 INJECTION, SOLUTION INTRAMUSCULAR; INTRAVENOUS at 09:03

## 2018-01-05 NOTE — PROGRESS NOTES
Pt remains dependent on Bipap at this time. On IV Solumedrol BID. Awaiting approval of outpt prescriptions under indigent program. Discharge plan: Home (with possible home health).

## 2018-01-05 NOTE — PROGRESS NOTES
Daily Progress Note: 2018 3:50 PM   Admit Date: 2017    Patient seen in follow up for multiple medical problems as listed below:  Patient Active Problem List   Diagnosis Code    Status asthmaticus J45.902    Lactic acidosis A37.4    Metabolic acidosis with respiratory alkalosis E87.2, E87.3    Acute severe exacerbation of asthma J45. 0    Acute respiratory acidosis E87.2       Assesment      34 y/o male with history of asthma. Admitted to ICU with Resp failure on Bipap due to status asthmaticus. There has been very gradual resolution of patients reactive airway disease. Acute severe asthma w/ hypoxic resp failure - Cont IV steroids, nebs and wean oxygen and Bipap per pulm  Leukocytosis  Anxiety    DVT Protocol Active: yes  Code Status:  Full Code     Disposition:req 48h hosp    Subjective:     CC: Respiratory Distress    Interval History: on Bipap overnight, there is likely an anxiety component. Lungs sound horrible. ROS: 11 point ROS negative     Objective:     Visit Vitals    /75    Pulse 81    Temp 99.3 °F (37.4 °C)    Resp 19    Ht 5' 8\" (1.727 m)    Wt 89.4 kg (197 lb 1.5 oz)    SpO2 90%    BMI 29.97 kg/m2       Temp (24hrs), Av.3 °F (36.8 °C), Min:97.6 °F (36.4 °C), Max:99.3 °F (37.4 °C)        Intake/Output Summary (Last 24 hours) at 18 1224  Last data filed at 18 0000   Gross per 24 hour   Intake              660 ml   Output             1750 ml   Net            -1090 ml       Gen: AOx3, NAD  HEENT:  GIULIA, EOMI. Neck: No Bruits/JVD   Lungs:   course sounds, loud exp wheeze. moderate respiratory effort  Heart:   RR S1 S2 without M/R/G  Abdomen: ND,NT, BSX4,   Extremities:   No LE edema. No cyanosis.   Skin:  no jaundice/lesions      Data Review:     Meds/Labs/Tests reviewed    Current Shift:     Last three shifts:   1901 -  0700  In: 2030 [P.O.:]  Out: 3700 [Urine:3700]  Recent Labs      18   0330  18   0400  18 0330   WBC  15.2*  19.5*  22.7*   RBC  6.10*  5.86*  6.10*   HGB  17.2*  16.7*  17.2*   HCT  52.8*  50.5*  52.6*   PLT  301  312  320   GRANS  86*  86*  89*   LYMPH  8*  7*  5*   EOS  0  0  0       Recent Labs      01/05/18   0330  01/04/18   0400  01/03/18   0330   BUN  30*  28*  33*   CREA  0.87  0.84  0.98   CA  8.5  8.5  8.7   K  5.0  5.1  5.2   NA  139  139  139   CL  102  100  101   CO2  30  29  31   GLU  108*  104*  120*        Lab Results   Component Value Date/Time    Glucose 108 01/05/2018 03:30 AM    Glucose 104 01/04/2018 04:00 AM    Glucose 120 01/03/2018 03:30 AM    Glucose 130 01/02/2018 10:30 AM    Glucose 122 01/01/2018 04:15 AM          Care coordination with Nursing/Consultants/staff: 15  Prior history, labs, and charting reviewed: 15    Procedures/Imaging:  CXR's    Total time spent with chart review, patient examination/education, discussion with staff on case,documentation and medication management / adjustment  :  30 Minutes      Dr Anton Mayfield  [phone]

## 2018-01-05 NOTE — PROGRESS NOTES
Problem: Falls - Risk of  Goal: *Absence of Falls  Document Nita Fall Risk and appropriate interventions in the flowsheet.    Outcome: Progressing Towards Goal  Fall Risk Interventions:            Medication Interventions: Evaluate medications/consider consulting pharmacy    Elimination Interventions: Call light in reach, Patient to call for help with toileting needs

## 2018-01-05 NOTE — PROGRESS NOTES
Problem: Mobility Impaired (Adult and Pediatric)  Goal: *Acute Goals and Plan of Care (Insert Text)  Physical Therapy Goals  Initiated 1/5/2018 and to be accomplished within 7 day(s)  1. Patient will move from supine to sit and sit to supine  in bed with modified independence. 2.  Patient will transfer from bed to chair and chair to bed with modified independence using the least restrictive device. 3.  Patient will perform sit to stand with modified independence. 4.  Patient will ambulate with modified independence for 250 feet with the least restrictive device. 5.  Patient will ascend/descend 3 stairs with handrail(s) with modified independence. Outcome: Progressing Towards Goal  physical Therapy EVALUATION    Patient: Elena Carlton (88 y.o. male)  Date: 1/5/2018  Primary Diagnosis: Acute severe exacerbation of asthma  Precautions: Skin    ASSESSMENT :  Patient requires between minimal assistance/contact guard assist and supervision/set-up for bed mobility, transfers and ambulation. Supervision for supine to sit transfer. Intact sitting balance. BLE strength 4+/5. Min A for sit to stand. Amb 4ftx2 with ww; limited by lines. One episode of loss of balance d/t scissoring of BLE with amb. Corrected with mod A. Returned to seated at EOB; 2 min. Sit to stand completed second time to transfer to chair. Min A for bed to chair transfer. HR increased to 110 with activity. BP post mobility 112/71. O2 saturation remained above 93% on 4L O2. Reports independent with mobility prior to admission. Seated in chair. RN Paul aware. Educated to call for assistance prior to mobility; verbalized understanding. Patient presents with deficits in:  Bed Mobility, Transfers, Gait, Strength, Balance and Stairs    Patient will benefit from skilled intervention to address the above impairments.   Patients rehabilitation potential is considered to be Fair  Factors which may influence rehabilitation potential include:   [] None noted  []         Mental ability/status  [x]         Medical condition  []         Home/family situation and support systems  []         Safety awareness  []         Pain tolerance/management  []         Other:      PLAN :  Recommendations and Planned Interventions:  [x]           Bed Mobility Training             [x]    Neuromuscular Re-Education  [x]           Transfer Training                   []    Orthotic/Prosthetic Training  [x]           Gait Training                          []    Modalities  [x]           Therapeutic Exercises          []    Edema Management/Control  [x]           Therapeutic Activities            [x]    Patient and Family Training/Education  []           Other (comment):    Frequency/Duration: Patient will be followed by physical therapy 1-2 times per day/4-7 days per week to address goals. Discharge Recommendations: Home Health  Further Equipment Recommendations for Discharge: rolling walker     SUBJECTIVE:   Agreeable to PT    OBJECTIVE DATA SUMMARY:     Past Medical History:   Diagnosis Date    Asthma     Chronic obstructive pulmonary disease ([facility] [address]      Past Surgical History:   Procedure Laterality Date    HX FREE SKIN GRAFT      HX ORTHOPAEDIC      facial reconstruction     Barriers to Learning/Limitations: None  Compensate with: visual, verbal, tactile, kinesthetic cues/model    G CODES:Mobility  Current  CL= 60-79%   Goal  CI= 1-19%.   The severity rating is based on the Other [employer] Balance Scale 2/5      Eval Complexity: History: MEDIUM  Complexity : 1-2 comorbidities / personal factors will impact the outcome/ POC Exam:MEDIUM Complexity : 3 Standardized tests and measures addressing body structure, function, activity limitation and / or participation in recreation  Presentation: MEDIUM Complexity : Evolving with changing characteristics  Clinical Decision Making:Medium Complexity [facility] Standing Balance Scale 2/5 Overall Complexity:MEDIUM    [employer] Sitting Balance Scale 3+/5  0: Pt performs 25% or less of sitting activity (Max assist) CN, 100% impaired. 1: Pt supports self with upper extremities but requires therapist assistance. Pt performs 25-50% of effort (Mod assist) CM, 80% to <100% impaired. 1+: Pt supports self with upper extremities but requires therapist assistance. Pt performs >50% effort. (Min assist). CL, 60% to <80% impaired. 2: Pt supports self independently with both upper extremities. CL, 60% to <80% impaired. 2+: Pt support self independently with 1 upper extremity. CK, 40% to <60% impaired. 3: Pt sits without upper extremity support for up to 30 seconds. CK, 40% to <60% impaired. 3+: Pt sits without upper extremity support for 30 seconds or greater. CJ, 20% to <40% impaired. 4: Pt moves and returns trunkal midpoint 1-2 inches in one plane. CJ, 20% to <40% impaired. 4+: Pt moves and returns trunkal midpoint 1-2 inches in multiple planes. CI, 1% to <20% impaired. 5: Pt moves and returns trunkal midpoint in all planes greater than 2 inches. CH, 0% impaired. [employer] Standing Balance Scale 2/5  0: Pt performs 25% or less of standing activity (Max assist) CN, 100% impaired. 1: Pt supports self with upper extremities but requires therapist assistance. Pt performs 25-50% of effort (Mod assist) CM, 80% to <100% impaired. 1+: Pt supports self with upper extremities but requires therapist assistance. Pt performs >50% effort. (Min assist). CL, 60% to <80% impaired. 2: Pt supports self independently with both upper extremities (walker, crutches, parallel bars). CL, 60% to <80% impaired. 2+: Pt support self independently with 1 upper extremity (cane, crutch, 1 parallel bar). CK, 40% to <60% impaired. 3: Pt stands without upper extremity support for up to 30 seconds. CK, 40% to <60% impaired. 3+: Pt stands without upper extremity support for 30 seconds or greater.  CJ, 20% to <40% impaired. 4: Pt independently moves and returns center of gravity 1-2 inches in one plane. CJ, 20% to <40% impaired. 4+: Pt independently moves and returns center of gravity 1-2 inches in multiple planes. CI, 1% to <20% impaired. 5: Pt independently moves and returns center of gravity in all planes greater than 2 inches. CH, 0% impaired. Prior Level of Function/Home Situation:   Home Situation  Home Environment: Private residence  # Steps to Enter: 3  Rails to Enter: Yes  Hand Rails : Bilateral  One/Two Story Residence: One story  Living Alone: No  Support Systems: Friends \ neighbors  Patient Expects to be Discharged to[de-identified] Private residence  Current DME Used/Available at Home: [address] [facility] [place], [place]  Critical Behavior:  Neurologic State: Alert  Orientation Level: Oriented X4  Cognition: Impulsive; Follows commands  Safety/Judgement: Awareness of environment; Fall prevention  Psychosocial  Patient Behaviors: Cooperative  Family  Behaviors: Supportive  Strength:    Strength: Generally decreased, functional  Manual Muscle Testing (LE)         R     L    Hip Flexion:   4+/5  4+/5    Knee EXT:   4+/5  4+/5  Knee FLEX:   4+/5  4+/5      Ankle DF:   4+/5  4+/5  _________________________________________________   Tone & Sensation:   Tone: Normal  Sensation: Intact  Range Of Motion:  AROM: Within functional limits  Functional Mobility:  Bed Mobility:  Supine to Sit: Supervision  Sit to Supine:  (seated in chair)  Transfers:  Sit to Stand: Minimum assistance  Stand to Sit: Minimum assistance  Bed to Chair: Minimum assistance  Balance:   Sitting: Intact  Standing: Impaired  Standing - Static: Fair  Standing - Dynamic : Fair  Ambulation/Gait Training:  Distance (ft):  (4ftx2)  Assistive Device: Walker, rolling  Ambulation - Level of Assistance: Minimal assistance  Gait Description (WDL): Exceptions to WDL  Therapeutic Exercises:   Sit to stand x2  Pain:  Pre treatment pain level:  Post treatment pain level:  Pain Scale 1: Numeric (0 - 10)  Pain Intensity 1: 0  Pain Location 1: Rib cage  Pain Intervention(s) 1: Medication (see MAR)  Activity Tolerance:   Fair  Please refer to the flowsheet for vital signs taken during this treatment. After treatment:   [x]         Patient left in no apparent distress sitting up in chair  []         Patient left in no apparent distress in bed  [x]         Call bell left within reach  [x]         Nursing notified  []         Caregiver present  []         Bed alarm activated    COMMUNICATION/EDUCATION:   [x]         Fall prevention education was provided and the patient/caregiver indicated understanding. [x]         Patient/family have participated as able in goal setting and plan of care. [x]         Patient/family agree to work toward stated goals and plan of care. []         Patient understands intent and goals of therapy, but is neutral about his/her participation. []         Patient is unable to participate in goal setting and plan of care. Patient educated on the role of physical therapy during the acute stay  and the importance of mobility. ENEDELIA.       Thank you for this referral.  Reyna Uribe, PT   Time Calculation: 19 mins

## 2018-01-05 NOTE — PROGRESS NOTES
Physical Exam   Constitutional: He is oriented to person, place, and time. He appears well-developed and well-nourished. Nasal cannula in place. HENT:   Head: Normocephalic. Eyes: Pupils are equal, round, and reactive to light. Neck: Normal range of motion. Cardiovascular: Regular rhythm, S1 normal, S2 normal, normal heart sounds and intact distal pulses. Pulmonary/Chest: Accessory muscle usage present. Tachypnea noted. He has decreased breath sounds. He has wheezes. Abdominal: Soft. Bowel sounds are normal.   Musculoskeletal: Normal range of motion. Neurological: He is alert and oriented to person, place, and time. He has normal strength. No sensory deficit. GCS eye subscore is 4. GCS verbal subscore is 5. GCS motor subscore is 6. Skin: Skin is warm, dry and intact. Psychiatric: His speech is normal. Thought content normal. His mood appears anxious. He is agitated. Cognition and memory are normal. He expresses impulsivity. Bedside and Verbal shift change report given to [address] RN (oncoming nurse) by Antolin Glez RN (offgoing nurse). Report included the following information SBAR, Kardex, Recent Results and Cardiac Rhythm NSR/ST.

## 2018-01-05 NOTE — PROGRESS NOTES
[facility]  ICU Progress Note      Name: Roro Benton   : 1990   MRN: [id number]   Date: 2018 3:07 PM     [x]I have reviewed the flowsheet and previous days notes. Events overnight reviewed and discussed with nursing staff. Vital signs and records reviewed.      34 yo male with PMH asthma admitted with severe exacerbation of asthma requiring BiPAP    2018  Feeling much better this am overall, used BiPAP briefly overnight and this am  WBC down  Has not yet been OOB, felt very weak when standing at edge of bed          Medication Review:  · Pressors -  · Sedation -xanax  · Antibiotics -  · Pain -morphine  · GI/ DVT -pepcid, lovenox  · Others (other gtts)    Safety Bundles: VAP Bundle/ CAUTI/ Severe Sepsis Protocol/ Electrolyte Replacement Protocol    Vital Signs:    Visit Vitals    /75    Pulse 81    Temp 99.2 °F (37.3 °C)    Resp 19    Ht 5' 8\" (1.727 m)    Wt 89.4 kg (197 lb 1.5 oz)    SpO2 90%    BMI 29.97 kg/m2       O2 Device: BIPAP   O2 Flow Rate (L/min): 4 l/min   Temp (24hrs), Av.5 °F (36.9 °C), Min:97.6 °F (36.4 °C), Max:99.3 °F (37.4 °C)       Intake/Output:   Last shift:         Last 3 shifts:  1901 -  0700  In: 2030 [P.O.:2030]  Out: 3700 [Urine:3700]    Intake/Output Summary (Last 24 hours) at 18 1423  Last data filed at 18 0000   Gross per 24 hour   Intake              240 ml   Output             1350 ml   Net            -1110 ml          Physical Exam:    Lay Reach and alert, appears comfortable, no WOB, no conversational dyspnea  HEENT:  Anicteric sclerae; pink palpebral conjunctivae; mucosa moist, nc in place  Resp:  Symmetrical chest expansion, no accessory muscle use; improved airway entry; +quiet much improved exp wheezing  CV: Regular, no m  GI:  Abdomen soft, non-tender; (+) active bowel sounds  Extremities:  +2 pulses on all extremities; no edema/ cyanosis/ clubbing noted  Skin:  Warm; no rashes/ lesions noted  Neurologic:  Non-focal. Moves all extremities      DATA:     Current Facility-Administered Medications   Medication Dose Route Frequency    ALPRAZolam (XANAX) tablet 1 mg  1 mg Oral QID    methylPREDNISolone (PF) (SOLU-MEDROL) injection 60 mg  60 mg IntraVENous Q12H    morphine injection 1 mg  1 mg IntraVENous Q4H PRN    famotidine (PEPCID) tablet 20 mg  20 mg Oral BID    budesonide (PULMICORT) 500 mcg/2 ml nebulizer suspension  1,000 mcg Nebulization BID RT    sodium chloride (NS) flush 5-10 mL  5-10 mL IntraVENous Q8H    sodium chloride (NS) flush 5-10 mL  5-10 mL IntraVENous PRN    enoxaparin (LOVENOX) injection 40 mg  40 mg SubCUTAneous Q24H    albuterol-ipratropium (DUO-NEB) 2.5 MG-0.5 MG/3 ML  3 mL Nebulization Q6H RT    albuterol CONCENTRATE 2.5mg/0.5 mL neb soln  2.5 mg Nebulization Q1H PRN    montelukast (SINGULAIR) tablet 10 mg  10 mg Oral QHS    arformoterol (BROVANA) neb solution 15 mcg  15 mcg Nebulization BID RT         Labs: Results:       Chemistry Recent Labs      01/05/18   0330  01/04/18   0400  01/03/18   0330   GLU  108*  104*  120*   NA  139  139  139   K  5.0  5.1  5.2   CL  102  100  101   CO2  30  29  31   BUN  30*  28*  33*   CREA  0.87  0.84  0.98   CA  8.5  8.5  8.7   AGAP  7  10  7   BUCR  34*  33*  34*      CBC w/Diff Recent Labs      01/05/18   0330  01/04/18   0400  01/03/18   0330   WBC  15.2*  19.5*  22.7*   RBC  6.10*  5.86*  6.10*   HGB  17.2*  16.7*  17.2*   HCT  52.8*  50.5*  52.6*   PLT  301  312  320   GRANS  86*  86*  89*   LYMPH  8*  7*  5*   EOS  0  0  0      Coagulation No results for input(s): PTP, INR, APTT in the last 72 hours. No lab exists for component: INREXT, INREXT    Liver Enzymes No results for input(s): TP, ALB, TBIL, AP, SGOT, GPT in the last 72 hours. No lab exists for component: DBIL   ABG No results found for: PH, PHI, PCO2, PCO2I, PO2, PO2I, HCO3, HCO3I, FIO2, FIO2I   Microbiology No results for input(s): CULT in the last 72 hours. Telemetry: [x]Sinus  []A-flutter []Paced    []A-fib []Multiple PVCs                    Imaging:  CXR Results  (Last 48 hours)    None          CT Results  (Last 48 hours)    None        12/31 CXR  Findings: The lungs appear clear. The cardiac silhouette and pulmonary  vascularity appear within normal limits. No evidence for pneumothorax or pleural  effusion.     IMPRESSION  Impression:     No acute cardiopulmonary disease. ALLERGEN PROFILE:  ZONE 2  9/7/2017  7:41 AM - [place], [facility] [place]       Component Results      Component Value Flag Ref Range Units Status     D. pteronyssinus 0.13 (A) Class 0/I kU/L Final     D. farinae Mite <0.10  Class 0 kU/L Final     Cat Hair/Dander 59.70 (A) Class V kU/L Final     Dog Hair/Dander 16.20 (A) Class IV kU/L Final     B[place] Grass 0.49 (A) Class I kU/L Final     Curt grass 0.19 (A) Class 0/I kU/L Final     Vinh Grass 0.14 (A) Class 0/I kU/L Final     [place]ia Grass 0.40 (A) Class I kU/L Final     Cockroach,American <0.10  Class 0 kU/L Final     Comment:     (NOTE)      Penicillium notatum <0.10  Class 0 kU/L Final      Cladosporium herbarum <0.10  Class 0 kU/L Final     Aspergillus fumigatus 0.12 (A) Class 0/I kU/L Final     Mucor racemosus <0.10  Class 0 kU/L Final     Alternaria tenuis <0.10  Class 0 kU/L Final     Stemphylium botryosum 0.49 (A) Class I kU/L Final     White Birch 0.39 (A) Class I kU/L Final     [place]by 0.53 (A) Class I kU/L Final     American White Elm 0.42 (A) Class I kU/L Final     Maple/[place] 0.31 (A) Class 0/I kU/L Final     White [place] 0.35 (A) Class I kU/L Final     Comment:     (NOTE)   This test was developed and its performance characteristics   determined by [facility] has not been cleared or approved by the   U.S. Food and Drug Administration. The FDA has determined that such clearance or approval is not   necessary.  This test is used for clinical purposes.  It should not   be regarded as investigational or for research.        American [place] 0.39 (A) Class I kU/L Final     White [place] <0.10  Class 0 kU/L Final     Sweet Gum 0.31 (A) Class 0/I kU/L Final     Comment:     (NOTE)   This test was developed and its performance characteristics   determined by [facility] has not been cleared or approved by the   U.S. Food and Drug Administration. The FDA has determined that such clearance or approval is not   necessary.  This test is used for clinical purposes.  It should not   be regarded as investigational or for research.        [facility] 0.30 (A) Class 0/I kU/L Final     Ragweed, Short/Common 0.30 (A) Class 0/I kU/L Final     Mugwort 0.20 (A) Class 0/I kU/L Final     Plantain, English 0.37 (A) Class I kU/L Final     Pigweed, Rough 0.43 (A) Class I kU/L Final     Sheep Sorrel (Dock) 0.48 (A) Class I kU/L Final     Nettle 0.19 (A) Class 0/I kU/L Final     Comment:     (NOTE)   Performed At: [address] [id number]   Balta Newby MD TQ:[id number]        Class description Comment     Final     Comment:     (NOTE)      Levels of Specific IgE       Class  Description of Class      ---------------------------  -----  --------------------                     < 0.10         0         Negative             0.10 -    0.31         0/I       Equivocal/Low             0.32 -    0.55         I         Low             0.56 -    1.40         II        Moderate             1.41 -    3.90         III       High             3.91 -   19.00         IV        Very High            19.01 -  100.00         V         Very High                    >100.00         VI        Very High              IMPRESSION:   · Acute respiratory failure due to severe asthma exacerbation requiring BiPAP, slowly improving  · Leukocytosis (20K) chronic at least since 6/17, improved  · Hx asthma, poorly controlled with noncompliance to medical regimen due to cost barriers  · Anxiety        PLAN:   · Resp -  Continue with nebs ATC and prn, cont steroids-slow taper, s/p course abx, singulair. CXR without acute process. Cont BiPAP prn, wean nc for sat>92%. Will need to ensure pt has medications upon discharge- aware-to arrange brovana/albuterol, pulmicort nebs as outpt. IGE level 387 in past (9/17)-see allergen profile as above. Discussed avoidance of cats, dogs and smoke in the home if possible. · ID - Leukocytosis improved and afebrile. s/p azithromycin course. Flu negative. · CVS - Monitor HD-currently stable  · Heme/onc -  hgb and plts stable.    · Metabolic - Replace lytes prn per protocol  · Renal - Follow BUN, cr  · Endocrine - Follow glucose on steroids, ISS prn  · Neuro/ Pain/ Sedation - started on xanax ATC, morphine prn  · GI -Advanced diet  · Prophylaxis - DVT-lovenox, GI-pepcid  · Discussed in interdisciplinary rounds  · FULL CODE  · Continue to monitor in stepdown for now, to floor when does not require BiPAP  · To be referred to care will [facility] upon discharge  · OOB and ambulate today, PT requested        [x]See my orders for details    My assessment/plan was discussed with:  [x]nursing []PT/OT    [x]respiratory therapy [x]   []family []         ODILIA Camacho

## 2018-01-05 NOTE — PROGRESS NOTES
0725-Received pt resting in bed with eyes closed, vs stable on BIPAP, no sign of distress, will continue to monitor  0850-Sitting up in bed eating breakfast, wheezing throughout, A+Ox4, calm at this time  0930-On Bipap, resting in bed with eyes closed  1100-Resting in bed with eyes closed, no sign of distress, vs stable on BIPAP  1213-Pt O2sat borderline, 88-91% RT notified, will continued to monitor  1400-Pt sitting up in bed, [id number]% on Nasal cannula, pt states, \"I feel really good. \" Eating lunch, friend at bedside, pt seen by Zaira Cee PA-C, updated on pt status  1530-Pt assisted to room toilet, tolerated well  1545-Pt assisted back to chair, tolerated well, friend at bedside  1800-Assisted back to bed, tolerated well, no sign of distress

## 2018-01-05 NOTE — PROGRESS NOTES
Problem: Falls - Risk of  Goal: *Absence of Falls  Document Nita Fall Risk and appropriate interventions in the flowsheet.    Outcome: Progressing Towards Goal  Fall Risk Interventions:            Medication Interventions: Assess postural VS orthostatic hypotension, Bed/chair exit alarm, Evaluate medications/consider consulting pharmacy, Patient to call before getting OOB, Teach patient to arise slowly    Elimination Interventions: Bed/chair exit alarm, Call light in reach, Patient to call for help with toileting needs, Toilet paper/wipes in reach, Toileting schedule/hourly rounds, Urinal in reach

## 2018-01-06 LAB
ANION GAP SERPL CALC-SCNC: 10 MMOL/L (ref 3–18)
BASOPHILS # BLD: 0 K/UL (ref 0–0.06)
BASOPHILS NFR BLD: 0 % (ref 0–2)
BUN SERPL-MCNC: 29 MG/DL (ref 7–18)
BUN/CREAT SERPL: 40 (ref 12–20)
CALCIUM SERPL-MCNC: 8.2 MG/DL (ref 8.5–10.1)
CHLORIDE SERPL-SCNC: 104 MMOL/L (ref 100–108)
CO2 SERPL-SCNC: 26 MMOL/L (ref 21–32)
CREAT SERPL-MCNC: 0.72 MG/DL (ref 0.6–1.3)
DIFFERENTIAL METHOD BLD: ABNORMAL
EOSINOPHIL # BLD: 0 K/UL (ref 0–0.4)
EOSINOPHIL NFR BLD: 0 % (ref 0–5)
ERYTHROCYTE [DISTWIDTH] IN BLOOD BY AUTOMATED COUNT: 12.4 % (ref 11.6–14.5)
GLUCOSE SERPL-MCNC: 112 MG/DL (ref 74–99)
HCT VFR BLD AUTO: 48.3 % (ref 36–48)
HGB BLD-MCNC: 16 G/DL (ref 13–16)
LYMPHOCYTES # BLD: 1.4 K/UL (ref 0.9–3.6)
LYMPHOCYTES NFR BLD: 9 % (ref 21–52)
MCH RBC QN AUTO: 28.4 PG (ref 24–34)
MCHC RBC AUTO-ENTMCNC: 33.1 G/DL (ref 31–37)
MCV RBC AUTO: 85.6 FL (ref 74–97)
MONOCYTES # BLD: 0.7 K/UL (ref 0.05–1.2)
MONOCYTES NFR BLD: 5 % (ref 3–10)
NEUTS SEG # BLD: 12.9 K/UL (ref 1.8–8)
NEUTS SEG NFR BLD: 86 % (ref 40–73)
PLATELET # BLD AUTO: 258 K/UL (ref 135–420)
PMV BLD AUTO: 9.5 FL (ref 9.2–11.8)
POTASSIUM SERPL-SCNC: 5.1 MMOL/L (ref 3.5–5.5)
RBC # BLD AUTO: 5.64 M/UL (ref 4.7–5.5)
SODIUM SERPL-SCNC: 140 MMOL/L (ref 136–145)
WBC # BLD AUTO: 15 K/UL (ref 4.6–13.2)

## 2018-01-06 PROCEDURE — 74011250636 HC RX REV CODE- 250/636: Performed by: INTERNAL MEDICINE

## 2018-01-06 PROCEDURE — 74011000250 HC RX REV CODE- 250: Performed by: INTERNAL MEDICINE

## 2018-01-06 PROCEDURE — 97116 GAIT TRAINING THERAPY: CPT

## 2018-01-06 PROCEDURE — 74011250636 HC RX REV CODE- 250/636: Performed by: HOSPITALIST

## 2018-01-06 PROCEDURE — 65660000000 HC RM CCU STEPDOWN

## 2018-01-06 PROCEDURE — 74011250637 HC RX REV CODE- 250/637: Performed by: PHYSICIAN ASSISTANT

## 2018-01-06 PROCEDURE — 74011250637 HC RX REV CODE- 250/637: Performed by: INTERNAL MEDICINE

## 2018-01-06 PROCEDURE — 80048 BASIC METABOLIC PNL TOTAL CA: CPT | Performed by: PHYSICIAN ASSISTANT

## 2018-01-06 PROCEDURE — 36415 COLL VENOUS BLD VENIPUNCTURE: CPT | Performed by: PHYSICIAN ASSISTANT

## 2018-01-06 PROCEDURE — 94640 AIRWAY INHALATION TREATMENT: CPT

## 2018-01-06 PROCEDURE — 85025 COMPLETE CBC W/AUTO DIFF WBC: CPT | Performed by: PHYSICIAN ASSISTANT

## 2018-01-06 PROCEDURE — 77010033678 HC OXYGEN DAILY

## 2018-01-06 RX ORDER — CALCIUM CARBONATE 200(500)MG
200 TABLET,CHEWABLE ORAL
Status: DISCONTINUED | OUTPATIENT
Start: 2018-01-07 | End: 2018-01-07 | Stop reason: HOSPADM

## 2018-01-06 RX ORDER — HYDROCODONE BITARTRATE AND ACETAMINOPHEN 5; 325 MG/1; MG/1
1 TABLET ORAL
Status: DISCONTINUED | OUTPATIENT
Start: 2018-01-06 | End: 2018-01-07 | Stop reason: HOSPADM

## 2018-01-06 RX ORDER — PREDNISONE 20 MG/1
60 TABLET ORAL
Status: DISCONTINUED | OUTPATIENT
Start: 2018-01-07 | End: 2018-01-07 | Stop reason: HOSPADM

## 2018-01-06 RX ORDER — CALCIUM CARBONATE 200(500)MG
200 TABLET,CHEWABLE ORAL
Status: DISCONTINUED | OUTPATIENT
Start: 2018-01-07 | End: 2018-01-06

## 2018-01-06 RX ORDER — ALPRAZOLAM 1 MG/1
1 TABLET ORAL
Status: DISCONTINUED | OUTPATIENT
Start: 2018-01-06 | End: 2018-01-07 | Stop reason: HOSPADM

## 2018-01-06 RX ADMIN — BUDESONIDE 1000 MCG: 0.5 INHALANT RESPIRATORY (INHALATION) at 08:09

## 2018-01-06 RX ADMIN — ALBUTEROL SULFATE 2.5 MG: 2.5 SOLUTION RESPIRATORY (INHALATION) at 16:49

## 2018-01-06 RX ADMIN — IPRATROPIUM BROMIDE AND ALBUTEROL SULFATE 3 ML: .5; 3 SOLUTION RESPIRATORY (INHALATION) at 01:12

## 2018-01-06 RX ADMIN — IPRATROPIUM BROMIDE AND ALBUTEROL SULFATE 3 ML: .5; 3 SOLUTION RESPIRATORY (INHALATION) at 13:20

## 2018-01-06 RX ADMIN — ALBUTEROL SULFATE 2.5 MG: 2.5 SOLUTION RESPIRATORY (INHALATION) at 06:35

## 2018-01-06 RX ADMIN — IPRATROPIUM BROMIDE AND ALBUTEROL SULFATE 3 ML: .5; 3 SOLUTION RESPIRATORY (INHALATION) at 19:45

## 2018-01-06 RX ADMIN — Medication 10 ML: at 14:00

## 2018-01-06 RX ADMIN — ARFORMOTEROL TARTRATE 15 MCG: 15 SOLUTION RESPIRATORY (INHALATION) at 19:45

## 2018-01-06 RX ADMIN — FAMOTIDINE 20 MG: 20 TABLET ORAL at 17:35

## 2018-01-06 RX ADMIN — IPRATROPIUM BROMIDE AND ALBUTEROL SULFATE 3 ML: .5; 3 SOLUTION RESPIRATORY (INHALATION) at 08:09

## 2018-01-06 RX ADMIN — ARFORMOTEROL TARTRATE 15 MCG: 15 SOLUTION RESPIRATORY (INHALATION) at 08:09

## 2018-01-06 RX ADMIN — ALPRAZOLAM 1 MG: 1 TABLET ORAL at 08:52

## 2018-01-06 RX ADMIN — METHYLPREDNISOLONE SODIUM SUCCINATE 60 MG: 40 INJECTION, POWDER, FOR SOLUTION INTRAMUSCULAR; INTRAVENOUS at 08:51

## 2018-01-06 RX ADMIN — BUDESONIDE 500 MCG: 0.5 INHALANT RESPIRATORY (INHALATION) at 19:45

## 2018-01-06 RX ADMIN — ENOXAPARIN SODIUM 40 MG: 40 INJECTION SUBCUTANEOUS at 02:00

## 2018-01-06 RX ADMIN — FAMOTIDINE 20 MG: 20 TABLET ORAL at 08:52

## 2018-01-06 RX ADMIN — MONTELUKAST SODIUM 10 MG: 10 TABLET, FILM COATED ORAL at 21:34

## 2018-01-06 RX ADMIN — Medication 10 ML: at 21:34

## 2018-01-06 RX ADMIN — ALPRAZOLAM 1 MG: 1 TABLET ORAL at 21:36

## 2018-01-06 RX ADMIN — Medication 10 ML: at 06:00

## 2018-01-06 NOTE — PROGRESS NOTES
Problem: Falls - Risk of  Goal: *Absence of Falls  Document Nita Fall Risk and appropriate interventions in the flowsheet.    Outcome: Progressing Towards Goal  Fall Risk Interventions:            Medication Interventions: Assess postural VS orthostatic hypotension    Elimination Interventions: Call light in reach

## 2018-01-06 NOTE — ROUTINE PROCESS
Bedside and Verbal shift change report given to SHLOMO Ordonez (oncoming nurse) by Prasanna Nieves RN (offgoing nurse). Report included the following information SBAR, Kardex, Intake/Output, MAR and Cardiac Rhythm NSR.

## 2018-01-06 NOTE — PROGRESS NOTES
[facility]  ICU Progress Note      Name: Jermain Mckee   : 1990   MRN: [id number]   Date: 2018 3:07 PM     [x]I have reviewed the flowsheet and previous days notes. Events overnight reviewed and discussed with nursing staff. Vital signs and records reviewed.      34 yo male with PMH asthma admitted with severe exacerbation of asthma requiring BiPAP    2018  Continues to improve  Did not require BiPAP overnight, on 3L nc  Anxiety well controlled    Has not yet been ambulating        Medication Review:  · Pressors -  · Sedation -xanax  · Antibiotics -  · Pain -norco  · GI/ DVT -pepcid, lovenox  · Others (other gtts)    Safety Bundles: VAP Bundle/ CAUTI/ Severe Sepsis Protocol/ Electrolyte Replacement Protocol    Vital Signs:    Visit Vitals    /76    Pulse 80    Temp 97.5 °F (36.4 °C)    Resp 22    Ht 5' 8\" (1.727 m)    Wt 89.5 kg (197 lb 5 oz)    SpO2 97%    BMI 30 kg/m2       O2 Device: Nasal cannula   O2 Flow Rate (L/min): 3 l/min   Temp (24hrs), Av.2 °F (36.8 °C), Min:97.5 °F (36.4 °C), Max:99.2 °F (37.3 °C)       Intake/Output:   Last shift:      701 - 1900  In: -   Out: 400 [Urine:400]  Last 3 shifts:  190 -  07  In: 480 [P.O.:480]  Out: 1950 [Urine:1950]    Intake/Output Summary (Last 24 hours) at 18 0938  Last data filed at 18 4029   Gross per 24 hour   Intake              240 ml   Output             1400 ml   Net            -1160 ml          Physical Exam:    Emry Shouts and alert, appears comfortable, no WOB, no conversational dyspnea  HEENT:  Anicteric sclerae; pink palpebral conjunctivae; mucosa moist, nc in place  Resp:  Symmetrical chest expansion, no accessory muscle use; improved airway entry; +significantly improved exp wheezing  CV: Regular, no m  GI:  Abdomen soft, non-tender; (+) active bowel sounds  Extremities:  +2 pulses on all extremities; no edema/ cyanosis/ clubbing noted  Skin:  Warm; no rashes/ lesions noted  Neurologic:  Non-focal. Moves all extremities      DATA:     Current Facility-Administered Medications   Medication Dose Route Frequency    ALPRAZolam (XANAX) tablet 1 mg  1 mg Oral QID    methylPREDNISolone (PF) (SOLU-MEDROL) injection 60 mg  60 mg IntraVENous Q12H    morphine injection 1 mg  1 mg IntraVENous Q4H PRN    famotidine (PEPCID) tablet 20 mg  20 mg Oral BID    budesonide (PULMICORT) 500 mcg/2 ml nebulizer suspension  1,000 mcg Nebulization BID RT    sodium chloride (NS) flush 5-10 mL  5-10 mL IntraVENous Q8H    sodium chloride (NS) flush 5-10 mL  5-10 mL IntraVENous PRN    enoxaparin (LOVENOX) injection 40 mg  40 mg SubCUTAneous Q24H    albuterol-ipratropium (DUO-NEB) 2.5 MG-0.5 MG/3 ML  3 mL Nebulization Q6H RT    albuterol CONCENTRATE 2.5mg/0.5 mL neb soln  2.5 mg Nebulization Q1H PRN    montelukast (SINGULAIR) tablet 10 mg  10 mg Oral QHS    arformoterol (BROVANA) neb solution 15 mcg  15 mcg Nebulization BID RT         Labs: Results:       Chemistry Recent Labs      01/06/18   0420  01/05/18   0330  01/04/18   0400   GLU  112*  108*  104*   NA  140  139  139   K  5.1  5.0  5.1   CL  104  102  100   CO2  26  30  29   BUN  29*  30*  28*   CREA  0.72  0.87  0.84   CA  8.2*  8.5  8.5   AGAP  10  7  10   BUCR  40*  34*  33*      CBC w/Diff Recent Labs      01/06/18   0420  01/05/18   0330  01/04/18   0400   WBC  15.0*  15.2*  19.5*   RBC  5.64*  6.10*  5.86*   HGB  16.0  17.2*  16.7*   HCT  48.3*  52.8*  50.5*   PLT  258  301  312   GRANS  86*  86*  86*   LYMPH  9*  8*  7*   EOS  0  0  0      Coagulation No results for input(s): PTP, INR, APTT in the last 72 hours. No lab exists for component: INREXT, INREXT    Liver Enzymes No results for input(s): TP, ALB, TBIL, AP, SGOT, GPT in the last 72 hours.     No lab exists for component: DBIL   ABG No results found for: PH, PHI, PCO2, PCO2I, PO2, PO2I, HCO3, HCO3I, FIO2, FIO2I   Microbiology No results for input(s): CULT in the last 72 hours. Telemetry: [x]Sinus  []A-flutter []Paced    []A-fib []Multiple PVCs                    Imaging:  CXR Results  (Last 48 hours)    None          CT Results  (Last 48 hours)    None        12/31 CXR  Findings: The lungs appear clear. The cardiac silhouette and pulmonary  vascularity appear within normal limits. No evidence for pneumothorax or pleural  effusion.     IMPRESSION  Impression:     No acute cardiopulmonary disease. ALLERGEN PROFILE:  ZONE 2  9/7/2017  7:41 AM - [place], [facility]       Component Results      Component Value Flag Ref Range Units Status     D. pteronyssinus 0.13 (A) Class 0/I kU/L Final     D. farinae Mite <0.10  Class 0 kU/L Final     Cat Hair/Dander 59.70 (A) Class V kU/L Final     Dog Hair/Dander 16.20 (A) Class IV kU/L Final     [place] Grass 0.49 (A) Class I kU/L Final     Curt grass 0.19 (A) Class 0/I kU/L Final     Vinh Grass 0.14 (A) Class 0/I kU/L Final     [place]ia Grass 0.40 (A) Class I kU/L Final     Cockroach,American <0.10  Class 0 kU/L Final     Comment:     (NOTE)      Penicillium notatum <0.10  Class 0 kU/L Final      Cladosporium herbarum <0.10  Class 0 kU/L Final     Aspergillus fumigatus 0.12 (A) Class 0/I kU/L Final     Mucor racemosus <0.10  Class 0 kU/L Final     Alternaria tenuis <0.10  Class 0 kU/L Final     Stemphylium botryosum 0.49 (A) Class I kU/L Final     White Birch 0.39 (A) Class I kU/L Final     [place] 0.53 (A) Class I kU/L Final     American [place]m 0.42 (A) Class I kU/L Final     Maple/[place] Elder 0.31 (A) Class 0/I kU/L Final     White [place]ory 0.35 (A) Class I kU/L Final     Comment:     (NOTE)   This test was developed and its performance characteristics   determined by [facility] has not been cleared or approved by the   U.S. Food and Drug Administration. The FDA has determined that such clearance or approval is not   necessary.  This test is used for clinical purposes.  It should not   be regarded as investigational or for research.        American [place] 0.39 (A) Class I kU/L Final     White [place] <0.10  Class 0 kU/L Final     Sweet Gum 0.31 (A) Class 0/I kU/L Final     Comment:     (NOTE)   This test was developed and its performance characteristics   determined by [facility] has not been cleared or approved by the   U.S. Food and Drug Administration. The FDA has determined that such clearance or approval is not   necessary.  This test is used for clinical purposes.  It should not   be regarded as investigational or for research.        [facility] 0.30 (A) Class 0/I kU/L Final     Ragweed, Short/Common 0.30 (A) Class 0/I kU/L Final     Mugwort 0.20 (A) Class 0/I kU/L Final     Plantain, English 0.37 (A) Class I kU/L Final     Pigweed, Rough 0.43 (A) Class I kU/L Final     Sheep Sorrel (Dock) 0.48 (A) Class I kU/L Final     Nettle 0.19 (A) Class 0/I kU/L Final     Comment:     (NOTE)   Performed At: [address] [id number]   Kyle Quintanilla MD OY:[id number]        Class description Comment     Final     Comment:     (NOTE)      Levels of Specific IgE       Class  Description of Class      ---------------------------  -----  --------------------                     < 0.10         0         Negative             0.10 -    0.31         0/I       Equivocal/Low             0.32 -    0.55         I         Low             0.56 -    1.40         II        Moderate             1.41 -    3.90         III       High             3.91 -   19.00         IV        Very High            19.01 -  100.00         V         Very High                    >100.00         VI        Very High              IMPRESSION:   · Acute respiratory failure due to severe asthma exacerbation requiring BiPAP, slowly improving, now on nc  · Leukocytosis (20K) chronic at least since 6/17, improved  · Hx asthma, poorly controlled with noncompliance to medical regimen due to cost barriers  · Anxiety        PLAN:   · Resp -  Continue with nebs ATC and prn, cont steroids-change to PO, s/p course abx, cont singulair. CXR without acute process. Wean nc for sat>92%. Will need to ensure pt has medications upon discharge- aware-to arrange brovana/albuterol, pulmicort nebs as outpt. IGE level 387 in past (9/17)-see allergen profile as above. Discussed avoidance of cats, dogs and smoke in the home if possible. · ID - Leukocytosis improved and afebrile. s/p azithromycin course. Flu negative. · CVS - Monitor HD-currently stable  · Heme/onc -  hgb and plts stable.    · Metabolic - Replace lytes prn per protocol  · Renal - Follow BUN, cr  · Endocrine - Follow glucose on steroids, ISS prn  · Neuro/ Pain/ Sedation - started on xanax-change to prn, morphine-->norco prn  · GI -Reg diet, pepcid  · Prophylaxis - DVT-lovenox, GI-pepcid  · Discussed in interdisciplinary rounds  · FULL CODE  · OK to transfer to floor  · To be referred to care a Lalita Lindquist upon discharge  · OOB and ambulate today, PT requested        [x]See my orders for details    My assessment/plan was discussed with:  [x]nursing []PT/OT    [x]respiratory therapy [x]   []family [x]patient         Pamwill Client, PA

## 2018-01-06 NOTE — PROGRESS NOTES
Physical Exam   Constitutional: He is oriented to person, place, and time. He appears well-developed and well-nourished. Nasal cannula in place. HENT:   Head: Normocephalic. Eyes: Pupils are equal, round, and reactive to light. Neck: Normal range of motion. Cardiovascular: Normal rate, regular rhythm, S1 normal, S2 normal, normal heart sounds and intact distal pulses. Pulmonary/Chest: Effort normal. He has decreased breath sounds. He has wheezes. Abdominal: Soft. Bowel sounds are normal.   Musculoskeletal: Normal range of motion. Neurological: He is alert and oriented to person, place, and time. He has normal strength. No sensory deficit. GCS eye subscore is 4. GCS verbal subscore is 5. GCS motor subscore is 6. Skin: Skin is warm, dry and intact. Psychiatric: He has a normal mood and affect. His speech is normal and behavior is normal. Judgment and thought content normal. Cognition and memory are normal.     Bedside and Verbal shift change report given to [address] RN (oncoming nurse) by Joleen Aase, RN (offgoing nurse). Report included the following information SBAR, Kardex, Recent Results and Cardiac Rhythm NSR/ST.     0600 Pt had an uneventful shift. He did not require the use of the BiPap. Pt slept well throughout the night, tolerated oxygen via NC at 3 LPM with sats well maintained %. No s/s acute resp distress, pt airam HHN tx's without incident. Continues to expectorate small amounts of white to yellow thin sputum. He is in a socialble, positive and pleasant mood this morning. Tolerated am ADL's without incident.

## 2018-01-06 NOTE — PROGRESS NOTES
Daily Progress Note: 2018 3:50 PM   Admit Date: 2017    Patient seen in follow up for multiple medical problems as listed below:  Patient Active Problem List   Diagnosis Code    Status asthmaticus J45.902    Lactic acidosis H83.5    Metabolic acidosis with respiratory alkalosis E87.2, E87.3    Acute severe exacerbation of asthma J45. Viru 65    Acute respiratory acidosis E87.2       Assesment      32 y/o male with history of asthma. Admitted to ICU with Resp failure on Bipap due to status asthmaticus. There has been very gradual resolution of patients reactive airway disease. Acute severe asthma w/ hypoxic resp failure - Cont IV steroids, nebs and wean oxygen and Bipap per pulm  Leukocytosis  Anxiety    DVT Protocol Active: yes  Code Status:  Full Code     Disposition:ok out of ICU. Home when off 02    Subjective:     CC: Respiratory Distress    Interval History: much improved. no bipap needed    ROS: 11 point ROS negative     Objective:     Visit Vitals    /80 (BP 1 Location: Right arm, BP Patient Position: At rest)    Pulse 98    Temp 95.7 °F (35.4 °C)    Resp 22    Ht 5' 8\" (1.727 m)    Wt 89.5 kg (197 lb 5 oz)    SpO2 96%    BMI 30 kg/m2       Temp (24hrs), Av.8 °F (36.6 °C), Min:95.7 °F (35.4 °C), Max:98.7 °F (37.1 °C)        Intake/Output Summary (Last 24 hours) at 18 1412  Last data filed at 18 0937   Gross per 24 hour   Intake              240 ml   Output             1400 ml   Net            -1160 ml       Gen: AOx3, NAD  HEENT:  GIULIA, EOMI. Neck: No Bruits/JVD   Lungs:   Faint exp wheeze. good respiratory effort  Heart:   RR S1 S2 without M/R/G  Abdomen: ND,NT, BSX4,   Extremities:   No LE edema. No cyanosis.   Skin:  no jaundice/lesions      Data Review:     Meds/Labs/Tests reviewed    Current Shift:  701 - 1900  In: -   Out: 400 [Urine:400]  Last three shifts:  1901 -  0700  In: 480 [P.O.:480]  Out: 1950 [Urine:1950]  Recent Labs 01/06/18   0420  01/05/18   0330  01/04/18   0400   WBC  15.0*  15.2*  19.5*   RBC  5.64*  6.10*  5.86*   HGB  16.0  17.2*  16.7*   HCT  48.3*  52.8*  50.5*   PLT  258  301  312   GRANS  86*  86*  86*   LYMPH  9*  8*  7*   EOS  0  0  0       Recent Labs      01/06/18   0420  01/05/18   0330  01/04/18   0400   BUN  29*  30*  28*   CREA  0.72  0.87  0.84   CA  8.2*  8.5  8.5   K  5.1  5.0  5.1   NA  140  139  139   CL  104  102  100   CO2  26  30  29   GLU  112*  108*  104*        Lab Results   Component Value Date/Time    Glucose 112 01/06/2018 04:20 AM    Glucose 108 01/05/2018 03:30 AM    Glucose 104 01/04/2018 04:00 AM    Glucose 120 01/03/2018 03:30 AM    Glucose 130 01/02/2018 10:30 AM          Care coordination with Nursing/Consultants/staff: 15  Prior history, labs, and charting reviewed: 15    Procedures/Imaging:  CXR's    Total time spent with chart review, patient examination/education, discussion with staff on case,documentation and medication management / adjustment  :  20 Minutes      Dr Kirill Brandt  [phone]

## 2018-01-06 NOTE — PROGRESS NOTES
Recd pt on 3 lpm NC  -pt awake & alert - no distress observed or reported  -pt off bipap overnight  -administer neb    1320-Pt transferred to floor - no complications  -administer neb

## 2018-01-06 NOTE — PROGRESS NOTES
Problem: Mobility Impaired (Adult and Pediatric)  Goal: *Acute Goals and Plan of Care (Insert Text)  Physical Therapy Goals  Initiated 1/5/2018 and to be accomplished within 7 day(s)  1. Patient will move from supine to sit and sit to supine  in bed with modified independence. 2.  Patient will transfer from bed to chair and chair to bed with modified independence using the least restrictive device. 3.  Patient will perform sit to stand with modified independence. 4.  Patient will ambulate with modified independence for 250 feet with the least restrictive device. 5.  Patient will ascend/descend 3 stairs with handrail(s) with modified independence. Outcome: Progressing Towards Goal  physical Therapy TREATMENT    Patient: Kirstin Hill (58 y.o. male)  Date: 1/6/2018  Diagnosis: Acute severe exacerbation of asthma Acute severe exacerbation of asthma  Precautions: Skin   Chart, physical therapy assessment, plan of care and goals were reviewed. PLOF:indpendent, sick in bed for 5 days prior to coming to hospital  ASSESSMENT:  SpO2 on 3L 95%. Ambulated 140ft on RA without an AD, reciprocal gt pattern, steady pace, no LOB or path deviations. SpO2 85% on RA during gait. Returned to bed, donned nasal cannula at 3L, increased to 95% in 40 seconds. Notified nurse. Education: take breaks when needed, slowly build tolerance to increasing activity  Progression toward goals:  []      Improving appropriately and progressing toward goals  [x]      Improving slowly and progressing toward goals  []      Not making progress toward goals and plan of care will be adjusted     PLAN:  Patient continues to benefit from skilled intervention to address the above impairments. Continue treatment per established plan of care. Discharge Recommendations:  None  Further Equipment Recommendations for Discharge:  N/A     SUBJECTIVE:   Patient stated I feel better.     OBJECTIVE DATA SUMMARY:   Critical Behavior:  Neurologic State: Alert, Appropriate for age  Orientation Level: Oriented X4  Cognition: Appropriate for age attention/concentration  Safety/Judgement: Awareness of environment, Fall prevention  Functional Mobility Training:  Bed Mobility:  Supine to Sit: Independent  Sit to Supine: Independent  Transfers:  Sit to Stand: Stand-by asssistance  Stand to Sit: Supervision  Balance:  Sitting: Intact  Standing: Intact  Standing - Static: Good  Standing - Dynamic : Good  Ambulation/Gait Training:  Distance (ft): 140 Feet (ft)  Assistive Device: Gait belt  Ambulation - Level of Assistance: Stand-by asssistance  Speed/Camille: Slow  Pain:  Pre:0  Post:0  Pain Scale 1: Numeric (0 - 10)  Pain Intensity 1: 0  Activity Tolerance:   Fair  Please refer to the flowsheet for vital signs taken during this treatment.   After treatment:   [] Patient left in no apparent distress sitting up in chair  [x] Patient left in no apparent distress in bed  [x] Call bell left within reach  [x] Nursing notified  [] Caregiver present  [] Bed alarm activated      Chary Isaacs PTA   Time Calculation: 15 mins

## 2018-01-06 NOTE — PROGRESS NOTES
1220 -- Report from [facility].    1330 -- Patient received on the unit, cardiac monitor applied, skin assessment completed, shift assessment completed, patient expresses no pain, will continue to monitor. 1735 -- Medications given, well tolerated, will continue to monitor. 1828 -- Reassessment completed, no change in patient condition, will continue to monitor. Bedside shift change report given to SHLOMO Peralta (oncoming nurse) by Jessica Fink RN (offgoing nurse). Report included the following information SBAR, Kardex, Intake/Output, MAR and Recent Results.

## 2018-01-06 NOTE — PROGRESS NOTES
Problem: Falls - Risk of  Goal: *Absence of Falls  Document Nita Fall Risk and appropriate interventions in the flowsheet.    Outcome: Progressing Towards Goal  Fall Risk Interventions:            Medication Interventions: Assess postural VS orthostatic hypotension, Evaluate medications/consider consulting pharmacy, Patient to call before getting OOB, Teach patient to arise slowly    Elimination Interventions: Call light in reach, Toilet paper/wipes in reach, Urinal in reach, Toileting schedule/hourly rounds

## 2018-01-06 NOTE — PROGRESS NOTES
conducted a Follow up consultation and Spiritual Assessment for Santiago Rowland, who is a 33 y.o.,male. The  provided the following Interventions:  Continued the relationship of care and support. Listened empathically. Offered prayer and assurance of continued prayer on patients behalf. Chart reviewed. The following outcomes were achieved:  Patient expressed gratitude for 's visit. Assessment:  There are no spiritual or [employer] issues which require intervention at this time. Plan:  Chaplains will continue to follow and will provide pastoral care on an as needed/requested basis.  recommends bedside caregivers page  on duty if patient shows signs of acute spiritual or emotional distress. Marcelino Headley M.Div.   [facility] [address]  [phone]

## 2018-01-07 VITALS
WEIGHT: 197.31 LBS | OXYGEN SATURATION: 96 % | BODY MASS INDEX: 29.9 KG/M2 | DIASTOLIC BLOOD PRESSURE: 69 MMHG | SYSTOLIC BLOOD PRESSURE: 133 MMHG | HEART RATE: 77 BPM | RESPIRATION RATE: 16 BRPM | TEMPERATURE: 97.8 F | HEIGHT: 68 IN

## 2018-01-07 LAB
ANION GAP SERPL CALC-SCNC: 7 MMOL/L (ref 3–18)
BASOPHILS # BLD: 0 K/UL (ref 0–0.06)
BASOPHILS NFR BLD: 0 % (ref 0–2)
BUN SERPL-MCNC: 26 MG/DL (ref 7–18)
BUN/CREAT SERPL: 32 (ref 12–20)
CALCIUM SERPL-MCNC: 8.1 MG/DL (ref 8.5–10.1)
CHLORIDE SERPL-SCNC: 106 MMOL/L (ref 100–108)
CO2 SERPL-SCNC: 28 MMOL/L (ref 21–32)
CREAT SERPL-MCNC: 0.81 MG/DL (ref 0.6–1.3)
DIFFERENTIAL METHOD BLD: ABNORMAL
EOSINOPHIL # BLD: 0.1 K/UL (ref 0–0.4)
EOSINOPHIL NFR BLD: 0 % (ref 0–5)
ERYTHROCYTE [DISTWIDTH] IN BLOOD BY AUTOMATED COUNT: 12.3 % (ref 11.6–14.5)
GLUCOSE SERPL-MCNC: 67 MG/DL (ref 74–99)
HCT VFR BLD AUTO: 50 % (ref 36–48)
HGB BLD-MCNC: 17.1 G/DL (ref 13–16)
LYMPHOCYTES # BLD: 5.2 K/UL (ref 0.9–3.6)
LYMPHOCYTES NFR BLD: 23 % (ref 21–52)
MCH RBC QN AUTO: 28.6 PG (ref 24–34)
MCHC RBC AUTO-ENTMCNC: 34.2 G/DL (ref 31–37)
MCV RBC AUTO: 83.6 FL (ref 74–97)
MONOCYTES # BLD: 2.7 K/UL (ref 0.05–1.2)
MONOCYTES NFR BLD: 12 % (ref 3–10)
NEUTS SEG # BLD: 14.3 K/UL (ref 1.8–8)
NEUTS SEG NFR BLD: 65 % (ref 40–73)
PLATELET # BLD AUTO: 268 K/UL (ref 135–420)
PMV BLD AUTO: 9.6 FL (ref 9.2–11.8)
POTASSIUM SERPL-SCNC: 3.7 MMOL/L (ref 3.5–5.5)
RBC # BLD AUTO: 5.98 M/UL (ref 4.7–5.5)
SODIUM SERPL-SCNC: 141 MMOL/L (ref 136–145)
WBC # BLD AUTO: 22.4 K/UL (ref 4.6–13.2)

## 2018-01-07 PROCEDURE — 74011000250 HC RX REV CODE- 250: Performed by: INTERNAL MEDICINE

## 2018-01-07 PROCEDURE — 80048 BASIC METABOLIC PNL TOTAL CA: CPT | Performed by: PHYSICIAN ASSISTANT

## 2018-01-07 PROCEDURE — 77010033678 HC OXYGEN DAILY

## 2018-01-07 PROCEDURE — 74011636637 HC RX REV CODE- 636/637: Performed by: PHYSICIAN ASSISTANT

## 2018-01-07 PROCEDURE — 74011250637 HC RX REV CODE- 250/637: Performed by: PHYSICIAN ASSISTANT

## 2018-01-07 PROCEDURE — 94760 N-INVAS EAR/PLS OXIMETRY 1: CPT

## 2018-01-07 PROCEDURE — 74011250637 HC RX REV CODE- 250/637: Performed by: FAMILY MEDICINE

## 2018-01-07 PROCEDURE — 85025 COMPLETE CBC W/AUTO DIFF WBC: CPT | Performed by: PHYSICIAN ASSISTANT

## 2018-01-07 PROCEDURE — 74011250636 HC RX REV CODE- 250/636: Performed by: HOSPITALIST

## 2018-01-07 PROCEDURE — 94640 AIRWAY INHALATION TREATMENT: CPT

## 2018-01-07 PROCEDURE — 36415 COLL VENOUS BLD VENIPUNCTURE: CPT | Performed by: PHYSICIAN ASSISTANT

## 2018-01-07 RX ORDER — PREDNISONE 20 MG/1
TABLET ORAL
Qty: 18 TAB | Refills: 0 | Status: SHIPPED | OUTPATIENT
Start: 2018-01-07 | End: 2018-03-26 | Stop reason: ALTCHOICE

## 2018-01-07 RX ADMIN — PREDNISONE 60 MG: 20 TABLET ORAL at 08:37

## 2018-01-07 RX ADMIN — ENOXAPARIN SODIUM 40 MG: 40 INJECTION SUBCUTANEOUS at 02:22

## 2018-01-07 RX ADMIN — CALCIUM CARBONATE (ANTACID) CHEW TAB 500 MG 200 MG: 500 CHEW TAB at 08:37

## 2018-01-07 RX ADMIN — ARFORMOTEROL TARTRATE 15 MCG: 15 SOLUTION RESPIRATORY (INHALATION) at 07:39

## 2018-01-07 RX ADMIN — IPRATROPIUM BROMIDE AND ALBUTEROL SULFATE 3 ML: .5; 3 SOLUTION RESPIRATORY (INHALATION) at 07:39

## 2018-01-07 RX ADMIN — IPRATROPIUM BROMIDE AND ALBUTEROL SULFATE 3 ML: .5; 3 SOLUTION RESPIRATORY (INHALATION) at 01:41

## 2018-01-07 RX ADMIN — FAMOTIDINE 20 MG: 20 TABLET ORAL at 08:37

## 2018-01-07 RX ADMIN — Medication 10 ML: at 05:32

## 2018-01-07 RX ADMIN — CALCIUM CARBONATE (ANTACID) CHEW TAB 500 MG 200 MG: 500 CHEW TAB at 00:11

## 2018-01-07 NOTE — DISCHARGE INSTRUCTIONS
DISCHARGE SUMMARY from Nurse    PATIENT INSTRUCTIONS:    After general anesthesia or intravenous sedation, for 24 hours or while taking prescription Narcotics:  · Limit your activities  · Do not drive and operate hazardous machinery  · Do not make important personal or business decisions  · Do  not drink alcoholic beverages  · If you have not urinated within 8 hours after discharge, please contact your surgeon on call. Report the following to your surgeon:  · Excessive pain, swelling, redness or odor of or around the surgical area  · Temperature over 100.5  · Nausea and vomiting lasting longer than 4 hours or if unable to take medications  · Any signs of decreased circulation or nerve impairment to extremity: change in color, persistent  numbness, tingling, coldness or increase pain  · Any questions    What to do at Home:  Recommended activity: Activity as tolerated. If you experience any of the following symptoms shortness of breath, chest tightness or wheezing , please follow up with primary car provider. *  Please give a list of your current medications to your Primary Care Provider. *  Please update this list whenever your medications are discontinued, doses are      changed, or new medications (including over-the-counter products) are added. *  Please carry medication information at all times in case of emergency situations. These are general instructions for a healthy lifestyle:    No smoking/ No tobacco products/ Avoid exposure to second hand smoke  Surgeon General's Warning:  Quitting smoking now greatly reduces serious risk to your health.     Obesity, smoking, and sedentary lifestyle greatly increases your risk for illness    A healthy diet, regular physical exercise & weight monitoring are important for maintaining a healthy lifestyle    You may be retaining fluid if you have a history of heart failure or if you experience any of the following symptoms:  Weight gain of 3 pounds or more overnight or 5 pounds in a week, increased swelling in our hands or feet or shortness of breath while lying flat in bed. Please call your doctor as soon as you notice any of these symptoms; do not wait until your next office visit. Recognize signs and symptoms of STROKE:    F-face looks uneven    A-arms unable to move or move unevenly    S-speech slurred or non-existent    T-time-call 911 as soon as signs and symptoms begin-DO NOT go       Back to bed or wait to see if you get better-TIME IS BRAIN. Warning Signs of HEART ATTACK     Call 911 if you have these symptoms:   Chest discomfort. Most heart attacks involve discomfort in the center of the chest that lasts more than a few minutes, or that goes away and comes back. It can feel like uncomfortable pressure, squeezing, fullness, or pain.  Discomfort in other areas of the upper body. Symptoms can include pain or discomfort in one or both arms, the back, neck, jaw, or stomach.  Shortness of breath with or without chest discomfort.  Other signs may include breaking out in a cold sweat, nausea, or lightheadedness. Don't wait more than five minutes to call 911 - MINUTES MATTER! Fast action can save your life. Calling 911 is almost always the fastest way to get lifesaving treatment. Emergency Medical Services staff can begin treatment when they arrive -- up to an hour sooner than if someone gets to the hospital by car. The discharge information has been reviewed with the patient. The patient verbalized understanding. Discharge medications reviewed with the patient and appropriate educational materials and side effects teaching were provided. ___________________________________________________________________________________________________________________________________     MyChart Activation    Thank you for enrolling in [address] Please follow the instructions below to securely access your online medical record.  [employer] allows you to send messages to your doctor, view your test results, renew your prescriptions, schedule appointments, and more. How Do I Sign Up? 1. In your internet browser, go to [email or web address]. 2. Click on the First Time User? Click Here link in the Sign In box. You will see the New Member Sign Up page. 3. Enter your Social Plus Access Code exactly as it appears below. You will not need to use this code after youve completed the sign-up process. If you do not sign up before the expiration date, you must request a new code. Social Plus Access Code: [email or web address]  Expires: 3/17/2018 12:53 PM     4. Enter the last four digits of your Social Security Number (xxxx) and Date of Birth (mm/dd/yyyy) as indicated and click Submit. You will be taken to the next sign-up page. 5. Create a Social Plus ID. This will be your Social Plus login ID and cannot be changed, so think of one that is secure and easy to remember. 6. Create a Social Plus password. You can change your password at any time. 7. Enter your Password Reset Question and Answer. This can be used at a later time if you forget your password. 8. Enter your e-mail address. You will receive e-mail notification when new information is available in [address] 9. Click Sign Up. You can now view your medical record. Additional Information    Remember, Social Plus is NOT to be used for urgent needs. For medical emergencies, dial 911. Now available from your iPhone and Android! Asthma Attack: Care Instructions  Your Care Instructions    During an asthma attack, the airways swell and narrow. This makes it hard to breathe. Severe asthma attacks can be life-threatening, but you can help prevent them by keeping your asthma under control and treating symptoms before they get bad. Symptoms include being short of breath, having chest tightness, coughing, and wheezing. Noting and treating these symptoms can also help you avoid future trips to the emergency room.   The doctor has checked you carefully, but problems can develop later. If you notice any problems or new symptoms, get medical treatment right away. Follow-up care is a key part of your treatment and safety. Be sure to make and go to all appointments, and call your doctor if you are having problems. It's also a good idea to know your test results and keep a list of the medicines you take. How can you care for yourself at home? · Follow your asthma action plan to prevent and treat attacks. If you don't have an asthma action plan, work with your doctor to create one. · Take your asthma medicines exactly as prescribed. Talk to your doctor right away if you have any questions about how to take them. ¨ Use your quick-relief medicine when you have symptoms of an attack. Quick-relief medicine is usually an albuterol inhaler. Some people need to use quick-relief medicine before they exercise. ¨ Take your controller medicine every day, not just when you have symptoms. Controller medicine is usually an inhaled corticosteroid. The goal is to prevent problems before they occur. Don't use your controller medicine to treat an attack that has already started. It doesn't work fast enough to help. ¨ If your doctor prescribed corticosteroid pills to use during an attack, take them exactly as prescribed. It may take hours for the pills to work, but they may make the episode shorter and help you breathe better. ¨ Keep your quick-relief medicine with you at all times. · Talk to your doctor before using other medicines. Some medicines, such as aspirin, can cause asthma attacks in some people. · If you have a peak flow meter, use it to check how well you are breathing. This can help you predict when an asthma attack is going to occur. Then you can take medicine to prevent the asthma attack or make it less severe. · Do not smoke or allow others to smoke around you. Avoid smoky places. Smoking makes asthma worse.  If you need help quitting, talk to your doctor about stop-smoking programs and medicines. These can increase your chances of quitting for good. · Learn what triggers an asthma attack for you, and avoid the triggers when you can. Common triggers include colds, smoke, air pollution, dust, pollen, mold, pets, cockroaches, stress, and cold air. · Avoid colds and the flu. Get a pneumococcal vaccine shot. If you have had one before, ask your doctor if you need a second dose. Get a flu vaccine every fall. If you must be around people with colds or the flu, wash your hands often. When should you call for help? Call 911 anytime you think you may need emergency care. For example, call if:  ? · You have severe trouble breathing. ?Call your doctor now or seek immediate medical care if:  ? · Your symptoms do not get better after you have followed your asthma action plan. ? · You have new or worse trouble breathing. ? · Your coughing and wheezing get worse. ? · You cough up dark brown or bloody mucus (sputum). ? · You have a new or higher fever. ? Watch closely for changes in your health, and be sure to contact your doctor if:  ? · You need to use quick-relief medicine on more than 2 days a week (unless it is just for exercise). ? · You cough more deeply or more often, especially if you notice more mucus or a change in the color of your mucus. ? · You are not getting better as expected. Where can you learn more? Go to http://jason-maritza.info/. Enter L599 in the search box to learn more about \"Asthma Attack: Care Instructions. \"  Current as of: May 12, 2017  Content Version: 11.4  © 7872-2691 [employer]. Care instructions adapted under license by [employer] (which disclaims liability or warranty for this information).  If you have questions about a medical condition or this instruction, always ask your healthcare professional. [address]  any warranty or liability for your use of this information. Learning About Asthma Triggers  What are triggers? When you have asthma, certain things can make your symptoms worse. These are called triggers. They include:  · Cigarette smoke or air pollution. · Things you are allergic to, such as:  ¨ Pollen, mold, or dust mites. ¨ Pet hair, skin, or saliva. · Illnesses, like colds, flu, or pneumonia. · Exercise. · Dry, cold air. How do triggers affect asthma? Triggers can make it harder for your lungs to work as they should and can lead to sudden difficulty breathing and other symptoms. When you are around a trigger, an asthma attack is more likely. If your symptoms are severe, you may need emergency treatment or have to go to the hospital for treatment. If you know what your triggers are and can avoid them, you may be able to prevent asthma attacks, reduce how often you have them, and make them less severe. What can you do to avoid triggers? The first thing is to know your triggers. When you are having symptoms, note the things around you that might be causing them. Then look for patterns in what may be triggering your symptoms. Record your triggers on a piece of paper or in an asthma diary. When you have your list of possible triggers, work with your doctor to find ways to avoid them. You also can check how well your lungs are working by measuring your peak expiratory flow (PEF) throughout the day. Your PEF may drop when you are near things that trigger symptoms. Here are some ways to avoid a few common triggers. · Do not smoke or allow others to smoke around you. If you need help quitting, talk to your doctor about stop-smoking programs and medicines. These can increase your chances of quitting for good. · If there is a lot of pollution, pollen, or dust outside, stay at home and keep your windows closed. Use an air conditioner or air filter in your home.  Check your local weather report or newspaper for air quality and pollen reports. · Get a flu shot every year. Talk to your doctor about getting a pneumococcal shot. Wash your hands often to prevent infections. · Avoid exercising outdoors in cold weather. If you are outdoors in cold weather, wear a scarf around your face and breathe through your nose. How can you manage an asthma attack? · If you have an asthma action plan, follow the plan. In general:  ¨ Use your quick-relief inhaler as directed by your doctor. If your symptoms do not get better after you use your medicine, have someone take you to the emergency room. Call an ambulance if needed. ¨ If your doctor has given you other inhaled medicines or steroid pills, take them as directed. Where can you learn more? Go to Browntape.be  Enter M564 in the search box to learn more about \"Learning About Asthma Triggers. \"   © 5976-8512 [employer]. Care instructions adapted under license by [employer] (which disclaims liability or warranty for this information). This care instruction is for use with your licensed healthcare professional. If you have questions about a medical condition or this instruction, always ask your healthcare professional. [place] [address] any warranty or liability for your use of this information. Content Version: 18.1.483425;  Last Revised: February 23, 2012

## 2018-01-07 NOTE — DISCHARGE SUMMARY
2 [facility]  Hospitalist Division    Discharge Summary      Patient: Dariana Madrid MRN: [id number]  CSN: [id number]    YOB: 1990  Age: 33 y.o. Sex: male    DOA: 12/31/2017 LOS:  LOS: 7 days   Discharge Date: 01/07/18     PCP:  Cem Ram MD    Chief Complaint:    Chief Complaint   Patient presents with    Respiratory Distress     Acute severe exacerbation of asthma    Admission Diagnosis:   Hospital Problems as of 1/7/2018  Never Reviewed          Codes Class Noted - Resolved POA    * (Principal)Acute severe exacerbation of asthma ICD-10-CM: J45.901  ICD-9-CM: 493.92  12/31/2017 - Present Unknown        Acute respiratory acidosis ICD-10-CM: E87.2  ICD-9-CM: 276.2  12/31/2017 - Present Unknown        Status asthmaticus ICD-10-CM: J45.902  ICD-9-CM: 493.91  9/5/2017 - Present Yes              Discharge Diagnoses:    Acute severe asthma w/ hypoxic resp failure - Cont IV steroids, nebs and wean oxygen and Bipap per pulm  Leukocytosis  Anxiety    Hospital Course:   33year old male with history of asthma. Admitted to ICU with Resp failure on Bipap due to status asthmaticus. There has been very gradual resolution of patients reactive airway disease. Patient had a prolonged ICU stay as he needed frequent use of Bipap and had anxiety attacks where he was set back. By time of discharge he was on room air and without wheeze. He is an excellent candidate for [facility] however he would need medical insurance for this. He needs pulmonary follow-up.      Significant Diagnostic Studies:  CXR's    Consults:  Treatment Team: Attending Provider: Billie Duarte DO; Consulting Provider: oDron Dougherty MD; Utilization Review: Akbar Mcdermott RN; Physical Therapy Assistant: Verona Richards PTA    Operative Procedures:  N/A    Disposition:  Home    Diet:  Reg    Discharge Condition:   Good    Discharge Medications:    Current Discharge Medication List      CONTINUE these medications which have CHANGED    Details   predniSONE (DELTASONE) 20 mg tablet 4 days of 60 mg daily,   then 2 days of 40 mg daily,   then 2 days of 20 mg daily  Then stop  Qty: 18 Tab, Refills: 0         CONTINUE these medications which have NOT CHANGED    Details   albuterol (PROVENTIL HFA, VENTOLIN HFA, PROAIR HFA) 90 mcg/actuation inhaler Take 2 Puffs by inhalation every four (4) hours as needed for Wheezing. Qty: 1 Inhaler, Refills: 0      albuterol-ipratropium (DUO-NEB) 2.5 mg-0.5 mg/3 ml nebu 3 mL by Nebulization route every six (6) hours as needed. Qty: 30 Nebule, Refills: 3      fluticasone-salmeterol (ADVAIR) 250-50 mcg/dose diskus inhaler Take 1 Puff by inhalation two (2) times a day. Qty: 1 Inhaler, Refills: 0      albuterol (PROVENTIL VENTOLIN) 2.5 mg /3 mL (0.083 %) nebulizer solution 3 mL by Nebulization route every four (4) hours as needed for Wheezing. Qty: 24 Each, Refills: 6      HYDROcodone-acetaminophen (NORCO) 5-325 mg per tablet Take 1 Tab by mouth every six (6) hours as needed for Pain. Max Daily Amount: 4 Tabs. Qty: 12 Tab, Refills: 0             Recent Results (from the past 24 hour(s))   CBC WITH AUTOMATED DIFF    Collection Time: 01/07/18  3:20 AM   Result Value Ref Range    WBC 22.4 (H) 4.6 - 13.2 K/uL    RBC 5.98 (H) 4.70 - 5.50 M/uL    HGB 17.1 (H) 13.0 - 16.0 g/dL    HCT 50.0 (H) 36.0 - 48.0 %    MCV 83.6 74.0 - 97.0 FL    MCH 28.6 24.0 - 34.0 PG    MCHC 34.2 31.0 - 37.0 g/dL    RDW 12.3 11.6 - 14.5 %    PLATELET 091 378 - 939 K/uL    MPV 9.6 9.2 - 11.8 FL    NEUTROPHILS 65 40 - 73 %    LYMPHOCYTES 23 21 - 52 %    MONOCYTES 12 (H) 3 - 10 %    EOSINOPHILS 0 0 - 5 %    BASOPHILS 0 0 - 2 %    ABS. NEUTROPHILS 14.3 (H) 1.8 - 8.0 K/UL    ABS. LYMPHOCYTES 5.2 (H) 0.9 - 3.6 K/UL    ABS. MONOCYTES 2.7 (H) 0.05 - 1.2 K/UL    ABS. EOSINOPHILS 0.1 0.0 - 0.4 K/UL    ABS.  BASOPHILS 0.0 0.0 - 0.06 K/UL    DF AUTOMATED     METABOLIC PANEL, BASIC    Collection Time: 01/07/18  3:20 AM Result Value Ref Range    Sodium 141 136 - 145 mmol/L    Potassium 3.7 3.5 - 5.5 mmol/L    Chloride 106 100 - 108 mmol/L    CO2 28 21 - 32 mmol/L    Anion gap 7 3.0 - 18 mmol/L    Glucose 67 (L) 74 - 99 mg/dL    BUN 26 (H) 7.0 - 18 MG/DL    Creatinine 0.81 0.6 - 1.3 MG/DL    BUN/Creatinine ratio 32 (H) 12 - 20      GFR est AA >60 >60 ml/min/1.73m2    GFR est non-AA >60 >60 ml/min/1.73m2    Calcium 8.1 (L) 8.5 - 10.1 MG/DL       Follow-Up And Discharge Instructions:    Follow-up Information     Follow up With Details Comments Palomo Reynoso MD On 1/9/2018 2p [id number] [place] [address]              Wound Care/Supplies:   N/A      [facility]  Hospitalist Division        Time Spent:  35min    Cc: Kenton Blood MD

## 2018-01-07 NOTE — PROGRESS NOTES
Problem: Falls - Risk of  Goal: *Absence of Falls  Document Nita Fall Risk and appropriate interventions in the flowsheet.    Outcome: Progressing Towards Goal  Fall Risk Interventions:            Medication Interventions: Patient to call before getting OOB    Elimination Interventions: Call light in reach

## 2018-01-07 NOTE — PROGRESS NOTES
Bedside shift change report given to Mery RN (oncoming nurse) by Gladis Ricardo RN (offgoing nurse). Report included the following information SBAR, Kardex, Intake/Output, MAR and Recent Results. 837   -- AM medications given, well tolerated, will continue to monitor.      1107 -- Reassessment completed, no change in patient condition, will continue to monitor.     1300 -- Patient discharged.     Bedside shift change report given to , RN (oncoming nurse) by Chacha Cox RN (offgoing nurse). Report included the following information SBAR, Kardex, Intake/Output, MAR and Recent Results.

## 2018-01-07 NOTE — CONSULTS
[facility]  Name: Jitendra Mckeon   : 1990   MRN: [id number]   Date: 2018    [x]I have reviewed the flowsheet and previous days notes. []The patient is unable to give any meaningful history or review of systems because the patient is:  []Intubated []Sedated   []Unresponsive      []The patient is critically ill on      []Mechanical ventilation []Pressors   []BiPAP []   S: Feels a lot better, no dyspnea, minimal cough nad scant, clear sputum, no CP. D/w him Omalizumab added to standard Ashma therapy to avoid ER visits, admissions Prednisone therapy and possibly NFA. ROS:A comprehensive review of systems was negative except for that written in the HPI. Events and notes from last 24 hours reviewed. Care plan discussed on multidisciplinary rounds.     Vital Signs:    Visit Vitals    /78 (BP 1 Location: Left arm, BP Patient Position: At rest;Head of bed elevated (Comment degrees))    Pulse 87    Temp 97.8 °F (36.6 °C)    Resp 16    Ht 5' 8\" (1.727 m)    Wt 89.5 kg (197 lb 5 oz)    SpO2 96%    BMI 30 kg/m2       O2 Device: Nasal cannula   O2 Flow Rate (L/min): 3 l/min   Temp (24hrs), Av.2 °F (36.2 °C), Min:95.7 °F (35.4 °C), Max:97.8 °F (36.6 °C)       Intake/Output:   Last shift:      701 - 1900  In: 240 [P.O.:240]  Out: 300 [Urine:300]  Last 3 shifts: 1901 -  07  In: 480 [P.O.:480]  Out: 2000 [Urine:2000]    Intake/Output Summary (Last 24 hours) at 18 1204  Last data filed at 18 0843   Gross per 24 hour   Intake              480 ml   Output              900 ml   Net             -420 ml     Hemodynamics:       :      Ventilator Settings:        Ventilator Volumes  Vt Spont (ml): 657 ml  Ve Observed (l/min): 18 l/min       Physical Exam:    General: in no apparent distress, well developed and well nourished, non-toxic, in no respiratory distress and acyanotic, alert, oriented times 3 and afebrile   HEENT: Normal   Neck: No abnormally enlarged lymph nodes.    Chest: increased AP diameter   Lungs: decreased air exchange bilaterally, distant lung sounds and prolonged expiration  no dullness/ tenderness/ rash   Heart: Regular rate and rhythm or S1S2 present   Abdomen: non distended, bowel sounds normoactive, tympanic, abdomen is soft without significant tenderness, masses, organomegaly or guarding   Extremity: negative   Neuro: alert   Skin: Skin color, texture, turgor normal. No rashes or lesions      DATA:   Current Facility-Administered Medications   Medication Dose Route Frequency    ALPRAZolam (XANAX) tablet 1 mg  1 mg Oral QID PRN    predniSONE (DELTASONE) tablet 60 mg  60 mg Oral DAILY WITH BREAKFAST    HYDROcodone-acetaminophen (NORCO) 5-325 mg per tablet 1 Tab  1 Tab Oral Q4H PRN    calcium carbonate (TUMS) chewable tablet 200 mg [elemental]  200 mg Oral TID WITH MEALS    famotidine (PEPCID) tablet 20 mg  20 mg Oral BID    budesonide (PULMICORT) 500 mcg/2 ml nebulizer suspension  1,000 mcg Nebulization BID RT    sodium chloride (NS) flush 5-10 mL  5-10 mL IntraVENous Q8H    sodium chloride (NS) flush 5-10 mL  5-10 mL IntraVENous PRN    enoxaparin (LOVENOX) injection 40 mg  40 mg SubCUTAneous Q24H    albuterol-ipratropium (DUO-NEB) 2.5 MG-0.5 MG/3 ML  3 mL Nebulization Q6H RT    albuterol CONCENTRATE 2.5mg/0.5 mL neb soln  2.5 mg Nebulization Q1H PRN    montelukast (SINGULAIR) tablet 10 mg  10 mg Oral QHS    arformoterol (BROVANA) neb solution 15 mcg  15 mcg Nebulization BID RT       Telemetry: []Sinus []A-flutter []Paced    []A-fib []Multiple PVCs                  Labs:  Recent Labs      01/07/18   0320  01/06/18   0420  01/05/18   0330   WBC  22.4*  15.0*  15.2*   HGB  17.1*  16.0  17.2*   HCT  50.0*  48.3*  52.8*   PLT  268  258  301     Recent Labs      01/07/18   0320  01/06/18   0420  01/05/18   0330   NA  141  140  139   K  3.7  5.1  5.0   CL  106  104  102   CO2  28  26  30   GLU  67*  112* 108*   BUN  26*  29*  30*   CREA  0.81  0.72  0.87   CA  8.1*  8.2*  8.5     No results for input(s): PH, PCO2, PO2, HCO3, FIO2 in the last 72 hours. Imaging:  []I have personally reviewed the patients radiographs  []Radiographs reviewed with radiologist   [] No CXR study available for review today   []No change from prior, tubes and lines in adequate position  []Improved   []Worsening       IMPRESSION:   Patient Active Problem List   Diagnosis Code    Status asthmaticus J45.902    Lactic acidosis L66.4    Metabolic acidosis with respiratory alkalosis E87.2, E87.3    Acute severe exacerbation of asthma J45. 901    Acute respiratory acidosis E87.2     · Acute respiratory failure due to severe asthma exacerbation requiring BiPAP, slowly improving, now on nc  · Leukocytosis (20K) chronic at least since 6/17, improved  · Hx asthma, poorly controlled with noncompliance to medical regimen due to cost barriers  · Anxiety       PLAN:   · May be a good candidate for Omalizumab  · DC on LABA+ ICS combo , add Spiriva Respimat 1.25 mcg per Puff: 2 puffs a day + Rescue+ Po Prednisone  · D/W Dr Angie Davis:  We will be available as needed     The patient is: [x] acutely ill Risk of deterioration: [] moderate    [] critically ill  [x] high     []See my orders for details    My assessment, plan of care, findings, medications, side effects etc were discussed with:  []nursing []PT/OT    []respiratory therapy [x]Dr. Mira Lynn []     []Total critical care time exclusive of procedures   25 minutes  Sanjay Vicente MD

## 2018-01-09 ENCOUNTER — OFFICE VISIT (OUTPATIENT)
Dept: FAMILY MEDICINE CLINIC | Age: 28
End: 2018-01-09

## 2018-01-09 VITALS
HEART RATE: 85 BPM | HEIGHT: 68 IN | SYSTOLIC BLOOD PRESSURE: 112 MMHG | WEIGHT: 197 LBS | DIASTOLIC BLOOD PRESSURE: 73 MMHG | RESPIRATION RATE: 18 BRPM | TEMPERATURE: 97.6 F | OXYGEN SATURATION: 96 % | BODY MASS INDEX: 29.86 KG/M2

## 2018-01-09 DIAGNOSIS — F43.10 POST TRAUMATIC STRESS DISORDER (PTSD): ICD-10-CM

## 2018-01-09 DIAGNOSIS — D72.829 LEUKOCYTOSIS, UNSPECIFIED TYPE: ICD-10-CM

## 2018-01-09 DIAGNOSIS — F41.0 PANIC DISORDER: ICD-10-CM

## 2018-01-09 DIAGNOSIS — J45.50 SEVERE PERSISTENT ASTHMA WITHOUT COMPLICATION: Primary | ICD-10-CM

## 2018-01-09 PROBLEM — E87.3 METABOLIC ACIDOSIS WITH RESPIRATORY ALKALOSIS: Status: RESOLVED | Noted: 2017-09-05 | Resolved: 2018-01-09

## 2018-01-09 PROBLEM — J45.902 STATUS ASTHMATICUS: Status: RESOLVED | Noted: 2017-09-05 | Resolved: 2018-01-09

## 2018-01-09 PROBLEM — E87.20 METABOLIC ACIDOSIS WITH RESPIRATORY ALKALOSIS: Status: RESOLVED | Noted: 2017-09-05 | Resolved: 2018-01-09

## 2018-01-09 PROBLEM — J96.02 ACUTE RESPIRATORY ACIDOSIS (HCC): Status: RESOLVED | Noted: 2017-12-31 | Resolved: 2018-01-09

## 2018-01-09 PROBLEM — J45.901 ACUTE SEVERE EXACERBATION OF ASTHMA: Status: RESOLVED | Noted: 2017-12-31 | Resolved: 2018-01-09

## 2018-01-09 PROBLEM — E87.20 LACTIC ACIDOSIS: Status: RESOLVED | Noted: 2017-09-05 | Resolved: 2018-01-09

## 2018-01-09 RX ORDER — BUDESONIDE AND FORMOTEROL FUMARATE DIHYDRATE 160; 4.5 UG/1; UG/1
2 AEROSOL RESPIRATORY (INHALATION) 2 TIMES DAILY
Qty: 1 INHALER | Refills: 10 | Status: SHIPPED | OUTPATIENT
Start: 2018-01-09 | End: 2019-04-12 | Stop reason: SDUPTHER

## 2018-01-09 RX ORDER — ALPRAZOLAM 0.5 MG/1
0.5 TABLET ORAL
Qty: 10 TAB | Refills: 0 | Status: SHIPPED | OUTPATIENT
Start: 2018-01-09 | End: 2018-11-19 | Stop reason: SDUPTHER

## 2018-01-09 NOTE — MR AVS SNAPSHOT
Visit Information Date & Time Provider Department Dept. Phone Encounter #  
 1/9/2018  2:30 PM Vannesa Soriano, [facility] [phone] [id number] Follow-up Instructions Return in about 2 months (around 3/9/2018) for asthma. Upcoming Health Maintenance Date Due Pneumococcal 19-64 Medium Risk (1 of 1 - PPSV23) 3/24/2010 DTaP/Tdap/Td series (1 - Tdap) 3/24/2012 Allergies as of 1/9/2018  Review Complete On: 1/9/2018 By: Vannesa Soriano MD  
  
 Severity Noted Reaction Type Reactions Penicillin G High 06/24/2017    Anaphylaxis Current Immunizations  Never Reviewed Name Date Influenza Vaccine 10/10/2017 Not reviewed this visit You Were Diagnosed With   
  
 Codes Comments Severe persistent asthma without complication    -  Primary ICD-10-CM: J45.50 ICD-9-CM: 493.90 Leukocytosis, unspecified type     ICD-10-CM: D72.829 ICD-9-CM: 288.60 Post traumatic stress disorder (PTSD)     ICD-10-CM: F43.10 ICD-9-CM: 309.81 Panic disorder     ICD-10-CM: F41.0 ICD-9-CM: 300.01 Vitals BP Pulse Temp Resp Height(growth percentile) Weight(growth percentile) 112/73 85 97.6 °F (36.4 °C) (Oral) 18 5' 8\" (1.727 m) 197 lb (89.4 kg) SpO2 BMI Smoking Status 96% 29.95 kg/m2 Never Smoker Vitals History BMI and BSA Data Body Mass Index Body Surface Area  
 29.95 kg/m 2 2.07 m 2 Preferred Pharmacy Pharmacy Name Phone [address], [address] [phone] Your Updated Medication List  
  
   
This list is accurate as of: 1/9/18  3:13 PM.  Always use your most recent med list.  
  
  
  
  
 * albuterol 2.5 mg /3 mL (0.083 %) nebulizer solution Commonly known as:  PROVENTIL VENTOLIN  
3 mL by Nebulization route every four (4) hours as needed for Wheezing. * albuterol 90 mcg/actuation inhaler Commonly known as:  PROVENTIL HFA, VENTOLIN HFA, PROAIR HFA Take 2 Puffs by inhalation every four (4) hours as needed for Wheezing. ALPRAZolam 0.5 mg tablet Commonly known as:  Narcisa Held Take 1 Tab by mouth three (3) times daily as needed for Anxiety. Max Daily Amount: 1.5 mg.  
  
 budesonide-formoterol 160-4.5 mcg/actuation Hfaa Commonly known as:  SYMBICORT Take 2 Puffs by inhalation two (2) times a day. predniSONE 20 mg tablet Commonly known as:  DELTASONE  
4 days of 60 mg daily,  then 2 days of 40 mg daily,  then 2 days of 20 mg daily Then stop * Notice: This list has 2 medication(s) that are the same as other medications prescribed for you. Read the directions carefully, and ask your doctor or other care provider to review them with you. Prescriptions Printed Refills  
 budesonide-formoterol (SYMBICORT) 160-4.5 mcg/actuation HFAA 10 Sig: Take 2 Puffs by inhalation two (2) times a day. Class: Print Route: Inhalation ALPRAZolam (XANAX) 0.5 mg tablet 0 Sig: Take 1 Tab by mouth three (3) times daily as needed for Anxiety. Max Daily Amount: 1.5 mg.  
 Class: Print Route: Oral  
  
Follow-up Instructions Return in about 2 months (around 3/9/2018) for asthma. Patient Instructions Asthma in Adults: Care Instructions Your Care Instructions During an asthma attack, your airways swell and narrow as a reaction to certain things (triggers). This makes it hard to breathe. You may be able to prevent asthma attacks if you avoid the things that set off your asthma symptoms. Keeping your asthma under control and treating symptoms before they get bad can help you avoid severe attacks. If you can control your asthma, you may be able to do all of your normal daily activities. You may also avoid asthma attacks and trips to the hospital. 
Follow-up care is a key part of your treatment and safety.  Be sure to make and go to all appointments, and call your doctor if you are having problems. It's also a good idea to know your test results and keep a list of the medicines you take. How can you care for yourself at home? · Follow your asthma action plan so you can manage your symptoms at home. An asthma action plan will help you prevent and control airway reactions and will tell you what to do during an asthma attack. If you do not have an asthma action plan, work with your doctor to build one. · Take your asthma medicine exactly as prescribed. Medicine plays an important role in controlling asthma. Talk to your doctor right away if you have any questions about what to take and how to take it. ¨ Use your quick-relief medicine when you have symptoms of an attack. Quick-relief medicine often is an albuterol inhaler. Some people need to use quick-relief medicine before they exercise. ¨ Take your controller medicine every day, not just when you have symptoms. Controller medicine is usually an inhaled corticosteroid. The goal is to prevent problems before they occur. Do not use your controller medicine to try to treat an attack that has already started. It does not work fast enough to help. ¨ If your doctor prescribed corticosteroid pills to use during an attack, take them as directed. They may take hours to work, but they may shorten the attack and help you breathe better. ¨ Keep your quick-relief medicine with you at all times. · Talk to your doctor before using other medicines. Some medicines, such as aspirin, can cause asthma attacks in some people. · Check yourself for asthma symptoms to know which step to follow in your action plan. Watch for things like being short of breath, having chest tightness, coughing, and wheezing. Also notice if symptoms wake you up at night or if you get tired quickly when you exercise.  
· If you have a peak flow meter, use it to check how well you are breathing. This can help you predict when an asthma attack is going to occur. Then you can take medicine to prevent the asthma attack or make it less severe. · See your doctor regularly. These visits will help you learn more about asthma and what you can do to control it. Your doctor will monitor your treatment to make sure the medicine is helping you. · Keep track of your asthma attacks and your treatment. After you have had an attack, write down what triggered it, what helped end it, and any concerns you have about your asthma action plan. Take your diary when you see your doctor. You can then review your asthma action plan and decide if it is working. · Do not smoke or allow others to smoke around you. Avoid smoky places. Smoking makes asthma worse. If you need help quitting, talk to your doctor about stop-smoking programs and medicines. These can increase your chances of quitting for good. · Learn what triggers an asthma attack for you, and avoid the triggers when you can. Common triggers include colds, smoke, air pollution, dust, pollen, mold, pets, cockroaches, stress, and cold air. · Avoid colds and the flu. Get a pneumococcal vaccine shot. If you have had one before, ask your doctor whether you need a second dose. Get a flu vaccine every fall. If you must be around people with colds or the flu, wash your hands often. When should you call for help? Call 911 anytime you think you may need emergency care. For example, call if: 
? · You have severe trouble breathing. ?Call your doctor now or seek immediate medical care if: 
? · Your symptoms do not get better after you have followed your asthma action plan. ? · You cough up yellow, dark brown, or bloody mucus (sputum). ? Watch closely for changes in your health, and be sure to contact your doctor if: 
? · Your coughing and wheezing get worse. ? · You need to use quick-relief medicine on more than 2 days a week (unless it is just for exercise). ? · You need help figuring out what is triggering your asthma attacks. Where can you learn more? Go to http://jason-maritza.info/. Enter P597 in the search box to learn more about \"Asthma in Adults: Care Instructions. \" Current as of: May 12, 2017 Content Version: 11.4 © 2865-3129 [employer]. Care instructions adapted under license by [employer]. com (which disclaims liability or warranty for this information). If you have questions about a medical condition or this instruction, always ask your healthcare professional. [address] any warranty or liability for your use of this information. Introducing [facility]! [employer] introduces Frankly Chat patient portal. Now you can access parts of your medical record, email your doctor's office, and request medication refills online. 1. In your internet browser, go to [email or web address] Wireless 2. Click on the First Time User? Click Here link in the Sign In box. You will see the New Member Sign Up page. 3. Enter your Frankly Chat Access Code exactly as it appears below. You will not need to use this code after youve completed the sign-up process. If you do not sign up before the expiration date, you must request a new code. · Frankly Chat Access Code: [email or web address] Expires: 3/17/2018 12:53 PM 
 
4. Enter the last four digits of your Social Security Number (xxxx) and Date of Birth (mm/dd/yyyy) as indicated and click Submit. You will be taken to the next sign-up page. 5. Create a Droplet Technologyt ID. This will be your Frankly Chat login ID and cannot be changed, so think of one that is secure and easy to remember. 6. Create a Frankly Chat password. You can change your password at any time. 7. Enter your Password Reset Question and Answer. This can be used at a later time if you forget your password. 8. Enter your e-mail address.  You will receive e-mail notification when new information is available in Sellbrite. 9. Click Sign Up. You can now view and download portions of your medical record. 10. Click the Download Summary menu link to download a portable copy of your medical information. If you have questions, please visit the Frequently Asked Questions section of the Sellbrite website. Remember, Sellbrite is NOT to be used for urgent needs. For medical emergencies, dial 911. Now available from your iPhone and Android! Please provide this summary of care documentation to your next provider. Your primary care clinician is listed as Yokasta Domínguez. If you have any questions after today's visit, please call [phone].

## 2018-01-09 NOTE — PROGRESS NOTES
History of Present Illness  Lukasz Fairchild is a 33 y.o. male who presents today for management of    Chief Complaint   Patient presents with   [facility] Follow Up    Asthma       Patient is here to establish care. He was admitted at [facility] on 12/31-1/7 for acute respiratory failure due asthma exacerbation. He was placed on Bipap. He improved after being on steroids, Bipap and nebulization. He was also noted to have leucocytosis (present since 6/2017). He was discharged on LABA+ICS and prednisone. He could not afford the Advair. Currently, he denies increased cough or sputum production. He has on and off wheezing or shortness of breath. He has been using albuterol 3 times per day. He did not have regular medical care prior to hospitalization. He also complains of anxiety. He gets panic attacks about once per week. Symptoms include: shaking, palpitations, racing thoughts. He has history of PTSD, diagnosed at age 17. Previous therapies: Xanax (effective). Past Medical History  Past Medical History:   Diagnosis Date    Asthma     Chronic obstructive pulmonary disease ([facility] [address]         Surgical History  Past Surgical History:   Procedure Laterality Date    HX FREE SKIN GRAFT      HX ORTHOPAEDIC      facial reconstruction        Current Medications  Current Outpatient Prescriptions   Medication Sig    predniSONE (DELTASONE) 20 mg tablet 4 days of 60 mg daily,   then 2 days of 40 mg daily,   then 2 days of 20 mg daily  Then stop    albuterol (PROVENTIL HFA, VENTOLIN HFA, PROAIR HFA) 90 mcg/actuation inhaler Take 2 Puffs by inhalation every four (4) hours as needed for Wheezing.  albuterol-ipratropium (DUO-NEB) 2.5 mg-0.5 mg/3 ml nebu 3 mL by Nebulization route every six (6) hours as needed.  albuterol (PROVENTIL VENTOLIN) 2.5 mg /3 mL (0.083 %) nebulizer solution 3 mL by Nebulization route every four (4) hours as needed for Wheezing.     HYDROcodone-acetaminophen (NORCO) 5-325 mg per tablet Take 1 Tab by mouth every six (6) hours as needed for Pain. Max Daily Amount: 4 Tabs.  fluticasone-salmeterol (ADVAIR) 250-50 mcg/dose diskus inhaler Take 1 Puff by inhalation two (2) times a day. No current facility-administered medications for this visit. Allergies/Drug Reactions  Allergies   Allergen Reactions    Penicillin G Anaphylaxis        Family History  No family history on file. Social History  Social History     Social History    Marital status: SINGLE     Spouse name: N/A    Number of children: N/A    Years of education: N/A     Occupational History    Not on file.      Social History Main Topics    Smoking status: Never Smoker    Smokeless tobacco: Former User    Alcohol use Yes    Drug use: No    Sexual activity: Not Currently     Other Topics Concern    Not on file     Social History Narrative       Health Maintenance   Topic Date Due    Pneumococcal 19-64 Medium Risk (1 of 1 - PPSV23) 03/24/2010    DTaP/Tdap/Td series (1 - Tdap) 03/24/2012    Influenza Age 5 to Adult  Completed     Immunization History   Administered Date(s) Administered    Influenza Vaccine 10/10/2017       Review of Systems  General ROS: negative  Psychological ROS: positive for - anxiety  Ophthalmic ROS: negative  ENT ROS: negative  Allergy and Immunology ROS: positive for - seasonal allergies  Endocrine ROS: negative  Respiratory ROS: positive for - cough and wheezing  Cardiovascular ROS: no chest pain or dyspnea on exertion  Gastrointestinal ROS: no abdominal pain, change in bowel habits, or black or bloody stools  Genito-Urinary ROS: no dysuria, trouble voiding, or hematuria  Musculoskeletal ROS: negative  Neurological ROS: negative  Dermatological ROS: negative    Physical Exam  Vital signs:   Vitals:    01/09/18 1438   BP: 112/73   Pulse: 85   Resp: 18   Temp: 97.6 °F (36.4 °C)   TempSrc: Oral   SpO2: 96%   Weight: 197 lb (89.4 kg)   Height: 5' 8\" (1.727 m)       General: alert, oriented, not in distress  Head: scalp normal, atraumatic  Eyes: pupils are equal and reactive, full and intact EOM's  Ears: patent ear canal, intact tympanic membrane  Nose: normal turbinates, no congestion or discharge  Lips/Mouth: moist lips and buccal mucosa, non-enlarged tonsils, pink throat  Neck: supple, no JVD, no lymphadenopathy, non-palpable thyroid  Chest/Lungs: clear breath sounds, no wheezing or crackles  Heart: normal rate, regular rhythm, no murmur  Abdomen: soft, non-distended, non-tender, normal bowel sounds, no organomegaly, no masses  Extremities: no focal deformities, no edema  Skin: no active skin lesions    Laboratory/Tests:    Component      Latest Ref Rng & Units 1/7/2018 1/7/2018           3:20 AM  3:20 AM   WBC      4.6 - 13.2 K/uL 22.4 (H)    RBC      4.70 - 5.50 M/uL 5.98 (H)    HGB      13.0 - 16.0 g/dL 17.1 (H)    HCT      36.0 - 48.0 % 50.0 (H)    MCV      74.0 - 97.0 FL 83.6    MCH      24.0 - 34.0 PG 28.6    MCHC      31.0 - 37.0 g/dL 34.2    RDW      11.6 - 14.5 % 12.3    PLATELET      047 - 919 K/uL 268    MPV      9.2 - 11.8 FL 9.6    NEUTROPHILS      40 - 73 % 65    LYMPHOCYTES      21 - 52 % 23    MONOCYTES      3 - 10 % 12 (H)    EOSINOPHILS      0 - 5 % 0    BASOPHILS      0 - 2 % 0    ABS. NEUTROPHILS      1.8 - 8.0 K/UL 14.3 (H)    ABS. LYMPHOCYTES      0.9 - 3.6 K/UL 5.2 (H)    ABS. MONOCYTES      0.05 - 1.2 K/UL 2.7 (H)    ABS. EOSINOPHILS      0.0 - 0.4 K/UL 0.1    ABS.  BASOPHILS      0.0 - 0.06 K/UL 0.0    DF       AUTOMATED    Sodium      136 - 145 mmol/L  141   Potassium      3.5 - 5.5 mmol/L  3.7   Chloride      100 - 108 mmol/L  106   CO2      21 - 32 mmol/L  28   Anion gap      3.0 - 18 mmol/L  7   Glucose      74 - 99 mg/dL  67 (L)   BUN      7.0 - 18 MG/DL  26 (H)   Creatinine      0.6 - 1.3 MG/DL  0.81   BUN/Creatinine ratio      12 - 20    32 (H)   GFR est AA      >60 ml/min/1.73m2  >60   GFR est non-AA      >60 ml/min/1.73m2  >60   Calcium      8.5 - 10.1 MG/DL 8.1 (L)     Component      Latest Ref Rng & Units 9/5/2017 9/5/2017 9/5/2017           4:23 PM  2:15 PM  1:23 PM   D. pteronyssinus      Class 0/I kU/L 0.13 (A)     D. farinae Mite      Class 0 kU/L <0.10     Cat Hair/Dander      Class V kU/L 59.70 (A)     Dog Hair/Dander      Class IV kU/L 16.20 (A)     [place] Grass      Class I kU/L 0.49 (A)     Curt grass      Class 0/I kU/L 0.19 (A)     Vinh Grass      Class 0/I kU/L 0.14 (A)     [place] Grass      Class I kU/L 0.40 (A)     Cockroach,American      Class 0 kU/L <0.10     Penicillium notatum      Class 0 kU/L <0.10     Cladosporium herbarum      Class 0 kU/L <0.10     Aspergillus fumigatus      Class 0/I kU/L 0.12 (A)     Mucor racemosus      Class 0 kU/L <0.10     Alternaria tenuis      Class 0 kU/L <0.10     Stemphylium botryosum      Class I kU/L 0.49 (A)     White Birch      Class I kU/L 0.39 (A)     [place]      Class I kU/L 0.53 (A)     American White Elm      Class I kU/L 0.42 (A)     Maple/[place]      Class 0/I kU/L 0.31 (A)     White [place]      Class I kU/L 0.35 (A)     [id number] [place], American      Class I kU/L 0.39 (A)     White [place]      Class 0 kU/L <0.10     Sweet Gum      Class 0/I kU/L 0.31 (A)     Mountain [place]      Class 0/I kU/L 0.30 (A)     Ragweed, Short/Common      Class 0/I kU/L 0.30 (A)     Mugwort      Class 0/I kU/L 0.20 (A)     Plantain, English      Class I kU/L 0.37 (A)     Pigweed, Rough      Class I kU/L 0.43 (A)     Sheep Sorrel (Dock)      Class I kU/L 0.48 (A)     Nettle      Class 0/I kU/L 0.19 (A)     Class description       Comment     BENZODIAZEPINES      NEG    NEGATIVE    BARBITURATES      NEG    NEGATIVE    THC (TH-CANNABINOL)      NEG    NEGATIVE    OPIATES      NEG    NEGATIVE    PCP(PHENCYCLIDINE)      NEG    NEGATIVE    COCAINE      NEG    NEGATIVE    AMPHETAMINES      NEG    NEGATIVE    METHADONE        NEGATIVE    HDSCOM        (NOTE)    Influenza A Antigen      NEG     NEGATIVE   Influenza B Antigen      NEG     NEGATIVE     Component      Latest Ref Rng & Units 9/5/2017 9/5/2017 9/5/2017          12:25 PM 12:25 PM 12:25 PM   NT pro-BNP      0 - 450 PG/ML   281   Immunoglobulin E      0 - 100 IU/mL  387 (H)    PERIPHERAL SMEAR       PATHOLOGY REPORT  LEUKOCYTOSIS WITH NUMEROUS NEUTROPHILS   R/O INFECTION        XR Chest:  No acute cardiopulmonary disease.     Assessment/Plan:    1. Severe persistent asthma without complication  - start budesonide-formoterol (SYMBICORT) 160-4.5 mcg/actuation HFAA; Take 2 Puffs by inhalation two (2) times a day. Dispense: 1 Inhaler; Refill: 10  - will apply for assistance program for Symbicort  - continue prn albuterol  - Patient will call pulmonology to set up an appointment. 2. Leukocytosis, unspecified type  - no obvious sign of infection  - possibly from chronic steroid use  - will repeat CBC on next visit    3. Post traumatic stress disorder (PTSD)  - ALPRAZolam (XANAX) 0.5 mg tablet; Take 1 Tab by mouth three (3) times daily as needed for Anxiety. Max Daily Amount: 1.5 mg.  Dispense: 10 Tab; Refill: 0  - gave a list of psychiatry facilities including [facility]    4. Panic disorder  - ALPRAZolam (XANAX) 0.5 mg tablet; Take 1 Tab by mouth three (3) times daily as needed for Anxiety. Max Daily Amount: 1.5 mg.  Dispense: 10 Tab; Refill: 0      Follow-up Disposition:  Return in about 3 months (around 4/9/2018). I have discussed the diagnosis with the patient and the intended plan as seen in the above orders. The patient has received an after-visit summary and questions were answered concerning future plans. I have discussed medication side effects and warnings with the patient as well. I have reviewed the plan of care with the patient, accepted their input and they are in agreement with the treatment goals.        Marcus Patten MD  January 9, 2018

## 2018-01-25 NOTE — ANCILLARY DISCHARGE INSTRUCTIONS
[facility]  Discharge Phone Call       After-Care Discharge Phone Call Questions: no answer     Were you able to get your prescriptions filled? Comment:      [] Yes  []No    Comment if answer is \"No\"   Are you taking your medication(s) as your doctor ordered? Do you understand the purpose of your medications? Comment:    [] Yes  []No    Comment if answer is \"No\"   Are you taking any other medications that are not on the list?  Comment:      [] Yes  []No    Comment if answer is \"Yes\"   Do you have any questions about your medications? Are you aware of potential side effects? Comment:    [] Yes  []No    Comment if answer is \"Yes\"   Did you make your follow-up appointments (if the hospital did not do this before  discharge)? Comment:    [] Yes  []No    Comment if answer is \"No\"   Is there any reason you might not be able to keep your follow-up appointments? Comment:     [] Yes  []No    Comment if answer is \"Yes\"   Do you have any questions about your care plan? Are you aware of what health problems to be alert for? Comment:    [] Yes  []No    Comment if answer is \"Yes\"   Do you have a good understanding of how you should manage your health? Comment:    [] Yes  []No    Comment if answer is \"Yes\"   Do you know which symptoms to watch for that would mean you would need to call your doctor right away? Comment:      [] Yes  []No    Comment if answer is \"No\"   Do you have any questions about the follow up process or any instructions that we have provided? Comment:    [] Yes  []No    Comment if answer is \"Yes\"   Did staff take your preferences into account?         [] Yes  []No    Comment if answer is \"Yes\"

## 2018-02-12 ENCOUNTER — OFFICE VISIT (OUTPATIENT)
Dept: PULMONOLOGY | Facility: CLINIC | Age: 28
End: 2018-02-12

## 2018-02-12 VITALS
TEMPERATURE: 98.3 F | DIASTOLIC BLOOD PRESSURE: 88 MMHG | WEIGHT: 197 LBS | RESPIRATION RATE: 19 BRPM | HEIGHT: 68 IN | BODY MASS INDEX: 29.86 KG/M2 | HEART RATE: 92 BPM | SYSTOLIC BLOOD PRESSURE: 102 MMHG

## 2018-02-12 DIAGNOSIS — J45.50 POORLY CONTROLLED SEVERE PERSISTENT ASTHMA WITHOUT COMPLICATION: Primary | ICD-10-CM

## 2018-02-12 DIAGNOSIS — R76.8 ELEVATED IGE LEVEL: ICD-10-CM

## 2018-02-12 RX ORDER — FLUTICASONE PROPIONATE 50 MCG
1 SPRAY, SUSPENSION (ML) NASAL 2 TIMES DAILY
Qty: 1 BOTTLE | Refills: 11 | Status: SHIPPED | OUTPATIENT
Start: 2018-02-12

## 2018-02-12 RX ORDER — MONTELUKAST SODIUM 10 MG/1
10 TABLET ORAL DAILY
Qty: 90 TAB | Refills: 3 | Status: SHIPPED | OUTPATIENT
Start: 2018-02-12 | End: 2018-11-19

## 2018-02-12 RX ORDER — CETIRIZINE HCL 10 MG
10 TABLET ORAL DAILY
Qty: 90 TAB | Refills: 3 | Status: SHIPPED | OUTPATIENT
Start: 2018-02-12 | End: 2020-03-04

## 2018-02-12 NOTE — PROGRESS NOTES
[facility]  Pulmonary, Critical Care, and Sleep Medicine      Pulmonary Office Progress Notes    Name: Vanetta Boeck     : 1990     Date: 2018        Subjective:     Patient is a 33 y.o. male is here for follow up on severe persistent asthma  Since  he has been to the ER or admitted 4 times for AE asthma. He is currently using 10 puffs of albuterol a day and wakens nightly short of breath. He continues to use steroids, but tries to decrease the prescribed dose in order to make the med last longer. He is currently unemployed and has trouble finding work due to his frequent exacerbations. He had 2 cats and 2 dogs in his apartment. He now has only dogs. He received his last Symbicort inhaler for free. He just refilled his rescue inhaler. Objective:     Visit Vitals    /88 (BP 1 Location: Left arm, BP Patient Position: Sitting)    Pulse 92    Temp 98.3 °F (36.8 °C) (Oral)    Resp 19    Ht 5' 8\" (1.727 m)    Wt 89.4 kg (197 lb)    BMI 29.95 kg/m2        Physical Exam:   General: comfortable, no acute distress, normal affect  HEENT: sclera anicteric, EOM intact  Neck:  no JVD  CVS: S1S2 normal, no murmurs  RS: Diffuse wheezing, no tactile fremitus or egophony, no accessory muscle use  Neuro: normal gait, awake, alert  Extrm: no clubbing or cyanosis    Data review: IgE 387  Multiple environmental allergies    Imaging:  I have personally reviewed the patients radiographs and have reviewed the reports:  Normal from last admission in early 2018     Patient Active Problem List   Diagnosis Code    Severe persistent asthma without complication R63.03    Leukocytosis D72.829       IMPRESSION:   · Severe persistent asthma, poorly controlled  · Elevated IgE      RECOMMENDATIONS:   · Continue Symbicort  · Cetirizine  · Flonase  · Spirometry  · Xolair candidate  · 3 month return to clinic     The patient is an excellent Xolair candidate.   The problem is that his insurance does not appear to cover such therapy. Follow-up Disposition: Not on File     Mr. Winifred August has a reminder for a \"due or due soon\" health maintenance. I have asked that he contact his primary care provider for follow-up on this health maintenance.          Ross Macias MD

## 2018-02-12 NOTE — PROGRESS NOTES
Chief Complaint   Patient presents with    Asthma     patient states that he is here to follow up on his asthma, has recently had a hospital visit at [facility]. He wants to discuss potential injectables for asthma control as well as the potential for disability.  Cough     this patient noticeably has a cough, and states that sometimes it is productive. he states that he has been \"rationing out\" his deltasone to make it last longer. has had this cough for about 1 week, states he thinks he had the flu last week. 1. Have you been to the ER, urgent care clinic since your last visit? Hospitalized since your last visit? No    2. Have you seen or consulted any other health care providers outside of the [address] [place] [address] since your last visit? Include any pap smears or colon screening.  No

## 2018-02-12 NOTE — PATIENT INSTRUCTIONS
Controlling Your Asthma: Care Instructions  Your Care Instructions    Asthma is a long-term condition that affects your breathing. It causes the airways that lead to the lungs to swell. During an asthma attack, the airways swell and narrow. This makes it hard to breathe. You may wheeze or cough. If you have a bad attack, you may need emergency care. There are two things to do to treat asthma. · Control asthma over the long term. · Treat attacks when they occur. You and your doctor can make an asthma action plan. It tells you what medicines you need to take every day to control asthma symptoms and what to do if you have an asthma attack. Your asthma action plan can help prevent and treat attacks. When you keep your asthma under control, you can prevent severe attacks and lasting damage to your airways. You need to treat your asthma even when you are not having symptoms. Although asthma is a lifelong disease, treatment can help control it and help you stay healthy. Follow-up care is a key part of your treatment and safety. Be sure to make and go to all appointments, and call your doctor if you are having problems. It's also a good idea to know your test results and keep a list of the medicines you take. How can you care for yourself at home? To control asthma over the long term  Medicines  Controller medicines reduce swelling in your lungs. They also prevent asthma attacks. Take your controller medicine exactly as prescribed. Talk to your doctor if you have any problems with your medicine. · Inhaled corticosteroid is a common and effective controller medicine. Using it the right way can prevent or reduce most side effects. · Take your controller medicine every day, not just when you have symptoms. It helps prevent problems before they occur. · Your doctor may prescribe another medicine that you use along with the corticosteroid. This is often a long-acting bronchodilator.  Do not take this medicine by itself. Using a long-acting bronchodilator by itself can increase your risk of a severe or fatal asthma attack. · Do not take inhaled corticosteroids or long-acting bronchodilators to stop an asthma attack that has already started. They don't work fast enough to help. · Talk to your doctor before you use other medicines. Some medicines, such as aspirin, can cause asthma attacks in some people. Education  · Learn what triggers an asthma attack. Avoid these triggers when you can. Common triggers include colds, smoke, air pollution, dust, pollen, mold, pets, cockroaches, stress, and cold air. · Check yourself for asthma symptoms to know which step to follow in your action plan. Watch for things like being short of breath, having chest tightness, coughing, and wheezing. Also notice if symptoms wake you up at night or if you get tired quickly when you exercise. · If you have a peak flow meter, use it to check how well you are breathing. It can help you know when an asthma attack is going to occur. Then you can take medicine to prevent the asthma attack or make it less severe. · Do not smoke or allow others to smoke around you. Avoid smoky places. Smoking makes asthma worse. If you need help quitting, talk to your doctor about stop-smoking programs and medicines. These can increase your chances of quitting for good. · Avoid colds and the flu. Get a pneumococcal vaccine shot. If you have had one before, ask your doctor whether you need a second dose. Get a flu vaccine every year, as soon as it's available. If you must be around people with colds or the flu, wash your hands often. To treat attacks when they occur  Use your asthma action plan when you have an attack. Your quick-relief medicine will stop an asthma attack. It relaxes the muscles that get tight around the airways. If your doctor prescribed corticosteroid pills to use during an attack, take them as directed.  They may take hours to work, but they may shorten the attack and help you breathe better. · Albuterol is an effective quick-relief inhaler. · Take your quick-relief medicine exactly as prescribed. · Always bring your asthma medicine with you when you travel. · You may need to use quick-relief medicine before you exercise. · Call your doctor if you think you are having a problem with your medicine. When should you call for help? Call 911 anytime you think you may need emergency care. For example, call if:  ? · You are having severe trouble breathing. ?Call your doctor now or seek immediate medical care if:  ? · Your symptoms do not get better after you have followed your asthma action plan. ? · You cough up yellow, dark brown, or bloody mucus (sputum). ? Watch closely for changes in your health, and be sure to contact your doctor if:  ? · Your coughing and wheezing get worse. ? · You need to use your quick-relief medicine on more than 2 days a week (unless it is just for exercise). ? · You need help figuring out what is triggering your asthma attacks. Where can you learn more? Go to http://jason-maritza.info/. Enter N844 in the search box to learn more about \"Controlling Your Asthma: Care Instructions. \"  Current as of: May 12, 2017  Content Version: 11.4  © 4579-6674 Healthwise, Incorporated. Care instructions adapted under license by [employer] (which disclaims liability or warranty for this information). If you have questions about a medical condition or this instruction, always ask your healthcare professional. [place] [address] any warranty or liability for your use of this information.

## 2018-02-12 NOTE — MR AVS SNAPSHOT
[facility] [address] [facility] [id number] 
[phone] Patient: Harry Brooke MRN: [id number] Visit Information Date & Time Provider Department Dept. Phone Encounter #  
 2/12/2018 11:00 AM Eron Sparks MD [facility] [id number] Follow-up Instructions Return in about 3 months (around 5/12/2018), or if symptoms worsen or fail to improve, for asthma follow up. Upcoming Health Maintenance Date Due Pneumococcal 19-64 Highest Risk (1 of 3 - PCV13) 3/24/2010 DTaP/Tdap/Td series (1 - Tdap) 3/24/2012 Allergies as of 2/12/2018  Review Complete On: 2/12/2018 By: Eron Sparks MD  
  
 Severity Noted Reaction Type Reactions Penicillin G High 06/24/2017    Anaphylaxis Current Immunizations  Never Reviewed Name Date Influenza Vaccine 10/10/2017 Not reviewed this visit You Were Diagnosed With   
  
 Codes Comments Poorly controlled severe persistent asthma without complication    -  Primary ICD-10-CM: J45.50 ICD-9-CM: 493.90 Elevated IgE level     ICD-10-CM: R76.8 ICD-9-CM: 795.79 Vitals BP Pulse Temp Resp Height(growth percentile) Weight(growth percentile) 102/88 (BP 1 Location: Left arm, BP Patient Position: Sitting) 92 98.3 °F (36.8 °C) (Oral) 19 5' 8\" (1.727 m) 197 lb (89.4 kg) BMI Smoking Status 29.95 kg/m2 Never Smoker Vitals History BMI and BSA Data Body Mass Index Body Surface Area  
 29.95 kg/m 2 2.07 m 2 Preferred Pharmacy Pharmacy Name Phone [address], [address] [phone] Your Updated Medication List  
  
   
This list is accurate as of: 2/12/18  3:50 PM.  Always use your most recent med list.  
  
  
  
  
 * albuterol 2.5 mg /3 mL (0.083 %) nebulizer solution Commonly known as:  PROVENTIL VENTOLIN  
3 mL by Nebulization route every four (4) hours as needed for Wheezing. * albuterol 90 mcg/actuation inhaler Commonly known as:  PROVENTIL HFA, VENTOLIN HFA, PROAIR HFA Take 2 Puffs by inhalation every four (4) hours as needed for Wheezing. ALPRAZolam 0.5 mg tablet Commonly known as:  Towana Brandyn Take 1 Tab by mouth three (3) times daily as needed for Anxiety. Max Daily Amount: 1.5 mg.  
  
 budesonide-formoterol 160-4.5 mcg/actuation Hfaa Commonly known as:  SYMBICORT Take 2 Puffs by inhalation two (2) times a day. cetirizine 10 mg tablet Commonly known as:  ZYRTEC Take 1 Tab by mouth daily. fluticasone 50 mcg/actuation nasal spray Commonly known as:  FLONASE  
1 [place] by Both Nostrils route two (2) times a day. montelukast 10 mg tablet Commonly known as:  SINGULAIR Take 1 Tab by mouth daily. Indications: MAINTENANCE THERAPY FOR ASTHMA predniSONE 20 mg tablet Commonly known as:  DELTASONE  
4 days of 60 mg daily,  then 2 days of 40 mg daily,  then 2 days of 20 mg daily Then stop * Notice: This list has 2 medication(s) that are the same as other medications prescribed for you. Read the directions carefully, and ask your doctor or other care provider to review them with you. Prescriptions Sent to Pharmacy Refills  
 montelukast (SINGULAIR) 10 mg tablet 3 Sig: Take 1 Tab by mouth daily. Indications: MAINTENANCE THERAPY FOR ASTHMA Class: Normal  
 Pharmacy: [facility] DR RMOINA CORLEY [address] Ph #: [phone] Route: Oral  
 cetirizine (ZYRTEC) 10 mg tablet 3 Sig: Take 1 Tab by mouth daily. Class: Normal  
 Pharmacy: [facility] DR ROMINA CORLEY [address] Ph #: [phone] Route: Oral  
 fluticasone (FLONASE) 50 mcg/actuation nasal spray [address] [place] by Both Nostrils route two (2) times a day.   
 Class: Normal  
 Pharmacy: [facility] DR ROMINA CORLEY [address], [address] Ph #: [phone] Route: Both Nostrils We Performed the Following AMB POC SPIROMETRY W/BRONCHODILATOR [58622 CPT(R)] Follow-up Instructions Return in about 3 months (around 5/12/2018), or if symptoms worsen or fail to improve, for asthma follow up. Patient Instructions Controlling Your Asthma: Care Instructions Your Care Instructions Asthma is a long-term condition that affects your breathing. It causes the airways that lead to the lungs to swell. During an asthma attack, the airways swell and narrow. This makes it hard to breathe. You may wheeze or cough. If you have a bad attack, you may need emergency care. There are two things to do to treat asthma. · Control asthma over the long term. · Treat attacks when they occur. You and your doctor can make an asthma action plan. It tells you what medicines you need to take every day to control asthma symptoms and what to do if you have an asthma attack. Your asthma action plan can help prevent and treat attacks. When you keep your asthma under control, you can prevent severe attacks and lasting damage to your airways. You need to treat your asthma even when you are not having symptoms. Although asthma is a lifelong disease, treatment can help control it and help you stay healthy. Follow-up care is a key part of your treatment and safety. Be sure to make and go to all appointments, and call your doctor if you are having problems. It's also a good idea to know your test results and keep a list of the medicines you take. How can you care for yourself at home? To control asthma over the long term Medicines Controller medicines reduce swelling in your lungs. They also prevent asthma attacks. Take your controller medicine exactly as prescribed. Talk to your doctor if you have any problems with your medicine. · Inhaled corticosteroid is a common and effective controller medicine. Using it the right way can prevent or reduce most side effects. · Take your controller medicine every day, not just when you have symptoms. It helps prevent problems before they occur. · Your doctor may prescribe another medicine that you use along with the corticosteroid. This is often a long-acting bronchodilator. Do not take this medicine by itself. Using a long-acting bronchodilator by itself can increase your risk of a severe or fatal asthma attack. · Do not take inhaled corticosteroids or long-acting bronchodilators to stop an asthma attack that has already started. They don't work fast enough to help. · Talk to your doctor before you use other medicines. Some medicines, such as aspirin, can cause asthma attacks in some people. Education · Learn what triggers an asthma attack. Avoid these triggers when you can. Common triggers include colds, smoke, air pollution, dust, pollen, mold, pets, cockroaches, stress, and cold air. · Check yourself for asthma symptoms to know which step to follow in your action plan. Watch for things like being short of breath, having chest tightness, coughing, and wheezing. Also notice if symptoms wake you up at night or if you get tired quickly when you exercise. · If you have a peak flow meter, use it to check how well you are breathing. It can help you know when an asthma attack is going to occur. Then you can take medicine to prevent the asthma attack or make it less severe. · Do not smoke or allow others to smoke around you. Avoid smoky places. Smoking makes asthma worse. If you need help quitting, talk to your doctor about stop-smoking programs and medicines. These can increase your chances of quitting for good. · Avoid colds and the flu. Get a pneumococcal vaccine shot. If you have had one before, ask your doctor whether you need a second dose.  Get a flu vaccine every year, as soon as it's available. If you must be around people with colds or the flu, wash your hands often. To treat attacks when they occur Use your asthma action plan when you have an attack. Your quick-relief medicine will stop an asthma attack. It relaxes the muscles that get tight around the airways. If your doctor prescribed corticosteroid pills to use during an attack, take them as directed. They may take hours to work, but they may shorten the attack and help you breathe better. · Albuterol is an effective quick-relief inhaler. · Take your quick-relief medicine exactly as prescribed. · Always bring your asthma medicine with you when you travel. · You may need to use quick-relief medicine before you exercise. · Call your doctor if you think you are having a problem with your medicine. When should you call for help? Call 911 anytime you think you may need emergency care. For example, call if: 
? · You are having severe trouble breathing. ?Call your doctor now or seek immediate medical care if: 
? · Your symptoms do not get better after you have followed your asthma action plan. ? · You cough up yellow, dark brown, or bloody mucus (sputum). ? Watch closely for changes in your health, and be sure to contact your doctor if: 
? · Your coughing and wheezing get worse. ? · You need to use your quick-relief medicine on more than 2 days a week (unless it is just for exercise). ? · You need help figuring out what is triggering your asthma attacks. Where can you learn more? Go to http://jason-maritza.info/. Enter N174 in the search box to learn more about \"Controlling Your Asthma: Care Instructions. \" Current as of: May 12, 2017 Content Version: 11.4 © 7846-0505 [employer]. Care instructions adapted under license by [employer] (which disclaims liability or warranty for this information).  If you have questions about a medical condition or this instruction, always ask your healthcare professional. [place] [address] any warranty or liability for your use of this information. Introducing [facility]! [employer] introduces Beegit patient portal. Now you can access parts of your medical record, email your doctor's office, and request medication refills online. 1. In your internet browser, go to [email or web address] 2. Click on the First Time User? Click Here link in the Sign In box. You will see the New Member Sign Up page. 3. Enter your Beegit Access Code exactly as it appears below. You will not need to use this code after youve completed the sign-up process. If you do not sign up before the expiration date, you must request a new code. · Beegit Access Code: [email or web address] Expires: 3/17/2018 12:53 PM 
 
4. Enter the last four digits of your Social Security Number (xxxx) and Date of Birth (mm/dd/yyyy) as indicated and click Submit. You will be taken to the next sign-up page. 5. Create a Beegit ID. This will be your Beegit login ID and cannot be changed, so think of one that is secure and easy to remember. 6. Create a Beegit password. You can change your password at any time. 7. Enter your Password Reset Question and Answer. This can be used at a later time if you forget your password. 8. Enter your e-mail address. You will receive e-mail notification when new information is available in [address]. 9. Click Sign Up. You can now view and download portions of your medical record. 10. Click the Download Summary menu link to download a portable copy of your medical information. If you have questions, please visit the Frequently Asked Questions section of the Beegit website. Remember, Beegit is NOT to be used for urgent needs. For medical emergencies, dial 911. Now available from your iPhone and Android! Please provide this summary of care documentation to your next provider. Your primary care clinician is listed as Judit Huber. If you have any questions after today's visit, please call [phone].

## 2018-03-14 ENCOUNTER — TELEPHONE (OUTPATIENT)
Dept: PULMONOLOGY | Age: 28
End: 2018-03-14

## 2018-03-14 NOTE — TELEPHONE ENCOUNTER
PT CALLED([phone]). NEEDS DR Luis Dowell TO SEND SCRIPTS FOR ALL OF HIS MEDS TO [facility] [phone]. THEY HAVE LOST SCRIPTS. PLEASE CALL PT BACK.

## 2018-03-15 NOTE — TELEPHONE ENCOUNTER
Have called patient x3, and left 2 messages. All scripts were sent electronically to his pharmacy at his [facility] last month.

## 2018-03-26 ENCOUNTER — OFFICE VISIT (OUTPATIENT)
Dept: PULMONOLOGY | Facility: CLINIC | Age: 28
End: 2018-03-26

## 2018-03-26 ENCOUNTER — HOSPITAL ENCOUNTER (EMERGENCY)
Age: 28
Discharge: HOME | End: 2018-03-26
Attending: EMERGENCY MEDICINE
Payer: SELF-PAY

## 2018-03-26 VITALS
WEIGHT: 197 LBS | SYSTOLIC BLOOD PRESSURE: 130 MMHG | HEIGHT: 68 IN | RESPIRATION RATE: 22 BRPM | BODY MASS INDEX: 29.86 KG/M2 | DIASTOLIC BLOOD PRESSURE: 100 MMHG | OXYGEN SATURATION: 98 % | HEART RATE: 110 BPM

## 2018-03-26 VITALS
OXYGEN SATURATION: 96 % | HEART RATE: 97 BPM | SYSTOLIC BLOOD PRESSURE: 134 MMHG | TEMPERATURE: 97.8 F | DIASTOLIC BLOOD PRESSURE: 95 MMHG

## 2018-03-26 DIAGNOSIS — J45.41 MODERATE PERSISTENT ASTHMA WITH ACUTE EXACERBATION: Primary | ICD-10-CM

## 2018-03-26 DIAGNOSIS — J45.51 ASTHMA IN ADULT, SEVERE PERSISTENT, WITH ACUTE EXACERBATION: Primary | ICD-10-CM

## 2018-03-26 DIAGNOSIS — J45.50 SEVERE PERSISTENT ASTHMA WITHOUT COMPLICATION: Primary | ICD-10-CM

## 2018-03-26 PROCEDURE — 74011000250 HC RX REV CODE- 250: Performed by: EMERGENCY MEDICINE

## 2018-03-26 PROCEDURE — 94640 AIRWAY INHALATION TREATMENT: CPT

## 2018-03-26 PROCEDURE — 96365 THER/PROPH/DIAG IV INF INIT: CPT

## 2018-03-26 PROCEDURE — 96375 TX/PRO/DX INJ NEW DRUG ADDON: CPT

## 2018-03-26 PROCEDURE — 74011250636 HC RX REV CODE- 250/636: Performed by: EMERGENCY MEDICINE

## 2018-03-26 PROCEDURE — 77030029684 HC NEB SM VOL KT MONA -A

## 2018-03-26 PROCEDURE — 99284 EMERGENCY DEPT VISIT MOD MDM: CPT

## 2018-03-26 RX ORDER — PREDNISONE 20 MG/1
20 TABLET ORAL DAILY
Qty: 5 TAB | Refills: 0 | Status: SHIPPED | OUTPATIENT
Start: 2018-03-26 | End: 2018-03-31

## 2018-03-26 RX ORDER — IPRATROPIUM BROMIDE AND ALBUTEROL SULFATE 2.5; .5 MG/3ML; MG/3ML
3 SOLUTION RESPIRATORY (INHALATION)
Status: COMPLETED | OUTPATIENT
Start: 2018-03-26 | End: 2018-03-26

## 2018-03-26 RX ORDER — MAGNESIUM SULFATE HEPTAHYDRATE 40 MG/ML
2 INJECTION, SOLUTION INTRAVENOUS ONCE
Status: COMPLETED | OUTPATIENT
Start: 2018-03-26 | End: 2018-03-26

## 2018-03-26 RX ADMIN — IPRATROPIUM BROMIDE AND ALBUTEROL SULFATE 3 ML: .5; 3 SOLUTION RESPIRATORY (INHALATION) at 14:45

## 2018-03-26 RX ADMIN — METHYLPREDNISOLONE SODIUM SUCCINATE 125 MG: 125 INJECTION, POWDER, FOR SOLUTION INTRAMUSCULAR; INTRAVENOUS at 14:23

## 2018-03-26 RX ADMIN — IPRATROPIUM BROMIDE AND ALBUTEROL SULFATE 3 ML: .5; 3 SOLUTION RESPIRATORY (INHALATION) at 14:23

## 2018-03-26 RX ADMIN — IPRATROPIUM BROMIDE AND ALBUTEROL SULFATE 3 ML: .5; 3 SOLUTION RESPIRATORY (INHALATION) at 14:59

## 2018-03-26 RX ADMIN — MAGNESIUM SULFATE HEPTAHYDRATE 2 G: 40 INJECTION, SOLUTION INTRAVENOUS at 14:24

## 2018-03-26 NOTE — DISCHARGE INSTRUCTIONS
Asthma Attack: Care Instructions  Your Care Instructions    During an asthma attack, the airways swell and narrow. This makes it hard to breathe. Severe asthma attacks can be life-threatening, but you can help prevent them by keeping your asthma under control and treating symptoms before they get bad. Symptoms include being short of breath, having chest tightness, coughing, and wheezing. Noting and treating these symptoms can also help you avoid future trips to the emergency room. The doctor has checked you carefully, but problems can develop later. If you notice any problems or new symptoms, get medical treatment right away. Follow-up care is a key part of your treatment and safety. Be sure to make and go to all appointments, and call your doctor if you are having problems. It's also a good idea to know your test results and keep a list of the medicines you take. How can you care for yourself at home? · Follow your asthma action plan to prevent and treat attacks. If you don't have an asthma action plan, work with your doctor to create one. · Take your asthma medicines exactly as prescribed. Talk to your doctor right away if you have any questions about how to take them. ¨ Use your quick-relief medicine when you have symptoms of an attack. Quick-relief medicine is usually an albuterol inhaler. Some people need to use quick-relief medicine before they exercise. ¨ Take your controller medicine every day, not just when you have symptoms. Controller medicine is usually an inhaled corticosteroid. The goal is to prevent problems before they occur. Don't use your controller medicine to treat an attack that has already started. It doesn't work fast enough to help. ¨ If your doctor prescribed corticosteroid pills to use during an attack, take them exactly as prescribed. It may take hours for the pills to work, but they may make the episode shorter and help you breathe better.   ¨ Keep your quick-relief medicine with you at all times. · Talk to your doctor before using other medicines. Some medicines, such as aspirin, can cause asthma attacks in some people. · If you have a peak flow meter, use it to check how well you are breathing. This can help you predict when an asthma attack is going to occur. Then you can take medicine to prevent the asthma attack or make it less severe. · Do not smoke or allow others to smoke around you. Avoid smoky places. Smoking makes asthma worse. If you need help quitting, talk to your doctor about stop-smoking programs and medicines. These can increase your chances of quitting for good. · Learn what triggers an asthma attack for you, and avoid the triggers when you can. Common triggers include colds, smoke, air pollution, dust, pollen, mold, pets, cockroaches, stress, and cold air. · Avoid colds and the flu. Get a pneumococcal vaccine shot. If you have had one before, ask your doctor if you need a second dose. Get a flu vaccine every fall. If you must be around people with colds or the flu, wash your hands often. When should you call for help? Call 911 anytime you think you may need emergency care. For example, call if:  ? · You have severe trouble breathing. ?Call your doctor now or seek immediate medical care if:  ? · Your symptoms do not get better after you have followed your asthma action plan. ? · You have new or worse trouble breathing. ? · Your coughing and wheezing get worse. ? · You cough up dark brown or bloody mucus (sputum). ? · You have a new or higher fever. ? Watch closely for changes in your health, and be sure to contact your doctor if:  ? · You need to use quick-relief medicine on more than 2 days a week (unless it is just for exercise). ? · You cough more deeply or more often, especially if you notice more mucus or a change in the color of your mucus. ? · You are not getting better as expected. Where can you learn more?   Go to http://jason-maritza.info/. Enter J167 in the search box to learn more about \"Asthma Attack: Care Instructions. \"  Current as of: May 12, 2017  Content Version: 11.4  © 8267-4343 Healthwise, [employer]. Care instructions adapted under license by [employer] (which disclaims liability or warranty for this information). If you have questions about a medical condition or this instruction, always ask your healthcare professional. [place] [address] any warranty or liability for your use of this information.

## 2018-03-26 NOTE — PROGRESS NOTES
Chief Complaint   Patient presents with    Asthma     patient is here today for an evaluation of a current asthma exacerbation. he is extremely out of breath currently due to walking and accidently going to the wrong building. has not been able to take his symbicort as the pharmacy would not use the free script card. 1. Have you been to the ER, urgent care clinic since your last visit? Hospitalized since your last visit? No    2. Have you seen or consulted any other health care providers outside of the [address] [place] [address] [place] since your last visit? Include any pap smears or colon screening.  No

## 2018-03-26 NOTE — ED PROVIDER NOTES
EMERGENCY DEPARTMENT HISTORY AND PHYSICAL EXAM    2:24 PM      Date: 3/26/2018  Patient Name: Shayy Mclean    History of Presenting Illness     Chief Complaint   Patient presents with    Wheezing    Shortness of Breath         History Provided By: Patient    Chief Complaint: SOB  Duration:  Days  Timing:  Worsening  Location: chest  Quality: N/A  Severity: Moderate  Modifying Factors: none  Associated Symptoms: wheezing      Additional History (Context): Gm Zamora is a 33 y.o. male with COPD and asthma who presents with to the ED from his pulmonologist office complaining of worsening SOB that began 1.5 hours ago with associated wheezing. The patient states that he has been feeling short of breath for the past \"couple days. \" The patient was brought to the ED because his pulmonologist was unable to give him steroids in office. He remarks that he is in the process of trying to obtain Medicare and only takes his medications as he can afford them. Patient denies cough, fever, and smoking. No other complaints or concerns at this time. PCP: Hardy Gomez MD    Current Outpatient Prescriptions   Medication Sig Dispense Refill    predniSONE (DELTASONE) 20 mg tablet Take 1 Tab by mouth daily for 5 days. With Breakfast 5 Tab 0    cetirizine (ZYRTEC) 10 mg tablet Take 1 Tab by mouth daily. 90 Tab 3    albuterol (PROVENTIL HFA, VENTOLIN HFA, PROAIR HFA) 90 mcg/actuation inhaler Take 2 Puffs by inhalation every four (4) hours as needed for Wheezing. 1 Inhaler 0    albuterol (PROVENTIL VENTOLIN) 2.5 mg /3 mL (0.083 %) nebulizer solution 3 mL by Nebulization route every four (4) hours as needed for Wheezing. 24 Each 6    montelukast (SINGULAIR) 10 mg tablet Take 1 Tab by mouth daily. Indications: MAINTENANCE THERAPY FOR ASTHMA 90 Tab 3    fluticasone (FLONASE) 50 mcg/actuation nasal spray 1 [place] by Both Nostrils route two (2) times a day.  1 Bottle 11    budesonide-formoterol (SYMBICORT) 160-4.5 mcg/actuation HFAA Take 2 Puffs by inhalation two (2) times a day. 1 Inhaler 10    ALPRAZolam (XANAX) 0.5 mg tablet Take 1 Tab by mouth three (3) times daily as needed for Anxiety. Max Daily Amount: 1.5 mg. 10 Tab 0       Past History     Past Medical History:  Past Medical History:   Diagnosis Date    Asthma     Chronic obstructive pulmonary disease ([address]        Past Surgical History:  Past Surgical History:   Procedure Laterality Date    HX FREE SKIN GRAFT      HX ORTHOPAEDIC      facial reconstruction       Family History:  Family History   Problem Relation Age of Onset    Adopted: Yes    No Known Problems Mother     No Known Problems Father        Social History:  Social History   Substance Use Topics    Smoking status: Never Smoker    Smokeless tobacco: Former User    Alcohol use Yes       Allergies: Allergies   Allergen Reactions    Penicillin G Anaphylaxis         Review of Systems       Review of Systems   Constitutional: Negative for diaphoresis and fever. HENT: Negative for ear pain, rhinorrhea and trouble swallowing. Eyes: Negative for visual disturbance. Respiratory: Positive for shortness of breath and wheezing. Negative for cough. Cardiovascular: Negative for chest pain and leg swelling. Gastrointestinal: Negative for abdominal pain, blood in stool, diarrhea, nausea and vomiting. Genitourinary: Negative for difficulty urinating, flank pain and hematuria. Musculoskeletal: Negative for back pain and neck pain. Skin: Negative for rash. Neurological: Negative for dizziness, weakness, numbness and headaches. Hematological: Negative. Psychiatric/Behavioral: Negative. All other systems reviewed and are negative. Physical Exam     Visit Vitals    BP (!) 126/96    Pulse 97    Temp 97.8 °F (36.6 °C)    SpO2 99%         Physical Exam   Constitutional: He is oriented to person, place, and time. He appears well-developed and well-nourished.  No distress. HENT:   Head: Normocephalic and atraumatic. Mouth/Throat: Oropharynx is clear and moist.   Eyes: Conjunctivae and EOM are normal. Pupils are equal, round, and reactive to light. No scleral icterus. Neck: Normal range of motion. Neck supple. Cardiovascular: Normal rate, regular rhythm and normal heart sounds. No murmur heard. Pulmonary/Chest: Accessory muscle usage present. No respiratory distress. He has wheezes (inspiratory and expiratory). The patient is able to speak in full sentences. Abdominal: Soft. Bowel sounds are normal. He exhibits no distension. There is no tenderness. Musculoskeletal: He exhibits no edema. Lymphadenopathy:     He has no cervical adenopathy. Neurological: He is alert and oriented to person, place, and time. Coordination normal.   Skin: Skin is warm and dry. No rash noted. Psychiatric: He has a normal mood and affect. His behavior is normal.   Nursing note and vitals reviewed. Diagnostic Study Results     Labs -  No results found for this or any previous visit (from the past 12 hour(s)). Radiologic Studies -   No orders to display         Medical Decision Making   I am the first provider for this patient. I reviewed the vital signs, available nursing notes, past medical history, past surgical history, family history and social history. Vital Signs-Reviewed the patient's vital signs. Pulse Oximetry Analysis -  96 on room air (Interpretation) WNL    Records Reviewed:  Old Medical Records (Time of Review: 2:24 PM)    ED Course: Progress Notes, Reevaluation, and Consults:      Provider Notes (Medical Decision Making):   MDM  Number of Diagnoses or Management Options  Moderate persistent asthma with acute exacerbation:   Diagnosis management comments: H/o chronic asthma with increased exacerbation able to speak in complete sentences in ED no evidence of infection duoneb X 3 with solumedrol and mag given in ED on re eval much improved will dc home           Diagnosis     Clinical Impression:   1. Moderate persistent asthma with acute exacerbation        Disposition: Discharged     Follow-up Information     Follow up With Details Comments Contact Info    [facility] EMERGENCY DEPT  As needed, If symptoms worsen [address] [facility] [address]    Judit Huber MD Schedule an appointment as soon as possible for a visit for ED follow up  [address] [facility] Danielle Dionicio Boxer, MD Schedule an appointment as soon as possible for a visit  [address] [facility] [address]  [phone]             Patient's Medications   Start Taking    PREDNISONE (DELTASONE) 20 MG TABLET    Take 1 Tab by mouth daily for 5 days. With Breakfast   Continue Taking    ALBUTEROL (PROVENTIL HFA, VENTOLIN HFA, PROAIR HFA) 90 MCG/ACTUATION INHALER    Take 2 Puffs by inhalation every four (4) hours as needed for Wheezing. ALBUTEROL (PROVENTIL VENTOLIN) 2.5 MG /3 ML (0.083 %) NEBULIZER SOLUTION    3 mL by Nebulization route every four (4) hours as needed for Wheezing. ALPRAZOLAM (XANAX) 0.5 MG TABLET    Take 1 Tab by mouth three (3) times daily as needed for Anxiety. Max Daily Amount: 1.5 mg.    BUDESONIDE-FORMOTEROL (SYMBICORT) 160-4.5 MCG/ACTUATION HFAA    Take 2 Puffs by inhalation two (2) times a day. CETIRIZINE (ZYRTEC) 10 MG TABLET    Take 1 Tab by mouth daily. FLUTICASONE (FLONASE) 50 MCG/ACTUATION NASAL SPRAY    1 [place] by Both Nostrils route two (2) times a day. MONTELUKAST (SINGULAIR) 10 MG TABLET    Take 1 Tab by mouth daily.  Indications: MAINTENANCE THERAPY FOR ASTHMA   These Medications have changed    No medications on file   Stop Taking    No medications on file     _______________________________    Attestations:  Scribe [employer] acting as a scribe for and in the presence of Du Lin MD      March 26, 2018 at 2:24 PM       Provider Attestation:      I personally performed the services described in the documentation, reviewed the documentation, as recorded by the scribe in my presence, and it accurately and completely records my words and actions.  March 26, 2018 at 2:24 PM - Cole Lawrence MD    _______________________________

## 2018-03-26 NOTE — ED NOTES
Bedside report received from Melchor Duarte rn  Pt resting on the stretcher in NAD. Pertinent information reviewed including results, medication administration, and SBAR. Any questions pt presents answered. Any needs addressed.

## 2018-03-26 NOTE — ED NOTES
Pt discharged to home ambulatory and in company of s/o  Discharge instructions provided via discussion and handout. Teaching to patient. Verbalized understanding. No questions voiced. Discharged with 1 RX.

## 2018-03-26 NOTE — PROGRESS NOTES
[facility]  Pulmonary, Critical Care, and Sleep Medicine      Pulmonary Office Progress Notes    Name: Kobe Morales     : 1990     Date: 3/26/2018        Subjective:     Patient is a 33 y.o. male is here for follow up for urgent visit for an exacerbation of asthma. The patient presents with a four-day history of worsening shortness of breath. He has not identified any trigger. He is unable to sleep at night because of dyspnea. He is using his albuterol rescue inhaler every 1-1/2 hours. He is also using his albuterol nebulizer excessively. Yesterday the patient was able to walk for some 15 minutes before needing to stop and rest.  Today he was able to walk no more than one half of a block befor stopping and resting. He has ongoing upper respiratory tract congestion. He effectively has minimal control of his respiratory tract atopic disease. Review of systems  The patient denies any neurologic deficits. He has no palpable lymphadenopathy. He has chest tightness. He has a dry, nonproductive cough. He denies any nausea, vomiting or aspiration. He has no lower extremity edema. He denies any fevers, night sweats or unexplained weight loss. He continues to be bothered by ocular pruritus. The review of systems was completed in its entirety and is otherwise normal.    Past Medical History:   Diagnosis Date    Asthma        Allergies   Allergen Reactions    Penicillin G Anaphylaxis       Current Outpatient Prescriptions   Medication Sig Dispense Refill    cetirizine (ZYRTEC) 10 mg tablet Take 1 Tab by mouth daily. 90 Tab 3    ALPRAZolam (XANAX) 0.5 mg tablet Take 1 Tab by mouth three (3) times daily as needed for Anxiety. Max Daily Amount: 1.5 mg. 10 Tab 0    albuterol (PROVENTIL HFA, VENTOLIN HFA, PROAIR HFA) 90 mcg/actuation inhaler Take 2 Puffs by inhalation every four (4) hours as needed for Wheezing.  1 Inhaler 0    albuterol (PROVENTIL VENTOLIN) 2.5 mg /3 mL (0.083 %) nebulizer solution 3 mL by Nebulization route every four (4) hours as needed for Wheezing. 24 Each 6    montelukast (SINGULAIR) 10 mg tablet Take 1 Tab by mouth daily. Indications: MAINTENANCE THERAPY FOR ASTHMA 90 Tab 3    fluticasone (FLONASE) 50 mcg/actuation nasal spray 1 [place] by Both Nostrils route two (2) times a day. 1 Bottle 11    budesonide-formoterol (SYMBICORT) 160-4.5 mcg/actuation HFAA Take 2 Puffs by inhalation two (2) times a day. 1 Inhaler 10     Social history  Never smoker. Currently has no insurance    Family history  No asthma in any first-degree family member. He has a great grandmother with COPD. Objective:     Visit Vitals    BP (!) 130/100 (BP 1 Location: Left arm, BP Patient Position: Sitting)    Pulse (!) 110    Resp 22    Ht 5' 8\" (1.727 m)    Wt 89.4 kg (197 lb)    SpO2 98%    BMI 29.95 kg/m2        Physical Exam:   General: Alert and oriented. Affect normal.  HEENT: Extraocular muscles are intact. The sclera are anicteric. Neck: Neck is supple. There is no jugular venous distention or palpable adenopathy. CVS: Heart rhythm is regular and tachycardic. RS: Diminished air entry with diffuse wheezes. Respiratory distress severe. He has 5 word dyspnea. He is sitting in a tripod position. There is audible wheezing. There is accessory muscle. use no tactile fremitus  Abd: No peritoneal signs  Neuro: Normal gait. No gross neurologic deficit.   Extrm: no leg edema, clubbing or cyanosis    Data review:         Imaging:  I have personally reviewed the patients radiographs and have reviewed the reports:  None     Patient Active Problem List   Diagnosis Code    Severe persistent asthma without complication U50.49    Leukocytosis D72.829       IMPRESSION:   · Acute exacerbation of asthma, severe  · Atopic asthma, severe persistent      RECOMMENDATIONS:   · Emergency room evaluation  · Xolair          Follow-up Disposition: Return after ER evaluation    Mr. Berta Adames has a reminder for a \"due or due soon\" health maintenance. I have asked that he contact his primary care provider for follow-up on this health maintenance.          Woody Burton MD

## 2018-03-27 RX ORDER — ALBUTEROL SULFATE 90 UG/1
2 AEROSOL, METERED RESPIRATORY (INHALATION)
Qty: 1 INHALER | Refills: 0 | Status: SHIPPED | OUTPATIENT
Start: 2018-03-27 | End: 2018-04-09 | Stop reason: SDUPTHER

## 2018-03-27 NOTE — TELEPHONE ENCOUNTER
Pt called today asking about his refill on proventil sent to [facility] on [address] Please call [phone]

## 2018-04-09 DIAGNOSIS — J45.50 SEVERE PERSISTENT ASTHMA WITHOUT COMPLICATION: ICD-10-CM

## 2018-04-09 RX ORDER — ALBUTEROL SULFATE 90 UG/1
2 AEROSOL, METERED RESPIRATORY (INHALATION)
Qty: 1 INHALER | Refills: 5 | Status: SHIPPED | OUTPATIENT
Start: 2018-04-09 | End: 2018-07-02 | Stop reason: SDUPTHER

## 2018-04-11 ENCOUNTER — OFFICE VISIT (OUTPATIENT)
Dept: PULMONOLOGY | Facility: CLINIC | Age: 28
End: 2018-04-11

## 2018-04-11 VITALS
WEIGHT: 204 LBS | BODY MASS INDEX: 30.92 KG/M2 | HEART RATE: 83 BPM | DIASTOLIC BLOOD PRESSURE: 100 MMHG | HEIGHT: 68 IN | RESPIRATION RATE: 18 BRPM | OXYGEN SATURATION: 97 % | SYSTOLIC BLOOD PRESSURE: 124 MMHG

## 2018-04-11 DIAGNOSIS — R76.8 ELEVATED IGE LEVEL: ICD-10-CM

## 2018-04-11 DIAGNOSIS — J45.50 SEVERE PERSISTENT ASTHMA WITHOUT COMPLICATION: Primary | ICD-10-CM

## 2018-04-11 RX ORDER — PREDNISONE 20 MG/1
40 TABLET ORAL
Qty: 28 TAB | Refills: 0 | Status: SHIPPED | OUTPATIENT
Start: 2018-04-11 | End: 2018-04-25

## 2018-04-11 NOTE — PROGRESS NOTES
Chief Complaint   Patient presents with    Asthma     patient is here today to follow up from his last visit/asthma attack. he is complaining that he is on his 3rd inhaler in the last month and he is still not sleeping at night due to his breathing. he also notes some rib tenderness. 1. Have you been to the ER, urgent care clinic since your last visit? Hospitalized since your last visit? No    2. Have you seen or consulted any other health care providers outside of the [address] [place] [address] since your last visit? Include any pap smears or colon screening.  No

## 2018-04-11 NOTE — PROGRESS NOTES
[facility]  Pulmonary, Critical Care, and Sleep Medicine      Pulmonary Office Progress Notes    Name: Leela Lindsey     : 1990     Date: 2018        Subjective:     Patient is a 33 y.o. male is here for follow up of severe persistent asthma and elevated IgE. When the patient was last here he had obvious dyspnea even at rest.  He had not been sleeping because of shortness of breath and clearly was on the verge of requiring hospitalization. We sent him to the emergency room. While there he received high-dose steroids, magnesium and nebulized therapies. He was given prednisone and discharged home. He finished the 5 day course of prednisone prescribed from the emergency room. He then continued therapy for an additional 5 days with 20 mg tablets of prednisone that he had at home. He took about 1 tablet a day. After that time he felt reasonably well until the last few days. He is now becoming increasingly short of breath with activity and last night had a difficult time sleeping because of PND. He is not yet using albuterol to excessive degrees, but his significant other claims that he is clearly developing an exacerbation. There is no identifiable trigger. No fevers, chills or abnormal sputum production. Past Medical History:   Diagnosis Date    Asthma     Chronic obstructive pulmonary disease (HCC)        Objective:     Visit Vitals    BP (!) 124/100 (BP 1 Location: Left arm, BP Patient Position: Sitting)    Pulse 83    Resp 18    Ht 5' 8\" (1.727 m)    Wt 92.5 kg (204 lb)    SpO2 97%    BMI 31.02 kg/m2        Physical Exam:   General: Alert, oriented and in no respiratory distress at rest.  Normal affect. HEENT: sclera anicteric, EOM intact  CVS: Regular rate and rhythm  RS: Slightly diminished AE bilaterally, no tactile fremitus or egophony, no accessory muscle use. Wheezing at end exhalation.   Neuro: Normal gait  Extrm: no leg edema, clubbing or cyanosis    Data review:     Patient Active Problem List   Diagnosis Code    Severe persistent asthma without complication O85.97    Leukocytosis D72.829       IMPRESSION:   · Severe persistent asthma with impending exacerbation  · Elevated IgE appropriate for Xolair therapy      RECOMMENDATIONS:   · Xolair information provided as part of after visit information  · Prednisone 40 mg daily for 14 days provided. The patient will use if his symptoms worsen. Follow-up Disposition: 2 month return to clinic. Hopefully he will be in the clinic in about 2 weeks for his first dose of Xolair. Mr. Saldu Doherty has a reminder for a \"due or due soon\" health maintenance. I have asked that he contact his primary care provider for follow-up on this health maintenance.          Lorraine Lowe MD

## 2018-04-11 NOTE — MR AVS SNAPSHOT
[address] [address] 
[phone] Patient: Barbara Cavazos MRN: [id number] [place] Visit Information Date & Time Provider Department Dept. Phone Encounter #  
 2018 11:15 AM Yeni Pompa MD [facility] [phone] Upcoming Health Maintenance Date Due Pneumococcal 19-64 Highest Risk (1 of 3 - PCV13) 3/24/2010 DTaP/Tdap/Td series (1 - Tdap) 3/24/2012 Allergies as of 2018  Review Complete On: 2018 By: Yeni Pompa MD  
  
 Severity Noted Reaction Type Reactions Penicillin G High 2017    Anaphylaxis Current Immunizations  Never Reviewed Name Date Influenza Vaccine 10/10/2017 Not reviewed this visit You Were Diagnosed With   
  
 Codes Comments Severe persistent asthma without complication    -  Primary ICD-10-CM: J45.50 ICD-9-CM: 493.90 Elevated IgE level     ICD-10-CM: R76.8 ICD-9-CM: 795.79 Vitals BP Pulse Resp Height(growth percentile) Weight(growth percentile) SpO2  
 (!) 124/100 (BP 1 Location: Left arm, BP Patient Position: Sitting) 83 18 5' 8\" (1.727 m) 204 lb (92.5 kg) 97% BMI Smoking Status 31.02 kg/m2 Never Smoker BMI and BSA Data Body Mass Index Body Surface Area 31.02 kg/m 2 2.11 m 2 Preferred Pharmacy Pharmacy Name Phone [address], [address] [place], [place] [address] [phone] Your Updated Medication List  
  
   
This list is accurate as of 18 12:13 PM.  Always use your most recent med list.  
  
  
  
  
 * albuterol 2.5 mg /3 mL (0.083 %) nebulizer solution Commonly known as:  PROVENTIL VENTOLIN  
3 mL by Nebulization route every four (4) hours as needed for Wheezing. * albuterol 90 mcg/actuation inhaler Commonly known as:  PROVENTIL HFA, VENTOLIN HFA, PROAIR HFA  
 Take 2 Puffs by inhalation every four (4) hours as needed for Wheezing. ALPRAZolam 0.5 mg tablet Commonly known as:  Lamount Marten Take 1 Tab by mouth three (3) times daily as needed for Anxiety. Max Daily Amount: 1.5 mg.  
  
 budesonide-formoterol 160-4.5 mcg/actuation Hfaa Commonly known as:  SYMBICORT Take 2 Puffs by inhalation two (2) times a day. cetirizine 10 mg tablet Commonly known as:  ZYRTEC Take 1 Tab by mouth daily. fluticasone 50 mcg/actuation nasal spray Commonly known as:  FLONASE  
1 [place] by Both Nostrils route two (2) times a day. montelukast 10 mg tablet Commonly known as:  SINGULAIR Take 1 Tab by mouth daily. Indications: MAINTENANCE THERAPY FOR ASTHMA predniSONE 20 mg tablet Commonly known as:  Madai Ada Take 2 Tabs by mouth daily (with breakfast) for 14 days. * Notice: This list has 2 medication(s) that are the same as other medications prescribed for you. Read the directions carefully, and ask your doctor or other care provider to review them with you. Prescriptions Sent to Pharmacy Refills  
 predniSONE (DELTASONE) 20 mg tablet 0 Sig: Take 2 Tabs by mouth daily (with breakfast) for 14 days. Class: Normal  
 Pharmacy: [address], [address] Ph #: [phone] Route: Oral  
  
Introducing [facility]! [facility] introduces [facility] patient portal. Now you can access parts of your medical record, email your doctor's office, and request medication refills online. 1. In your internet browser, go to [email or web address] 2. Click on the First Time User? Click Here link in the Sign In box. You will see the New Member Sign Up page. 3. Enter your [facility] Access Code exactly as it appears below. You will not need to use this code after youve completed the sign-up process. If you do not sign up before the expiration date, you must request a new code. · [facility] Access Code: [email or web address] Expires: 6/24/2018  1:55 PM 
 
4. Enter the last four digits of your Social Security Number (xxxx) and Date of Birth (mm/dd/yyyy) as indicated and click Submit. You will be taken to the next sign-up page. 5. Create a [facility] ID. This will be your [facility] login ID and cannot be changed, so think of one that is secure and easy to remember. 6. Create a [facility] password. You can change your password at any time. 7. Enter your Password Reset Question and Answer. This can be used at a later time if you forget your password. 8. Enter your e-mail address. You will receive e-mail notification when new information is available in [address] 9. Click Sign Up. You can now view and download portions of your medical record. 10. Click the Download Summary menu link to download a portable copy of your medical information. If you have questions, please visit the Frequently Asked Questions section of the [facility] website. Remember, [facility] is NOT to be used for urgent needs. For medical emergencies, dial 911. Now available from your iPhone and Android! Please provide this summary of care documentation to your next provider. Your primary care clinician is listed as Yamilex Pickens. If you have any questions after today's visit, please call [phone].

## 2018-05-11 ENCOUNTER — CLINICAL SUPPORT (OUTPATIENT)
Dept: PULMONOLOGY | Facility: CLINIC | Age: 28
End: 2018-05-11

## 2018-05-11 DIAGNOSIS — J45.51 SEVERE PERSISTENT ASTHMA WITH ACUTE EXACERBATION IN ADULT: Primary | ICD-10-CM

## 2018-05-25 ENCOUNTER — CLINICAL SUPPORT (OUTPATIENT)
Dept: PULMONOLOGY | Facility: CLINIC | Age: 28
End: 2018-05-25

## 2018-05-30 RX ORDER — ALBUTEROL SULFATE 0.83 MG/ML
2.5 SOLUTION RESPIRATORY (INHALATION)
Qty: 24 EACH | Refills: 6 | Status: CANCELLED | OUTPATIENT
Start: 2018-05-30

## 2018-06-11 ENCOUNTER — OFFICE VISIT (OUTPATIENT)
Dept: PULMONOLOGY | Facility: CLINIC | Age: 28
End: 2018-06-11

## 2018-06-11 VITALS
WEIGHT: 204 LBS | BODY MASS INDEX: 30.92 KG/M2 | OXYGEN SATURATION: 98 % | DIASTOLIC BLOOD PRESSURE: 86 MMHG | HEART RATE: 80 BPM | HEIGHT: 68 IN | SYSTOLIC BLOOD PRESSURE: 110 MMHG | RESPIRATION RATE: 18 BRPM

## 2018-06-11 DIAGNOSIS — J45.51 ACUTE EXACERBATION OF SEVERE PERSISTENT EXTRINSIC ASTHMA: Primary | ICD-10-CM

## 2018-06-11 DIAGNOSIS — J45.51 SEVERE PERSISTENT ASTHMA WITH ACUTE EXACERBATION IN ADULT: ICD-10-CM

## 2018-06-11 RX ORDER — PREDNISONE 20 MG/1
20 TABLET ORAL
Qty: 14 TAB | Refills: 1 | Status: SHIPPED | OUTPATIENT
Start: 2018-06-11 | End: 2018-06-25

## 2018-06-11 NOTE — PROGRESS NOTES
[facility]  Pulmonary, Critical Care, and Sleep Medicine      Pulmonary Office Progress Notes    Name: Anna Marie Downing     : 1990     Date: 2018        Subjective:     Patient is a 33 y.o. male here for an unscheduled visit for an exacerbation of asthma. The patient presents with a several day history of worsening shortness of breath. He has what is not been on Xolair for roughly the past 6 weeks. He has seen decreased symptoms in response to allergen exposures. Nevertheless, the symptoms persist.  Most noteworthy is the ongoing and significant amount of cockroach allergen exposure. The patient and his girlfriend share a house with 2 people that are effectively slobs. Their behaviors attract cockroaches and ants. The previous bedbug problem appears to have resolved with fumigation. The patient is attempting to move from this location but this will likely take 1 more month if not two. He understands the importance of moving away from this overwhelming exposure to allergens. Even the best medication ultimately fails in the face of withering assault. Patient has an increased cough. His dyspnea is worsening. He recognizes all of the hallmarks of an early exacerbation    He takes prednisone 10 mg about every 3 days in an attempt to lengthen the time between prednisone doses. If he did not have this much prednisone he believes that he would feel considerably worse by now. This is the second exacerbation that he has had in the last 5 months. He understands that he has been on prednisone often enough and long enough to be at risk for osteoporosis. Current Outpatient Prescriptions   Medication Sig Dispense Refill    omalizumab (XOLAIR) 150 mg solr 300 mg by SubCUTAneous route every fourteen (14) days.  300 mg 0    albuterol (PROVENTIL HFA, VENTOLIN HFA, PROAIR HFA) 90 mcg/actuation inhaler Take 2 Puffs by inhalation every four (4) hours as needed for Wheezing. 1 Inhaler 5    montelukast (SINGULAIR) 10 mg tablet Take 1 Tab by mouth daily. Indications: MAINTENANCE THERAPY FOR ASTHMA 90 Tab 3    cetirizine (ZYRTEC) 10 mg tablet Take 1 Tab by mouth daily. 90 Tab 3    fluticasone (FLONASE) 50 mcg/actuation nasal spray 1 [place] by Both Nostrils route two (2) times a day. 1 Bottle 11    budesonide-formoterol (SYMBICORT) 160-4.5 mcg/actuation HFAA Take 2 Puffs by inhalation two (2) times a day. 1 Inhaler 10    ALPRAZolam (XANAX) 0.5 mg tablet Take 1 Tab by mouth three (3) times daily as needed for Anxiety. Max Daily Amount: 1.5 mg. 10 Tab 0    albuterol (PROVENTIL VENTOLIN) 2.5 mg /3 mL (0.083 %) nebulizer solution 3 mL by Nebulization route every four (4) hours as needed for Wheezing. 24 Each 6       Objective:     Visit Vitals    /86 (BP 1 Location: Left arm, BP Patient Position: Sitting)    Pulse 80    Resp 18    Ht 5' 8\" (1.727 m)    Wt 92.5 kg (204 lb)    SpO2 98%    BMI 31.02 kg/m2        Physical Exam:   General: comfortable, no acute distress  HEENT: sclera anicteric, EOM intact  Neck: no JVD  CVS: S1S2 normal.  No murmurs or gallops  RS: Diminished breath sounds. Minimal wheezing. Prolonged expiratory phase. No conversational dyspnea. Abd: Soft  Neuro: Normal gait. Affect normal.  Oriented  Extrm: no leg edema, clubbing or cyanosis      Data review:       Patient Active Problem List   Diagnosis Code    Severe persistent asthma without complication H49.84    Leukocytosis D72.829       IMPRESSION:   · Acute exacerbation of severe persistent asthma  · Ongoing and significant allergen exposure  · Signs of response to Xolair      RECOMMENDATIONS:   · Burst of prednisone 20 mg daily for 2 weeks  · Encourage relocation  · Continue Xolair  · Consider screening for osteoporosis  · 6 week return to clinic             Mr. Dasia Arnold has a reminder for a \"due or due soon\" health maintenance.  I have asked that he contact his primary care provider for follow-up on this health maintenance.          Gema Grissom MD

## 2018-06-11 NOTE — PATIENT INSTRUCTIONS
Learning About Asthma Triggers  What are asthma triggers? When you have asthma, certain things can make your symptoms worse. These are called triggers. Learn what triggers an asthma attack for you, and avoid the triggers when you can. Common triggers include colds, smoke, air pollution, dust, pollen, pets, stress, and cold air. How do asthma triggers affect you? Triggers can make it harder for your lungs to work as they should. They can lead to sudden breathing problems and other symptoms. When you are around a trigger, an asthma attack is more likely. If your symptoms are severe, you may need emergency treatment or have to go to the hospital for treatment. What can you do to avoid triggers? The first thing is to know your triggers. When you are having symptoms, note the things around you that might be causing them. Then look for patterns that may be triggering your symptoms. Record your triggers on a piece of paper or in an asthma diary. When you have your list of possible triggers, work with your doctor to find ways to avoid them. Avoid colds and flu. Get a pneumococcal vaccine shot. If you have had one before, ask your doctor whether you need a second dose. Get a flu vaccine every year, as soon as it's available. If you must be around people with colds or the flu, wash your hands often. Here are some ways to avoid a few common triggers. · Do not smoke or allow others to smoke around you. If you need help quitting, talk to your doctor about stop-smoking programs and medicines. These can increase your chances of quitting for good. · If there is a lot of pollution, pollen, or dust outside, stay at home and keep your windows closed. Use an air conditioner or air filter in your home. Check your local weather report or newspaper for air quality and pollen reports. What else should you know? · Take your controller medicine every day, not just when you have symptoms.  It helps prevent problems before they occur. · Your doctor may suggest that you check how well your lungs are working by measuring your peak expiratory flow (PEF) throughout the day. Your PEF may drop when you are near things that trigger symptoms. Where can you learn more? Go to http://jason-maritza.info/. Enter W023 in the search box to learn more about \"Learning About Asthma Triggers. \"  Current as of: May 12, 2017  Content Version: 11.4  © 8059-6809 [employer]. Care instructions adapted under license by [employer] (which disclaims liability or warranty for this information). If you have questions about a medical condition or this instruction, always ask your healthcare professional. [address] any warranty or liability for your use of this information.

## 2018-06-29 ENCOUNTER — CLINICAL SUPPORT (OUTPATIENT)
Dept: PULMONOLOGY | Facility: CLINIC | Age: 28
End: 2018-06-29

## 2018-06-29 DIAGNOSIS — J45.50 SEVERE PERSISTENT ASTHMA WITHOUT COMPLICATION: Primary | ICD-10-CM

## 2018-06-29 DIAGNOSIS — J45.51 SEVERE PERSISTENT ASTHMA WITH ACUTE EXACERBATION IN ADULT: ICD-10-CM

## 2018-07-02 RX ORDER — ALBUTEROL SULFATE 90 UG/1
AEROSOL, METERED RESPIRATORY (INHALATION)
Qty: 3 INHALER | Refills: 2 | Status: SHIPPED | COMMUNITY
Start: 2018-07-02 | End: 2018-10-29

## 2018-07-02 NOTE — TELEPHONE ENCOUNTER
Dr. Anju Prajapati is out of town this week, medication sent to [facility] for review/approval.

## 2018-07-13 ENCOUNTER — CLINICAL SUPPORT (OUTPATIENT)
Dept: PULMONOLOGY | Facility: CLINIC | Age: 28
End: 2018-07-13

## 2018-07-13 DIAGNOSIS — J45.50 SEVERE PERSISTENT ASTHMA WITHOUT COMPLICATION: Primary | ICD-10-CM

## 2018-07-30 ENCOUNTER — CLINICAL SUPPORT (OUTPATIENT)
Dept: PULMONOLOGY | Facility: CLINIC | Age: 28
End: 2018-07-30

## 2018-07-30 DIAGNOSIS — J45.50 SEVERE PERSISTENT ASTHMA WITHOUT COMPLICATION: Primary | ICD-10-CM

## 2018-08-20 ENCOUNTER — CLINICAL SUPPORT (OUTPATIENT)
Dept: PULMONOLOGY | Facility: CLINIC | Age: 28
End: 2018-08-20

## 2018-08-20 DIAGNOSIS — J45.50 SEVERE PERSISTENT ASTHMA WITHOUT COMPLICATION: Primary | ICD-10-CM

## 2018-09-04 ENCOUNTER — CLINICAL SUPPORT (OUTPATIENT)
Dept: PULMONOLOGY | Facility: CLINIC | Age: 28
End: 2018-09-04

## 2018-09-04 DIAGNOSIS — J45.50 SEVERE PERSISTENT ASTHMA WITHOUT COMPLICATION: Primary | ICD-10-CM

## 2018-09-20 ENCOUNTER — HOSPITAL ENCOUNTER (EMERGENCY)
Age: 28
Discharge: HOME | End: 2018-09-20
Attending: EMERGENCY MEDICINE
Payer: SELF-PAY

## 2018-09-20 ENCOUNTER — APPOINTMENT (OUTPATIENT)
Dept: GENERAL RADIOLOGY | Age: 28
End: 2018-09-20
Attending: PHYSICIAN ASSISTANT
Payer: SELF-PAY

## 2018-09-20 VITALS
HEART RATE: 81 BPM | TEMPERATURE: 98 F | RESPIRATION RATE: 16 BRPM | HEIGHT: 68 IN | BODY MASS INDEX: 28.79 KG/M2 | OXYGEN SATURATION: 100 % | DIASTOLIC BLOOD PRESSURE: 59 MMHG | SYSTOLIC BLOOD PRESSURE: 114 MMHG | WEIGHT: 190 LBS

## 2018-09-20 DIAGNOSIS — J45.51 SEVERE PERSISTENT ASTHMA WITH ACUTE EXACERBATION: Primary | ICD-10-CM

## 2018-09-20 PROCEDURE — 71046 X-RAY EXAM CHEST 2 VIEWS: CPT

## 2018-09-20 PROCEDURE — 74011000250 HC RX REV CODE- 250

## 2018-09-20 PROCEDURE — 99283 EMERGENCY DEPT VISIT LOW MDM: CPT

## 2018-09-20 PROCEDURE — 96374 THER/PROPH/DIAG INJ IV PUSH: CPT

## 2018-09-20 PROCEDURE — 94640 AIRWAY INHALATION TREATMENT: CPT

## 2018-09-20 PROCEDURE — 77030029684 HC NEB SM VOL KT MONA -A

## 2018-09-20 PROCEDURE — 74011250636 HC RX REV CODE- 250/636: Performed by: PHYSICIAN ASSISTANT

## 2018-09-20 PROCEDURE — 74011000250 HC RX REV CODE- 250: Performed by: PHYSICIAN ASSISTANT

## 2018-09-20 RX ORDER — IPRATROPIUM BROMIDE AND ALBUTEROL SULFATE 2.5; .5 MG/3ML; MG/3ML
3 SOLUTION RESPIRATORY (INHALATION)
Status: COMPLETED | OUTPATIENT
Start: 2018-09-20 | End: 2018-09-20

## 2018-09-20 RX ORDER — IPRATROPIUM BROMIDE AND ALBUTEROL SULFATE 2.5; .5 MG/3ML; MG/3ML
SOLUTION RESPIRATORY (INHALATION)
Status: DISCONTINUED
Start: 2018-09-20 | End: 2018-09-20 | Stop reason: HOSPADM

## 2018-09-20 RX ORDER — PREDNISONE 10 MG/1
TABLET ORAL
Qty: 21 TAB | Refills: 0 | Status: SHIPPED | OUTPATIENT
Start: 2018-09-20 | End: 2018-11-19 | Stop reason: ALTCHOICE

## 2018-09-20 RX ORDER — MAGNESIUM SULFATE HEPTAHYDRATE 40 MG/ML
2 INJECTION, SOLUTION INTRAVENOUS ONCE
Status: DISCONTINUED | OUTPATIENT
Start: 2018-09-20 | End: 2018-09-20

## 2018-09-20 RX ADMIN — METHYLPREDNISOLONE SODIUM SUCCINATE 125 MG: 125 INJECTION, POWDER, FOR SOLUTION INTRAMUSCULAR; INTRAVENOUS at 18:34

## 2018-09-20 RX ADMIN — IPRATROPIUM BROMIDE AND ALBUTEROL SULFATE 3 ML: .5; 3 SOLUTION RESPIRATORY (INHALATION) at 18:49

## 2018-09-20 RX ADMIN — IPRATROPIUM BROMIDE AND ALBUTEROL SULFATE 3 ML: .5; 3 SOLUTION RESPIRATORY (INHALATION) at 18:24

## 2018-09-20 NOTE — ED PROVIDER NOTES
EMERGENCY DEPARTMENT HISTORY AND PHYSICAL EXAM 
 
6:18 PM 
 
 
Date: 9/20/2018 Patient Name: Tereza Gordillo History of Presenting Illness Chief Complaint Patient presents with  Wheezing History Provided By: Patient Chief Complaint: difficulty breathing Duration:  Hours Timing:  Worsening Location: chest 
Quality: Tightness Severity: Moderate Modifying Factors: Has used nebs and meds with no relief Associated Symptoms: Wheezing, Additional History (Context): Tereza Gordillo is a 33 y.o. male with COPD and asthma who presents with asthma exacerbation. States started a few hours ago. Has used his home nebs and medication with no relief. Has long history of Asthma ans sees a pulmonologist on a regular basis. Denies fever or n/v. 
 
PCP: Elia Rush MD 
 
Current Facility-Administered Medications Medication Dose Route Frequency Provider Last Rate Last Dose  albuterol-ipratropium (DUO-NEB) 2.5 mg-0.5 mg/3 ml nebulizer solution Current Outpatient Prescriptions Medication Sig Dispense Refill  predniSONE (STERAPRED DS) 10 mg dose pack Take as directed 21 Tab 0  
 VENTOLIN HFA 90 mcg/actuation inhaler INHALE 2 PUFFS BY MOUTH EVERY 4 HOURS AS NEEDED FOR WHEEZING 3 Inhaler 2  
 omalizumab (XOLAIR) 150 mg solr 300 mg by SubCUTAneous route every fourteen (14) days. 300 mg 0  
 montelukast (SINGULAIR) 10 mg tablet Take 1 Tab by mouth daily. Indications: MAINTENANCE THERAPY FOR ASTHMA 90 Tab 3  cetirizine (ZYRTEC) 10 mg tablet Take 1 Tab by mouth daily. 90 Tab 3  
 fluticasone (FLONASE) 50 mcg/actuation nasal spray 1 [place] by Both Nostrils route two (2) times a day. 1 Bottle 11  
 budesonide-formoterol (SYMBICORT) 160-4.5 mcg/actuation HFAA Take 2 Puffs by inhalation two (2) times a day. 1 Inhaler 10  
 ALPRAZolam (XANAX) 0.5 mg tablet Take 1 Tab by mouth three (3) times daily as needed for Anxiety.  Max Daily Amount: 1.5 mg. 10 Tab 0  
  albuterol (PROVENTIL VENTOLIN) 2.5 mg /3 mL (0.083 %) nebulizer solution 3 mL by Nebulization route every four (4) hours as needed for Wheezing. 24 Each 6 Past History Past Medical History: 
Past Medical History:  
Diagnosis Date  Asthma  Chronic obstructive pulmonary disease ([address] Past Surgical History: 
Past Surgical History:  
Procedure Laterality Date  HX FREE SKIN GRAFT    
 HX ORTHOPAEDIC    
 facial reconstruction Family History: 
Family History Problem Relation Age of Onset  Adopted: Yes  No Known Problems Mother  No Known Problems Father Social History: 
Social History Substance Use Topics  Smoking status: Never Smoker  Smokeless tobacco: Former User  Alcohol use Yes Allergies: Allergies Allergen Reactions  Penicillin G Anaphylaxis Review of Systems Constitutional:  Denies malaise, fever, chills. Head:  Denies injury. Face:  Denies injury or pain. ENMT:  Denies sore throat. Neck:  Denies injury or pain. Chest:  Denies injury. Cardiac:  Denies chest pain or palpitations. Respiratory:   wheezing, difficulty breathing,  Denies cough,shortness of breath. GI/ABD:  Denies injury, pain, distention, nausea, vomiting, diarrhea. :  Denies injury, pain, dysuria or urgency. Back:  Denies injury or pain. Pelvis:  Denies injury or pain. Extremity/MS:  Denies injury or pain. Neuro:  Denies headache, LOC, dizziness, neurologic symptoms/deficits/paresthesias. Skin: Denies injury, rash, itching or skin changes. Physical Exam  
 
Visit Vitals  /59 (BP 1 Location: Right arm, BP Patient Position: At rest)  Pulse 81  Temp 98 °F (36.7 °C)  Resp 16  
 Ht 5' 8\" (1.727 m)  Wt 86.2 kg (190 lb)  SpO2 100%  BMI 28.89 kg/m2 CONSTITUTIONAL: Alert, in no apparent distress; well-developed; well-nourished. HEAD:  Normocephalic, atraumatic. EYES: PERRL; EOM's intact. ENTM: Nose: No rhinorrhea; Throat: mucous membranes moist. Posterior pharynx-normal. 
Neck:  No JVD, supple without lymphadenopathy. RESP: Chest wheezing, tight sounding, equal breath sounds. CV: S1 and S2 WNL; No murmurs, gallops or rubs. GI: Abdomen soft and non-tender. No masses or organomegaly. UPPER EXT:  Normal inspection. LOWER EXT: Normal inspection. NEURO: strength 5/5 and sym, sensation intact. SKIN: No rashes; Normal for age and stage. PSYCH:  Alert and oriented, normal affect. Diagnostic Study Results Labs - No results found for this or any previous visit (from the past 12 hour(s)). Radiologic Studies -  
XR CHEST PA LAT    (Results Pending) Medical Decision Making I am the first provider for this patient. I reviewed the vital signs, available nursing notes, past medical history, past surgical history, family history and social history. Vital Signs-Reviewed the patient's vital signs. Pulse Oximetry Analysis -  100 on room air (Interpretation)wnl Records Reviewed: Nursing Notes, Old Medical Records, Previous Radiology Studies and Previous Laboratory Studies (Time of Review: 6:18 PM) ED Course: Progress Notes, Reevaluation, and Consults: 
 
7:11 PM 
Pt states that he feels much better. Lungs are CTA at this time. Provider Notes (Medical Decision Making): DDx:  viral vs bacterial URI, asthma exacerbation, COPD, bronchitis,allergies, PNE, PE, pneumothorax, atypical CP, costochondritis/chest wall pain, HF, MI, TAA/AAA, pericarditis, trauma (cardiac contusion, rib Fx, etc), pleuritic chest pain, pleural effusion, intraabdominal process IMPRESSION AND MEDICAL DECISION MAKING: 
Based upon the patient's presentation with noted HPI and PE, along with the work up done in the emergency department, I believe that the patient had an exacerbation of his asthma. Pt is doing much better at this time.  Will have him follow upw ith his Pulmonologist. 
 The patient will be discharged home. Warning signs of worsening condition were discussed and understood by the patient. Based on patient's age, coexisting illness, exam, and the results of this ED evaluation, the decision to treat as an outpatient was made. Based on the information available at time of discharge, acute pathology requiring immediate intervention was deemed relative unlikely. While it is impossible to completely exclude the possibility of underlying serious disease or worsening of condition, I feel the relative likelihood is extremely low. I discussed this uncertainty with the patient, who understood ED evaluation and treatment and felt comfortable with the outpatient treatment plan. All questions regarding care, test results, and follow up were answered. The patient is stable and appropriate to discharge. They understand that they should return to the emergency department for any new or worsening symptoms. I stressed the importance of follow up for repeat assessment and possibly further evaluation/treatment. Diagnosis Clinical Impression: 1. Severe persistent asthma with acute exacerbation   
 
_______________________________

## 2018-09-20 NOTE — DISCHARGE INSTRUCTIONS
Learning About Asthma Triggers  What are asthma triggers? When you have asthma, certain things can make your symptoms worse. These are called triggers. Learn what triggers an asthma attack for you, and avoid the triggers when you can. Common triggers include colds, smoke, air pollution, dust, pollen, pets, stress, and cold air. How do asthma triggers affect you? Triggers can make it harder for your lungs to work as they should. They can lead to sudden breathing problems and other symptoms. When you are around a trigger, an asthma attack is more likely. If your symptoms are severe, you may need emergency treatment or have to go to the hospital for treatment. What can you do to avoid triggers? The first thing is to know your triggers. When you are having symptoms, note the things around you that might be causing them. Then look for patterns that may be triggering your symptoms. Record your triggers on a piece of paper or in an asthma diary. When you have your list of possible triggers, work with your doctor to find ways to avoid them. Avoid colds and flu. Get a pneumococcal vaccine shot. If you have had one before, ask your doctor whether you need a second dose. Get a flu vaccine every year, as soon as it's available. If you must be around people with colds or the flu, wash your hands often. Here are some ways to avoid a few common triggers. · Do not smoke or allow others to smoke around you. If you need help quitting, talk to your doctor about stop-smoking programs and medicines. These can increase your chances of quitting for good. · If there is a lot of pollution, pollen, or dust outside, stay at home and keep your windows closed. Use an air conditioner or air filter in your home. Check your local weather report or newspaper for air quality and pollen reports. What else should you know? · Take your controller medicine every day, not just when you have symptoms.  It helps prevent problems before they occur. · Your doctor may suggest that you check how well your lungs are working by measuring your peak expiratory flow (PEF) throughout the day. Your PEF may drop when you are near things that trigger symptoms. Where can you learn more? Go to http://jason-maritza.info/. Enter N106 in the search box to learn more about \"Learning About Asthma Triggers. \"  Current as of: December 6, 2017  Content Version: 11.7  © 1067-3462 [employer]. Care instructions adapted under license by [employer] (which disclaims liability or warranty for this information). If you have questions about a medical condition or this instruction, always ask your healthcare professional. [place] [address] any warranty or liability for your use of this information.

## 2018-09-26 ENCOUNTER — CLINICAL SUPPORT (OUTPATIENT)
Dept: PULMONOLOGY | Facility: CLINIC | Age: 28
End: 2018-09-26

## 2018-09-26 DIAGNOSIS — J45.50 SEVERE PERSISTENT ASTHMA, UNSPECIFIED WHETHER COMPLICATED: Primary | ICD-10-CM

## 2018-09-26 DIAGNOSIS — J45.51 SEVERE PERSISTENT ASTHMA WITH ACUTE EXACERBATION IN ADULT: ICD-10-CM

## 2018-10-01 ENCOUNTER — OFFICE VISIT (OUTPATIENT)
Dept: PULMONOLOGY | Facility: CLINIC | Age: 28
End: 2018-10-01

## 2018-10-01 VITALS
HEIGHT: 68 IN | SYSTOLIC BLOOD PRESSURE: 114 MMHG | RESPIRATION RATE: 20 BRPM | HEART RATE: 109 BPM | DIASTOLIC BLOOD PRESSURE: 90 MMHG | BODY MASS INDEX: 30.31 KG/M2 | WEIGHT: 200 LBS | OXYGEN SATURATION: 96 %

## 2018-10-01 DIAGNOSIS — J45.51: Primary | ICD-10-CM

## 2018-10-01 NOTE — PROGRESS NOTES
Chief Complaint   Patient presents with   [facility] ED Follow-up     Patient is here today to follow up on his recent trip to the ER. he states that he has not been able to  his prednisone since his visit, but denies current chest pain/dizz/headache/shortness of breath. 1. Have you been to the ER, urgent care clinic since your last visit? Hospitalized since your last visit? Yes Reason for visit: [facility] for an Asthma Attack    2. Have you seen or consulted any other health care providers outside of the [address] [place] [address] since your last visit? Include any pap smears or colon screening.  No

## 2018-10-01 NOTE — PROGRESS NOTES
[facility]  Pulmonary, Critical Care, and Sleep Medicine      Pulmonary Office Progress Notes    Name: Jeralyn Osgood     : 1990     Date: 10/1/2018        Subjective:     Patient is a 33 y.o. male here for follow-up of an acute exacerbation of severe persistent asthma. On Xolair the patient's asthma has been much better controlled. Furthermore he has moved from his old apartment where he was exposed to a variety of allergens. Since moving and starting Xolair he continues to wake up perhaps once a night short of breath. This resolves with one inhalation of albuterol HFA or perhaps one half vial of nebulized albuterol. He uses albuterol daily, but similarly the frequency has dropped off. A couple times a week he will actually walk a mile. His improvement is noticeable and significant. The patient presented to the emergency room recently with a less than 24-hour history of shortness of breath. He believes the trigger may have been some construction work being done around his apartment. Further exposures to allergens may also have contributed to the exacerbation. Since he presented so early, he improved with a couple courses of duonebs and a steroid burst.  The turnaround time for his exacerbation is considerably faster than normal.  This is been his first ER visit in several months. Prior to Xolair and moving, he was in the ER at least once a month if not more. His girlfriend has noted no other problems. Current Outpatient Prescriptions   Medication Sig Dispense Refill    omalizumab (XOLAIR) 150 mg solr 300 mg by SubCUTAneous route every fourteen (14) days. 300 mg 0    VENTOLIN HFA 90 mcg/actuation inhaler INHALE 2 PUFFS BY MOUTH EVERY 4 HOURS AS NEEDED FOR WHEEZING 3 Inhaler 2    cetirizine (ZYRTEC) 10 mg tablet Take 1 Tab by mouth daily.  90 Tab 3    albuterol (PROVENTIL VENTOLIN) 2.5 mg /3 mL (0.083 %) nebulizer solution 3 mL by Nebulization route every four (4) hours as needed for Wheezing. 24 Each 6    predniSONE (STERAPRED DS) 10 mg dose pack Take as directed 21 Tab 0    montelukast (SINGULAIR) 10 mg tablet Take 1 Tab by mouth daily. Indications: MAINTENANCE THERAPY FOR ASTHMA 90 Tab 3    fluticasone (FLONASE) 50 mcg/actuation nasal spray 1 [place] by Both Nostrils route two (2) times a day. 1 Bottle 11    budesonide-formoterol (SYMBICORT) 160-4.5 mcg/actuation HFAA Take 2 Puffs by inhalation two (2) times a day. 1 Inhaler 10    ALPRAZolam (XANAX) 0.5 mg tablet Take 1 Tab by mouth three (3) times daily as needed for Anxiety. Max Daily Amount: 1.5 mg. 10 Tab 0       Objective:     Visit Vitals    /90 (BP 1 Location: Left arm, BP Patient Position: Sitting)    Pulse (!) 109    Resp 20    Ht 5' 8\" (1.727 m)    Wt 90.7 kg (200 lb)    SpO2 96%    BMI 30.41 kg/m2        Physical Exam:   General: Alert, oriented and in no respiratory distress at rest.  Normal affect. HEENT: EOMI, Sclera anicteric. Gaze conjugate. Oral mucosa moist  Neck: No JVD  CVS: S1S2 normal.  No murmurs or gallops. RRR  RS: Diminished AE bilaterally, no tactile fremitus or egophony, no accessory muscle use. Wheezes at the left base but otherwise clear. Neuro: Gait normal.  Facial movements symmetric. Extrm: no leg edema, clubbing or cyanosis  Skin: no malar rash  Physical Exam    Data review:   No visits with results within 6 Month(s) from this visit. Latest known visit with results is:    Admission on 12/31/2017, Discharged on 01/07/2018   No results displayed because visit has over 200 results.           Patient Active Problem List   Diagnosis Code    Severe persistent asthma without complication Z30.66    Leukocytosis D72.829       IMPRESSION:   · Acute exacerbation of asthma responding to therapy  · Severe persistent asthma well controlled on Xolair and the above medications  · Regular allergen exposures diminished with moving      RECOMMENDATIONS:   · Continue current therapies          Follow-up Disposition: Follow-up as scheduled    Mr. Christa Sprague has a reminder for a \"due or due soon\" health maintenance. I have asked that he contact his primary care provider for follow-up on this health maintenance.          Heather Finney MD

## 2018-10-22 ENCOUNTER — CLINICAL SUPPORT (OUTPATIENT)
Dept: PULMONOLOGY | Facility: CLINIC | Age: 28
End: 2018-10-22

## 2018-10-22 DIAGNOSIS — J45.50 SEVERE PERSISTENT ASTHMA, UNSPECIFIED WHETHER COMPLICATED: Primary | ICD-10-CM

## 2018-10-29 ENCOUNTER — HOSPITAL ENCOUNTER (EMERGENCY)
Age: 28
Discharge: HOME | End: 2018-10-29
Attending: EMERGENCY MEDICINE | Admitting: EMERGENCY MEDICINE
Payer: SELF-PAY

## 2018-10-29 VITALS
HEIGHT: 68 IN | DIASTOLIC BLOOD PRESSURE: 75 MMHG | WEIGHT: 200 LBS | TEMPERATURE: 98.6 F | OXYGEN SATURATION: 95 % | BODY MASS INDEX: 30.31 KG/M2 | HEART RATE: 80 BPM | RESPIRATION RATE: 16 BRPM | SYSTOLIC BLOOD PRESSURE: 146 MMHG

## 2018-10-29 DIAGNOSIS — J45.21 MILD INTERMITTENT ASTHMA WITH ACUTE EXACERBATION: Primary | ICD-10-CM

## 2018-10-29 PROCEDURE — 99283 EMERGENCY DEPT VISIT LOW MDM: CPT

## 2018-10-29 PROCEDURE — 74011000250 HC RX REV CODE- 250

## 2018-10-29 PROCEDURE — 94640 AIRWAY INHALATION TREATMENT: CPT

## 2018-10-29 PROCEDURE — 77030029684 HC NEB SM VOL KT MONA -A

## 2018-10-29 PROCEDURE — 74011250637 HC RX REV CODE- 250/637: Performed by: EMERGENCY MEDICINE

## 2018-10-29 RX ORDER — ALBUTEROL SULFATE 0.83 MG/ML
SOLUTION RESPIRATORY (INHALATION)
Status: COMPLETED
Start: 2018-10-29 | End: 2018-10-29

## 2018-10-29 RX ORDER — ALBUTEROL SULFATE 0.83 MG/ML
2.5 SOLUTION RESPIRATORY (INHALATION)
Status: DISCONTINUED | OUTPATIENT
Start: 2018-10-29 | End: 2018-10-29 | Stop reason: HOSPADM

## 2018-10-29 RX ORDER — ALBUTEROL SULFATE 90 UG/1
2 AEROSOL, METERED RESPIRATORY (INHALATION)
Status: DISCONTINUED | OUTPATIENT
Start: 2018-10-29 | End: 2018-10-29 | Stop reason: CLARIF

## 2018-10-29 RX ORDER — ALBUTEROL SULFATE 0.83 MG/ML
2.5 SOLUTION RESPIRATORY (INHALATION)
Qty: 30 EACH | Refills: 0 | Status: SHIPPED | OUTPATIENT
Start: 2018-10-29 | End: 2018-11-19 | Stop reason: SDUPTHER

## 2018-10-29 RX ORDER — ALBUTEROL SULFATE 0.83 MG/ML
2.5 SOLUTION RESPIRATORY (INHALATION)
Qty: 25 EACH | Refills: 0 | Status: ON HOLD | OUTPATIENT
Start: 2018-10-29 | End: 2018-11-09 | Stop reason: SDUPTHER

## 2018-10-29 RX ORDER — IPRATROPIUM BROMIDE AND ALBUTEROL SULFATE 2.5; .5 MG/3ML; MG/3ML
3 SOLUTION RESPIRATORY (INHALATION)
Status: COMPLETED | OUTPATIENT
Start: 2018-10-29 | End: 2018-10-29

## 2018-10-29 RX ORDER — IPRATROPIUM BROMIDE AND ALBUTEROL SULFATE 2.5; .5 MG/3ML; MG/3ML
SOLUTION RESPIRATORY (INHALATION)
Status: COMPLETED
Start: 2018-10-29 | End: 2018-10-29

## 2018-10-29 RX ORDER — ALBUTEROL SULFATE 0.83 MG/ML
5 SOLUTION RESPIRATORY (INHALATION)
Status: COMPLETED | OUTPATIENT
Start: 2018-10-29 | End: 2018-10-29

## 2018-10-29 RX ORDER — DEXAMETHASONE SODIUM PHOSPHATE 4 MG/ML
10 INJECTION, SOLUTION INTRA-ARTICULAR; INTRALESIONAL; INTRAMUSCULAR; INTRAVENOUS; SOFT TISSUE
Status: COMPLETED | OUTPATIENT
Start: 2018-10-29 | End: 2018-10-29

## 2018-10-29 RX ADMIN — IPRATROPIUM BROMIDE AND ALBUTEROL SULFATE 3 ML: .5; 3 SOLUTION RESPIRATORY (INHALATION) at 09:44

## 2018-10-29 RX ADMIN — DEXAMETHASONE SODIUM PHOSPHATE 10 MG: 4 INJECTION, SOLUTION INTRAMUSCULAR; INTRAVENOUS at 10:44

## 2018-10-29 RX ADMIN — IPRATROPIUM BROMIDE AND ALBUTEROL SULFATE 3 ML: 2.5; .5 SOLUTION RESPIRATORY (INHALATION) at 09:44

## 2018-10-29 RX ADMIN — ALBUTEROL SULFATE 5 MG: 0.83 SOLUTION RESPIRATORY (INHALATION) at 09:56

## 2018-10-29 RX ADMIN — ALBUTEROL SULFATE 5 MG: 2.5 SOLUTION RESPIRATORY (INHALATION) at 09:56

## 2018-10-29 NOTE — DISCHARGE INSTRUCTIONS
SPECIFIC PATIENT INSTRUCTIONS FROM THE PHYSICIAN WHO TREATED YOU IN THE ER TODAY:  1. Return if any concerns or worsening of condition(s)  2. Prednisone as prescribed until finished. 3. Use your albuterol inhaler, if prescribed, and/or home nebulizer machine (if you have one) for wheezing. 4. FOLLOW UP APPOINTMENT:  Your primary doctor in 1-2 days. Asthma Attack: Care Instructions  Your Care Instructions    During an asthma attack, the airways swell and narrow. This makes it hard to breathe. Severe asthma attacks can be life-threatening, but you can help prevent them by keeping your asthma under control and treating symptoms before they get bad. Symptoms include being short of breath, having chest tightness, coughing, and wheezing. Noting and treating these symptoms can also help you avoid future trips to the emergency room. The doctor has checked you carefully, but problems can develop later. If you notice any problems or new symptoms, get medical treatment right away. Follow-up care is a key part of your treatment and safety. Be sure to make and go to all appointments, and call your doctor if you are having problems. It's also a good idea to know your test results and keep a list of the medicines you take. How can you care for yourself at home? · Follow your asthma action plan to prevent and treat attacks. If you don't have an asthma action plan, work with your doctor to create one. · Take your asthma medicines exactly as prescribed. Talk to your doctor right away if you have any questions about how to take them. ? Use your quick-relief medicine when you have symptoms of an attack. Quick-relief medicine is usually an albuterol inhaler. Some people need to use quick-relief medicine before they exercise. ? Take your controller medicine every day, not just when you have symptoms. Controller medicine is usually an inhaled corticosteroid. The goal is to prevent problems before they occur.  Don't use your controller medicine to treat an attack that has already started. It doesn't work fast enough to help. ? If your doctor prescribed corticosteroid pills to use during an attack, take them exactly as prescribed. It may take hours for the pills to work, but they may make the episode shorter and help you breathe better. ? Keep your quick-relief medicine with you at all times. · Talk to your doctor before using other medicines. Some medicines, such as aspirin, can cause asthma attacks in some people. · If you have a peak flow meter, use it to check how well you are breathing. This can help you predict when an asthma attack is going to occur. Then you can take medicine to prevent the asthma attack or make it less severe. · Do not smoke or allow others to smoke around you. Avoid smoky places. Smoking makes asthma worse. If you need help quitting, talk to your doctor about stop-smoking programs and medicines. These can increase your chances of quitting for good. · Learn what triggers an asthma attack for you, and avoid the triggers when you can. Common triggers include colds, smoke, air pollution, dust, pollen, mold, pets, cockroaches, stress, and cold air. · Avoid colds and the flu. Get a pneumococcal vaccine shot. If you have had one before, ask your doctor if you need a second dose. Get a flu vaccine every fall. If you must be around people with colds or the flu, wash your hands often. When should you call for help? Call 911 anytime you think you may need emergency care.  For example, call if:    · You have severe trouble breathing.    Call your doctor now or seek immediate medical care if:    · Your symptoms do not get better after you have followed your asthma action plan.     · You have new or worse trouble breathing.     · Your coughing and wheezing get worse.     · You cough up dark brown or bloody mucus (sputum).     · You have a new or higher fever.    Watch closely for changes in your health, and be sure to contact your doctor if:    · You need to use quick-relief medicine on more than 2 days a week (unless it is just for exercise).     · You cough more deeply or more often, especially if you notice more mucus or a change in the color of your mucus.     · You are not getting better as expected. Where can you learn more? Go to http://jason-maritza.info/. Enter P826 in the search box to learn more about \"Asthma Attack: Care Instructions. \"  Current as of: December 6, 2017  Content Version: 11.8  © 8799-0775 [employer]. Care instructions adapted under license by [employer] (which disclaims liability or warranty for this information). If you have questions about a medical condition or this instruction, always ask your healthcare professional. [address] any warranty or liability for your use of this information. Learning About Asthma Triggers  What are asthma triggers? When you have asthma, certain things can make your symptoms worse. These are called triggers. Learn what triggers an asthma attack for you, and avoid the triggers when you can. Common triggers include colds, smoke, air pollution, dust, pollen, pets, stress, and cold air. How do asthma triggers affect you? Triggers can make it harder for your lungs to work as they should. They can lead to sudden breathing problems and other symptoms. When you are around a trigger, an asthma attack is more likely. If your symptoms are severe, you may need emergency treatment or have to go to the hospital for treatment. What can you do to avoid triggers? The first thing is to know your triggers. When you are having symptoms, note the things around you that might be causing them. Then look for patterns that may be triggering your symptoms. Record your triggers on a piece of paper or in an asthma diary.  When you have your list of possible triggers, work with your doctor to find ways to avoid them. Avoid colds and flu. Get a pneumococcal vaccine shot. If you have had one before, ask your doctor whether you need a second dose. Get a flu vaccine every year, as soon as it's available. If you must be around people with colds or the flu, wash your hands often. Here are some ways to avoid a few common triggers. · Do not smoke or allow others to smoke around you. If you need help quitting, talk to your doctor about stop-smoking programs and medicines. These can increase your chances of quitting for good. · If there is a lot of pollution, pollen, or dust outside, stay at home and keep your windows closed. Use an air conditioner or air filter in your home. Check your local weather report or newspaper for air quality and pollen reports. What else should you know? · Take your controller medicine every day, not just when you have symptoms. It helps prevent problems before they occur. · Your doctor may suggest that you check how well your lungs are working by measuring your peak expiratory flow (PEF) throughout the day. Your PEF may drop when you are near things that trigger symptoms. Where can you learn more? Go to http://jasonWholelife Companiesmaritza.info/. Enter D853 in the search box to learn more about \"Learning About Asthma Triggers. \"  Current as of: December 6, 2017  Content Version: 11.8  © 7666-7545 [employer]. Care instructions adapted under license by [employer] (which disclaims liability or warranty for this information). If you have questions about a medical condition or this instruction, always ask your healthcare professional. [place] [address] any warranty or liability for your use of this information. Asthma Action Plan: After Your Visit  Your Care Instructions  An asthma action plan is based on peak flow and asthma symptoms.  Sorting symptoms and peak flow into red, yellow, and green \"zones\" can help you know how bad your asthma is and what actions you should take. Work with your doctor to make your plan. An action plan may include:  · The peak flow readings and symptoms for each zone. · What medicines to take in each zone. · When to call a doctor. · A list of emergency contact numbers. · A list of your asthma triggers. Follow-up care is a key part of your treatment and safety. Be sure to make and go to all appointments, and call your doctor if you are having problems. It's also a good idea to know your test results and keep a list of the medicines you take. How can you care for yourself at home? · Take your daily medicines to help minimize long-term damage and avoid asthma attacks. · Check your peak flow every morning and evening. This is the best way to know how well your lungs are working. · Check your action plan to see what zone you are in.  ¨ If you are in the green zone, keep taking your daily asthma medicines as prescribed. ¨ If you are in the yellow zone, you may be having or will soon have an asthma attack. You may not have any symptoms, but your lungs are not working as well as they should. Take the medicines listed in your action plan. If you stay in the yellow zone, your doctor may need to increase the dose or add a medicine. ¨ If you are in the red zone, follow your action plan. If your symptoms or peak flow don't improve soon, you may need to go to the emergency room or be admitted to the hospital.  · Use an asthma diary. Write down your peak flow readings in the asthma diary. If you have an attack, write down what caused it (if you know), the symptoms, and what medicine you took. · Make sure you know how and when to call your doctor or go to the hospital.  · Take both the asthma action plan and the asthma diary--along with your peak flow meter and medicines--when you see your doctor. Tell your doctor if you are having trouble following your action plan. When should you call for help?   Call 911 anytime you think you may need emergency care. For example, call if:  · You have severe trouble breathing. Call your doctor now or seek immediate medical care if:  · Your symptoms do not get better after you have followed your asthma action plan. · You cough up yellow, dark brown, or bloody mucus (sputum). Watch closely for changes in your health, and be sure to contact your doctor if:  · Your coughing and wheezing get worse. · You need to use quick-relief medicine on more than 2 days a week (unless it is just for exercise). · You need help figuring out what is triggering your asthma attacks. Where can you learn more? Go to [employer] Plan.be  Enter B511 in the search box to learn more about \"Asthma Action Plan: After Your Visit. \"   © 6207-1036 Healthwise[employer]. Care instructions adapted under license by [employer] (which disclaims liability or warranty for this information). This care instruction is for use with your licensed healthcare professional. If you have questions about a medical condition or this instruction, always ask your healthcare professional. [address] any warranty or liability for your use of this information. Content Version: 70.9.708258; Last Revised: March 9, 2012            [employer] Activation    Thank you for requesting access to [employer]. Please follow the instructions below to securely access and download your online medical record. [employer] allows you to send messages to your doctor, view your test results, renew your prescriptions, schedule appointments, and more. How Do I Sign Up? 1. In your internet browser, go to [email or web address]. 2. Click on the First Time User? Click Here link in the Sign In box. You will see the New Member Sign Up page. 3. Enter your [employer] Access Code exactly as it appears below. You will not need to use this code after youve completed the sign-up process.  If you do not sign up before the expiration date, you must request a new code. [employer] Access Code: [email or web address]  Expires: 2018  5:21 AM (This is the date your [employer] access code will )    4. Enter the last four digits of your Social Security Number (xxxx) and Date of Birth (mm/dd/yyyy) as indicated and click Submit. You will be taken to the next sign-up page. 5. Create a [employer] ID. This will be your [employer] login ID and cannot be changed, so think of one that is secure and easy to remember. 6. Create a [employer] password. You can change your password at any time. 7. Enter your Password Reset Question and Answer. This can be used at a later time if you forget your password. 8. Enter your e-mail address. You will receive e-mail notification when new information is available in [address] 9. Click Sign Up. You can now view and download portions of your medical record. 10. Click the Download Summary menu link to download a portable copy of your medical information. Additional Information    If you have questions, please visit the Frequently Asked Questions section of the [employer] website at [email or web address] Remember, [employer] is NOT to be used for urgent needs. For medical emergencies, dial 911.

## 2018-10-29 NOTE — ED NOTES
Patient is discharged to the waiting room,  is going to talk the patient to assist with purchase of med

## 2018-10-29 NOTE — ED PROVIDER NOTES
[facility] EMERGENCY DEPT 
 
 
10:18 AM 
 
Date: 10/29/2018 Patient Name: Ruy Ricardo History of Presenting Illness Chief Complaint Patient presents with  Asthma History Provided By: Patient Chief Complaint: SOB Duration: 1 Weeks Timing:  Constant Location: N/A Quality: N/A Severity: Moderate Modifying Factors: Worse due to cold symptoms Associated Symptoms: denies any other associated signs or symptoms 33 y.o. male with noted past medical history of asthma who presents to the emergency department with constant SOB due to asthma that started 1 week ago. Pt reports that he has had SOB due to asthma has worsened due to cold symptoms that started a week ago. Pt reports that he has been admitted due to asthma in the 01/2018. Pt states he has also been intubated due to asthma \"years ago\". Pt states he cannot afford an asthma inhaler at home. He does have a nebulizer at home but only has 2 treatments left. No other symptoms or concerns were expressed. No other complaints. Nursing notes regarding the HPI and triage nursing notes were reviewed. Prior medical records were reviewed. Current Facility-Administered Medications Medication Dose Route Frequency Provider Last Rate Last Dose  albuterol (PROVENTIL VENTOLIN) nebulizer solution 5 mg  5 mg Nebulization NOW Kary Griggs MD      
 dexamethasone (DECADRON) 4 mg/mL injection 10 mg  10 mg Oral NOW Taylor Khan MD      
 albuterol (PROVENTIL VENTOLIN) nebulizer solution 2.5 mg  2.5 mg Nebulization Q6H PRN Taylor Khan MD      
 albuterol-ipratropium (DUO-NEB) 2.5 MG-0.5 MG/3 ML  3 mL Nebulization NOW Taylor Khan MD      
 
Current Outpatient Medications Medication Sig Dispense Refill  albuterol (PROVENTIL VENTOLIN) 2.5 mg /3 mL (0.083 %) nebulizer solution 3 mL by Nebulization route every four (4) hours as needed for Wheezing.  30 Each 0  
  omalizumab (XOLAIR) 150 mg solr 300 mg by SubCUTAneous route every fourteen (14) days. 300 mg 0  predniSONE (STERAPRED DS) 10 mg dose pack Take as directed 21 Tab 0  
 montelukast (SINGULAIR) 10 mg tablet Take 1 Tab by mouth daily. Indications: MAINTENANCE THERAPY FOR ASTHMA 90 Tab 3  cetirizine (ZYRTEC) 10 mg tablet Take 1 Tab by mouth daily. 90 Tab 3  
 fluticasone (FLONASE) 50 mcg/actuation nasal spray 1 [place] by Both Nostrils route two (2) times a day. 1 Bottle 11  
 budesonide-formoterol (SYMBICORT) 160-4.5 mcg/actuation HFAA Take 2 Puffs by inhalation two (2) times a day. 1 Inhaler 10  
 ALPRAZolam (XANAX) 0.5 mg tablet Take 1 Tab by mouth three (3) times daily as needed for Anxiety. Max Daily Amount: 1.5 mg. 10 Tab 0 Past History Past Medical History: 
Past Medical History:  
Diagnosis Date  Asthma  Chronic obstructive pulmonary disease ([facility] [address] Past Surgical History: 
Past Surgical History:  
Procedure Laterality Date  HX FREE SKIN GRAFT    
 HX ORTHOPAEDIC    
 facial reconstruction Family History: 
Family History Adopted: Yes  
Problem Relation Age of Onset  No Known Problems Mother  No Known Problems Father Social History: 
Social History Tobacco Use  Smoking status: Never Smoker  Smokeless tobacco: Former User Substance Use Topics  Alcohol use: Yes  Drug use: No  
 
 
Allergies: Allergies Allergen Reactions  Penicillin G Anaphylaxis Patient's primary care provider (as noted in EPIC):  Evelyn Abrams MD 
 
Review of Systems Constitutional: Negative for diaphoresis. HENT: Negative for congestion. Eyes: Negative for discharge. Respiratory: Positive for shortness of breath. Negative for stridor. Cardiovascular: Negative for palpitations. Gastrointestinal: Negative for diarrhea. Genitourinary: Negative for flank pain. Musculoskeletal: Negative for back pain. Neurological: Negative for weakness. Psychiatric/Behavioral: Negative for hallucinations. All other systems reviewed and are negative. Visit Vitals /75 Pulse 80 Temp 98.6 °F (37 °C) Resp 16 SpO2 95% PHYSICAL EXAM: 
 
CONSTITUTIONAL:  Alert, in no apparent distress;  well developed;  well nourished. HEAD:  Normocephalic, atraumatic. EYES:  EOMI. Non-icteric sclera. Normal conjunctiva. ENTM:  Nose:  no rhinorrhea. Throat:  no erythema or exudate, mucous membranes moist. 
NECK:  No JVD. Supple RESPIRATORY:  Diffuse expiratory wheezes but good air movement. No rales or rhonchi. CARDIOVASCULAR:  Regular rate and rhythm. No murmurs, rubs, or gallops. GI:  Normal bowel sounds, abdomen soft and non-tender. No rebound or guarding. BACK:  Non-tender. UPPER EXT:  Normal inspection. LOWER EXT:  No edema, no calf tenderness. Distal pulses intact. NEURO:  Moves all four extremities, and grossly normal motor exam. 
SKIN:  No rashes;  Normal for age. PSYCH:  Alert and normal affect. DIFFERENTIAL DIAGNOSES/ MEDICAL DECISION MAKING: 
Acute asthma exacerbation, acute bronchitis, pneumonia, upper respiratory infection, pulmonary embolism, bronchospasm, various cardiac etiologies verus numerous other etiologies versus combination of the above. Diagnostic Study Results Abnormal lab results from this emergency department encounter: 
Labs Reviewed - No data to display Lab values for this patient within approximately the last 12 hours: 
No results found for this or any previous visit (from the past 12 hour(s)). Radiologist and cardiologist interpretations if available at time of this note: No results found. Medication(s) ordered for patient during this emergency visit encounter: 
Medications  
albuterol (PROVENTIL VENTOLIN) nebulizer solution 5 mg (not administered) dexamethasone (DECADRON) 4 mg/mL injection 10 mg (not administered) albuterol (PROVENTIL VENTOLIN) nebulizer solution 2.5 mg (not administered)  
albuterol-ipratropium (DUO-NEB) 2.5 MG-0.5 MG/3 ML (not administered)  
albuterol-ipratropium (DUO-NEB) 2.5 mg-0.5 mg/3 ml nebulizer solution (3 mL  Given 10/29/18 0944)  
albuterol (PROVENTIL VENTOLIN) 2.5 mg /3 mL (0.083 %) nebulizer solution (5 mg  Given 10/29/18 0956) Medical Decision Making I am the first provider for this patient. I reviewed the vital signs, available nursing notes, past medical history, past surgical history, family history and social history. Vital Signs:  Reviewed the patient's vital signs. ED COURSE AND MEDICAL DECISION MAKING:  Patient's breathing improved and wheezing resolved in the emergency department with the noted albuterol and atrovent HHN treatments. Patient's initial steroid therapy may have been started in the ED as well. Patient is well and can be discharged home. There are no other medical conditions that I believe are significantly contributing to the patient's shortness of breath except the noted acute asthma exacerbation and any noted triggers. IMPRESSION AND MEDICAL DECISION MAKING: 
Based upon the patient's presentation with noted HPI and PE, along with the work up done in the emergency department, I believe that the patient is having an acute asthma exacerbation in an asthmatic patient. DIAGNOSIS: 
1. Acute asthma exacerbation. SPECIFIC PATIENT INSTRUCTIONS FROM THE PHYSICIAN WHO TREATED YOU IN THE ER TODAY: 
1. Return if any concerns or worsening of condition(s) 2. Prednisone as prescribed until finished. 3. Use your albuterol inhaler, if prescribed, and/or home nebulizer machine (if you have one) for wheezing. 4. FOLLOW UP APPOINTMENT:  Your primary doctor in 1-2 days. Patient is improved, resting quietly and comfortably. The patient will be discharged home.   
 
The patient was reassured that these symptoms do not appear to represent a serious or life threatening condition at this time. Warning signs of worsening condition were discussed and understood by the patient. Based on patient's age, coexisting illness, exam, and the results of this ED evaluation, the decision to treat as an outpatient was made. Based on the information available at time of discharge, acute pathology requiring immediate intervention was deemed relative unlikely. While it is impossible to completely exclude the possibility of underlying serious disease or worsening of condition, I feel the relative likelihood is extremely low. I discussed this uncertainty with the patient, who understood ED evaluation and treatment and felt comfortable with the outpatient treatment plan. All questions regarding care, test results, and follow up were answered. The patient is stable and appropriate to discharge. They understand that they should return to the emergency department for any new or worsening symptoms. I stressed the importance of follow up for repeat assessment and possibly further evaluation/treatment. Coding Diagnoses Clinical Impression: 1. Mild intermittent asthma with acute exacerbation Disposition Disposition:  Home. THAD Gallardo Board Certified Emergency Physician Provider Attestation: If a scribe was utilized in generation of this patient record, I personally performed the services described in the documentation, reviewed the documentation, as recorded by the scribe in my presence, and it accurately records the patient's history of presenting illness, review of systems, patient physical examination, and procedures performed by me as the attending physician. THAD Gallardo Board Certified Emergency Physician 
10/29/2018. 
10:18 AM 
 
 
Scribe Attestation Carline Cano acting as a scribe for and in the presence of Kate Maria MD     
October 29, 2018 at 10:33 AM 
    
 Provider Attestation:     
I personally performed the services described in the documentation, reviewed the documentation, as recorded by the scribe in my presence, and it accurately and completely records my words and actions.  October 29, 2018 at 10:33 AM - Star Uribe MD

## 2018-10-29 NOTE — PROGRESS NOTES
Referral received to assist pt with prescription and nebulizer machine. Medication assistance form completed for albuterol for nebulizer and faxed to [facility] [address] and pt will  upon discharge. Gave pt [facility] information and pt scheduled to  free nebulizer machine tomorrow. Pt very appreciative.

## 2018-11-05 ENCOUNTER — CLINICAL SUPPORT (OUTPATIENT)
Dept: PULMONOLOGY | Facility: CLINIC | Age: 28
End: 2018-11-05

## 2018-11-05 DIAGNOSIS — J45.909 SEVERE ASTHMA, UNSPECIFIED WHETHER COMPLICATED, UNSPECIFIED WHETHER PERSISTENT: Primary | ICD-10-CM

## 2018-11-09 DIAGNOSIS — J45.50 SEVERE PERSISTENT ASTHMA WITHOUT COMPLICATION: Primary | ICD-10-CM

## 2018-11-09 NOTE — TELEPHONE ENCOUNTER
PT CALLED([phone]). [phone]

## 2018-11-11 ENCOUNTER — APPOINTMENT (OUTPATIENT)
Dept: GENERAL RADIOLOGY | Age: 28
DRG: 202 | End: 2018-11-11
Attending: EMERGENCY MEDICINE
Payer: SELF-PAY

## 2018-11-11 ENCOUNTER — HOSPITAL ENCOUNTER (INPATIENT)
Age: 28
LOS: 2 days | Discharge: HOME | DRG: 202 | End: 2018-11-13
Attending: EMERGENCY MEDICINE | Admitting: FAMILY MEDICINE
Payer: SELF-PAY

## 2018-11-11 DIAGNOSIS — J45.51 SEVERE PERSISTENT ASTHMA WITH ACUTE EXACERBATION: Primary | ICD-10-CM

## 2018-11-11 DIAGNOSIS — F41.9 ANXIETY: ICD-10-CM

## 2018-11-11 PROBLEM — J45.901 SEVERE ASTHMA WITH ACUTE EXACERBATION: Status: ACTIVE | Noted: 2018-11-11

## 2018-11-11 PROBLEM — J96.90 RESPIRATORY FAILURE (HCC): Status: ACTIVE | Noted: 2018-11-11

## 2018-11-11 LAB
ALBUMIN SERPL-MCNC: 3.1 G/DL (ref 3.4–5)
ALBUMIN/GLOB SERPL: 0.9 {RATIO} (ref 0.8–1.7)
ALP SERPL-CCNC: 115 U/L (ref 45–117)
ALT SERPL-CCNC: 45 U/L (ref 16–61)
ANION GAP SERPL CALC-SCNC: 6 MMOL/L (ref 3–18)
ARTERIAL PATENCY WRIST A: ABNORMAL
AST SERPL-CCNC: 24 U/L (ref 15–37)
BASE EXCESS BLDV CALC-SCNC: 3 MMOL/L
BASOPHILS # BLD: 0.1 K/UL (ref 0–0.1)
BASOPHILS NFR BLD: 1 % (ref 0–2)
BDY SITE: ABNORMAL
BILIRUB SERPL-MCNC: 0.5 MG/DL (ref 0.2–1)
BODY TEMPERATURE: 98.6
BUN SERPL-MCNC: 9 MG/DL (ref 7–18)
BUN/CREAT SERPL: 9 (ref 12–20)
CALCIUM SERPL-MCNC: 8.8 MG/DL (ref 8.5–10.1)
CHLORIDE SERPL-SCNC: 108 MMOL/L (ref 100–108)
CO2 SERPL-SCNC: 26 MMOL/L (ref 21–32)
CREAT SERPL-MCNC: 0.97 MG/DL (ref 0.6–1.3)
DIFFERENTIAL METHOD BLD: ABNORMAL
EOSINOPHIL # BLD: 0.9 K/UL (ref 0–0.4)
EOSINOPHIL NFR BLD: 8 % (ref 0–5)
ERYTHROCYTE [DISTWIDTH] IN BLOOD BY AUTOMATED COUNT: 12.1 % (ref 11.6–14.5)
GAS FLOW.O2 O2 DELIVERY SYS: ABNORMAL L/MIN
GLOBULIN SER CALC-MCNC: 3.6 G/DL (ref 2–4)
GLUCOSE SERPL-MCNC: 116 MG/DL (ref 74–99)
HCO3 BLDV-SCNC: 28.3 MMOL/L (ref 23–28)
HCT VFR BLD AUTO: 54 % (ref 36–48)
HGB BLD-MCNC: 17.7 G/DL (ref 13–16)
LYMPHOCYTES # BLD: 3.3 K/UL (ref 0.9–3.6)
LYMPHOCYTES NFR BLD: 29 % (ref 21–52)
MCH RBC QN AUTO: 27.7 PG (ref 24–34)
MCHC RBC AUTO-ENTMCNC: 32.8 G/DL (ref 31–37)
MCV RBC AUTO: 84.6 FL (ref 74–97)
MONOCYTES # BLD: 0.9 K/UL (ref 0.05–1.2)
MONOCYTES NFR BLD: 8 % (ref 3–10)
NEUTS SEG # BLD: 6.1 K/UL (ref 1.8–8)
NEUTS SEG NFR BLD: 54 % (ref 40–73)
O2/TOTAL GAS SETTING VFR VENT: 0.59 %
PCO2 BLDV: 46.6 MMHG (ref 41–51)
PEEP RESPIRATORY: 8 CMH2O
PH BLDV: 7.39 [PH] (ref 7.32–7.42)
PIP ISTAT,IPIP: 14
PLATELET # BLD AUTO: 329 K/UL (ref 135–420)
PMV BLD AUTO: 10.2 FL (ref 9.2–11.8)
PO2 BLDV: 30 MMHG (ref 25–40)
POTASSIUM SERPL-SCNC: 3.9 MMOL/L (ref 3.5–5.5)
PROT SERPL-MCNC: 6.7 G/DL (ref 6.4–8.2)
RBC # BLD AUTO: 6.38 M/UL (ref 4.7–5.5)
SAO2 % BLDV: 57 % (ref 65–88)
SERVICE CMNT-IMP: ABNORMAL
SODIUM SERPL-SCNC: 140 MMOL/L (ref 136–145)
SPECIMEN TYPE: ABNORMAL
SPONTANEOUS TIMED, IST: YES
TOTAL RESP. RATE, ITRR: 26
WBC # BLD AUTO: 11.3 K/UL (ref 4.6–13.2)

## 2018-11-11 PROCEDURE — 74011250637 HC RX REV CODE- 250/637: Performed by: INTERNAL MEDICINE

## 2018-11-11 PROCEDURE — 80053 COMPREHEN METABOLIC PANEL: CPT

## 2018-11-11 PROCEDURE — 74011250636 HC RX REV CODE- 250/636: Performed by: EMERGENCY MEDICINE

## 2018-11-11 PROCEDURE — 71045 X-RAY EXAM CHEST 1 VIEW: CPT

## 2018-11-11 PROCEDURE — 85025 COMPLETE CBC W/AUTO DIFF WBC: CPT

## 2018-11-11 PROCEDURE — 74011000250 HC RX REV CODE- 250

## 2018-11-11 PROCEDURE — 94640 AIRWAY INHALATION TREATMENT: CPT

## 2018-11-11 PROCEDURE — 96360 HYDRATION IV INFUSION INIT: CPT

## 2018-11-11 PROCEDURE — 74011000250 HC RX REV CODE- 250: Performed by: PHYSICIAN ASSISTANT

## 2018-11-11 PROCEDURE — 99285 EMERGENCY DEPT VISIT HI MDM: CPT

## 2018-11-11 PROCEDURE — 74011000250 HC RX REV CODE- 250: Performed by: FAMILY MEDICINE

## 2018-11-11 PROCEDURE — 82803 BLOOD GASES ANY COMBINATION: CPT

## 2018-11-11 PROCEDURE — 94664 DEMO&/EVAL PT USE INHALER: CPT

## 2018-11-11 PROCEDURE — 74011000250 HC RX REV CODE- 250: Performed by: EMERGENCY MEDICINE

## 2018-11-11 PROCEDURE — 65610000006 HC RM INTENSIVE CARE

## 2018-11-11 PROCEDURE — 5A09357 ASSISTANCE WITH RESPIRATORY VENTILATION, LESS THAN 24 CONSECUTIVE HOURS, CONTINUOUS POSITIVE AIRWAY PRESSURE: ICD-10-PCS | Performed by: INTERNAL MEDICINE

## 2018-11-11 PROCEDURE — 74011250636 HC RX REV CODE- 250/636: Performed by: FAMILY MEDICINE

## 2018-11-11 PROCEDURE — 94660 CPAP INITIATION&MGMT: CPT

## 2018-11-11 PROCEDURE — 77030029684 HC NEB SM VOL KT MONA -A

## 2018-11-11 PROCEDURE — 77030035694 HC MSK BIPAP FLL FAC PERFMAX PHIL -B

## 2018-11-11 RX ORDER — ARFORMOTEROL TARTRATE 15 UG/2ML
15 SOLUTION RESPIRATORY (INHALATION)
Status: DISCONTINUED | OUTPATIENT
Start: 2018-11-11 | End: 2018-11-13 | Stop reason: HOSPADM

## 2018-11-11 RX ORDER — LORATADINE 10 MG/1
10 TABLET ORAL DAILY
Status: DISCONTINUED | OUTPATIENT
Start: 2018-11-12 | End: 2018-11-13 | Stop reason: HOSPADM

## 2018-11-11 RX ORDER — IPRATROPIUM BROMIDE AND ALBUTEROL SULFATE 2.5; .5 MG/3ML; MG/3ML
3 SOLUTION RESPIRATORY (INHALATION)
Status: DISCONTINUED | OUTPATIENT
Start: 2018-11-11 | End: 2018-11-12

## 2018-11-11 RX ORDER — ALBUTEROL SULFATE 0.83 MG/ML
10 SOLUTION RESPIRATORY (INHALATION) CONTINUOUS
Status: DISCONTINUED | OUTPATIENT
Start: 2018-11-11 | End: 2018-11-11

## 2018-11-11 RX ORDER — BUDESONIDE AND FORMOTEROL FUMARATE DIHYDRATE 160; 4.5 UG/1; UG/1
2 AEROSOL RESPIRATORY (INHALATION) 2 TIMES DAILY
Status: DISCONTINUED | OUTPATIENT
Start: 2018-11-11 | End: 2018-11-11 | Stop reason: CLARIF

## 2018-11-11 RX ORDER — IPRATROPIUM BROMIDE AND ALBUTEROL SULFATE 2.5; .5 MG/3ML; MG/3ML
SOLUTION RESPIRATORY (INHALATION)
Status: COMPLETED
Start: 2018-11-11 | End: 2018-11-11

## 2018-11-11 RX ORDER — ALBUTEROL SULFATE 0.83 MG/ML
5 SOLUTION RESPIRATORY (INHALATION)
Status: COMPLETED | OUTPATIENT
Start: 2018-11-11 | End: 2018-11-11

## 2018-11-11 RX ORDER — MONTELUKAST SODIUM 10 MG/1
10 TABLET ORAL DAILY
Status: DISCONTINUED | OUTPATIENT
Start: 2018-11-12 | End: 2018-11-13 | Stop reason: HOSPADM

## 2018-11-11 RX ORDER — EPINEPHRINE 1 MG/ML
0.3 INJECTION INTRAMUSCULAR; INTRAVENOUS; SUBCUTANEOUS
Status: ACTIVE | OUTPATIENT
Start: 2018-11-11 | End: 2018-11-12

## 2018-11-11 RX ORDER — ENOXAPARIN SODIUM 100 MG/ML
40 INJECTION SUBCUTANEOUS EVERY 24 HOURS
Status: DISCONTINUED | OUTPATIENT
Start: 2018-11-11 | End: 2018-11-13 | Stop reason: HOSPADM

## 2018-11-11 RX ORDER — ONDANSETRON 2 MG/ML
4 INJECTION INTRAMUSCULAR; INTRAVENOUS
Status: COMPLETED | OUTPATIENT
Start: 2018-11-11 | End: 2018-11-11

## 2018-11-11 RX ORDER — ACETAMINOPHEN 325 MG/1
650 TABLET ORAL
Status: DISCONTINUED | OUTPATIENT
Start: 2018-11-11 | End: 2018-11-13 | Stop reason: HOSPADM

## 2018-11-11 RX ORDER — FLUTICASONE PROPIONATE 50 MCG
1 SPRAY, SUSPENSION (ML) NASAL 2 TIMES DAILY
Status: DISCONTINUED | OUTPATIENT
Start: 2018-11-11 | End: 2018-11-13 | Stop reason: HOSPADM

## 2018-11-11 RX ORDER — IPRATROPIUM BROMIDE AND ALBUTEROL SULFATE 2.5; .5 MG/3ML; MG/3ML
3 SOLUTION RESPIRATORY (INHALATION)
Status: COMPLETED | OUTPATIENT
Start: 2018-11-11 | End: 2018-11-11

## 2018-11-11 RX ORDER — BUDESONIDE 0.5 MG/2ML
500 INHALANT ORAL
Status: DISCONTINUED | OUTPATIENT
Start: 2018-11-11 | End: 2018-11-13 | Stop reason: HOSPADM

## 2018-11-11 RX ORDER — SODIUM CHLORIDE 9 MG/ML
100 INJECTION, SOLUTION INTRAVENOUS CONTINUOUS
Status: DISCONTINUED | OUTPATIENT
Start: 2018-11-11 | End: 2018-11-11

## 2018-11-11 RX ADMIN — ALBUTEROL SULFATE 5 MG: 2.5 SOLUTION RESPIRATORY (INHALATION) at 15:17

## 2018-11-11 RX ADMIN — SODIUM CHLORIDE 1000 ML: 900 INJECTION, SOLUTION INTRAVENOUS at 13:53

## 2018-11-11 RX ADMIN — ENOXAPARIN SODIUM 40 MG: 40 INJECTION, SOLUTION INTRAVENOUS; SUBCUTANEOUS at 21:03

## 2018-11-11 RX ADMIN — BUDESONIDE 500 MCG: 0.5 INHALANT RESPIRATORY (INHALATION) at 20:00

## 2018-11-11 RX ADMIN — METHYLPREDNISOLONE SODIUM SUCCINATE 60 MG: 40 INJECTION, POWDER, FOR SOLUTION INTRAMUSCULAR; INTRAVENOUS at 21:01

## 2018-11-11 RX ADMIN — IPRATROPIUM BROMIDE AND ALBUTEROL SULFATE 3 ML: .5; 3 SOLUTION RESPIRATORY (INHALATION) at 20:05

## 2018-11-11 RX ADMIN — METHYLPREDNISOLONE SODIUM SUCCINATE 60 MG: 40 INJECTION, POWDER, FOR SOLUTION INTRAMUSCULAR; INTRAVENOUS at 23:48

## 2018-11-11 RX ADMIN — ONDANSETRON 4 MG: 2 INJECTION INTRAMUSCULAR; INTRAVENOUS at 18:38

## 2018-11-11 RX ADMIN — ACETAMINOPHEN 650 MG: 325 TABLET, FILM COATED ORAL at 21:28

## 2018-11-11 RX ADMIN — IPRATROPIUM BROMIDE AND ALBUTEROL SULFATE 3 ML: .5; 3 SOLUTION RESPIRATORY (INHALATION) at 13:46

## 2018-11-11 RX ADMIN — IPRATROPIUM BROMIDE AND ALBUTEROL SULFATE 3 ML: .5; 3 SOLUTION RESPIRATORY (INHALATION) at 23:47

## 2018-11-11 RX ADMIN — ALBUTEROL SULFATE 10 MG: 2.5 SOLUTION RESPIRATORY (INHALATION) at 16:46

## 2018-11-11 RX ADMIN — ARFORMOTEROL TARTRATE 15 MCG: 15 SOLUTION RESPIRATORY (INHALATION) at 20:00

## 2018-11-11 NOTE — ED NOTES
TRANSFER - ED to INPATIENT REPORT: 
 
SBAR report made available to receiving floor on this patient being transferred to  [phone] /[phone])  for routine progression of care Admitting diagnosis Severe asthma with acute exacerbation Respiratory failure ([facility] [address] Information from the following report(s) ED Summary, Procedure Summary and Intake/Output was made available to receiving floor. Lines:    
 
Medication list confirmed with patient Opportunity for questions and clarification was provided. Patient is oriented to time, place, person and situation Bi-PAP Patient is  continent and ambulatory without assist 
  
Valuables transported with patient Patient transported with: 
 Monitor Registered Nurse Tech Bi-PAP [facility] Vitals:  
 11/11/18 1415 11/11/18 1416 11/11/18 1430 11/11/18 1445 BP: 127/90  121/78 112/76 Pulse: (!) 128  (!) 122 (!) 117 Resp: 23  19 23 Temp:      
SpO2: 100% 100% 99% 100% Weight:      
Height:

## 2018-11-11 NOTE — PROGRESS NOTES
Called to bedside for SOB 
--Placed on bipap - physician requesting epap=3 (not supported on V60, lowest epap avail=4; notified MD & given ok) --Placed in Ipap=10, epap=4, Fio2=35% 
--Pt indicating he is improving with bipap support, also given nebs 
--Observed lowering HR & improved WOB Verbal order per MD for VBG 
 
1800: Attempt ABG with pt in ER - pt became extremely anxious/agitated while I was attempting the collection- had to stop procedure as pt indicated was going to vomit, lost all color in face & was wearing bipap mask;  
--removed bipap mask immediately - notified RN - discussed need for possible anti-nausea medicine 
--Pt recovered within 30 min but did not wish to return to bipap mask at this time 1840--Transport pt to ICU on 3 lpm NC - no complications - pt talking no respiratory distress at this time, sats 100% - RN advised me verbally she had spoken with MD about pt anxiety/nausea during procedure - continues to be very anxious about ABG 
   ---Hand off to night RT

## 2018-11-11 NOTE — H&P
History and Physical 
 
Patient: Leida Danielle               Sex: male          DOA: 11/11/2018 YOB: 1990      Age:  33 y.o.        LOS:  LOS: 0 days Chief Complaint Patient presents with  Shortness of Breath HPI:  
 
Leida Danielle is a 33 y.o. male with hx asthma  who presents with sob onset today. Patient reports that it started around noon today. He had associated wheezing and chest tightness. He used his rescue inhaler with no relief. He used his nebulizer 3x with no change. In the ED he received duoneb x 2 , albuterol x1. He had a CXR not yet officially read. He was then  placed on BIPAP for respiratory distress. He will be admitted to ICU for resp failure due to severe acute asthma exacerbation. Past Medical History:  
Diagnosis Date  Asthma  Chronic obstructive pulmonary disease ([address] [place] Queening Past Surgical History:  
Procedure Laterality Date  HX FREE SKIN GRAFT    
 HX ORTHOPAEDIC    
 facial reconstruction No current facility-administered medications on file prior to encounter. Current Outpatient Medications on File Prior to Encounter Medication Sig Dispense Refill  omalizumab (XOLAIR) 150 mg solr 300 mg by SubCUTAneous route every fourteen (14) days. 300 mg 0  
 albuterol (PROVENTIL VENTOLIN) 2.5 mg /3 mL (0.083 %) nebulizer solution 3 mL by Nebulization route every four (4) hours as needed for Wheezing. 30 Each 0  
 albuterol (PROVENTIL VENTOLIN) 2.5 mg /3 mL (0.083 %) nebulizer solution 3 mL by Nebulization route every four (4) hours as needed for Wheezing. 25 Each 0  
 omalizumab (XOLAIR) 150 mg solr 300 mg by SubCUTAneous route every fourteen (14) days. 300 mg 0  predniSONE (STERAPRED DS) 10 mg dose pack Take as directed 21 Tab 0  
 montelukast (SINGULAIR) 10 mg tablet Take 1 Tab by mouth daily.  Indications: MAINTENANCE THERAPY FOR ASTHMA 90 Tab 3  
  cetirizine (ZYRTEC) 10 mg tablet Take 1 Tab by mouth daily. 90 Tab 3  
 fluticasone (FLONASE) 50 mcg/actuation nasal spray 1 [place] by Both Nostrils route two (2) times a day. 1 Bottle 11  
 budesonide-formoterol (SYMBICORT) 160-4.5 mcg/actuation HFAA Take 2 Puffs by inhalation two (2) times a day. 1 Inhaler 10  
 ALPRAZolam (XANAX) 0.5 mg tablet Take 1 Tab by mouth three (3) times daily as needed for Anxiety. Max Daily Amount: 1.5 mg. 10 Tab 0 Social History Socioeconomic History  Marital status: SINGLE Spouse name: Not on file  Number of children: Not on file  Years of education: Not on file  Highest education level: Not on file Social Needs  Financial resource strain: Not on file  Food insecurity - worry: Not on file  Food insecurity - inability: Not on file  Transportation needs - medical: Not on file  Transportation needs - non-medical: Not on file Occupational History  Not on file Tobacco Use  Smoking status: Never Smoker  Smokeless tobacco: Former User Substance and Sexual Activity  Alcohol use: Yes  Drug use: No  
 Sexual activity: Not Currently Other Topics Concern  Not on file Social History Narrative  Not on file Prior to Admission Medications Prescriptions Last Dose Informant Patient Reported? Taking? ALPRAZolam (XANAX) 0.5 mg tablet   No No  
Sig: Take 1 Tab by mouth three (3) times daily as needed for Anxiety. Max Daily Amount: 1.5 mg.  
albuterol (PROVENTIL VENTOLIN) 2.5 mg /3 mL (0.083 %) nebulizer solution   No No  
Sig: 3 mL by Nebulization route every four (4) hours as needed for Wheezing. albuterol (PROVENTIL VENTOLIN) 2.5 mg /3 mL (0.083 %) nebulizer solution   No No  
Sig: 3 mL by Nebulization route every four (4) hours as needed for Wheezing. budesonide-formoterol (SYMBICORT) 160-4.5 mcg/actuation HFAA   No No  
Sig: Take 2 Puffs by inhalation two (2) times a day. cetirizine (ZYRTEC) 10 mg tablet   No No  
Sig: Take 1 Tab by mouth daily. fluticasone (FLONASE) 50 mcg/actuation nasal spray   No No  
[place] by Both Nostrils route two (2) times a day. montelukast (SINGULAIR) 10 mg tablet   No No  
Sig: Take 1 Tab by mouth daily. Indications: MAINTENANCE THERAPY FOR ASTHMA  
omalizumab (XOLAIR) 150 mg solr   No No  
Si mg by SubCUTAneous route every fourteen (14) days. omalizumab (XOLAIR) 150 mg solr   No No  
Si mg by SubCUTAneous route every fourteen (14) days. predniSONE (STERAPRED DS) 10 mg dose pack   No No  
Sig: Take as directed Facility-Administered Medications: None Family History Adopted: Yes  
Problem Relation Age of Onset  No Known Problems Mother  No Known Problems Father Allergies Allergen Reactions  Penicillin G Anaphylaxis  Cat Dander Shortness of Breath Review of Systems Review of Systems Physical Exam:  
 
 
Visit Vitals /76 Pulse (!) 117 Temp 98.1 °F (36.7 °C) Resp 23 Ht 5' 8\" (1.727 m) Wt 90.7 kg (200 lb) SpO2 100% BMI 30.41 kg/m² Physical Exam 
 
Ancillary Studies: All lab and imaging reviewed for the past 24 hours. Recent Results (from the past 24 hour(s)) METABOLIC PANEL, COMPREHENSIVE Collection Time: 18  1:45 PM  
Result Value Ref Range Sodium 140 136 - 145 mmol/L Potassium 3.9 3.5 - 5.5 mmol/L Chloride 108 100 - 108 mmol/L  
 CO2 26 21 - 32 mmol/L Anion gap 6 3.0 - 18 mmol/L Glucose 116 (H) 74 - 99 mg/dL BUN 9 7.0 - 18 MG/DL Creatinine 0.97 0.6 - 1.3 MG/DL  
 BUN/Creatinine ratio 9 (L) 12 - 20 GFR est AA >60 >60 ml/min/1.73m2 GFR est non-AA >60 >60 ml/min/1.73m2 Calcium 8.8 8.5 - 10.1 MG/DL Bilirubin, total 0.5 0.2 - 1.0 MG/DL  
 ALT (SGPT) 45 16 - 61 U/L  
 AST (SGOT) 24 15 - 37 U/L Alk. phosphatase 115 45 - 117 U/L Protein, total 6.7 6.4 - 8.2 g/dL Albumin 3.1 (L) 3.4 - 5.0 g/dL Globulin 3.6 2.0 - 4.0 g/dL A-G Ratio 0.9 0.8 - 1.7 POC VENOUS BLOOD GAS Collection Time: 11/11/18  2:09 PM  
Result Value Ref Range Device: BIPAP    
 FIO2 (POC) 0.59 %  
 pH, venous (POC) 7.392 7.32 - 7.42    
 pCO2, venous (POC) 46.6 41 - 51 MMHG  
 pO2, venous (POC) 30 25 - 40 mmHg HCO3, venous (POC) 28.3 (H) 23.0 - 28.0 MMOL/L  
 sO2, venous (POC) 57 (L) 65 - 88 % Base excess, venous (POC) 3 mmol/L  
 PEEP/CPAP (POC) 8 cmH2O  
 PIP (POC) 14 Allens test (POC) N/A Total resp. rate 26 Site OTHER Patient temp. 98.6 Specimen type (POC) VENOUS BLOOD Performed by Kvng Buchanan Spontaneous timed YES Assessment/Plan Active Problems: 
  Respiratory failure ([address] (11/11/2018) Severe asthma with acute exacerbation (11/11/2018) PLAN: 
 
Severe asthma Exacerbation - Admit to ICU  
- Duoneb - Solumedrol - Symbicort - BIPAP 
- On Xolair PTA  
- Pulmonology consult Respiratory Failure - due to above - BIPAP 
- ABG pending - Intensivist consulted DVT prophylaxis - Lovenox Supportive care Full code Sil Junior MD 
11/11/2018 5:18 PM

## 2018-11-11 NOTE — ED PROVIDER NOTES
EMERGENCY DEPARTMENT HISTORY AND PHYSICAL EXAM 
 
1:47 PM 
 
 
Date: 11/11/2018 Patient Name: Tabby Lee History of Presenting Illness Chief Complaint Patient presents with  Shortness of Breath History Provided By: Patient Additional History (Context): Tabby Lee is a 33 y.o. male with a PMHx of asthma and COPD who presents with acute, moderate SOB onset x \"earlier today\" with an associated cough. EMS reports the pt had 3 Albuterol treatments prior to their arrival. During transport pt received magnesium, combi, duo neb, and solumedrol. He states the SOB feels like his asthma and his cough is nonproductive. Pt reports at the age of 16 he was intubated, he has a Hx of being on BIPAP, and was on CPAP in December of 2018. Denies leg swelling, rhinorrhea, recent surgeries, Hx of blood clots, and smoking cigarettes. No further concerns or complaints at this time. PCP: Joss Grace MD 
 
Current Facility-Administered Medications Medication Dose Route Frequency Provider Last Rate Last Dose  EPINEPHrine (ADRENALIN) 1 mg/mL injection 0.3 mg  0.3 mg IntraMUSCular NOW Barbie Smith MD      
 albuterol (PROVENTIL VENTOLIN) nebulizer solution 10 mg  10 mg Nebulization CONTINUOUS Bethanne Babinski, Ashlee L, PA   10 mg at 11/11/18 1646 Current Outpatient Medications Medication Sig Dispense Refill  omalizumab (XOLAIR) 150 mg solr 300 mg by SubCUTAneous route every fourteen (14) days. 300 mg 0  
 albuterol (PROVENTIL VENTOLIN) 2.5 mg /3 mL (0.083 %) nebulizer solution 3 mL by Nebulization route every four (4) hours as needed for Wheezing. 30 Each 0  
 albuterol (PROVENTIL VENTOLIN) 2.5 mg /3 mL (0.083 %) nebulizer solution 3 mL by Nebulization route every four (4) hours as needed for Wheezing. 25 Each 0  
 omalizumab (XOLAIR) 150 mg solr 300 mg by SubCUTAneous route every fourteen (14) days.  300 mg 0  
  predniSONE (STERAPRED DS) 10 mg dose pack Take as directed 21 Tab 0  
 montelukast (SINGULAIR) 10 mg tablet Take 1 Tab by mouth daily. Indications: MAINTENANCE THERAPY FOR ASTHMA 90 Tab 3  cetirizine (ZYRTEC) 10 mg tablet Take 1 Tab by mouth daily. 90 Tab 3  
 fluticasone (FLONASE) 50 mcg/actuation nasal spray 1 [place] by Both Nostrils route two (2) times a day. 1 Bottle 11  
 budesonide-formoterol (SYMBICORT) 160-4.5 mcg/actuation HFAA Take 2 Puffs by inhalation two (2) times a day. 1 Inhaler 10  
 ALPRAZolam (XANAX) 0.5 mg tablet Take 1 Tab by mouth three (3) times daily as needed for Anxiety. Max Daily Amount: 1.5 mg. 10 Tab 0 Past History Past Medical History: 
Past Medical History:  
Diagnosis Date  Asthma  Chronic obstructive pulmonary disease ([address] Past Surgical History: 
Past Surgical History:  
Procedure Laterality Date  HX FREE SKIN GRAFT    
 HX ORTHOPAEDIC    
 facial reconstruction Family History: 
Family History Adopted: Yes  
Problem Relation Age of Onset  No Known Problems Mother  No Known Problems Father Social History: 
Social History Tobacco Use  Smoking status: Never Smoker  Smokeless tobacco: Former User Substance Use Topics  Alcohol use: Yes  Drug use: No  
 
 
Allergies: Allergies Allergen Reactions  Penicillin G Anaphylaxis  Cat Dander Shortness of Breath Review of Systems Review of Systems HENT: Negative for rhinorrhea. Respiratory: Positive for cough and shortness of breath. Cardiovascular: Negative for leg swelling. All other systems reviewed and are negative. Physical Exam  
 
Visit Vitals /76 Pulse (!) 117 Temp 98.1 °F (36.7 °C) Resp 23 Ht 5' 8\" (1.727 m) Wt 90.7 kg (200 lb) SpO2 100% BMI 30.41 kg/m² Physical Exam  
Constitutional: He is oriented to person, place, and time. He appears well-developed and well-nourished.   
HENT:  
 Head: Normocephalic and atraumatic. Eyes: EOM are normal. Pupils are equal, round, and reactive to light. Right eye exhibits no discharge. Left eye exhibits no discharge. Neck: Normal range of motion. Neck supple. No JVD present. No tracheal deviation present. Cardiovascular: Regular rhythm and normal heart sounds. Tachycardia present. No murmur heard. Capillary refill is less than 3 seconds. Pulmonary/Chest:  
High WOB. Tripod of breathing and head bobbing. Pt has poor air movement. Abdominal: Soft. Bowel sounds are normal. He exhibits no distension. There is no tenderness. There is no rebound. Musculoskeletal: Normal range of motion. He exhibits no tenderness or deformity. Pt does not have LE edema. Negative for calf tenderness. Neurological: He is alert and oriented to person, place, and time. No cranial nerve deficit. 5/5 strength UE/LE, 5/5 sensation UE/LE Skin: Skin is warm and dry. No rash noted. No erythema. Psychiatric: He has a normal mood and affect. His behavior is normal.  
 
 
 
Diagnostic Study Results Labs - Recent Results (from the past 12 hour(s)) METABOLIC PANEL, COMPREHENSIVE Collection Time: 11/11/18  1:45 PM  
Result Value Ref Range Sodium 140 136 - 145 mmol/L Potassium 3.9 3.5 - 5.5 mmol/L Chloride 108 100 - 108 mmol/L  
 CO2 26 21 - 32 mmol/L Anion gap 6 3.0 - 18 mmol/L Glucose 116 (H) 74 - 99 mg/dL BUN 9 7.0 - 18 MG/DL Creatinine 0.97 0.6 - 1.3 MG/DL  
 BUN/Creatinine ratio 9 (L) 12 - 20 GFR est AA >60 >60 ml/min/1.73m2 GFR est non-AA >60 >60 ml/min/1.73m2 Calcium 8.8 8.5 - 10.1 MG/DL Bilirubin, total 0.5 0.2 - 1.0 MG/DL  
 ALT (SGPT) 45 16 - 61 U/L  
 AST (SGOT) 24 15 - 37 U/L Alk. phosphatase 115 45 - 117 U/L Protein, total 6.7 6.4 - 8.2 g/dL Albumin 3.1 (L) 3.4 - 5.0 g/dL Globulin 3.6 2.0 - 4.0 g/dL A-G Ratio 0.9 0.8 - 1.7 POC VENOUS BLOOD GAS  Collection Time: 11/11/18  2:09 PM  
 Result Value Ref Range Device: BIPAP    
 FIO2 (POC) 0.59 %  
 pH, venous (POC) 7.392 7.32 - 7.42    
 pCO2, venous (POC) 46.6 41 - 51 MMHG  
 pO2, venous (POC) 30 25 - 40 mmHg HCO3, venous (POC) 28.3 (H) 23.0 - 28.0 MMOL/L  
 sO2, venous (POC) 57 (L) 65 - 88 % Base excess, venous (POC) 3 mmol/L  
 PEEP/CPAP (POC) 8 cmH2O  
 PIP (POC) 14 Allens test (POC) N/A Total resp. rate 26 Site OTHER Patient temp. 98.6 Specimen type (POC) VENOUS BLOOD Performed by Hans Sol Spontaneous timed YES Radiologic Studies -  
XR CHEST PORT    (Results Pending) Medical Decision Making I am the first provider for this patient. I reviewed the vital signs, available nursing notes, past medical history, past surgical history, family history and social history. Vital Signs-Reviewed the patient's vital signs. Records Reviewed: Nursing Notes ED Course: Progress Notes, Reevaluation, and Consults: 
 
Provider Notes (Medical Decision Making): MDM Number of Diagnoses or Management Options Severe persistent asthma with acute exacerbation:  
Diagnosis management comments: Diff dx: ptx, pna, asthma exac, copd exac Pt presenting in extremis, already received solumedrol, mag, 1 duoneb, 1 alb neb, has hx severe asthma, intubations, frequent need for PPV. IM epi given, 2 more duonebs, bipap started at 10/4, vbg showing mild hypercapnea. wob improved, will rpt VBG in 1h, cxr not showing any pneumo or PNA. Afebrile, meets SIRS criteria but only 2/2 epi/nebs, will not perform sepsis bundle at this time as this is classic bronchoconstrictive disease. Plan for admission, does not need intubation at this time. Signing out to [facility] Mouna Kristi Laguerre MD 
 
Pt verbally consented to bipap, has intact mental status, low risk for aspiration. For Hospitalized Patients: 1.  Critical Care Time: Critical Care Time: 
 The services I provided to this patient were to treat and/or prevent clinically significant deterioration that could result in the failure of one or more body systems and/or organ systems due to respiratory distress. Services included the following: 
-reviewing nursing notes and old charts 
-vital sign assessments 
-direct patient care 
-medication orders and management 
-interpreting and reviewing diagnostic studies/labs 
-re-evaluations 
-documentation time Aggregate critical care time was 31 minutes, which includes only time during which I was engaged in work directly related to the patient's care as described above, whether I was at bedside or elsewhere in the Emergency Department. It did not include time spent performing other reported procedures or the services of residents, students, nurses, or advance practice providers. Mae Jacob MD 
2:08 PM 
 
 
2. Hospitalization Decision Time: The decision to hospitalize the patient was made by Dr. Ashlyn Cameron at 2:05 PM on 11/11/2018 3. Aspirin: Aspirin was not given because the patient did not present with a stroke at the time of their Emergency Department evaluation Diagnosis Clinical Impression: 1. Severe persistent asthma with acute exacerbation 2:28 PM : Pt care transferred to Sherrel Hatchet, PA-C,ED provider. History of patient complaint(s), available diagnostic reports and current treatment plan has been discussed thoroughly. Bedside rounding on patient occured : yes . Intended disposition of patient : Home, ADMIT and TBD Pending diagnostics reports and/or labs (please list): pending repeat VBG and reassessment off BIPAP. Disposition: Admission to ICU Medication List  
  
ASK your doctor about these medications * albuterol 2.5 mg /3 mL (0.083 %) nebulizer solution Commonly known as:  PROVENTIL VENTOLIN 
3 mL by Nebulization route every four (4) hours as needed for Wheezing. * albuterol 2.5 mg /3 mL (0.083 %) nebulizer solution Commonly known as:  PROVENTIL VENTOLIN 
3 mL by Nebulization route every four (4) hours as needed for Wheezing. ALPRAZolam 0.5 mg tablet Commonly known as:  [place] Dice Take 1 Tab by mouth three (3) times daily as needed for Anxiety. Max Daily Amount: 1.5 mg. 
  
budesonide-formoterol 160-4.5 mcg/actuation Hfaa Commonly known as:  SYMBICORT Take 2 Puffs by inhalation two (2) times a day. cetirizine 10 mg tablet Commonly known as:  ZYRTEC Take 1 Tab by mouth daily. fluticasone 50 mcg/actuation nasal spray Commonly known as:  FLONASE 
1 [place] by Both Nostrils route two (2) times a day. montelukast 10 mg tablet Commonly known as:  SINGULAIR Take 1 Tab by mouth daily. Indications: MAINTENANCE THERAPY FOR ASTHMA * omalizumab 150 mg Solr Commonly known as:  XOLAIR 
300 mg by SubCUTAneous route every fourteen (14) days. * omalizumab 150 mg Solr Commonly known as:  XOLAIR 
300 mg by SubCUTAneous route every fourteen (14) days. predniSONE 10 mg dose pack Commonly known as:  STERAPRED DS Take as directed * This list has 4 medication(s) that are the same as other medications prescribed for you. Read the directions carefully, and ask your doctor or other care provider to review them with you.  
  
  
  
 
_______________________________ Attestations: 
Scribe Attestation Tonja Bautista acting as a scribe for and in the presence of Fredy Farfan MD     
November 11, 2018 at 1:47 PM 
    
Provider Attestation:     
I personally performed the services described in the documentation, reviewed the documentation, as recorded by the scribe in my presence, and it accurately and completely records my words and actions. November 11, 2018 at 1:47 PM - Fredy Farfan MD   
_______________________________ 
 
3:00pm ODILIA Dangelo have taken over care of the patient. Pt remains on Bipap. He is still tight and wheezing throughout. Will check VBG and give albuterol treatments.  
 
4:00pm Pt still wheezing, tachypneic and tachy (likely albuterol). States the last time he was admitted he was on BiPAP for 4 days. Will admit him at this time and will not attempt to take off BiPAP. Consult 4:30 PM: I have discussed case with Dr. Eden Art. Standard discussion regarding this patient's presenting symptoms, PMHX, exam, vital signs, available ancillary and diagnostic studies. Consulting MD states: he will accept the patient in the ICU. Recommends consult with the intensivist.  
 
Consult 4:48 PM: I have discussed case with Dr. Brannon Boston. Standard discussion regarding this patient's presenting symptoms, PMHX, exam, vital signs, available ancillary and diagnostic studies. Consulting MD states: he will accept the patient in the ICU and will come and see the patient.

## 2018-11-11 NOTE — ED TRIAGE NOTES
Pt took own nebsx3, medics gave magnesium, solumedrol, combi, and duo neb. Pt slightly improved. md immediately at bedside. Reports intubated at age 16.  Respiratory called for bipap per md.

## 2018-11-11 NOTE — PROGRESS NOTES
completed the initial Spiritual Assessment of the patient in bed 7 of the emergency room and offered Pastoral Care support. Patient does not have any Evangelical/cultural needs that will affect patients preferences in health care. Chaplains will continue to follow and will provide pastoral care on an as needed/requested basis. Sunitha Mary Board Certified [employer] Spiritual Care Department [phone]

## 2018-11-12 PROBLEM — F41.9 ANXIETY: Status: ACTIVE | Noted: 2018-11-12

## 2018-11-12 PROBLEM — D75.1 ERYTHROCYTOSIS: Status: ACTIVE | Noted: 2018-11-12

## 2018-11-12 LAB
ANION GAP SERPL CALC-SCNC: 6 MMOL/L (ref 3–18)
BUN SERPL-MCNC: 12 MG/DL (ref 7–18)
BUN/CREAT SERPL: 14 (ref 12–20)
CALCIUM SERPL-MCNC: 8.8 MG/DL (ref 8.5–10.1)
CHLORIDE SERPL-SCNC: 110 MMOL/L (ref 100–108)
CO2 SERPL-SCNC: 25 MMOL/L (ref 21–32)
CREAT SERPL-MCNC: 0.87 MG/DL (ref 0.6–1.3)
ERYTHROCYTE [DISTWIDTH] IN BLOOD BY AUTOMATED COUNT: 12.1 % (ref 11.6–14.5)
GLUCOSE SERPL-MCNC: 128 MG/DL (ref 74–99)
HCT VFR BLD AUTO: 48.1 % (ref 36–48)
HGB BLD-MCNC: 16.5 G/DL (ref 13–16)
MCH RBC QN AUTO: 28.7 PG (ref 24–34)
MCHC RBC AUTO-ENTMCNC: 34.3 G/DL (ref 31–37)
MCV RBC AUTO: 83.8 FL (ref 74–97)
PLATELET # BLD AUTO: 357 K/UL (ref 135–420)
PMV BLD AUTO: 9.2 FL (ref 9.2–11.8)
POTASSIUM SERPL-SCNC: 4.3 MMOL/L (ref 3.5–5.5)
RBC # BLD AUTO: 5.74 M/UL (ref 4.7–5.5)
SODIUM SERPL-SCNC: 141 MMOL/L (ref 136–145)
WBC # BLD AUTO: 26.5 K/UL (ref 4.6–13.2)

## 2018-11-12 PROCEDURE — 94660 CPAP INITIATION&MGMT: CPT

## 2018-11-12 PROCEDURE — 80048 BASIC METABOLIC PNL TOTAL CA: CPT

## 2018-11-12 PROCEDURE — 94640 AIRWAY INHALATION TREATMENT: CPT

## 2018-11-12 PROCEDURE — 74011250637 HC RX REV CODE- 250/637: Performed by: INTERNAL MEDICINE

## 2018-11-12 PROCEDURE — 74011250636 HC RX REV CODE- 250/636: Performed by: FAMILY MEDICINE

## 2018-11-12 PROCEDURE — 85027 COMPLETE CBC AUTOMATED: CPT

## 2018-11-12 PROCEDURE — 74011250636 HC RX REV CODE- 250/636: Performed by: INTERNAL MEDICINE

## 2018-11-12 PROCEDURE — 74011000250 HC RX REV CODE- 250: Performed by: FAMILY MEDICINE

## 2018-11-12 PROCEDURE — 65270000029 HC RM PRIVATE

## 2018-11-12 PROCEDURE — 74011250637 HC RX REV CODE- 250/637: Performed by: FAMILY MEDICINE

## 2018-11-12 PROCEDURE — 74011000250 HC RX REV CODE- 250: Performed by: INTERNAL MEDICINE

## 2018-11-12 RX ORDER — FAMOTIDINE 20 MG/1
20 TABLET, FILM COATED ORAL 2 TIMES DAILY
Status: DISCONTINUED | OUTPATIENT
Start: 2018-11-12 | End: 2018-11-13 | Stop reason: HOSPADM

## 2018-11-12 RX ORDER — ALPRAZOLAM 0.5 MG/1
0.5 TABLET ORAL 2 TIMES DAILY
Status: DISCONTINUED | OUTPATIENT
Start: 2018-11-12 | End: 2018-11-13 | Stop reason: HOSPADM

## 2018-11-12 RX ORDER — ALBUTEROL SULFATE 0.83 MG/ML
2.5 SOLUTION RESPIRATORY (INHALATION)
Status: DISCONTINUED | OUTPATIENT
Start: 2018-11-12 | End: 2018-11-13 | Stop reason: HOSPADM

## 2018-11-12 RX ORDER — ALBUTEROL SULFATE 90 UG/1
2 AEROSOL, METERED RESPIRATORY (INHALATION)
Qty: 3 INHALER | Refills: 3 | Status: SHIPPED | OUTPATIENT
Start: 2018-11-12 | End: 2019-04-12 | Stop reason: SDUPTHER

## 2018-11-12 RX ORDER — ALBUTEROL SULFATE 0.83 MG/ML
2.5 SOLUTION RESPIRATORY (INHALATION)
Qty: 75 EACH | Refills: 3 | Status: SHIPPED | OUTPATIENT
Start: 2018-11-12 | End: 2019-04-12 | Stop reason: SDUPTHER

## 2018-11-12 RX ADMIN — ARFORMOTEROL TARTRATE 15 MCG: 15 SOLUTION RESPIRATORY (INHALATION) at 08:35

## 2018-11-12 RX ADMIN — IPRATROPIUM BROMIDE AND ALBUTEROL SULFATE 3 ML: .5; 3 SOLUTION RESPIRATORY (INHALATION) at 04:38

## 2018-11-12 RX ADMIN — BUDESONIDE 500 MCG: 0.5 INHALANT RESPIRATORY (INHALATION) at 08:35

## 2018-11-12 RX ADMIN — IPRATROPIUM BROMIDE AND ALBUTEROL SULFATE 3 ML: .5; 3 SOLUTION RESPIRATORY (INHALATION) at 08:35

## 2018-11-12 RX ADMIN — METHYLPREDNISOLONE SODIUM SUCCINATE 60 MG: 40 INJECTION, POWDER, FOR SOLUTION INTRAMUSCULAR; INTRAVENOUS at 22:32

## 2018-11-12 RX ADMIN — ALPRAZOLAM 0.5 MG: 0.5 TABLET ORAL at 22:32

## 2018-11-12 RX ADMIN — FLUTICASONE PROPIONATE 1 SPRAY: 50 SPRAY, METERED NASAL at 17:34

## 2018-11-12 RX ADMIN — MONTELUKAST SODIUM 10 MG: 10 TABLET, COATED ORAL at 09:54

## 2018-11-12 RX ADMIN — FLUTICASONE PROPIONATE 1 SPRAY: 50 SPRAY, METERED NASAL at 09:54

## 2018-11-12 RX ADMIN — METHYLPREDNISOLONE SODIUM SUCCINATE 60 MG: 40 INJECTION, POWDER, FOR SOLUTION INTRAMUSCULAR; INTRAVENOUS at 05:40

## 2018-11-12 RX ADMIN — IPRATROPIUM BROMIDE AND ALBUTEROL SULFATE 3 ML: .5; 3 SOLUTION RESPIRATORY (INHALATION) at 11:36

## 2018-11-12 RX ADMIN — ALBUTEROL SULFATE 2.5 MG: 2.5 SOLUTION RESPIRATORY (INHALATION) at 16:09

## 2018-11-12 RX ADMIN — ARFORMOTEROL TARTRATE 15 MCG: 15 SOLUTION RESPIRATORY (INHALATION) at 21:01

## 2018-11-12 RX ADMIN — ENOXAPARIN SODIUM 40 MG: 40 INJECTION, SOLUTION INTRAVENOUS; SUBCUTANEOUS at 22:31

## 2018-11-12 RX ADMIN — LORATADINE 10 MG: 10 TABLET ORAL at 09:54

## 2018-11-12 RX ADMIN — ALPRAZOLAM 0.5 MG: 0.5 TABLET ORAL at 10:36

## 2018-11-12 RX ADMIN — METHYLPREDNISOLONE SODIUM SUCCINATE 60 MG: 40 INJECTION, POWDER, FOR SOLUTION INTRAMUSCULAR; INTRAVENOUS at 11:14

## 2018-11-12 RX ADMIN — BUDESONIDE 500 MCG: 0.5 INHALANT RESPIRATORY (INHALATION) at 21:01

## 2018-11-12 RX ADMIN — FAMOTIDINE 20 MG: 20 TABLET ORAL at 17:33

## 2018-11-12 NOTE — ROUTINE PROCESS
Assumed care of patient. Report received from Jaun Alonzo RN. Assessments completed and documented in flowsheets. Patient resting in bed on 3L NC. No signs of distress. Friend at the bedside. Patient instructed friend Brandon John to bring home his belongings. 0600: Attempted to draw patient's blood. Unable to get patients blood at this time. Patient started crying and asked me not to try and get blood again. States \"I just want something in my life to go right! \". Bedside shift change report given to Nhan Naidu RN (oncoming nurse) by Jaki Hebert RN (offgoing nurse). Report included the following information SBAR, Kardex, Procedure Summary, Intake/Output, MAR, Accordion, Recent Results, Cardiac Rhythm Sinus tach and Quality Measures.

## 2018-11-12 NOTE — PROGRESS NOTES
4769--Removed pt from Bipap (Fio2=35%, Ipap=10;, Epap=4) & placed on 3 lpm NC 
 
1530--Spoke with MD for changes-- pt Q4 nebs changed to PRN & will continue BID 
--Pt is not to be placed on bipap at night routinely any longer, only if needed 1603--Pt asking for PRN neb per RN - she will evaluate & let me know

## 2018-11-12 NOTE — CDMP QUERY
Please clarify for coding purposes if Respiratory Failure can be further classified as  
 
=>Acute Hypoxia or Acute Hypercapnic Respiratory Failure with need for bi-pap 
=>Other Explanation of clinical findings =>Unable to Determine (no explanation of clinical findings) The medical record reflects the following: 
 
Risk: Asthma , copd Clinical Indicators:  Pt admitted with acute shortness of breath , resps 38, placed on bi-pap for asthma exac, Treatment:   bi-pap Please clarify and document your clinical opinion in the progress notes and discharge summary including the definitive and/or presumptive diagnosis, (suspected or probable), related to the above clinical findings. Please include clinical findings supporting your diagnosis. If you DECLINE this query or would like to communicate with [employer], please utilize the \"[employer] message box\" at the TOP of the Progress Note on the right. Thank you, 
Harshil Jenkins RN/CCDS 
[phone]

## 2018-11-12 NOTE — PROGRESS NOTES
Problem: Asthma: Day 1 Goal: Respiratory Outcome: Progressing Towards Goal 
4L NC currently. Will wear BIPAP at night. Problem: Pain Goal: *PALLIATIVE CARE:  Alleviation of Pain Outcome: Resolved/Met Date Met: 11/11/18 N/A Problem: Falls - Risk of 
Goal: *Absence of Falls Document Judie Hernandez Fall Risk and appropriate interventions in the flowsheet. Outcome: Progressing Towards Goal 
Fall Risk Interventions:

## 2018-11-12 NOTE — CONSULTS
Pulmonary consultation  Indication: Acute exacerbation of asthma    History  20-year-old male followed in the pulmonary clinic for severe persistent asthma. In addition to typical controlling therapies he is on Xolair. The patient presented to the emergency room today with a less than 24-hour history of worsening shortness of breath. At home he used his nebulizer 3 times without improvement and was given further albuterol and Atrovent in the ER. He ultimately required BiPAP to relieve his shortness of breath. While he does not know of a specific trigger for this exacerbation, he has a history of worsening asthma control with an extensive number of allergens. He recently moved from a location over run with various danders only to end up in yet another apartment infested with cockroaches and possibly mold. Recently he went without his standard controlling inhalers for roughly a week. He is now back on Symbicort. Previous pulmonary function tests have shown significant reversibility which is suggestive of an allergy driven form of asthma. He is on Flonase and Zyrtec. Review of systems  The review of systems was completed in its entirety and is otherwise his normal baseline    Past medical history  Severe persistent asthma. While the patient's pulmonary function tests technically meet changes consistent with COPD, the patient's clinical picture is more consistent with asthma    Allergies   Allergen Reactions    Penicillin G Anaphylaxis    Cat Dander Shortness of Breath     No current facility-administered medications on file prior to encounter. Current Outpatient Medications on File Prior to Encounter   Medication Sig Dispense Refill    omalizumab (XOLAIR) 150 mg solr 300 mg by SubCUTAneous route every fourteen (14) days. 300 mg 0    albuterol (PROVENTIL VENTOLIN) 2.5 mg /3 mL (0.083 %) nebulizer solution 3 mL by Nebulization route every four (4) hours as needed for Wheezing.  25 Each 0    predniSONE (STERAPRED DS) 10 mg dose pack Take as directed 21 Tab 0    montelukast (SINGULAIR) 10 mg tablet Take 1 Tab by mouth daily. Indications: MAINTENANCE THERAPY FOR ASTHMA 90 Tab 3    cetirizine (ZYRTEC) 10 mg tablet Take 1 Tab by mouth daily. 90 Tab 3    fluticasone (FLONASE) 50 mcg/actuation nasal spray 1 [place] by Both Nostrils route two (2) times a day. 1 Bottle 11    budesonide-formoterol (SYMBICORT) 160-4.5 mcg/actuation HFAA Take 2 Puffs by inhalation two (2) times a day. 1 Inhaler 10    ALPRAZolam (XANAX) 0.5 mg tablet Take 1 Tab by mouth three (3) times daily as needed for Anxiety. Max Daily Amount: 1.5 mg. 10 Tab 0     Social history  The patient does not smoke. His living arrangements include sharing his apartment with multiple cockroaches and likely mold from water leak. Family history  No asthma in any first-degree family member. Great grandmother with COPD    Exam  He is alert and oriented. He is wearing a BiPAP mask and is not showing significant respiratory effort. Blood pressure 112/66, pulse (!) 119, temperature 98.4 °F (36.9 °C), resp. rate 26, height 5' 8\" (1.727 m), weight 90.7 kg (200 lb), SpO2 99 %. HEENT: Sclera are anicteric and not injected. Neck is supple and the trachea is midline  Lungs: Diminished breath sounds throughout. Scattered wheezes. Chest wall excursion diminished  Heart: Tachycardic. Regular rhythm  Abdomen: Soft and nondistended  Extremities: No cyanosis or clubbing. No lower extremity edema  Neuro: Extraocular muscles are intact. Gaze is conjugate. Strength is grossly intact. Labs: WBC 11.3.   Eosinophils 8% with absolute eosinophil count of 0.9    Assessment  Acute exacerbation of severe persistent asthma requiring BiPAP  Elevated eosinophils  History of elevated IgE     Plan  IV corticosteroids  Scheduled nebulized therapies  BiPAP  Resume standard therapies on discharge  Consider Nucala in addition to Xolair

## 2018-11-12 NOTE — PROGRESS NOTES
Received alert and oriented male pt from [address] [place] [address] with clear lung sounds to all fields-states he had neb in ICU prior to transport. Denies pain/discomfort.

## 2018-11-12 NOTE — PROGRESS NOTES
Patient states he spoke with roommate & has reconsidered Psych consult & anti-anxiety medication. Dr. Sisi Mariee notified, orders received.

## 2018-11-12 NOTE — PROGRESS NOTES
[facility] Pulmonary, Critical Care, and Sleep Medicine Critical Care Progress Note Name: Brenda Ramirez : 1990 MRN: [id number] Date: 2018 [x]I have reviewed the flowsheet and previous days notes. Events, vitals, medications and notes from last 24 hours reviewed. Care plan discussed on multidisciplinary rounds. My assessment, plan of care, findings, medications, side effects etc were discussed with: 
[x] Nurse [] PT/OT   
[] Respiratory therapy []    
[] Family [] Patient: answered all questions to satisfaction  
[] Pharmacist [] ASSESSMENT/PLAN:  
Active Problems: 
  Respiratory failure ([address] (2018) Severe asthma with acute exacerbation (2018) Anxiety (2018) Erythrocytosis (2018) · Decrease SoluMedrol to 60 mg IV q 8. 
· Continue Brovana/Pulmicort. Stop DuoNeb. Albuterol PRN. · Psychiatry consultation is advised (Dx: PTSD, JULY) · BiPAP PRN 
 
NUTRITION: PO diet. Check prealbumin q week. Consult Clinical Dietician. ICU electrolyte replacement protocol PROPHYLAXIS: 
DVT prophylaxis: Lovenox GI prophylaxis: start famotidine HOB 30-degree elevation Chlorhexidine mouth washes CODE STATUS: FULL CODE Further recommendations will be based on the patient's response to recommended treatment and results of the investigation ordered. DISPOSITION: 
 
The patient is: [x] acutely ill Risk of deterioration: [x] moderate  
 [] critically ill  [] high  
 
[x]See my orders for details 
 
 
[] The patient is unable to give any meaningful history or review of systems because the patient is: 
[] Intubated [] Sedated  
[] Unresponsive [] []The patient is critically ill on     
[] Mechanical ventilation [] Vasoactive agents  
[] BiPAP [] Inotropes SUBJECTIVE 
- This 72-year-old  male presented to the emergency room today with a less than 24-hour history of worsening shortness of breath. At home he used his nebulizer 3 times without improvement and was given further albuterol and Atrovent in the ER. He ultimately required BiPAP to relieve his shortness of breath. While he does not know of a specific trigger for this exacerbation, he has a history of worsening asthma control with an extensive number of allergens. He recently moved from a location over run with various danders only to end up in yet another apartment infested with cockroaches and possibly mold. He is followed by Dr. Jacobo Banerjee in the pulmonary clinic for severe persistent asthma. In addition to typical controlling therapies he is on Xolair. Recently he went without his standard controlling inhalers for roughly a week. He is now back on Symbicort. Previous pulmonary function tests have shown significant reversibility which is suggestive of an allergy driven form of asthma. He is on Flonase and Zyrtec. - Overnight events: spent the night on BiPAP. Off BiPAP now. Ate breakfast. Anxious, driven by aversion to ABGs. [x] Nutrition: Regular 
[] BM today. ROS: A comprehensive review of systems was negative except for: Respiratory: positive for wheezing Behvioral/Psych: positive for anxiety Past Medical History:  
Diagnosis Date  Asthma  Chronic obstructive pulmonary disease ([facility] Allergies Allergen Reactions  Penicillin G Anaphylaxis  Cat Dander Shortness of Breath Current Facility-Administered Medications:  
  ALPRAZolam (XANAX) tablet 0.5 mg, 0.5 mg, Oral, BID, Chelsea Storey MD 
  loratadine (CLARITIN) tablet 10 mg, 10 mg, Oral, DAILY, Lamberto Fowler MD, 10 mg at 11/12/18 9176   fluticasone (FLONASE) 50 mcg/actuation nasal spray 1 Spray, 1 Spray, Both Nostrils, BID, Lamberto Fowler MD, 1 Spray at 11/12/18 0974 
  montelukast (SINGULAIR) tablet 10 mg, 10 mg, Oral, DAILY, Janeth Dilcia Giron MD, 10 mg at 11/12/18 8938   enoxaparin (LOVENOX) injection 40 mg, 40 mg, SubCUTAneous, Q24H, Lamberto Fowler MD, 40 mg at 11/11/18 2103   albuterol-ipratropium (DUO-NEB) 2.5 MG-0.5 MG/3 ML, 3 mL, Nebulization, Q4H RT, Lamberto Fowler MD, 3 mL at 11/12/18 0835 
  methylPREDNISolone (PF) (SOLU-MEDROL) injection 60 mg, 60 mg, IntraVENous, Q6H, Lamberto Fowler MD, 60 mg at 11/12/18 0540   budesonide (PULMICORT) 500 mcg/2 ml nebulizer suspension, 500 mcg, Nebulization, BID RT, Lamberto Fowler MD, 500 mcg at 11/12/18 0835 
  arformoterol (BROVANA) neb solution 15 mcg, 15 mcg, Nebulization, BID RT, Lamberto Fowler MD, 15 mcg at 11/12/18 0853   acetaminophen (TYLENOL) tablet 650 mg, 650 mg, Oral, Q6H PRN, Flaquito Izquierdo MD, 650 mg at 11/11/18 2128 OBJECTIVE Vital Signs:   
Patient Vitals for the past 24 hrs: 
 Temp Pulse Resp BP SpO2  
11/12/18 0800 98.8 °F (37.1 °C) (!) 103 19 109/52 98 % 11/12/18 0700  (!) 105 18  98 % 11/12/18 0600  (!) 111 16 120/80 99 % 11/12/18 0500  (!) 106 20  98 % 11/12/18 0439     99 % 11/12/18 0400 98.1 °F (36.7 °C) (!) 101 19 112/75 98 % 11/12/18 0200  (!) 104 17 92/56 98 % 11/12/18 0000  (!) 113 20 113/68 98 % 11/11/18 2352     98 % 11/11/18 2350     98 % 11/11/18 2200  (!) 115 22 102/59 98 % 11/11/18 2115  (!) 119 26  99 % 11/11/18 2100  (!) 116 14 112/66 98 % 11/11/18 2045  (!) 120 20 116/62 99 % 11/11/18 2030  (!) 118 19 108/75 97 % 11/11/18 2015  (!) 107 17 99/61 99 % 11/11/18 2001     100 % 11/11/18 2000 98.4 °F (36.9 °C) (!) 107 18 105/66 100 % 11/11/18 1945  (!) 110 15 110/68 97 % 11/11/18 1830  (!) 115 29  99 % 11/11/18 1815  (!) 118 18 (!) 135/96 100 % 11/11/18 1800  (!) 121 15 123/77 99 % 11/11/18 1745  (!) 118 17 127/66 100 % 11/11/18 1730  (!) 117 17 122/80 100 % 11/11/18 1715  (!) 116 20 113/74 100 % 18 1700  (!) 117 12 118/67 100 % 18 1645  (!) 119 19 111/86 98 % 18 1630  (!) 108 18 108/79 98 % 18 1615  (!) 110 10 112/87 99 % 18 1600  (!) 116 18 118/89 100 % 18 1545  (!) 114 17 123/85 99 % 18 1530  (!) 112 13 117/80 99 % 18 1515  (!) 113 26 124/86 100 % 18 1500  (!) 111 14 119/77 100 % 18 1445  (!) 117 23 112/76 100 % 18 1430  (!) 122 19 121/78 99 % 18 1416     100 % 18 1415  (!) 128 23 127/90 100 % 18 1400  (!) 136 20 110/90 100 % 18 1354  (!) 138 21  100 % 18 1345  (!) 143 (!) 32 121/89 100 % 18 1338 98.1 °F (36.7 °C) (!) 144 (!) 38 (!) 129/97 100 % O2 Device: BIPAP  
O2 Flow Rate (L/min): 3 l/min Temp (24hrs), Av.4 °F (36.9 °C), Min:98.1 °F (36.7 °C), Max:98.8 °F (37.1 °C) PHYSICAL EXAM:  
General: awake, alert, oriented to time, person and place. Anxious. Head: atraumatic, normocephalic Eye: PERRLA, no scleral icterus, no pallor, no cyanosis Nose: no sinus tenderness, no erythema, no induration, no discharge Throat: no tonsillar enlargement, no erythema, no exudates, no oral thrush Neck: supple, no thyromegaly, no JVD, no lymphadenopathy. Trachea midline Lung: symmetric in development and expansion, good air entry. Wheezes on forced expiration. Heart: Regular S1 & S2. Tachycardic. No murmur. No rub. No gallop. Abdomen: soft, flat, bowel sounds present. Nontender, nondistended, no rebound, no guarding. Extremities: no edema, no cyanosis, no clubbing, 2+ peripheral pulses in DP Lymphatic: no cervical/supraclavicular/axillary lymphadenopathy Neurologic: cranial nerves II-XII are grossly symmetric and physiologic. No sensory or motor level. No lateralizing signs. DTR 2+ @ R biceps tendon, 2+ @ L biceps tendon, 2+ @ R patellar tendon, 2+ @ L patellar tendon. No ankle clonus. No cerebellar signs. Gait was not assessed.  
 
DATA:  
 
 Intake/Output:  
Last shift:      No intake/output data recorded. Last 3 shifts: 11/10 1901 - 11/12 0700 In: 250 [P.O.:250] Out: 900 [Urine:900] Intake/Output Summary (Last 24 hours) at 11/12/2018 1024 Last data filed at 11/12/2018 0400 Gross per 24 hour Intake 250 ml Output 900 ml Net -650 ml Last 3 Recorded Weights in this Encounter 11/11/18 1338 Weight: 90.7 kg (200 lb) Hemodynamics:  
Lines: All central lines examined by me. No signs of erythema, induration, discharge. Peripheral Intravenous Line: 
Peripheral IV 11/11/18 Anterior; Left Antecubital (Active) Site Assessment Clean, dry, & intact 11/12/2018  8:52 AM  
Phlebitis Assessment 0 11/12/2018  8:52 AM  
Infiltration Assessment 0 11/12/2018  8:52 AM  
Dressing Status Clean, dry, & intact 11/12/2018  8:52 AM  
Dressing Type Transparent 11/12/2018  8:52 AM  
Hub Color/Line Status Blue;Flushed 11/12/2018  8:52 AM  
Action Taken Blood drawn;Dressing reinforced 11/12/2018  8:52 AM  
Alcohol Cap Used Yes 11/12/2018  8:52 AM  
 
 
Telemetry: [] Sinus [] A-flutter [] Paced [] A-fib [] Multiple PVCs Imaging: 
[x] I have personally reviewed the patients radiographs 
[] Radiographs reviewed with radiologist 
[] No CXR study available for review today 
[] No change from prior, tubes and lines are in adequate position 
[] Improved   [] Worsening Xr Chest [facility] Result Date: 11/12/2018 IMPRESSION: No acute cardiopulmonary disease. Labs: 
Recent Results (from the past 24 hour(s)) METABOLIC PANEL, COMPREHENSIVE Collection Time: 11/11/18  1:45 PM  
Result Value Ref Range Sodium 140 136 - 145 mmol/L Potassium 3.9 3.5 - 5.5 mmol/L Chloride 108 100 - 108 mmol/L  
 CO2 26 21 - 32 mmol/L Anion gap 6 3.0 - 18 mmol/L Glucose 116 (H) 74 - 99 mg/dL BUN 9 7.0 - 18 MG/DL Creatinine 0.97 0.6 - 1.3 MG/DL  
 BUN/Creatinine ratio 9 (L) 12 - 20 GFR est AA >60 >60 ml/min/1.73m2 GFR est non-AA >60 >60 ml/min/1.73m2 Calcium 8.8 8.5 - 10.1 MG/DL Bilirubin, total 0.5 0.2 - 1.0 MG/DL  
 ALT (SGPT) 45 16 - 61 U/L  
 AST (SGOT) 24 15 - 37 U/L Alk. phosphatase 115 45 - 117 U/L Protein, total 6.7 6.4 - 8.2 g/dL Albumin 3.1 (L) 3.4 - 5.0 g/dL Globulin 3.6 2.0 - 4.0 g/dL A-G Ratio 0.9 0.8 - 1.7    
CBC WITH AUTOMATED DIFF Collection Time: 11/11/18  1:45 PM  
Result Value Ref Range WBC 11.3 4.6 - 13.2 K/uL  
 RBC 6.38 (H) 4.70 - 5.50 M/uL  
 HGB 17.7 (H) 13.0 - 16.0 g/dL HCT 54.0 (H) 36.0 - 48.0 % MCV 84.6 74.0 - 97.0 FL  
 MCH 27.7 24.0 - 34.0 PG  
 MCHC 32.8 31.0 - 37.0 g/dL  
 RDW 12.1 11.6 - 14.5 % PLATELET 169 333 - 665 K/uL MPV 10.2 9.2 - 11.8 FL  
 NEUTROPHILS 54 40 - 73 % LYMPHOCYTES 29 21 - 52 % MONOCYTES 8 3 - 10 % EOSINOPHILS 8 (H) 0 - 5 % BASOPHILS 1 0 - 2 %  
 ABS. NEUTROPHILS 6.1 1.8 - 8.0 K/UL  
 ABS. LYMPHOCYTES 3.3 0.9 - 3.6 K/UL  
 ABS. MONOCYTES 0.9 0.05 - 1.2 K/UL  
 ABS. EOSINOPHILS 0.9 (H) 0.0 - 0.4 K/UL  
 ABS. BASOPHILS 0.1 0.0 - 0.1 K/UL  
 DF AUTOMATED    
POC VENOUS BLOOD GAS Collection Time: 11/11/18  2:09 PM  
Result Value Ref Range Device: BIPAP    
 FIO2 (POC) 0.59 %  
 pH, venous (POC) 7.392 7.32 - 7.42    
 pCO2, venous (POC) 46.6 41 - 51 MMHG  
 pO2, venous (POC) 30 25 - 40 mmHg HCO3, venous (POC) 28.3 (H) 23.0 - 28.0 MMOL/L  
 sO2, venous (POC) 57 (L) 65 - 88 % Base excess, venous (POC) 3 mmol/L  
 PEEP/CPAP (POC) 8 cmH2O  
 PIP (POC) 14 Allens test (POC) N/A Total resp. rate 26 Site OTHER Patient temp. 98.6 Specimen type (POC) VENOUS BLOOD Performed by Foster García Spontaneous timed YES    
METABOLIC PANEL, BASIC Collection Time: 11/12/18  8:20 AM  
Result Value Ref Range Sodium 141 136 - 145 mmol/L Potassium 4.3 3.5 - 5.5 mmol/L Chloride 110 (H) 100 - 108 mmol/L  
 CO2 25 21 - 32 mmol/L Anion gap 6 3.0 - 18 mmol/L Glucose 128 (H) 74 - 99 mg/dL BUN 12 7.0 - 18 MG/DL Creatinine 0.87 0.6 - 1.3 MG/DL  
 BUN/Creatinine ratio 14 12 - 20 GFR est AA >60 >60 ml/min/1.73m2 GFR est non-AA >60 >60 ml/min/1.73m2 Calcium 8.8 8.5 - 10.1 MG/DL  
CBC W/O DIFF Collection Time: 11/12/18  8:20 AM  
Result Value Ref Range WBC 26.5 (H) 4.6 - 13.2 K/uL  
 RBC 5.74 (H) 4.70 - 5.50 M/uL  
 HGB 16.5 (H) 13.0 - 16.0 g/dL HCT 48.1 (H) 36.0 - 48.0 % MCV 83.8 74.0 - 97.0 FL  
 MCH 28.7 24.0 - 34.0 PG  
 MCHC 34.3 31.0 - 37.0 g/dL  
 RDW 12.1 11.6 - 14.5 % PLATELET 534 791 - 558 K/uL MPV 9.2 9.2 - 11.8 FL During this entire length of time I was immediately available to the patient. The reason for providing this level of medical care for this critically ill patient was due a critical illness that impaired one or more vital organ systems such that there was a high probability of imminent or life threatening deterioration in the patients condition. This care involved high complexity decision making to assess, manipulate, and support vital system functions, to treat this degreee vital organ system failure and to prevent further life threatening deterioration of the patients condition 
 
[] Total critical care time spent on reviewing the case/medical record/data/notes/EMR/patient examination/documentation/coordinating care with nurse/consultants, exclusive of procedures - minutes with complex decision making performed and > 50% time spent in face to face evaluation. Greyson Costello MD  
Board Certified by the [facility] 11/12/2018

## 2018-11-12 NOTE — PROGRESS NOTES
Patient placed on BiPAp for resp distress and for QHS - patient appears to be comfortable at this time, no s/s resp distress noted after BiPAP placed

## 2018-11-12 NOTE — PROGRESS NOTES
Hospitalist Progress Note Rony Munson MD 
Internal medicine/ Hospitalist 
 
Daily Progress Note: 11/12/2018 2:53 PM   
Interval history / Subjective:  
Marcus Mendez is a 33 y.o. male with hx asthma  who presented with sob. He reported that it started around noon the day he presented to ED. And was associated wheezing and chest tightness. He used his rescue inhaler with no relief. He used his nebulizer 3x with no change. In the ED he received duoneb x 2 , albuterol x1. He had a CXR w/o acute process. He was then  placed on BIPAP for respiratory distress. He will be admitted to ICU for resp failure due to severe acute asthma exacerbation. Pulmonary was consulted and advised to continue steroids with Xolair and possibly add Nucala. So far patient has responded well to treatment and he is feeling better today. However it is reported episodes of anxiety. Patient was offered to be evaluated by psychiatry but he declined saying that he knows that he has h/o anxiety/depression and PTSD. He thinks that psych medications make thinks worse and prefer using relaxation to deal with his anxiety. He has reported that he has been on psych medications before and stopped taking them. He is denying any suicidal or homocidal ideation. Later patient requested to be put on xanax. 
  
 
 
 
Current Facility-Administered Medications Medication Dose Route Frequency  ALPRAZolam (XANAX) tablet 0.5 mg  0.5 mg Oral BID  loratadine (CLARITIN) tablet 10 mg  10 mg Oral DAILY  fluticasone (FLONASE) 50 mcg/actuation nasal spray 1 Spray  1 Spray Both Nostrils BID  montelukast (SINGULAIR) tablet 10 mg  10 mg Oral DAILY  enoxaparin (LOVENOX) injection 40 mg  40 mg SubCUTAneous Q24H  
 albuterol-ipratropium (DUO-NEB) 2.5 MG-0.5 MG/3 ML  3 mL Nebulization Q4H RT  
 methylPREDNISolone (PF) (SOLU-MEDROL) injection 60 mg  60 mg IntraVENous Q6H  
 budesonide (PULMICORT) 500 mcg/2 ml nebulizer suspension  500 mcg Nebulization BID RT  
 arformoterol (BROVANA) neb solution 15 mcg  15 mcg Nebulization BID RT  
 acetaminophen (TYLENOL) tablet 650 mg  650 mg Oral Q6H PRN Review of Systems Says that he is feeling better today. Objective:  
 
Visit Vitals /69 (BP 1 Location: Right arm, BP Patient Position: At rest) Pulse (!) 126 Temp 98.4 °F (36.9 °C) Resp 27 Ht 5' 8\" (1.727 m) Wt 90.7 kg (200 lb) SpO2 96% BMI 30.41 kg/m² O2 Flow Rate (L/min): 3 l/min O2 Device: Nasal cannula Temp (24hrs), Av.4 °F (36.9 °C), Min:98.1 °F (36.7 °C), Max:98.8 °F (37.1 °C) No intake/output data recorded. 11/10 1901 -  0700 In: 250 [P.O.:250] Out: 900 [Urine:900] P/E 
NAD,laying comfortably in bed,working on the computer. Heent:perrla,at/nc,mouth moist. 
Lungs ctab Heart s1s2 nl,no m/g Abdm:soft,not tender,bs present. Extr:no edema,good pulses. Neuro:aaox3,grossly intact. Data Review Recent Results (from the past 12 hour(s)) METABOLIC PANEL, BASIC Collection Time: 18  8:20 AM  
Result Value Ref Range Sodium 141 136 - 145 mmol/L Potassium 4.3 3.5 - 5.5 mmol/L Chloride 110 (H) 100 - 108 mmol/L  
 CO2 25 21 - 32 mmol/L Anion gap 6 3.0 - 18 mmol/L Glucose 128 (H) 74 - 99 mg/dL BUN 12 7.0 - 18 MG/DL Creatinine 0.87 0.6 - 1.3 MG/DL  
 BUN/Creatinine ratio 14 12 - 20 GFR est AA >60 >60 ml/min/1.73m2 GFR est non-AA >60 >60 ml/min/1.73m2 Calcium 8.8 8.5 - 10.1 MG/DL  
CBC W/O DIFF Collection Time: 18  8:20 AM  
Result Value Ref Range WBC 26.5 (H) 4.6 - 13.2 K/uL  
 RBC 5.74 (H) 4.70 - 5.50 M/uL  
 HGB 16.5 (H) 13.0 - 16.0 g/dL HCT 48.1 (H) 36.0 - 48.0 % MCV 83.8 74.0 - 97.0 FL  
 MCH 28.7 24.0 - 34.0 PG  
 MCHC 34.3 31.0 - 37.0 g/dL  
 RDW 12.1 11.6 - 14.5 % PLATELET 902 807 - 072 K/uL MPV 9.2 9.2 - 11.8 FL Assessment/Plan: Active Problems: 
  Respiratory failure ([facility] (2018) Severe asthma with acute exacerbation (11/11/2018) Care Plan Severe asthma Exacerbation - Patient initially admitted to ICU  
- Duoneb - Solumedrol - Symbicort - BIPAP 
- On Xolair PTA  
- Pulmonology consult  
-Claude Mess longer on BIPAP. O2sat sat 96% on 3 liters. Will continue to monitor  
  
Respiratory Failure - due to above - BIPAP 
- ABG reviewed. - Patient improved,no longer on BIPAP,will transfer to medical floor and continue to wean off oxygen if tolerated - now on 3 liters with O2sat 96% Anxiety/depression/PTDS 
 -Patient has above problems and noted to be anxious on several occasion. 
 -Declined psych evaluation,saying that he know how to deal with these problems. -Xanax 0.5 mg po bid. Tachycardia due to asthma and anxiety 
  
DVT prophylaxis - Lovenox Supportive care Full code

## 2018-11-12 NOTE — INTERDISCIPLINARY ROUNDS
Patient's anxiousness, past family history & current living situation discussed with Dr. Piero Prabhakar. Patient refused anti-anxiety medications & Psych consult.

## 2018-11-12 NOTE — INTERDISCIPLINARY ROUNDS
Patient's anxiousness , past family history & current living situation  discussed with Dr. Severo Juares. Patient refused anti-anxiety meds or Psych consult.

## 2018-11-12 NOTE — PROGRESS NOTES
Reason for Admission:  Severe Asthma with acute exacerbation RRAT Score:   11 Plan for utilizing home health:  As indicated Likelihood of Readmission: mod Transition of Care Plan: home with [facility] Interviewed patient, he agrees to share is discharge information with his friend, Jose M Liriano  [phone]. Demographic information verified. He was independent prior to admission and see Dr Hayes Gr for her primary care needs. He is uninsured and a referral has been made to [facility].  Patient states he does not have any NOK and wants to name Jose M Liriano as his POA. His friend Anderson Dandy is hs friend and roommate. His discharge plan is to return home with [facility]. Care Management Interventions PCP Verified by CM: Yes 
Palliative Care Criteria Met (RRAT>21 & CHF Dx)?: No 
Mode of Transport at Discharge: Other (see comment) Transition of Care Consult (CM Consult): Discharge Planning MyChart Signup: No 
Discharge Durable Medical Equipment: No 
Health Maintenance Reviewed: Yes Physical Therapy Consult: No 
Occupational Therapy Consult: No 
Speech Therapy Consult: No 
Current Support Network: Other(room mate) Confirm Follow Up Transport: Other (see comment) Plan discussed with Pt/Family/Caregiver: No 
[employer] Information Provided?: No 
Discharge Location Discharge Placement: Other:(home vs HH)

## 2018-11-12 NOTE — PROGRESS NOTES
Report called to Miller Beltrán RN. HX & Physical reviewed, SBAR, pertinent orders & VS reviewed. Patient transferred to [id number]. Tolerated well. Breathing TX equipment, patients cell phone & laptop transported with patient.

## 2018-11-13 VITALS
HEIGHT: 68 IN | HEART RATE: 85 BPM | RESPIRATION RATE: 15 BRPM | BODY MASS INDEX: 30.31 KG/M2 | OXYGEN SATURATION: 98 % | TEMPERATURE: 97.8 F | WEIGHT: 199.96 LBS | DIASTOLIC BLOOD PRESSURE: 72 MMHG | SYSTOLIC BLOOD PRESSURE: 121 MMHG

## 2018-11-13 LAB
BASOPHILS # BLD: 0 K/UL (ref 0–0.1)
BASOPHILS NFR BLD: 0 % (ref 0–2)
DIFFERENTIAL METHOD BLD: ABNORMAL
EOSINOPHIL # BLD: 0 K/UL (ref 0–0.4)
EOSINOPHIL NFR BLD: 0 % (ref 0–5)
ERYTHROCYTE [DISTWIDTH] IN BLOOD BY AUTOMATED COUNT: 12.3 % (ref 11.6–14.5)
HCT VFR BLD AUTO: 47.7 % (ref 36–48)
HGB BLD-MCNC: 15.5 G/DL (ref 13–16)
LYMPHOCYTES # BLD: 2.6 K/UL (ref 0.9–3.6)
LYMPHOCYTES NFR BLD: 9 % (ref 21–52)
MCH RBC QN AUTO: 27.9 PG (ref 24–34)
MCHC RBC AUTO-ENTMCNC: 32.5 G/DL (ref 31–37)
MCV RBC AUTO: 85.8 FL (ref 74–97)
MONOCYTES # BLD: 1.8 K/UL (ref 0.05–1.2)
MONOCYTES NFR BLD: 6 % (ref 3–10)
NEUTS SEG # BLD: 24.6 K/UL (ref 1.8–8)
NEUTS SEG NFR BLD: 85 % (ref 40–73)
PLATELET # BLD AUTO: 346 K/UL (ref 135–420)
PMV BLD AUTO: 10.3 FL (ref 9.2–11.8)
RBC # BLD AUTO: 5.56 M/UL (ref 4.7–5.5)
WBC # BLD AUTO: 29 K/UL (ref 4.6–13.2)

## 2018-11-13 PROCEDURE — 85025 COMPLETE CBC W/AUTO DIFF WBC: CPT

## 2018-11-13 PROCEDURE — 74011250637 HC RX REV CODE- 250/637: Performed by: INTERNAL MEDICINE

## 2018-11-13 PROCEDURE — 74011250636 HC RX REV CODE- 250/636: Performed by: INTERNAL MEDICINE

## 2018-11-13 PROCEDURE — 74011000250 HC RX REV CODE- 250: Performed by: FAMILY MEDICINE

## 2018-11-13 PROCEDURE — 94640 AIRWAY INHALATION TREATMENT: CPT

## 2018-11-13 PROCEDURE — 77010033678 HC OXYGEN DAILY

## 2018-11-13 PROCEDURE — 74011000250 HC RX REV CODE- 250: Performed by: INTERNAL MEDICINE

## 2018-11-13 PROCEDURE — 74011250637 HC RX REV CODE- 250/637: Performed by: FAMILY MEDICINE

## 2018-11-13 PROCEDURE — 36415 COLL VENOUS BLD VENIPUNCTURE: CPT

## 2018-11-13 RX ORDER — PREDNISONE 20 MG/1
20 TABLET ORAL
Qty: 16 TAB | Refills: 0 | Status: SHIPPED | OUTPATIENT
Start: 2018-11-13 | End: 2019-02-18 | Stop reason: ALTCHOICE

## 2018-11-13 RX ORDER — VENLAFAXINE HYDROCHLORIDE 37.5 MG/1
37.5 CAPSULE, EXTENDED RELEASE ORAL DAILY
Qty: 12 CAP | Refills: 0 | Status: SHIPPED | OUTPATIENT
Start: 2018-11-13 | End: 2018-11-19 | Stop reason: SDUPTHER

## 2018-11-13 RX ORDER — TRAZODONE HYDROCHLORIDE 50 MG/1
25 TABLET ORAL
Qty: 6 TAB | Refills: 0 | Status: SHIPPED | OUTPATIENT
Start: 2018-11-13 | End: 2018-11-19 | Stop reason: SDUPTHER

## 2018-11-13 RX ORDER — ALPRAZOLAM 0.25 MG/1
0.25 TABLET ORAL
Qty: 12 TAB | Refills: 0 | Status: SHIPPED | OUTPATIENT
Start: 2018-11-13 | End: 2020-04-08

## 2018-11-13 RX ADMIN — METHYLPREDNISOLONE SODIUM SUCCINATE 60 MG: 40 INJECTION, POWDER, FOR SOLUTION INTRAMUSCULAR; INTRAVENOUS at 16:19

## 2018-11-13 RX ADMIN — METHYLPREDNISOLONE SODIUM SUCCINATE 60 MG: 40 INJECTION, POWDER, FOR SOLUTION INTRAMUSCULAR; INTRAVENOUS at 06:06

## 2018-11-13 RX ADMIN — ARFORMOTEROL TARTRATE 15 MCG: 15 SOLUTION RESPIRATORY (INHALATION) at 08:30

## 2018-11-13 RX ADMIN — FAMOTIDINE 20 MG: 20 TABLET ORAL at 10:57

## 2018-11-13 RX ADMIN — ALBUTEROL SULFATE 2.5 MG: 2.5 SOLUTION RESPIRATORY (INHALATION) at 03:43

## 2018-11-13 RX ADMIN — ALPRAZOLAM 0.5 MG: 0.5 TABLET ORAL at 10:57

## 2018-11-13 RX ADMIN — FLUTICASONE PROPIONATE 1 SPRAY: 50 SPRAY, METERED NASAL at 10:58

## 2018-11-13 RX ADMIN — MONTELUKAST SODIUM 10 MG: 10 TABLET, COATED ORAL at 10:57

## 2018-11-13 RX ADMIN — BUDESONIDE 500 MCG: 0.5 INHALANT RESPIRATORY (INHALATION) at 08:30

## 2018-11-13 RX ADMIN — LORATADINE 10 MG: 10 TABLET ORAL at 10:57
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
## 2018-11-13 NOTE — ROUTINE PROCESS
Bedside and Verbal shift change report given to [facility] (oncoming nurse) by Nj Ontiveros (offgoing nurse). Report included the following information SBAR, Kardex, Intake/Output, MAR and Recent Results. 0745 Pt awoke stating that he could not breath. Appears that pt was having a panic attack. Pt states that he wakes up in this state every morning and that he usually recovers independently. Placed 2L O2 on pt. Will continue to monitor. 0820 Pt resting in bed and stable. Pt's O2 % on room air. Will continue to monitor. 1045 Pt resting in bed. Complaints of pain have been addressed. Will continue to monitor. 1320 Pt resting in bed with no complaints of pain and no change to condition. Will continue to monitor. 1620 Pt discharged home. Discharge instructions reviewed with pt. Pt to  prescriptions from [address] upon discharge.

## 2018-11-13 NOTE — ROUTINE PROCESS
Bedside shift change report given to [address] [place] [address] (oncoming nurse) by Sandy Brown RN (offgoing nurse). Report included the following information SBAR, Kardex, Intake/Output, MAR and Recent Results.

## 2018-11-13 NOTE — DISCHARGE SUMMARY
Discharge Summary    Patient: Phillip Vásquez               Sex: male          DOA: 11/11/2018         YOB: 1990      Age:  33 y.o.        LOS:  LOS: 2 days                Admit Date: 11/11/2018    Discharge Date: 11/13/2018    Admission Diagnoses: Severe asthma with acute exacerbation  Respiratory failure [facility])    Discharge Diagnoses:    Problem List as of 11/13/2018 Date Reviewed: 11/12/2018          Codes Class Noted - Resolved    Anxiety/Depression. ICD-10-CM: F41.9  ICD-9-CM: 300.00  11/12/2018 - Present        Erythrocytosis ICD-10-CM: D75.1  ICD-9-CM: 289.0  11/12/2018 - Present        Respiratory failure ([address] ICD-10-CM: J96.90  ICD-9-CM: 518.81  11/11/2018 - Present        Severe asthma with acute exacerbation ICD-10-CM: J45.901  ICD-9-CM: 493.92  11/11/2018 - Present        Severe persistent asthma without complication ZKA-74-CG: X91.35  ICD-9-CM: 493.90  1/9/2018 - Present        Leukocytosis ICD-10-CM: D72.829  ICD-9-CM: 288.60  1/9/2018 - Present        RESOLVED: Acute severe exacerbation of asthma ICD-10-CM: J45.901  ICD-9-CM: 493.92  12/31/2017 - 1/9/2018        RESOLVED: Acute respiratory acidosis ICD-10-CM: E87.2  ICD-9-CM: 276.2  12/31/2017 - 1/9/2018        RESOLVED: Status asthmaticus ICD-10-CM: J45.902  ICD-9-CM: 493.91  9/5/2017 - 1/9/2018        RESOLVED: Lactic acidosis ICD-10-CM: L76.7  ICD-9-CM: 276.2  9/5/2017 - 1/9/2018        RESOLVED: Metabolic acidosis with respiratory alkalosis ICD-10-CM: E87.2, E87.3  ICD-9-CM: 276.2, 276.3  9/5/2017 - 1/9/2018              Discharge Medications:     Current Discharge Medication List      START taking these medications    Details   predniSONE (DELTASONE) 20 mg tablet Take 20 mg by mouth daily (with breakfast). Take 60 mg po today at night. Then 60 mg po daily x 2 days,then 40 mg po daily x 2 days,then 20 mg po daily x 2 days,then 10 mg po daily x 2 days  Qty: 16 Tab, Refills: 0      !! ALPRAZolam (XANAX) 0.25 mg tablet Take 1 Tab by mouth two (2) times daily as needed for Anxiety. Max Daily Amount: 0.5 mg.  Qty: 12 Tab, Refills: 0    Associated Diagnoses: Anxiety      venlafaxine-SR (EFFEXOR XR) 37.5 mg capsule Take 1 Cap by mouth daily. Qty: 12 Cap, Refills: 0    Associated Diagnoses: Anxiety      traZODone (DESYREL) 50 mg tablet Take 0.5 Tabs by mouth nightly. Qty: 6 Tab, Refills: 0    Associated Diagnoses: Anxiety       ! ! - Potential duplicate medications found. Please discuss with provider. CONTINUE these medications which have NOT CHANGED    Details   !! albuterol (PROVENTIL VENTOLIN) 2.5 mg /3 mL (0.083 %) nebulizer solution 3 mL by Nebulization route every four (4) hours as needed for Wheezing. Qty: 30 Each, Refills: 0      cetirizine (ZYRTEC) 10 mg tablet Take 1 Tab by mouth daily. Qty: 90 Tab, Refills: 3    Associated Diagnoses: Poorly controlled severe persistent asthma without complication      !! albuterol (PROVENTIL VENTOLIN) 2.5 mg /3 mL (0.083 %) nebulizer solution 3 mL by Nebulization route every four (4) hours as needed for Wheezing. Qty: 75 Each, Refills: 3    Associated Diagnoses: Severe persistent asthma without complication      albuterol (VENTOLIN HFA) 90 mcg/actuation inhaler Take 2 Puffs by inhalation every four (4) hours as needed for Wheezing. Qty: 3 Inhaler, Refills: 3    Associated Diagnoses: Severe persistent asthma without complication      !! omalizumab (XOLAIR) 150 mg solr 300 mg by SubCUTAneous route every fourteen (14) days. Qty: 300 mg, Refills: 0      !! omalizumab (XOLAIR) 150 mg solr 300 mg by SubCUTAneous route every fourteen (14) days. Qty: 300 mg, Refills: 0    Associated Diagnoses: Severe persistent asthma with acute exacerbation in adult      predniSONE (STERAPRED DS) 10 mg dose pack Take as directed  Qty: 21 Tab, Refills: 0      montelukast (SINGULAIR) 10 mg tablet Take 1 Tab by mouth daily.  Indications: MAINTENANCE THERAPY FOR ASTHMA  Qty: 90 Tab, Refills: 3    Associated Diagnoses: Poorly controlled severe persistent asthma without complication      fluticasone (FLONASE) 50 mcg/actuation nasal spray 1 [place] by Both Nostrils route two (2) times a day. Qty: 1 Bottle, Refills: 11    Associated Diagnoses: Poorly controlled severe persistent asthma without complication; Elevated IgE level      budesonide-formoterol (SYMBICORT) 160-4.5 mcg/actuation HFAA Take 2 Puffs by inhalation two (2) times a day. Qty: 1 Inhaler, Refills: 10    Associated Diagnoses: Severe persistent asthma without complication      !! ALPRAZolam (XANAX) 0.5 mg tablet Take 1 Tab by mouth three (3) times daily as needed for Anxiety. Max Daily Amount: 1.5 mg.  Qty: 10 Tab, Refills: 0    Associated Diagnoses: Post traumatic stress disorder (PTSD); Panic disorder       !! - Potential duplicate medications found. Please discuss with provider. Follow-up:PCP,Pulmonary    Discharge Condition: Good    Activity: Activity as tolerated    Diet: Regular Diet    Wound Care: None needed    Labs:  Labs: Results:       Chemistry Recent Labs     11/12/18  0820 11/11/18  1345   * 116*    140   K 4.3 3.9   * 108   CO2 25 26   BUN 12 9   CREA 0.87 0.97   CA 8.8 8.8   AGAP 6 6   BUCR 14 9*   AP  --  115   TP  --  6.7   ALB  --  3.1*   GLOB  --  3.6   AGRAT  --  0.9      CBC w/Diff Recent Labs     11/13/18  0400 11/12/18  0820 11/11/18  1345   WBC 29.0* 26.5* 11.3   RBC 5.56* 5.74* 6.38*   HGB 15.5 16.5* 17.7*   HCT 47.7 48.1* 54.0*    357 329   GRANS 85*  --  54   LYMPH 9*  --  29   EOS 0  --  8*      Cardiac Enzymes No results for input(s): CPK, CKND1, JENNIFER in the last 72 hours. No lab exists for component: CKRMB, TROIP   Coagulation No results for input(s): PTP, INR, APTT in the last 72 hours.     No lab exists for component: INREXT    Lipid Panel No results found for: CHOL, CHOLPOCT, CHOLX, CHLST, CHOLV, 986776, HDL, LDL, LDLC, DLDLP, 325880, VLDLC, VLDL, TGLX, TRIGL, TRIGP, TGLPOCT, CHHD, CHHDX BNP No results for input(s): BNPP in the last 72 hours. Liver Enzymes Recent Labs     11/11/18  1345   TP 6.7   ALB 3.1*      SGOT 24      Thyroid Studies No results found for: T4, T3U, TSH, TSHEXT       Imaging:  CXR on 11/11/2018:  No acute cardiopulmonary disease. Consults: Psychiatry and Pulmonary/Critical Care    Treatment Team: Treatment Team: Attending Provider: Law Lugo MD; Consulting Provider: La Harper MD; Consulting Provider: Kota Marks MD; Care Manager: Jayleen Ferrer; Utilization Review: Hong Ragsdale RN; Care Manager: Lynsey Mason    Significant Diagnostic Studies: labs: see recent results    Hospital Course:  Bridgette Neri a 33 y. o. male with hx asthma  who presented with sob. He reported that it started around noon the day he presented to ED. The short of breath was associated with wheezing and chest tightness. He used his rescue inhaler with no relief. He used his nebulizer 3x with no change. In the ED he received duoneb x 2 , albuterol x1. He had a CXR w/o acute process. He was then  placed on BIPAP for respiratory distress. Then admitted to ICU for resp failure due to severe acute asthma exacerbation. Pulmonary was consulted and advised to continue steroids with Xolair and possibly add Nucala. So far patient has responded well to treatment and felt better the next day,no longer requiring the BIPAP. However it is reported episodes of anxiety. Patient was offered to be evaluated by psychiatry but initially declined saying that he knows that he has h/o anxiety/depression and PTSD. He reported that psych medications make his condition worse and prefers using relaxation to deal with his anxiety. He also reported that he has been on psych medications before and stopped taking them. He is denying any suicidal or homocidal ideation. Later patient asked to be put on xanax and agreed to talk to a psychiatrist.Tele-psychiatry was consulted and recommended to start patient on effexor,trazodone,xanax. No need for inpatient psychiatry admission. Today patient has been evaluated and noted to be clinically stable for discharge. He is instructed to follow up with pcp,pulmonologist and his psychiatrist.    Time for discharge:40 minutes. Discharge paln discussed with patient,,nurse.       Iwona Block MD  November 13, 2018

## 2018-11-13 NOTE — PROGRESS NOTES
Chart reviewed. Noted orders for discharge. Provided pt with med assist for : prednisone, trazadone, xanax, & effexor. Provided pt with written handout on CSB, when to walk in & what information to bring. Available as needed. Betsy LaguerreRN,ext [phone]. Care Management Interventions PCP Verified by CM: Yes 
Palliative Care Criteria Met (RRAT>21 & CHF Dx)?: No 
Mode of Transport at Discharge: Other (see comment) Transition of Care Consult (CM Consult): Discharge Planning MyChart Signup: No 
Discharge Durable Medical Equipment: No 
Health Maintenance Reviewed: Yes Physical Therapy Consult: No 
Occupational Therapy Consult: No 
Speech Therapy Consult: No 
Current Support Network: Other(room mate) Confirm Follow Up Transport: Other (see comment) Plan discussed with Pt/Family/Caregiver: Yes [employer] Information Provided?: No 
Discharge Location Discharge Placement: Home

## 2018-11-13 NOTE — PROGRESS NOTES
*ATTENTION:  This note has been created by a medical student for educational purposes only. Please do not refer to the content of this note for clinical decision-making, billing, or other purposes. Please see attending physicians note to obtain clinical information on this patient. * Student Progress Note Please refer to attendings daily rounding note for full details Patient: Ovidio Bull MRN: [id number]  CSN: [id number] YOB: 1990  Age: 33 y.o. Sex: male DOA: 11/11/2018 LOS:  LOS: 2 days Chief Complaint:  SOB Subjective: HPI:  
Ovidio Bull is a 34 y/o male that presented to the ED with SOB. History of asthma, anxiety, depression, and PTSD. Per the H&P by Dr. Sonia Allison reported that it started around noon the day he presented to ED. And was associated wheezing and chest tightness. He used his rescue inhaler with no relief. He used his nebulizer 3x with no change. In the ED he received duoneb x 2 , albuterol x1. He had a CXR w/o acute process. He was then  placed on BIPAP for respiratory distress. He will be admitted to ICU for resp failure due to severe acute asthma exacerbation. Pulmonary was consulted and advised to continue steroids with Xolair and possibly add Nucala. So far patient has responded well to treatment and he is feeling better today. However it is reported episodes of anxiety. Patient was offered to be evaluated by psychiatry but he declined saying that he knows that he has h/o anxiety/depression and PTSD. He thinks that psych medications make thinks worse and prefer using relaxation to deal with his anxiety. He has reported that he has been on psych medications before and stopped taking them. He is denying any suicidal or homocidal ideation. Later patient requested to be put on xanax. \"  
-Tele psych saw patient on 11/12, detailed traumatic childhood into early adulthood. Recommendations: No current indication for psychiatric admission Start trazodone 25mg, 1-2 tabs HS PRN Start Effexor XR 37.5mg po Daily Continue Xanax at 0.25mg po BID PRN Please provide outpt psychiatric referrals upon his hospital discharge. Pt should be seen within 2 weeks of his hospital discharge 11/13 patient is in good spirits, he states his breathing feeling great today. He is on 2L NC at home. Denies any suicidal ideations at this time.  
-Next Xolair injection is due this coming Monday. PMH:  
Past Medical History:  
Diagnosis Date  Asthma  Chronic obstructive pulmonary disease ([facility] [address]   
patient also reports a history of anxiety, depression, and PTSD Allergies: Allergies Allergen Reactions  Penicillin G Anaphylaxis  Cat Dander Shortness of Breath PTA med list:  
Medications Prior to Admission Medication Sig  
 albuterol (PROVENTIL VENTOLIN) 2.5 mg /3 mL (0.083 %) nebulizer solution 3 mL by Nebulization route every four (4) hours as needed for Wheezing.  cetirizine (ZYRTEC) 10 mg tablet Take 1 Tab by mouth daily.  omalizumab (XOLAIR) 150 mg solr 300 mg by SubCUTAneous route every fourteen (14) days.  omalizumab (XOLAIR) 150 mg solr 300 mg by SubCUTAneous route every fourteen (14) days.  predniSONE (STERAPRED DS) 10 mg dose pack Take as directed  montelukast (SINGULAIR) 10 mg tablet Take 1 Tab by mouth daily. Indications: MAINTENANCE THERAPY FOR ASTHMA  fluticasone (FLONASE) 50 mcg/actuation nasal spray 1 [place] by Both Nostrils route two (2) times a day.  budesonide-formoterol (SYMBICORT) 160-4.5 mcg/actuation HFAA Take 2 Puffs by inhalation two (2) times a day.  ALPRAZolam (XANAX) 0.5 mg tablet Take 1 Tab by mouth three (3) times daily as needed for Anxiety. Max Daily Amount: 1.5 mg.  
 
 
ROS: 
Constitutional: Denies weight loss, fatigue, fever, chills, night sweats. Skin: Denies rash, pruritis, changes in hair/nails. HEENT: Denies headache, vision changes, tinnitus, hearing loss, rhinitis, sore throat. Respiratory: Denies cough, SOB, wheezing. Cardiovascular: Denies chest pain, edema, orthopnea, claudication. Gastrointestinal: Denies abdomen pain, N/V/D, dark or bloody stool. Genitourinary: Denies dysuria, frequency, hematuria. Musculoskeletal: Denies arthralgia, swelling, myalgia. Neurologic: Denies dizziness, weakness, seizures, paresthesia, tremor, syncope. Hematologic: Denies easy bruising/bleeding. Endocrine: Denies polyuria, polydipsia, polyphagia, heat/cold intolerance. Psychiatric: Denies suicidal ideations, positive for anxiety and depression. Objective:  
  
Visit Vitals /73 Pulse (!) 108 Temp 97.8 °F (36.6 °C) Resp 22 Ht 5' 8\" (1.727 m) Wt 90.7 kg (199 lb 15.3 oz) SpO2 98% BMI 30.40 kg/m² Physical Exam: 
General: Well appearing, well nourished. Alert and cooperative, in NAD. Appropriate mood and affect. Skin: Warm, dry, and intact without lesions, erythema, bruising, or pallor. HEENT: Normocephalic, atraumatic. PERRL, EOMI, vision grossly intact. Hearing grossly intact. Neck supple, without lymphadenopathy or thyromegaly. Trachea midline. Cardiovascular: RRR. No peripheral edema, cyanosis, or pallor. Radial and dorsalis pedis pulses strong and equal bilaterally. Capillary refill <2 seconds. Respiratory: CTAB without wheezes. Abdominal: Soft, non-distended, non-tender. Normoactive bowel sounds. No guarding or rebound tenderness. No CVA tenderness. Musculoskeletal: Normal muscular development. ROM spine and extremities grossly intact. Neurologic: CN II-XII intact. Strength and sensation intact and symmetric throughout. I have reviewed the patient's Labs and Radiology studies.  
   
Assessment/Plan:  
Linking Statement: 
 
severe asthma exacerbation/respiratory failure 
-Brovana nebs 15mcg BID 
-Pulmicort 500mcg/2ml BID 
 -Flonase 50mcg both nostrils BID 
-Claritin tablet 10mg PO daily  
-Singulair 10mg PO daily  
methylprednisolone 60mg injection Q8hrs  
-albuterol nebs 2.5 mg PRN Q4hrs  
- consult placed  
-[address] outpatient with pulmonologist  
 
Anxiety  
- consult placed 
-patient stated that he was on Effexor previously, had extremely negative side effects and suicidal ideations, but at this point in his life he was having significant changes (daughter was killed). -follow tele psych recommendations for further medications? Leukocytosis  
-WBC still trending up, today 29.0 
 
DVT prophylaxis 
-Lovenox 40mg injection daily Ed Foster 11/13/2018 
9:26 AM

## 2018-11-13 NOTE — PROGRESS NOTES
Problem: Falls - Risk of 
Goal: *Absence of Falls Document Kervin Soni Fall Risk and appropriate interventions in the flowsheet. Outcome: Progressing Towards Goal 
Fall Risk Interventions: 
  
 
  
 
Medication Interventions: Evaluate medications/consider consulting pharmacy, Teach patient to arise slowly

## 2018-11-13 NOTE — DISCHARGE INSTRUCTIONS
DISCHARGE SUMMARY from Nurse    PATIENT INSTRUCTIONS:    After general anesthesia or intravenous sedation, for 24 hours or while taking prescription Narcotics:  · Limit your activities  · Do not drive and operate hazardous machinery  · Do not make important personal or business decisions  · Do  not drink alcoholic beverages  · If you have not urinated within 8 hours after discharge, please contact your surgeon on call. Report the following to your surgeon:  · Excessive pain, swelling, redness or odor of or around the surgical area  · Temperature over 100.5  · Nausea and vomiting lasting longer than 4 hours or if unable to take medications  · Any signs of decreased circulation or nerve impairment to extremity: change in color, persistent  numbness, tingling, coldness or increase pain  · Any questions    What to do at Home:  Recommended activity: Activity as tolerated    If you experience any of the following symptoms worsening shortness of breath or change in sputum, please follow up with primary care physician. *  Please give a list of your current medications to your Primary Care Provider. *  Please update this list whenever your medications are discontinued, doses are      changed, or new medications (including over-the-counter products) are added. *  Please carry medication information at all times in case of emergency situations. These are general instructions for a healthy lifestyle:    No smoking/ No tobacco products/ Avoid exposure to second hand smoke  Surgeon General's Warning:  Quitting smoking now greatly reduces serious risk to your health.     Obesity, smoking, and sedentary lifestyle greatly increases your risk for illness    A healthy diet, regular physical exercise & weight monitoring are important for maintaining a healthy lifestyle    You may be retaining fluid if you have a history of heart failure or if you experience any of the following symptoms:  Weight gain of 3 pounds or more overnight or 5 pounds in a week, increased swelling in our hands or feet or shortness of breath while lying flat in bed. Please call your doctor as soon as you notice any of these symptoms; do not wait until your next office visit. Recognize signs and symptoms of STROKE:    F-face looks uneven    A-arms unable to move or move unevenly    S-speech slurred or non-existent    T-time-call 911 as soon as signs and symptoms begin-DO NOT go       Back to bed or wait to see if you get better-TIME IS BRAIN. Warning Signs of HEART ATTACK     Call 911 if you have these symptoms:   Chest discomfort. Most heart attacks involve discomfort in the center of the chest that lasts more than a few minutes, or that goes away and comes back. It can feel like uncomfortable pressure, squeezing, fullness, or pain.  Discomfort in other areas of the upper body. Symptoms can include pain or discomfort in one or both arms, the back, neck, jaw, or stomach.  Shortness of breath with or without chest discomfort.  Other signs may include breaking out in a cold sweat, nausea, or lightheadedness. Don't wait more than five minutes to call 911 - MINUTES MATTER! Fast action can save your life. Calling 911 is almost always the fastest way to get lifesaving treatment. Emergency Medical Services staff can begin treatment when they arrive -- up to an hour sooner than if someone gets to the hospital by car. The discharge information has been reviewed with the patient. The patient verbalized understanding. Discharge medications reviewed with the patient and appropriate educational materials and side effects teaching were provided. Patient armband removed and shredded.

## 2018-11-13 NOTE — PROGRESS NOTES
Pulmonary progress note History 44-year-old male with severe persistent asthma on typical controlling therapies and Xolair admitted to the hospital on 11/11/18 with an acute exacerbation. He required BiPAP but now appears to have responded well to steroid and nebulized therapies. Eosinophils were elevated to 8% on admission. Patient feels well and is ready to go home. Shortness of breath is near baseline and he is not coughing paroxysmally. He was seen by psychiatry and they have recommended antidepressants and anxiolytics. They are to follow-up with him in the next 2 weeks. Exam 
He is alert, oriented and in no respiratory distress at rest.  Affect is baseline. Blood pressure 121/72, pulse 85, temperature 97.8 °F (36.6 °C), resp. rate 15, height 5' 8\" (1.727 m), weight 90.7 kg (199 lb 15.3 oz), SpO2 98 %. No conversational dyspnea or accessory muscle use. Relatively good air movement and no wheezing. Assessment Acute exacerbation of severe persistent asthma Elevated IgE on Xolair Hypereosinophilia with this exacerbation Plan No contraindication to discharge home from pulmonary perspective Follow-up in pulmonary clinic in the next 2 weeks Consider Nucala

## 2018-11-13 NOTE — PROGRESS NOTES
1900  -- Bedside, Verbal and Written shift change report given to 2309 Orlin Kline (oncoming nurse) by DAY (offgoing nurse). Report included the following information SBAR, Kardex, Intake/Output, MAR and Recent Results. Allergy band placed at wrist. 
   
2025 -- MD Bolaños contacted RN, Telepsych machine placed at bedside. 2232 -- PM medications administered, pt tolerated with ease, will continue to monitor. 
  
0030 -- Shift reassessment, pt condition unchanged, will continue to monitor. 0329 -- PRN BTX medication spilled out of nebulizer, medication wasted. 0343 -- PRN BTX medication administered.   
 
0400 --  Shift reassessment, pt condition unchanged, will continue to monitor.   
   
 0700 -- Bedside, Verbal and Written shift change report given to Christopher Ledbetter by STANLEY (offgoing nurse). Report included the following information SBAR, Kardex, Intake/Output, MAR and Recent Results. Skin assessment completed.

## 2018-11-19 ENCOUNTER — OFFICE VISIT (OUTPATIENT)
Dept: FAMILY MEDICINE CLINIC | Age: 28
End: 2018-11-19

## 2018-11-19 VITALS
OXYGEN SATURATION: 96 % | BODY MASS INDEX: 29.7 KG/M2 | WEIGHT: 196 LBS | TEMPERATURE: 98.5 F | HEART RATE: 85 BPM | SYSTOLIC BLOOD PRESSURE: 116 MMHG | DIASTOLIC BLOOD PRESSURE: 76 MMHG | RESPIRATION RATE: 20 BRPM | HEIGHT: 68 IN

## 2018-11-19 DIAGNOSIS — F41.9 ANXIETY: ICD-10-CM

## 2018-11-19 DIAGNOSIS — F43.10 PTSD (POST-TRAUMATIC STRESS DISORDER): ICD-10-CM

## 2018-11-19 DIAGNOSIS — Z23 ENCOUNTER FOR IMMUNIZATION: ICD-10-CM

## 2018-11-19 DIAGNOSIS — D72.829 LEUKOCYTOSIS, UNSPECIFIED TYPE: ICD-10-CM

## 2018-11-19 DIAGNOSIS — J45.50 SEVERE PERSISTENT ASTHMA WITHOUT COMPLICATION: Primary | ICD-10-CM

## 2018-11-19 PROBLEM — J96.90 RESPIRATORY FAILURE (HCC): Status: RESOLVED | Noted: 2018-11-11 | Resolved: 2018-11-19

## 2018-11-19 PROBLEM — D75.1 ERYTHROCYTOSIS: Status: RESOLVED | Noted: 2018-11-12 | Resolved: 2018-11-19

## 2018-11-19 PROBLEM — J45.901 SEVERE ASTHMA WITH ACUTE EXACERBATION: Status: RESOLVED | Noted: 2018-11-11 | Resolved: 2018-11-19

## 2018-11-19 RX ORDER — VENLAFAXINE HYDROCHLORIDE 37.5 MG/1
37.5 CAPSULE, EXTENDED RELEASE ORAL DAILY
Qty: 30 CAP | Refills: 0 | Status: SHIPPED | OUTPATIENT
Start: 2018-11-19 | End: 2019-02-18 | Stop reason: SDUPTHER

## 2018-11-19 RX ORDER — TRAZODONE HYDROCHLORIDE 50 MG/1
50 TABLET ORAL
Qty: 30 TAB | Refills: 0 | Status: SHIPPED | OUTPATIENT
Start: 2018-11-19 | End: 2019-02-18 | Stop reason: SDUPTHER

## 2018-11-19 NOTE — PROGRESS NOTES
History of Present Illness Jeralyn Osgood is a 33 y.o. male who presents today for management of 
 
Chief Complaint Patient presents with  Asthma  
  hospital follow up Patient is here for follow-up after being admitted at [facility] on 11/11-11/13/18 for acute respiratory failure requiring Bipap, secondary to a severe asthma attack. Pulmonary was consulted and advised to continue steroids with Xolair. Tele-psychiatry was consulted and recommended to start patient on Effexor, trazodone and Xanax. Current therapy: Xolair, prn albuterol. He could not afford Symbicort. Today, patient reports of daily asthma symptoms. Symptoms include cough, wheezing and shortness of breath which are moderately severe, which responds to rescue medications. He has to use his nebulizer every 2-3 hours. His reports that his anxiety and PTSD are responding well to his medications. He was able to sleep well for the last 2 days. Problem List 
Patient Active Problem List  
 Diagnosis Date Noted  PTSD (post-traumatic stress disorder) 11/19/2018  Anxiety 11/12/2018  Severe persistent asthma without complication 47/43/0801  Leukocytosis 01/09/2018 Past Medical History Past Medical History:  
Diagnosis Date  Asthma  Chronic obstructive pulmonary disease ([facility] [address] Surgical History Past Surgical History:  
Procedure Laterality Date  HX FREE SKIN GRAFT    
 HX ORTHOPAEDIC    
 facial reconstruction Current Medications Current Outpatient Medications Medication Sig  
 venlafaxine-SR (EFFEXOR XR) 37.5 mg capsule Take 1 Cap by mouth daily.  traZODone (DESYREL) 50 mg tablet Take 1 Tab by mouth nightly.  predniSONE (DELTASONE) 20 mg tablet Take 20 mg by mouth daily (with breakfast). Take 60 mg po today at night. Then 60 mg po daily x 2 days,then 40 mg po daily x 2 days,then 20 mg po daily x 2 days,then 10 mg po daily x 2 days  ALPRAZolam (XANAX) 0.25 mg tablet Take 1 Tab by mouth two (2) times daily as needed for Anxiety. Max Daily Amount: 0.5 mg.  
 albuterol (PROVENTIL VENTOLIN) 2.5 mg /3 mL (0.083 %) nebulizer solution 3 mL by Nebulization route every four (4) hours as needed for Wheezing.  albuterol (VENTOLIN HFA) 90 mcg/actuation inhaler Take 2 Puffs by inhalation every four (4) hours as needed for Wheezing.  omalizumab (XOLAIR) 150 mg solr 300 mg by SubCUTAneous route every fourteen (14) days.  cetirizine (ZYRTEC) 10 mg tablet Take 1 Tab by mouth daily.  fluticasone (FLONASE) 50 mcg/actuation nasal spray 1 [place] by Both Nostrils route two (2) times a day.  budesonide-formoterol (SYMBICORT) 160-4.5 mcg/actuation HFAA Take 2 Puffs by inhalation two (2) times a day. No current facility-administered medications for this visit. Allergies/Drug Reactions Allergies Allergen Reactions  Penicillin G Anaphylaxis  Cat Dander Shortness of Breath Family History Family History Adopted: Yes  
Problem Relation Age of Onset  No Known Problems Mother  No Known Problems Father Social History Social History Socioeconomic History  Marital status: SINGLE Spouse name: Not on file  Number of children: Not on file  Years of education: Not on file  Highest education level: Not on file Social Needs  Financial resource strain: Not on file  Food insecurity - worry: Not on file  Food insecurity - inability: Not on file  Transportation needs - medical: Not on file  Transportation needs - non-medical: Not on file Occupational History  Not on file Tobacco Use  Smoking status: Never Smoker  Smokeless tobacco: Former User Substance and Sexual Activity  Alcohol use: Yes  Drug use: No  
 Sexual activity: Not Currently Other Topics Concern  Not on file Social History Narrative  Not on file Review of Systems Negative except as mentioned in HPI Physical Exam 
Vital signs:  
Vitals:  
 11/19/18 0736 BP: 116/76 Pulse: 85 Resp: 20 Temp: 98.5 °F (36.9 °C) TempSrc: Oral  
SpO2: 96% Weight: 196 lb (88.9 kg) Height: 5' 8\" (1.727 m) General: alert, oriented, not in distress Eyes: clear conjunctivae, anicteric sclerae, full and equal ROMs Chest/Lungs: clear breath sounds, no wheezing or crackles Heart: normal rate, regular rhythm, no murmur Extremities: no focal deformities, no edema Neuro: AAOx3, CN's grossly intact Skin: no visible abnormalities Laboratory/Tests: 
Component Latest Ref Rng & Units 11/13/2018 11/12/2018  
 
      4:00 AM  8:20 AM  
WBC 
    4.6 - 13.2 K/uL 29.0 (H) RBC 
    4.70 - 5.50 M/uL 5.56 (H) HGB 13.0 - 16.0 g/dL 15.5 HCT 
    36.0 - 48.0 % 47.7 MCV 
    74.0 - 97.0 FL 85.8 MCH 
    24.0 - 34.0 PG 27.9 MCHC 31.0 - 37.0 g/dL 32.5   
RDW 
    11.6 - 14.5 % 12.3 PLATELET 
    618 - 815 K/uL 346 MPV 
    9.2 - 11.8 FL 10.3 NEUTROPHILS 
    40 - 73 % 85 (H) LYMPHOCYTES 
    21 - 52 % 9 (L) MONOCYTES 
    3 - 10 % 6 EOSINOPHILS 
    0 - 5 % 0   
BASOPHILS 
    0 - 2 % 0   
ABS. NEUTROPHILS 
    1.8 - 8.0 K/UL 24.6 (H)   
ABS. LYMPHOCYTES 
    0.9 - 3.6 K/UL 2.6   
ABS. MONOCYTES 
    0.05 - 1.2 K/UL 1.8 (H)   
ABS. EOSINOPHILS 
    0.0 - 0.4 K/UL 0.0   
ABS. BASOPHILS 
    0.0 - 0.1 K/UL 0.0   
DF AUTOMATED Sodium 136 - 145 mmol/L  141 Potassium 3.5 - 5.5 mmol/L  4.3 Chloride 100 - 108 mmol/L  110 (H)  
CO2 
    21 - 32 mmol/L  25 Anion gap 3.0 - 18 mmol/L  6 Glucose 74 - 99 mg/dL  128 (H) BUN 
    7.0 - 18 MG/DL  12 Creatinine 
    0.6 - 1.3 MG/DL  0.87 BUN/Creatinine ratio 12 - 20    14 GFR est AA 
    >60 ml/min/1.73m2  >60  
GFR est non-AA 
    >60 ml/min/1.73m2  >60 Calcium 8.5 - 10.1 MG/DL  8.8 EXAM: Chest portable 
  INDICATION: Short of breath 
  
 COMPARISON: Two-view chest 9/20/2018 
  
_______________ 
  
FINDINGS: 
  
AP portable chest film was performed. Lungs are clear. No effusion or 
pneumothorax. Heart and pulmonary vascularity are normal. 
  
_______________ 
  
IMPRESSION IMPRESSION: 
  
No acute cardiopulmonary disease. Assessment/Plan: 1. Severe persistent asthma without complication 
- finish course of steroids 
- continue Xolair and prn albuterol 
- advised patient to apply for assistance for Symbicort from his pulmonologist's office 2. Anxiety 
- improved 
- continue current meds 
- increase trazodone to 50mg QHS 3. PTSD (post-traumatic stress disorder) - Patient will walk in at [phone] [email or web address] . He is just waiting for proof of unemployment. 4. Leukocytosis, unspecified type 
- likely due to steroid use Will give influenza and pneumococcal vaccines today. Follow-up Disposition: 
Return in about 6 months (around 5/19/2019) for rov. I have discussed the diagnosis with the patient and the intended plan as seen in the above orders. The patient has received an after-visit summary and questions were answered concerning future plans. I have discussed medication side effects and warnings with the patient as well. I have reviewed the plan of care with the patient, accepted their input and they are in agreement with the treatment goals. Dave Badillo MD 
November 19, 2018

## 2018-11-28 ENCOUNTER — CLINICAL SUPPORT (OUTPATIENT)
Dept: PULMONOLOGY | Facility: CLINIC | Age: 28
End: 2018-11-28

## 2018-11-28 DIAGNOSIS — J45.50 SEVERE PERSISTENT ASTHMA WITHOUT COMPLICATION: Primary | ICD-10-CM

## 2018-12-12 ENCOUNTER — CLINICAL SUPPORT (OUTPATIENT)
Dept: PULMONOLOGY | Facility: CLINIC | Age: 28
End: 2018-12-12

## 2018-12-12 DIAGNOSIS — J45.50 SEVERE PERSISTENT ASTHMA WITHOUT COMPLICATION: Primary | ICD-10-CM

## 2018-12-14 ENCOUNTER — TELEPHONE (OUTPATIENT)
Dept: PULMONOLOGY | Facility: CLINIC | Age: 28
End: 2018-12-14

## 2018-12-14 NOTE — TELEPHONE ENCOUNTER
Spoke with pt. He was informed neb solution is at the [facility] but [facility] [id number] if he chooses.

## 2018-12-14 NOTE — TELEPHONE ENCOUNTER
Pt called in requesting refill on Albuterol inhaler and nebulizer, states he never picked original script in November and pharmacist informed that the rx expirerd and he needs a new rx.  Please f/u

## 2018-12-14 NOTE — TELEPHONE ENCOUNTER
Spoke with [address]. They have the nebulizer solution. The inhaler the pt had transferred to [facility].

## 2018-12-27 ENCOUNTER — CLINICAL SUPPORT (OUTPATIENT)
Dept: PULMONOLOGY | Facility: CLINIC | Age: 28
End: 2018-12-27

## 2018-12-27 DIAGNOSIS — J45.50 SEVERE PERSISTENT ASTHMA WITHOUT COMPLICATION: Primary | ICD-10-CM

## 2019-01-09 ENCOUNTER — OFFICE VISIT (OUTPATIENT)
Dept: PULMONOLOGY | Facility: CLINIC | Age: 29
End: 2019-01-09

## 2019-01-09 DIAGNOSIS — J45.50 SEVERE PERSISTENT ASTHMA WITHOUT COMPLICATION: Primary | ICD-10-CM

## 2019-01-09 RX ORDER — PREDNISONE 20 MG/1
20 TABLET ORAL
Qty: 20 TAB | Refills: 0 | Status: SHIPPED | OUTPATIENT
Start: 2019-01-09 | End: 2019-02-05 | Stop reason: ALTCHOICE

## 2019-01-09 NOTE — PROGRESS NOTES
[facility]  Pulmonary, Critical Care, and Sleep Medicine       Pulmonary Office Progress Notes       Subjective   Subjective:      45-year-old male presents for follow-up on severe persistent asthma and elevated IgE    Since he was last here the patient was briefly admitted to the hospital from  to 2018 for an exacerbation of asthma. There seemed to be a strong anxiety component of the exacerbation. Recently he wakened with shortness of breath that required 3 nebulized therapies. He nearly presented to the emergency room at that time. He did not receive prednisone. The patient's asthma remains poorly controlled. He uses albuterol multiple times daily. He also awakens every night 2-3 times because of shortness of breath. He is compliant with Xolair. Since starting Xolair his ER presentations and hospital admissions have clearly decreased. As noted earlier, the patient did have elevated eosinophils on one previous ER admission. He is now obtained insurance and is regularly taking Symbicort. He has albuterol as his rescue inhaler. He also has albuterol nebulizer. He is not on Atrovent or other anticholinergic. He has used DuoNeb's when hospitalized and does find them effective. The patient's brother recently  from some form of cardiac hypertrophy. He does not know if this was a sub-aortic stenosis or complication of regular illicit drug use. He states a number of his family members use heroin and other drugs. His father  of a similar problem, at least as far as he knows. He has not seen the death certificate for the precise diagnosis. Objective:     He is alert, oriented and in no respiratory distress at rest.  His affect is normal.  There were no vitals taken for this visit. Extraocular muscles are intact. Gait is normal.  Trachea is midline. Lungs show only end expiratory wheezes posteriorly on the right side.   Otherwise the air movement was normal.  His heart has a regular rate and rhythm. There is no appreciable murmur or gallop. Extremities show no cyanosis clubbing     Assessment/Plan:   Assessment  Severe persistent asthma  Elevated IgE  Expect improved medication compliance now that he has insurance  No anticholinergic in medication regimen  No as needed prednisone available. This may be a medication worth him having on hand given his recent near hospitalization   Family history of undefined hypertrophic cardiomyopathy    Plan  Continue Symbicort  Continue albuterol  Start Combivent Respimat  Prednisone 20 mg tablets 20 to have on hand. The \"deal\" given him for this prednisone is that he must call us if he starts to use the drug.

## 2019-01-09 NOTE — PROGRESS NOTES
Chief Complaint   Patient presents with    Asthma     patient is here today to discuss her current asthma medications. he now has insurance and would like to know if he should change what hes currently using,     1. Have you been to the ER, urgent care clinic since your last visit? Hospitalized since your last visit? Yes, [facility] for asthma exacerbation    2. Have you seen or consulted any other health care providers outside of the [address] [place] [address] since your last visit? Include any pap smears or colon screening.  No

## 2019-01-11 ENCOUNTER — TELEPHONE (OUTPATIENT)
Dept: PULMONOLOGY | Facility: CLINIC | Age: 29
End: 2019-01-11

## 2019-01-23 ENCOUNTER — CLINICAL SUPPORT (OUTPATIENT)
Dept: PULMONOLOGY | Facility: CLINIC | Age: 29
End: 2019-01-23

## 2019-01-23 DIAGNOSIS — J45.50 SEVERE PERSISTENT ASTHMA WITHOUT COMPLICATION: Primary | ICD-10-CM

## 2019-02-05 ENCOUNTER — OFFICE VISIT (OUTPATIENT)
Dept: FAMILY MEDICINE CLINIC | Age: 29
End: 2019-02-05

## 2019-02-05 VITALS
HEIGHT: 68 IN | BODY MASS INDEX: 32.37 KG/M2 | OXYGEN SATURATION: 95 % | SYSTOLIC BLOOD PRESSURE: 117 MMHG | TEMPERATURE: 98.2 F | DIASTOLIC BLOOD PRESSURE: 86 MMHG | WEIGHT: 213.6 LBS | RESPIRATION RATE: 20 BRPM | HEART RATE: 122 BPM

## 2019-02-05 DIAGNOSIS — R07.9 CHEST PAIN, UNSPECIFIED TYPE: Primary | ICD-10-CM

## 2019-02-05 NOTE — PROGRESS NOTES
1. Have you been to the ER, urgent care clinic since your last visit? Hospitalized since your last visit? No 
 
2. Have you seen or consulted any other health care providers outside of the [facility] since your last visit? Include any pap smears or colon screening.  No

## 2019-02-05 NOTE — PROGRESS NOTES
History of Present Illness Barbara Cavazos is a 33 y.o. male who presents today for management of 
 
Chief Complaint Patient presents with  Follow-up Chest Pain Patient complains of chest pain. Onset was 1 year ago, with worsening course since that time. The patient admits to chest discomfort that is intermittent, with radiation to none, rated as a scale of 5/10 in intensity that is sharp, precordial in nature. Associated symptoms are palpitations. Aggravating factors are none. Alleviating factors are: nothing. Patient's cardiac risk factors are family history, male gender. Patient's risk factors for DVT/PE: none. Previous cardiac testing includes: Electrocardiogram (EKG). Problem List 
Patient Active Problem List  
 Diagnosis Date Noted  PTSD (post-traumatic stress disorder) 11/19/2018  Anxiety 11/12/2018  Severe persistent asthma without complication 47/84/3338  Leukocytosis 01/09/2018 Past Medical History Past Medical History:  
Diagnosis Date  Asthma  Chronic obstructive pulmonary disease ([address] Surgical History Past Surgical History:  
Procedure Laterality Date  HX FREE SKIN GRAFT    
 HX ORTHOPAEDIC    
 facial reconstruction Current Medications Current Outpatient Medications Medication Sig  ipratropium-albuterol (COMBIVENT RESPIMAT)  mcg/actuation inhaler Take 1 Puff by inhalation every six (6) hours as needed for Wheezing or Shortness of Breath.  omalizumab (XOLAIR) 150 mg solr 300 mg by SubCUTAneous route every fourteen (14) days.  predniSONE (DELTASONE) 20 mg tablet Take 20 mg by mouth daily (with breakfast). Take 60 mg po today at night. Then 60 mg po daily x 2 days,then 40 mg po daily x 2 days,then 20 mg po daily x 2 days,then 10 mg po daily x 2 days  albuterol (PROVENTIL VENTOLIN) 2.5 mg /3 mL (0.083 %) nebulizer solution 3 mL by Nebulization route every four (4) hours as needed for Wheezing.  albuterol (VENTOLIN HFA) 90 mcg/actuation inhaler Take 2 Puffs by inhalation every four (4) hours as needed for Wheezing.  cetirizine (ZYRTEC) 10 mg tablet Take 1 Tab by mouth daily.  fluticasone (FLONASE) 50 mcg/actuation nasal spray 1 [place] by Both Nostrils route two (2) times a day.  budesonide-formoterol (SYMBICORT) 160-4.5 mcg/actuation HFAA Take 2 Puffs by inhalation two (2) times a day.  venlafaxine-SR (EFFEXOR XR) 37.5 mg capsule Take 1 Cap by mouth daily.  traZODone (DESYREL) 50 mg tablet Take 1 Tab by mouth nightly.  ALPRAZolam (XANAX) 0.25 mg tablet Take 1 Tab by mouth two (2) times daily as needed for Anxiety. Max Daily Amount: 0.5 mg. No current facility-administered medications for this visit. Allergies/Drug Reactions Allergies Allergen Reactions  Penicillin G Anaphylaxis  Cat Dander Shortness of Breath Family History Family History Adopted: Yes  
Problem Relation Age of Onset  No Known Problems Mother  No Known Problems Father Social History Social History Socioeconomic History  Marital status: SINGLE Spouse name: Not on file  Number of children: Not on file  Years of education: Not on file  Highest education level: Not on file Social Needs  Financial resource strain: Not on file  Food insecurity - worry: Not on file  Food insecurity - inability: Not on file  Transportation needs - medical: Not on file  Transportation needs - non-medical: Not on file Occupational History  Not on file Tobacco Use  Smoking status: Never Smoker  Smokeless tobacco: Former User Substance and Sexual Activity  Alcohol use: Yes  Drug use: No  
 Sexual activity: Not Currently Other Topics Concern  Not on file Social History Narrative  Not on file Review of Systems Negative except as mentioned in HPI Physical Exam 
Vital signs:  
Vitals:  
 02/05/19 0846 BP: 117/86 Pulse: (!) 122 Resp: 20 Temp: 98.2 °F (36.8 °C) TempSrc: Oral  
SpO2: 95% Weight: 213 lb 9.6 oz (96.9 kg) Height: 5' 8\" (1.727 m) General: alert, oriented, not in distress Eyes: clear conjunctivae, anicteric sclerae, full and equal ROMs Chest/Lungs: clear breath sounds, no wheezing or crackles Heart: normal rate, regular rhythm, no murmur Extremities: no focal deformities, no edema Neuro: AAOx3, CN's grossly intact Skin: no visible abnormalities Laboratory/Tests: 
Component Latest Ref Rng & Units 11/13/2018 11/12/2018 11/12/2018  
 
      4:00 AM  8:20 AM  8:20 AM  
WBC 
    4.6 - 13.2 K/uL 29.0 (H)  26.5 (H) RBC 
    4.70 - 5.50 M/uL 5.56 (H)  5.74 (H) HGB 13.0 - 16.0 g/dL 15.5  16.5 (H) HCT 
    36.0 - 48.0 % 47.7  48.1 (H) MCV 
    74.0 - 97.0 FL 85.8  83.8 MCH 
    24.0 - 34.0 PG 27.9  28.7 MCHC 31.0 - 37.0 g/dL 32.5  34.3  
RDW 
    11.6 - 14.5 % 12.3  12.1 PLATELET 
    332 - 927 K/uL 346  357 MPV 
    9.2 - 11.8 FL 10.3  9.2 NEUTROPHILS 
    40 - 73 % 85 (H) LYMPHOCYTES 
    21 - 52 % 9 (L) MONOCYTES 
    3 - 10 % 6 EOSINOPHILS 
    0 - 5 % 0    
BASOPHILS 
    0 - 2 % 0    
ABS. NEUTROPHILS 
    1.8 - 8.0 K/UL 24.6 (H)    
ABS. LYMPHOCYTES 
    0.9 - 3.6 K/UL 2.6    
ABS. MONOCYTES 
    0.05 - 1.2 K/UL 1.8 (H)    
ABS. EOSINOPHILS 
    0.0 - 0.4 K/UL 0.0    
ABS. BASOPHILS 
    0.0 - 0.1 K/UL 0.0    
DF AUTOMATED Sodium 136 - 145 mmol/L  141 Potassium 3.5 - 5.5 mmol/L  4.3 Chloride 100 - 108 mmol/L  110 (H)   
CO2 
    21 - 32 mmol/L  25 Anion gap 3.0 - 18 mmol/L  6 Glucose 74 - 99 mg/dL  128 (H) BUN 
    7.0 - 18 MG/DL  12 Creatinine 
    0.6 - 1.3 MG/DL  0.87 BUN/Creatinine ratio 12 - 20    14 GFR est AA 
    >60 ml/min/1.73m2  >60   
GFR est non-AA 
    >60 ml/min/1.73m2  >60 Calcium 8.5 - 10.1 MG/DL  8.8   
 
 
12-lead EKG 1/7/2018 Sinus tachycardia Left axis deviation Nonspecific ST and T wave abnormality Abnormal ECG Assessment/Plan: ICD-10-CM ICD-9-CM 1. Chest pain, unspecified type R07.9 786.50 ECHO ADULT COMPLETE Has dyspnea on exertion, but also has history of severe asthma. Patient has a strong family history of HOCM (Sister and brother) Check echocardiogram 
 
Follow-up Disposition: 
Return as needed. I have discussed the diagnosis with the patient and the intended plan as seen in the above orders. The patient has received an after-visit summary and questions were answered concerning future plans. I have discussed medication side effects and warnings with the patient as well. I have reviewed the plan of care with the patient, accepted their input and they are in agreement with the treatment goals. Hannah Kauffman MD 
February 5, 2019

## 2019-02-06 NOTE — ED TRIAGE NOTES
Patient with audible wheezing using accesoory muscles. States he has been out of his meds. Was [facility] the car a [facility] waiting but came over here because he became worse
no

## 2019-02-14 ENCOUNTER — HOSPITAL ENCOUNTER (OUTPATIENT)
Dept: NON INVASIVE DIAGNOSTICS | Age: 29
Discharge: HOME | End: 2019-02-14
Attending: INTERNAL MEDICINE
Payer: MEDICAID

## 2019-02-14 VITALS
SYSTOLIC BLOOD PRESSURE: 120 MMHG | BODY MASS INDEX: 32.28 KG/M2 | WEIGHT: 213 LBS | HEIGHT: 68 IN | DIASTOLIC BLOOD PRESSURE: 85 MMHG

## 2019-02-14 DIAGNOSIS — R07.9 CHEST PAIN, UNSPECIFIED TYPE: ICD-10-CM

## 2019-02-14 PROCEDURE — 93306 TTE W/DOPPLER COMPLETE: CPT

## 2019-02-15 ENCOUNTER — TELEPHONE (OUTPATIENT)
Dept: FAMILY MEDICINE CLINIC | Age: 29
End: 2019-02-15

## 2019-02-18 ENCOUNTER — OFFICE VISIT (OUTPATIENT)
Dept: FAMILY MEDICINE CLINIC | Age: 29
End: 2019-02-18

## 2019-02-18 VITALS
TEMPERATURE: 98 F | HEART RATE: 96 BPM | DIASTOLIC BLOOD PRESSURE: 76 MMHG | BODY MASS INDEX: 32.37 KG/M2 | SYSTOLIC BLOOD PRESSURE: 115 MMHG | WEIGHT: 213.6 LBS | HEIGHT: 68 IN | RESPIRATION RATE: 20 BRPM | OXYGEN SATURATION: 92 %

## 2019-02-18 DIAGNOSIS — F41.9 ANXIETY: ICD-10-CM

## 2019-02-18 DIAGNOSIS — R00.2 PALPITATIONS: Primary | ICD-10-CM

## 2019-02-18 DIAGNOSIS — J45.50 SEVERE PERSISTENT ASTHMA WITHOUT COMPLICATION: ICD-10-CM

## 2019-02-18 RX ORDER — TRAZODONE HYDROCHLORIDE 50 MG/1
50 TABLET ORAL
Qty: 90 TAB | Refills: 0 | Status: SHIPPED | OUTPATIENT
Start: 2019-02-18

## 2019-02-18 RX ORDER — VENLAFAXINE HYDROCHLORIDE 37.5 MG/1
37.5 CAPSULE, EXTENDED RELEASE ORAL DAILY
Qty: 90 CAP | Refills: 0 | Status: SHIPPED | OUTPATIENT
Start: 2019-02-18 | End: 2020-03-04

## 2019-02-18 NOTE — PROGRESS NOTES
History of Present Illness Kaylin Huggins is a 33 y.o. male who presents today for management of 
 
Chief Complaint Patient presents with  Follow-up Palpitations Patient complains of palpitations. The symptoms are of mild, brief and intermittent severity, occuring bedtime and lasting 3 seconds per episode. Cardiac risk factors include: asthma, stress. Aggravating factors: none. Relieving factors: none. Associated signs and symptoms: has complaint(s) of mild dizziness. He has severe asthma, uses albuterol about 4-5 times per day. Problem List 
Patient Active Problem List  
 Diagnosis Date Noted  PTSD (post-traumatic stress disorder) 11/19/2018  Anxiety 11/12/2018  Severe persistent asthma without complication 78/40/2679  Leukocytosis 01/09/2018 Past Medical History Past Medical History:  
Diagnosis Date  Asthma  Chronic obstructive pulmonary disease ([facility] [address] Surgical History Past Surgical History:  
Procedure Laterality Date  HX FREE SKIN GRAFT    
 HX ORTHOPAEDIC    
 facial reconstruction Current Medications Current Outpatient Medications Medication Sig  
 traZODone (DESYREL) 50 mg tablet Take 1 Tab by mouth nightly.  venlafaxine-SR (EFFEXOR XR) 37.5 mg capsule Take 1 Cap by mouth daily.  ipratropium-albuterol (COMBIVENT RESPIMAT)  mcg/actuation inhaler Take 1 Puff by inhalation every six (6) hours as needed for Wheezing or Shortness of Breath.  omalizumab (XOLAIR) 150 mg solr 300 mg by SubCUTAneous route every fourteen (14) days.  ALPRAZolam (XANAX) 0.25 mg tablet Take 1 Tab by mouth two (2) times daily as needed for Anxiety. Max Daily Amount: 0.5 mg.  
 albuterol (PROVENTIL VENTOLIN) 2.5 mg /3 mL (0.083 %) nebulizer solution 3 mL by Nebulization route every four (4) hours as needed for Wheezing.  albuterol (VENTOLIN HFA) 90 mcg/actuation inhaler Take 2 Puffs by inhalation every four (4) hours as needed for Wheezing.  cetirizine (ZYRTEC) 10 mg tablet Take 1 Tab by mouth daily.  fluticasone (FLONASE) 50 mcg/actuation nasal spray 1 [place] by Both Nostrils route two (2) times a day.  budesonide-formoterol (SYMBICORT) 160-4.5 mcg/actuation HFAA Take 2 Puffs by inhalation two (2) times a day. No current facility-administered medications for this visit. Allergies/Drug Reactions Allergies Allergen Reactions  Penicillin G Anaphylaxis  Cat Dander Shortness of Breath Family History Family History Adopted: Yes  
Problem Relation Age of Onset  No Known Problems Mother  No Known Problems Father Social History Social History Socioeconomic History  Marital status: SINGLE Spouse name: Not on file  Number of children: Not on file  Years of education: Not on file  Highest education level: Not on file Social Needs  Financial resource strain: Not on file  Food insecurity - worry: Not on file  Food insecurity - inability: Not on file  Transportation needs - medical: Not on file  Transportation needs - non-medical: Not on file Occupational History  Not on file Tobacco Use  Smoking status: Never Smoker  Smokeless tobacco: Former User Substance and Sexual Activity  Alcohol use: Yes  Drug use: No  
 Sexual activity: Not Currently Other Topics Concern  Not on file Social History Narrative  Not on file Review of Systems Negative except as mentioned in HPI Physical Exam 
Vital signs:  
Vitals:  
 02/18/19 1506 BP: 115/76 Pulse: 96  
Resp: 20 Temp: 98 °F (36.7 °C) TempSrc: Oral  
SpO2: 92% Weight: 213 lb 9.6 oz (96.9 kg) Height: 5' 8\" (1.727 m) General: alert, oriented, not in distress Eyes: clear conjunctivae, anicteric sclerae, full and equal ROMs Chest/Lungs: clear breath sounds, no wheezing or crackles Heart: normal rate, regular rhythm, no murmur Abdomen: soft, non-distended, non-tender, normal bowel sounds, no organomegaly, no masses Extremities: no focal deformities, no edema Neuro: AAOx3, CN's grossly intact Skin: no visible abnormalities Laboratory/Tests: 
ECHOCARDIOGRAM 2/14/19 · Estimated left ventricular ejection fraction is 56 - 60%. Visually measured ejection fraction. Normal left ventricular wall motion, no regional wall motion abnormality noted. Age-appropriate left ventricular diastolic function. · Mildly elevated central venous pressure (5-10 mmHg); IVC diameter is less than 21 mm and collapses less than 50% with respiration. Component Latest Ref Rng & Units 11/13/2018 11/12/2018 11/11/2018  
 
      4:00 AM  8:20 AM  1:45 PM  
WBC 
    4.6 - 13.2 K/uL 29.0 (H) RBC 
    4.70 - 5.50 M/uL 5.56 (H) HGB 13.0 - 16.0 g/dL 15.5 HCT 
    36.0 - 48.0 % 47.7 MCV 
    74.0 - 97.0 FL 85.8 MCH 
    24.0 - 34.0 PG 27.9 MCHC 31.0 - 37.0 g/dL 32.5    
RDW 
    11.6 - 14.5 % 12.3 PLATELET 
    359 - 528 K/uL 346 MPV 
    9.2 - 11.8 FL 10.3 NEUTROPHILS 
    40 - 73 % 85 (H) LYMPHOCYTES 
    21 - 52 % 9 (L) MONOCYTES 
    3 - 10 % 6 EOSINOPHILS 
    0 - 5 % 0    
BASOPHILS 
    0 - 2 % 0    
ABS. NEUTROPHILS 
    1.8 - 8.0 K/UL 24.6 (H)    
ABS. LYMPHOCYTES 
    0.9 - 3.6 K/UL 2.6    
ABS. MONOCYTES 
    0.05 - 1.2 K/UL 1.8 (H)    
ABS. EOSINOPHILS 
    0.0 - 0.4 K/UL 0.0    
ABS. BASOPHILS 
    0.0 - 0.1 K/UL 0.0    
DF AUTOMATED Sodium 136 - 145 mmol/L  141 140 Potassium 3.5 - 5.5 mmol/L  4.3 3.9 Chloride 100 - 108 mmol/L  110 (H) 108 CO2 
    21 - 32 mmol/L  25 26 Anion gap 3.0 - 18 mmol/L  6 6 Glucose 74 - 99 mg/dL  128 (H) 116 (H) BUN 
    7.0 - 18 MG/DL  12 9 Creatinine 
    0.6 - 1.3 MG/DL  0.87 0.97  
BUN/Creatinine ratio 12 - 20    14 9 (L) GFR est AA 
    >60 ml/min/1.73m2  >60 >60  
GFR est non-AA >60 ml/min/1.73m2  >60 >60 Calcium 8.5 - 10.1 MG/DL  8.8 8.8 Bilirubin, total 
    0.2 - 1.0 MG/DL   0.5 ALT (SGPT) 16 - 61 U/L   45 AST 
    15 - 37 U/L   24 Alk. phosphatase 45 - 117 U/L   115 Protein, total 
    6.4 - 8.2 g/dL   6.7 Albumin 3.4 - 5.0 g/dL   3.1 (L) Globulin 2.0 - 4.0 g/dL   3.6 A-G Ratio 
    0.8 - 1.7     0.9 Assessment/Plan: 1. Palpitations 
- monitor 2. Severe persistent asthma without complication 
- continue current meds - pulmonology follow-up 3. Anxiety - Patient has not gotten to B yet 
- provided a list of psychiatry facilities and therapists Follow-up Disposition: Not on File I have discussed the diagnosis with the patient and the intended plan as seen in the above orders. The patient has received an after-visit summary and questions were answered concerning future plans. I have discussed medication side effects and warnings with the patient as well. I have reviewed the plan of care with the patient, accepted their input and they are in agreement with the treatment goals. Modesta Askew MD 
February 18, 2019

## 2019-03-05 ENCOUNTER — CLINICAL SUPPORT (OUTPATIENT)
Dept: PULMONOLOGY | Facility: CLINIC | Age: 29
End: 2019-03-05

## 2019-03-05 DIAGNOSIS — J45.50 SEVERE PERSISTENT ASTHMA WITHOUT COMPLICATION: Primary | ICD-10-CM

## 2019-03-05 NOTE — PROGRESS NOTES
Dk Pavon is here for Xolair injection. Injected subcutaneous : left in upper arm.  right in upper arm. Lot Number:   Exp Date: 04/30/2022  Total Xolair Dose: 300mg  Epipen is with patient. Patient observed for 15 minutes. No reaction at injection site.

## 2019-03-11 ENCOUNTER — TELEPHONE (OUTPATIENT)
Dept: FAMILY MEDICINE CLINIC | Age: 29
End: 2019-03-11

## 2019-03-11 DIAGNOSIS — F43.10 PTSD (POST-TRAUMATIC STRESS DISORDER): ICD-10-CM

## 2019-03-11 DIAGNOSIS — F41.9 ANXIETY: Primary | ICD-10-CM

## 2019-03-12 ENCOUNTER — TELEPHONE (OUTPATIENT)
Dept: FAMILY MEDICINE CLINIC | Age: 29
End: 2019-03-12

## 2019-03-12 NOTE — TELEPHONE ENCOUNTER
Attempted to notify patient of appt information for [facility], appt scheduled for 04/23/2019 arrive 230 pm scheduled for 3 pm. Please notify pt on return call.

## 2019-03-12 NOTE — PROGRESS NOTES
Lily Jon is here for Xolair injection.    Injected subcutaneous : 150mg in left upper arm, and 150mg in right upper arm

## 2019-03-12 NOTE — PROGRESS NOTES
Chief Complaint   Patient presents with    Injection     Barbara Cavazos is here for Xolair injection.    Injected subcutaneous : 150mg in right upper arm, 150mg in left upper arm

## 2019-03-29 ENCOUNTER — CLINICAL SUPPORT (OUTPATIENT)
Dept: PULMONOLOGY | Facility: CLINIC | Age: 29
End: 2019-03-29

## 2019-04-12 ENCOUNTER — OFFICE VISIT (OUTPATIENT)
Dept: PULMONOLOGY | Facility: CLINIC | Age: 29
End: 2019-04-12

## 2019-04-12 VITALS
DIASTOLIC BLOOD PRESSURE: 92 MMHG | HEIGHT: 68 IN | BODY MASS INDEX: 32.4 KG/M2 | OXYGEN SATURATION: 96 % | HEART RATE: 126 BPM | SYSTOLIC BLOOD PRESSURE: 122 MMHG | WEIGHT: 213.8 LBS

## 2019-04-12 DIAGNOSIS — J45.50 SEVERE PERSISTENT ASTHMA WITHOUT COMPLICATION: ICD-10-CM

## 2019-04-12 RX ORDER — BUDESONIDE AND FORMOTEROL FUMARATE DIHYDRATE 160; 4.5 UG/1; UG/1
2 AEROSOL RESPIRATORY (INHALATION) 2 TIMES DAILY
Qty: 1 INHALER | Refills: 10 | Status: SHIPPED | OUTPATIENT
Start: 2019-04-12 | End: 2020-06-11

## 2019-04-12 RX ORDER — CETIRIZINE HCL 10 MG
10 TABLET ORAL DAILY
Qty: 90 TAB | Refills: 3 | Status: SHIPPED | OUTPATIENT
Start: 2019-04-12

## 2019-04-12 RX ORDER — ALBUTEROL SULFATE 0.83 MG/ML
2.5 SOLUTION RESPIRATORY (INHALATION)
Qty: 75 EACH | Refills: 3 | OUTPATIENT
Start: 2019-04-12 | End: 2020-03-03

## 2019-04-12 RX ORDER — ALBUTEROL SULFATE 90 UG/1
2 AEROSOL, METERED RESPIRATORY (INHALATION)
Qty: 3 INHALER | Refills: 3 | Status: SHIPPED | OUTPATIENT
Start: 2019-04-12 | End: 2019-12-29

## 2019-04-12 NOTE — PROGRESS NOTES
[facility]  Pulmonary, Critical Care, and Sleep Medicine       Pulmonary Office Progress Notes        Subjective:      Patient presents for follow-up on severe persistent asthma    The patient's last admission to the hospital for an exacerbation of asthma was in October 2018. Since then he has had only 2 courses of prednisone. Prior to starting Xolair, he was in the emergency room or hospitalized every month, if not more often. He denies any regular cough. He does wheeze. He uses his rescue inhalers 4-6 times a day. Nights are worse than during the day. He is compliant with Symbicort in addition to Xolair. His allergen exposure has decreased in his apartment. Professional exterminators eliminated all pest problems short of one very resourceful mouse. He has seasonal allergies. They have been bad in part because of the high pollen counts. He is not taking Flonase or an oral antihistamine. Walking up a flight of stairs is less difficult than before Xolair. When he goes grocery shopping sometimes he still needs to lean on a cart, but this has improved. Objective:     He is alert, oriented and in no respiratory distress at rest normal affect  Blood pressure (!) 122/92, pulse (!) 126, height 5' 8\" (1.727 m), weight 97 kg (213 lb 12.8 oz), SpO2 96 %. Sclera are anicteric. Gaze is conjugate. No jugular venous distention  Lungs are slightly coarse but there are no wheezes  Heart has a regular rate and rhythm  No edema, cyanosis or clubbing  No changes of the hands or wrists suggesting inflammatory arthritis. No facial rash    Assessment  Severe persistent asthma with decreased number of exacerbations, ER visits and hospitalizations. Plan:  Continue albuterol, Combivent and Symbicort  Continue Xolair  Zyrtec  6-month return to clinic    Arguably the patient could benefit from more aggressive controlling medications.   At this point his disease control is much improved and we will modify his current regimen in another 6 months should he remain so well controlled.

## 2019-04-12 NOTE — PROGRESS NOTES
Chief Complaint   Patient presents with    Asthma     patient is here today to follow up on his asthma. he just had to use his nebulizer prior to arriving for todays visit around 11am.    Injection     xolair     1. Have you been to the ER, urgent care clinic since your last visit? Hospitalized since your last visit? No    2. Have you seen or consulted any other health care providers outside of the [address] [place] [address] since your last visit? Include any pap smears or colon screening.  No

## 2019-05-03 ENCOUNTER — CLINICAL SUPPORT (OUTPATIENT)
Dept: PULMONOLOGY | Facility: CLINIC | Age: 29
End: 2019-05-03

## 2019-05-03 DIAGNOSIS — J45.50 SEVERE PERSISTENT ASTHMA WITHOUT COMPLICATION: Primary | ICD-10-CM

## 2019-06-03 ENCOUNTER — CLINICAL SUPPORT (OUTPATIENT)
Dept: PULMONOLOGY | Facility: CLINIC | Age: 29
End: 2019-06-03

## 2019-06-03 DIAGNOSIS — J45.50 SEVERE PERSISTENT ASTHMA WITHOUT COMPLICATION: Primary | ICD-10-CM

## 2019-06-04 DIAGNOSIS — J45.50 SEVERE PERSISTENT ASTHMA WITHOUT COMPLICATION: ICD-10-CM

## 2019-06-05 RX ORDER — ALBUTEROL SULFATE 90 UG/1
AEROSOL, METERED RESPIRATORY (INHALATION)
Qty: 3 INHALER | Refills: 2 | OUTPATIENT
Start: 2019-06-05 | End: 2019-12-29

## 2019-09-10 ENCOUNTER — TELEPHONE (OUTPATIENT)
Dept: PULMONOLOGY | Age: 29
End: 2019-09-10

## 2019-09-10 NOTE — TELEPHONE ENCOUNTER
Pt states he wishes to begin getting his xolair at or [place] office. Recent Dr. Jose Alejandro aMrtin pt. Please advise [phone].

## 2019-09-10 NOTE — TELEPHONE ENCOUNTER
Left message. Pt needs to call our office and make appt to get established with one of our doctors and to re start [address]. Pt last had Xolair beginning of June.

## 2019-10-03 ENCOUNTER — HOSPITAL ENCOUNTER (EMERGENCY)
Age: 29
Discharge: HOME | End: 2019-10-03
Attending: EMERGENCY MEDICINE
Payer: MEDICAID

## 2019-10-03 ENCOUNTER — APPOINTMENT (OUTPATIENT)
Dept: GENERAL RADIOLOGY | Age: 29
End: 2019-10-03
Attending: EMERGENCY MEDICINE
Payer: MEDICAID

## 2019-10-03 VITALS
BODY MASS INDEX: 30.31 KG/M2 | HEART RATE: 96 BPM | WEIGHT: 200 LBS | TEMPERATURE: 98.1 F | RESPIRATION RATE: 18 BRPM | OXYGEN SATURATION: 95 % | HEIGHT: 68 IN | DIASTOLIC BLOOD PRESSURE: 80 MMHG | SYSTOLIC BLOOD PRESSURE: 121 MMHG

## 2019-10-03 DIAGNOSIS — K22.2 ESOPHAGEAL OBSTRUCTION DUE TO FOOD IMPACTION: Primary | ICD-10-CM

## 2019-10-03 DIAGNOSIS — T18.128A ESOPHAGEAL OBSTRUCTION DUE TO FOOD IMPACTION: Primary | ICD-10-CM

## 2019-10-03 LAB
ALBUMIN SERPL-MCNC: 2.8 G/DL (ref 3.4–5)
ALBUMIN/GLOB SERPL: 0.7 {RATIO} (ref 0.8–1.7)
ALP SERPL-CCNC: 95 U/L (ref 45–117)
ALT SERPL-CCNC: 26 U/L (ref 16–61)
ANION GAP SERPL CALC-SCNC: 7 MMOL/L (ref 3–18)
AST SERPL-CCNC: 12 U/L (ref 10–38)
BASOPHILS # BLD: 0.1 K/UL (ref 0–0.1)
BASOPHILS NFR BLD: 0 % (ref 0–2)
BILIRUB SERPL-MCNC: 0.2 MG/DL (ref 0.2–1)
BUN SERPL-MCNC: 12 MG/DL (ref 7–18)
BUN/CREAT SERPL: 10 (ref 12–20)
CALCIUM SERPL-MCNC: 8.8 MG/DL (ref 8.5–10.1)
CHLORIDE SERPL-SCNC: 105 MMOL/L (ref 100–111)
CO2 SERPL-SCNC: 28 MMOL/L (ref 21–32)
CREAT SERPL-MCNC: 1.21 MG/DL (ref 0.6–1.3)
DIFFERENTIAL METHOD BLD: ABNORMAL
EOSINOPHIL # BLD: 0.9 K/UL (ref 0–0.4)
EOSINOPHIL NFR BLD: 5 % (ref 0–5)
ERYTHROCYTE [DISTWIDTH] IN BLOOD BY AUTOMATED COUNT: 12.8 % (ref 11.6–14.5)
GLOBULIN SER CALC-MCNC: 4.1 G/DL (ref 2–4)
GLUCOSE SERPL-MCNC: 93 MG/DL (ref 74–99)
HCT VFR BLD AUTO: 47.1 % (ref 36–48)
HGB BLD-MCNC: 15.7 G/DL (ref 13–16)
LYMPHOCYTES # BLD: 3.2 K/UL (ref 0.9–3.6)
LYMPHOCYTES NFR BLD: 16 % (ref 21–52)
MCH RBC QN AUTO: 28.4 PG (ref 24–34)
MCHC RBC AUTO-ENTMCNC: 33.3 G/DL (ref 31–37)
MCV RBC AUTO: 85.3 FL (ref 74–97)
MONOCYTES # BLD: 1.3 K/UL (ref 0.05–1.2)
MONOCYTES NFR BLD: 6 % (ref 3–10)
NEUTS SEG # BLD: 14.4 K/UL (ref 1.8–8)
NEUTS SEG NFR BLD: 73 % (ref 40–73)
PLATELET # BLD AUTO: 354 K/UL (ref 135–420)
PMV BLD AUTO: 8.8 FL (ref 9.2–11.8)
POTASSIUM SERPL-SCNC: 4.1 MMOL/L (ref 3.5–5.5)
PROT SERPL-MCNC: 6.9 G/DL (ref 6.4–8.2)
RBC # BLD AUTO: 5.52 M/UL (ref 4.7–5.5)
SODIUM SERPL-SCNC: 140 MMOL/L (ref 136–145)
WBC # BLD AUTO: 19.8 K/UL (ref 4.6–13.2)

## 2019-10-03 PROCEDURE — 80053 COMPREHEN METABOLIC PANEL: CPT

## 2019-10-03 PROCEDURE — 99283 EMERGENCY DEPT VISIT LOW MDM: CPT

## 2019-10-03 PROCEDURE — 71045 X-RAY EXAM CHEST 1 VIEW: CPT

## 2019-10-03 PROCEDURE — 96374 THER/PROPH/DIAG INJ IV PUSH: CPT

## 2019-10-03 PROCEDURE — 85025 COMPLETE CBC W/AUTO DIFF WBC: CPT

## 2019-10-03 PROCEDURE — 96375 TX/PRO/DX INJ NEW DRUG ADDON: CPT

## 2019-10-03 PROCEDURE — 74011250636 HC RX REV CODE- 250/636: Performed by: EMERGENCY MEDICINE

## 2019-10-03 RX ORDER — METOCLOPRAMIDE HYDROCHLORIDE 5 MG/ML
10 INJECTION INTRAMUSCULAR; INTRAVENOUS
Status: DISCONTINUED | OUTPATIENT
Start: 2019-10-03 | End: 2019-10-03

## 2019-10-03 RX ORDER — LORAZEPAM 2 MG/ML
1 INJECTION INTRAMUSCULAR
Status: DISCONTINUED | OUTPATIENT
Start: 2019-10-03 | End: 2019-10-03

## 2019-10-03 RX ORDER — DIPHENHYDRAMINE HYDROCHLORIDE 50 MG/ML
25 INJECTION, SOLUTION INTRAMUSCULAR; INTRAVENOUS
Status: COMPLETED | OUTPATIENT
Start: 2019-10-03 | End: 2019-10-03

## 2019-10-03 RX ORDER — OMEPRAZOLE 20 MG/1
20 CAPSULE, DELAYED RELEASE ORAL DAILY
Qty: 30 CAP | Refills: 0 | Status: SHIPPED | OUTPATIENT
Start: 2019-10-03 | End: 2020-03-04

## 2019-10-03 RX ORDER — METOCLOPRAMIDE HYDROCHLORIDE 5 MG/ML
10 INJECTION INTRAMUSCULAR; INTRAVENOUS
Status: COMPLETED | OUTPATIENT
Start: 2019-10-03 | End: 2019-10-03

## 2019-10-03 RX ORDER — LORAZEPAM 2 MG/ML
0.5 INJECTION INTRAMUSCULAR
Status: COMPLETED | OUTPATIENT
Start: 2019-10-03 | End: 2019-10-03

## 2019-10-03 RX ORDER — IPRATROPIUM BROMIDE AND ALBUTEROL SULFATE 2.5; .5 MG/3ML; MG/3ML
3 SOLUTION RESPIRATORY (INHALATION)
Status: DISCONTINUED | OUTPATIENT
Start: 2019-10-03 | End: 2019-10-03

## 2019-10-03 RX ADMIN — GLUCAGON HYDROCHLORIDE 1 MG: KIT at 20:06

## 2019-10-03 RX ADMIN — METOCLOPRAMIDE 10 MG: 5 INJECTION, SOLUTION INTRAMUSCULAR; INTRAVENOUS at 20:11

## 2019-10-03 RX ADMIN — DIPHENHYDRAMINE HYDROCHLORIDE 25 MG: 50 INJECTION, SOLUTION INTRAMUSCULAR; INTRAVENOUS at 20:12

## 2019-10-03 RX ADMIN — LORAZEPAM 0.5 MG: 2 INJECTION, SOLUTION INTRAMUSCULAR; INTRAVENOUS at 20:10

## 2019-10-03 NOTE — ED TRIAGE NOTES
Piece of steak caught in throat. Patient has been trying to clear for approx 30 minutes. Unable to swallow. Spitting secretions in triage.

## 2019-10-03 NOTE — ED PROVIDER NOTES
Santi Domínguez is a 30 y.o. Male with history of asthma with complaints of food impaction after eating steak and feels like it stuck in his upper esophagus. Patient has been spitting up from this but not able to pass the food. This happened before but he is never had to get an EGD done before. Symptoms are constant. Worse with swallowing. The history is provided by the patient. Past Medical History:   Diagnosis Date    Asthma     Chronic obstructive pulmonary disease (HCC)        Past Surgical History:   Procedure Laterality Date    HX FREE SKIN GRAFT      HX ORTHOPAEDIC      facial reconstruction         Family History:   Adopted: Yes   Problem Relation Age of Onset    No Known Problems Mother     No Known Problems Father        Social History     Socioeconomic History    Marital status: SINGLE     Spouse name: Not on file    Number of children: Not on file    Years of education: Not on file    Highest education level: Not on file   Occupational History    Not on file   Social Needs    Financial resource strain: Not on file    Food insecurity:     Worry: Not on file     Inability: Not on file    Transportation needs:     Medical: Not on file     Non-medical: Not on file   Tobacco Use    Smoking status: Never Smoker    Smokeless tobacco: Former User   Substance and Sexual Activity    Alcohol use:  Yes    Drug use: No    Sexual activity: Not Currently   Lifestyle    Physical activity:     Days per week: Not on file     Minutes per session: Not on file    Stress: Not on file   Relationships    Social connections:     Talks on phone: Not on file     Gets together: Not on file     Attends [employer] service: Not on file     Active member of club or organization: Not on file     Attends meetings of clubs or organizations: Not on file     Relationship status: Not on file    Intimate partner violence:     Fear of current or ex partner: Not on file     Emotionally abused: Not on file     Physically abused: Not on file     Forced sexual activity: Not on file   Other Topics Concern    Not on file   Social History Narrative    Not on file         ALLERGIES: Penicillin g and Cat dander    Review of Systems   Constitutional: Negative for fever. HENT: Positive for trouble swallowing. Negative for sore throat. Eyes: Negative for visual disturbance. Respiratory: Positive for shortness of breath. Cardiovascular: Negative for chest pain. Gastrointestinal: Negative for abdominal pain. Genitourinary: Negative for difficulty urinating. Musculoskeletal: Negative for gait problem. Skin: Negative for rash. Allergic/Immunologic: Negative for immunocompromised state. Neurological: Negative for syncope. Psychiatric/Behavioral: Positive for sleep disturbance. Vitals:    10/03/19 1950   BP: (!) 127/92   Pulse: (!) 111   Resp: 18   Temp: 98.1 °F (36.7 °C)   SpO2: 96%   Weight: 90.7 kg (200 lb)   Height: 5' 8\" (1.727 m)            Physical Exam   Constitutional: He is oriented to person, place, and time. He appears well-developed and well-nourished. Non-toxic appearance. He does not appear ill. He appears distressed. HENT:   Head: Normocephalic and atraumatic. Right Ear: External ear normal.   Left Ear: External ear normal.   Nose: Nose normal.   Mouth/Throat: Oropharynx is clear and moist. No oropharyngeal exudate. Eyes: Conjunctivae are normal.   Neck: Normal range of motion. Cardiovascular: Normal rate, regular rhythm, normal heart sounds and intact distal pulses. Pulmonary/Chest: Effort normal and breath sounds normal. No stridor. No respiratory distress. Abdominal: Soft. There is no tenderness. Musculoskeletal: Normal range of motion. He exhibits no edema. Neurological: He is alert and oriented to person, place, and time. Skin: Skin is warm and dry. Capillary refill takes less than 2 seconds. He is not diaphoretic. Psychiatric: His mood appears anxious. Nursing note and vitals reviewed. MDM       Procedures  Vitals:  Patient Vitals for the past 12 hrs:   Temp Pulse Resp BP SpO2   10/03/19 1950 98.1 °F (36.7 °C) (!) 111 18 (!) 127/92 96 %         Medications ordered:   Medications   sodium chloride 0.9 % bolus infusion 1,000 mL (1,000 mL IntraVENous New Bag 10/3/19 2019)   metoclopramide HCl (REGLAN) injection 10 mg (10 mg IntraVENous Given 10/3/19 2011)   diphenhydrAMINE (BENADRYL) injection 25 mg (25 mg IntraVENous Given 10/3/19 2012)   LORazepam (ATIVAN) injection 0.5 mg (0.5 mg IntraVENous Given 10/3/19 2010)   glucagon (GLUCAGEN) injection 1 mg (1 mg IntraVENous Given 10/3/19 2006)         Lab findings:  Recent Results (from the past 12 hour(s))   CBC WITH AUTOMATED DIFF    Collection Time: 10/03/19  8:07 PM   Result Value Ref Range    WBC 19.8 (H) 4.6 - 13.2 K/uL    RBC 5.52 (H) 4.70 - 5.50 M/uL    HGB 15.7 13.0 - 16.0 g/dL    HCT 47.1 36.0 - 48.0 %    MCV 85.3 74.0 - 97.0 FL    MCH 28.4 24.0 - 34.0 PG    MCHC 33.3 31.0 - 37.0 g/dL    RDW 12.8 11.6 - 14.5 %    PLATELET 974 806 - 931 K/uL    MPV 8.8 (L) 9.2 - 11.8 FL    NEUTROPHILS 73 40 - 73 %    LYMPHOCYTES 16 (L) 21 - 52 %    MONOCYTES 6 3 - 10 %    EOSINOPHILS 5 0 - 5 %    BASOPHILS 0 0 - 2 %    ABS. NEUTROPHILS 14.4 (H) 1.8 - 8.0 K/UL    ABS. LYMPHOCYTES 3.2 0.9 - 3.6 K/UL    ABS. MONOCYTES 1.3 (H) 0.05 - 1.2 K/UL    ABS. EOSINOPHILS 0.9 (H) 0.0 - 0.4 K/UL    ABS.  BASOPHILS 0.1 0.0 - 0.1 K/UL    DF AUTOMATED     METABOLIC PANEL, COMPREHENSIVE    Collection Time: 10/03/19  8:07 PM   Result Value Ref Range    Sodium 140 136 - 145 mmol/L    Potassium 4.1 3.5 - 5.5 mmol/L    Chloride 105 100 - 111 mmol/L    CO2 28 21 - 32 mmol/L    Anion gap 7 3.0 - 18 mmol/L    Glucose 93 74 - 99 mg/dL    BUN 12 7.0 - 18 MG/DL    Creatinine 1.21 0.6 - 1.3 MG/DL    BUN/Creatinine ratio 10 (L) 12 - 20      GFR est AA >60 >60 ml/min/1.73m2    GFR est non-AA >60 >60 ml/min/1.73m2    Calcium 8.8 8.5 - 10.1 MG/DL Bilirubin, total 0.2 0.2 - 1.0 MG/DL    ALT (SGPT) 26 16 - 61 U/L    AST (SGOT) 12 10 - 38 U/L    Alk. phosphatase 95 45 - 117 U/L    Protein, total 6.9 6.4 - 8.2 g/dL    Albumin 2.8 (L) 3.4 - 5.0 g/dL    Globulin 4.1 (H) 2.0 - 4.0 g/dL    A-G Ratio 0.7 (L) 0.8 - 1.7         EKG interpretation by ED Physician:      X-Ray, CT or other radiology findings or impressions:  XR CHEST PORT    (Results Pending)   No acute process per my interpretation    Progress notes, Consult notes or additional Procedure notes:   Medications do not help relieve any obstruction. Patient still coughing up clear liquids. \  D/w dr Pealr Oh on-call for GI who will come in to do EGD    9:11 PM  Patient was able to clear impaction was able to tolerate liquids now without difficulty  Discussed with GI again who states patient is to follow back up in the office. Will not need emergent EGD tonight    I have discussed with patient and/or family/sig other the results, interpretation of any imaging if performed, suspected diagnosis and treatment plan to include instructions regarding the diagnoses listed to which understanding was expressed with all questions answered      Reevaluation of patient:   stable    Disposition:  Diagnosis:   1. Esophageal obstruction due to food impaction        Disposition: gi lab    Follow-up Information     Follow up With Specialties Details Why Contact Info    Gloria Parker MD Gastroenterology, Internal Medicine Schedule an appointment as soon as possible for a visit  [address] [place] [id number]  [phone]      [place] [address] DEPT Emergency Medicine  If symptoms worsen [address] [facility] [address].  [phone]            Patient's Medications   Start Taking    OMEPRAZOLE (PRILOSEC) 20 MG CAPSULE    Take 1 Cap by mouth daily.    Continue Taking    ALBUTEROL (PROVENTIL HFA, VENTOLIN HFA, PROAIR HFA) 90 MCG/ACTUATION INHALER    INHALE 2 PUFFS BY MOUTH EVERY 4 HOURS AS NEEDED FOR WHEEZING    ALBUTEROL (PROVENTIL VENTOLIN) 2.5 MG /3 ML (0.083 %) NEBULIZER SOLUTION    3 mL by Nebulization route every four (4) hours as needed for Wheezing. ALBUTEROL (VENTOLIN HFA) 90 MCG/ACTUATION INHALER    Take 2 Puffs by inhalation every four (4) hours as needed for Wheezing. ALPRAZOLAM (XANAX) 0.25 MG TABLET    Take 1 Tab by mouth two (2) times daily as needed for Anxiety. Max Daily Amount: 0.5 mg.    BUDESONIDE-FORMOTEROL (SYMBICORT) 160-4.5 MCG/ACTUATION HFAA    Take 2 Puffs by inhalation two (2) times a day. CETIRIZINE (ZYRTEC) 10 MG TABLET    Take 1 Tab by mouth daily. CETIRIZINE (ZYRTEC) 10 MG TABLET    Take 1 Tab by mouth daily. FLUTICASONE (FLONASE) 50 MCG/ACTUATION NASAL SPRAY    1 [place] by Both Nostrils route two (2) times a day. IPRATROPIUM-ALBUTEROL (COMBIVENT RESPIMAT)  MCG/ACTUATION INHALER    Take 1 Puff by inhalation every six (6) hours as needed for Wheezing or Shortness of Breath. OMALIZUMAB (XOLAIR) 150 MG SOLR    300 mg by SubCUTAneous route every fourteen (14) days. TRAZODONE (DESYREL) 50 MG TABLET    Take 1 Tab by mouth nightly. VENLAFAXINE-SR (EFFEXOR XR) 37.5 MG CAPSULE    Take 1 Cap by mouth daily.    These Medications have changed    No medications on file   Stop Taking    No medications on file

## 2019-10-04 NOTE — ED NOTES
IV medication administered as ordered, tolerated well by pt, pt reported immediate relief. 5 minutes after medication administered, pt reports feeling of something stuck in throat again. MD aware. GI consult obtained.

## 2019-10-04 NOTE — ED NOTES
Pt reports feeling \"pop\" in throat, he believes foreign body is no longer stuck in throat, MD notified, PO challenge performed. Pt able to tolerate PO fluids well, no choking or difficulty swallowing noted at this time. Will continue to monitor.

## 2019-10-04 NOTE — ED NOTES
Assumed care of patient. Patient c/o choking, reports eating steak that got stuck in his throat, pt still feels something in throat. Currently vomiting. PIV established in R[facility]. Pt started on cardiac monitor.

## 2019-10-04 NOTE — DISCHARGE INSTRUCTIONS
Clear liquids for next 24 hours  Avoid steak, chicken other dense meats until you are seen by the GI specialist

## 2019-10-04 NOTE — ED NOTES
I have reviewed discharge instructions with the patient. The patient verbalized understanding. Pt ambulatory to ED lobby with no assist, steady gait noted. Pt stable in no distress at time of discharge.

## 2019-12-29 ENCOUNTER — APPOINTMENT (OUTPATIENT)
Dept: GENERAL RADIOLOGY | Age: 29
End: 2019-12-29
Attending: PHYSICIAN ASSISTANT
Payer: MEDICAID

## 2019-12-29 ENCOUNTER — HOSPITAL ENCOUNTER (EMERGENCY)
Age: 29
Discharge: HOME | End: 2019-12-29
Attending: EMERGENCY MEDICINE
Payer: MEDICAID

## 2019-12-29 VITALS
TEMPERATURE: 98 F | DIASTOLIC BLOOD PRESSURE: 62 MMHG | OXYGEN SATURATION: 100 % | RESPIRATION RATE: 22 BRPM | HEIGHT: 68 IN | HEART RATE: 110 BPM | BODY MASS INDEX: 30.31 KG/M2 | WEIGHT: 200 LBS | SYSTOLIC BLOOD PRESSURE: 116 MMHG

## 2019-12-29 DIAGNOSIS — J45.52 SEVERE PERSISTENT ASTHMA WITH STATUS ASTHMATICUS: ICD-10-CM

## 2019-12-29 DIAGNOSIS — J45.50 SEVERE PERSISTENT ASTHMA WITHOUT COMPLICATION: ICD-10-CM

## 2019-12-29 DIAGNOSIS — R55 SYNCOPE AND COLLAPSE: ICD-10-CM

## 2019-12-29 DIAGNOSIS — R06.03 RESPIRATORY DISTRESS: Primary | ICD-10-CM

## 2019-12-29 LAB
ALBUMIN SERPL-MCNC: 2.4 G/DL (ref 3.4–5)
ALBUMIN/GLOB SERPL: 0.7 {RATIO} (ref 0.8–1.7)
ALP SERPL-CCNC: 83 U/L (ref 45–117)
ALT SERPL-CCNC: 26 U/L (ref 16–61)
ANION GAP SERPL CALC-SCNC: 6 MMOL/L (ref 3–18)
AST SERPL-CCNC: 17 U/L (ref 10–38)
ATRIAL RATE: 117 BPM
BASOPHILS # BLD: 0.1 K/UL (ref 0–0.1)
BASOPHILS NFR BLD: 0 % (ref 0–2)
BILIRUB SERPL-MCNC: 0.2 MG/DL (ref 0.2–1)
BUN SERPL-MCNC: 10 MG/DL (ref 7–18)
BUN/CREAT SERPL: 10 (ref 12–20)
CALCIUM SERPL-MCNC: 9.4 MG/DL (ref 8.5–10.1)
CALCULATED P AXIS, ECG09: 118 DEGREES
CALCULATED R AXIS, ECG10: -90 DEGREES
CALCULATED T AXIS, ECG11: 141 DEGREES
CHLORIDE SERPL-SCNC: 110 MMOL/L (ref 100–111)
CK MB CFR SERPL CALC: 3.6 % (ref 0–4)
CK MB SERPL-MCNC: 3.3 NG/ML (ref 5–25)
CK SERPL-CCNC: 91 U/L (ref 39–308)
CO2 SERPL-SCNC: 24 MMOL/L (ref 21–32)
CREAT SERPL-MCNC: 1.02 MG/DL (ref 0.6–1.3)
DIAGNOSIS, 93000: NORMAL
DIFFERENTIAL METHOD BLD: ABNORMAL
EOSINOPHIL # BLD: 0.4 K/UL (ref 0–0.4)
EOSINOPHIL NFR BLD: 2 % (ref 0–5)
ERYTHROCYTE [DISTWIDTH] IN BLOOD BY AUTOMATED COUNT: 12.8 % (ref 11.6–14.5)
GLOBULIN SER CALC-MCNC: 3.4 G/DL (ref 2–4)
GLUCOSE SERPL-MCNC: 118 MG/DL (ref 74–99)
HCT VFR BLD AUTO: 44.9 % (ref 36–48)
HGB BLD-MCNC: 14.7 G/DL (ref 13–16)
LYMPHOCYTES # BLD: 2 K/UL (ref 0.9–3.6)
LYMPHOCYTES NFR BLD: 11 % (ref 21–52)
MCH RBC QN AUTO: 28.4 PG (ref 24–34)
MCHC RBC AUTO-ENTMCNC: 32.7 G/DL (ref 31–37)
MCV RBC AUTO: 86.7 FL (ref 74–97)
MONOCYTES # BLD: 1.6 K/UL (ref 0.05–1.2)
MONOCYTES NFR BLD: 8 % (ref 3–10)
NEUTS SEG # BLD: 15.1 K/UL (ref 1.8–8)
NEUTS SEG NFR BLD: 79 % (ref 40–73)
P-R INTERVAL, ECG05: 140 MS
PLATELET # BLD AUTO: 274 K/UL (ref 135–420)
PMV BLD AUTO: 9 FL (ref 9.2–11.8)
POTASSIUM SERPL-SCNC: 3.5 MMOL/L (ref 3.5–5.5)
PROT SERPL-MCNC: 5.8 G/DL (ref 6.4–8.2)
Q-T INTERVAL, ECG07: 338 MS
QRS DURATION, ECG06: 78 MS
QTC CALCULATION (BEZET), ECG08: 471 MS
RBC # BLD AUTO: 5.18 M/UL (ref 4.7–5.5)
SODIUM SERPL-SCNC: 140 MMOL/L (ref 136–145)
TROPONIN I SERPL-MCNC: <0.02 NG/ML (ref 0–0.04)
VENTRICULAR RATE, ECG03: 117 BPM
WBC # BLD AUTO: 19.1 K/UL (ref 4.6–13.2)

## 2019-12-29 PROCEDURE — 80053 COMPREHEN METABOLIC PANEL: CPT

## 2019-12-29 PROCEDURE — 82550 ASSAY OF CK (CPK): CPT

## 2019-12-29 PROCEDURE — 94640 AIRWAY INHALATION TREATMENT: CPT

## 2019-12-29 PROCEDURE — 85025 COMPLETE CBC W/AUTO DIFF WBC: CPT

## 2019-12-29 PROCEDURE — 71045 X-RAY EXAM CHEST 1 VIEW: CPT

## 2019-12-29 PROCEDURE — 77030029684 HC NEB SM VOL KT MONA -A

## 2019-12-29 PROCEDURE — 99284 EMERGENCY DEPT VISIT MOD MDM: CPT

## 2019-12-29 PROCEDURE — 74011000250 HC RX REV CODE- 250: Performed by: PHYSICIAN ASSISTANT

## 2019-12-29 PROCEDURE — 74011000250 HC RX REV CODE- 250: Performed by: EMERGENCY MEDICINE

## 2019-12-29 PROCEDURE — 96374 THER/PROPH/DIAG INJ IV PUSH: CPT

## 2019-12-29 PROCEDURE — 74011250636 HC RX REV CODE- 250/636: Performed by: PHYSICIAN ASSISTANT

## 2019-12-29 PROCEDURE — 93005 ELECTROCARDIOGRAM TRACING: CPT

## 2019-12-29 RX ORDER — PREDNISONE 50 MG/1
50 TABLET ORAL DAILY
Qty: 5 TAB | Refills: 0 | Status: SHIPPED | OUTPATIENT
Start: 2019-12-29 | End: 2020-01-03

## 2019-12-29 RX ORDER — IPRATROPIUM BROMIDE AND ALBUTEROL SULFATE 2.5; .5 MG/3ML; MG/3ML
3 SOLUTION RESPIRATORY (INHALATION) ONCE
Status: COMPLETED | OUTPATIENT
Start: 2019-12-29 | End: 2019-12-29

## 2019-12-29 RX ORDER — ALBUTEROL SULFATE 90 UG/1
2 AEROSOL, METERED RESPIRATORY (INHALATION)
Qty: 1 INHALER | Refills: 0 | OUTPATIENT
Start: 2019-12-29 | End: 2020-03-03

## 2019-12-29 RX ORDER — OXYMETAZOLINE HCL 0.05 %
2 SPRAY, NON-AEROSOL (ML) NASAL 2 TIMES DAILY
Qty: 1 EACH | Refills: 0 | Status: SHIPPED | OUTPATIENT
Start: 2019-12-29 | End: 2020-01-01

## 2019-12-29 RX ORDER — IPRATROPIUM BROMIDE AND ALBUTEROL SULFATE 2.5; .5 MG/3ML; MG/3ML
3 SOLUTION RESPIRATORY (INHALATION)
Status: COMPLETED | OUTPATIENT
Start: 2019-12-29 | End: 2019-12-29

## 2019-12-29 RX ADMIN — IPRATROPIUM BROMIDE AND ALBUTEROL SULFATE 3 ML: .5; 3 SOLUTION RESPIRATORY (INHALATION) at 12:40

## 2019-12-29 RX ADMIN — IPRATROPIUM BROMIDE AND ALBUTEROL SULFATE 3 ML: .5; 3 SOLUTION RESPIRATORY (INHALATION) at 13:04

## 2019-12-29 RX ADMIN — IPRATROPIUM BROMIDE AND ALBUTEROL SULFATE 3 ML: .5; 3 SOLUTION RESPIRATORY (INHALATION) at 13:34

## 2019-12-29 RX ADMIN — METHYLPREDNISOLONE SODIUM SUCCINATE 125 MG: 125 INJECTION, POWDER, FOR SOLUTION INTRAMUSCULAR; INTRAVENOUS at 12:41

## 2019-12-29 RX ADMIN — IPRATROPIUM BROMIDE AND ALBUTEROL SULFATE 3 ML: .5; 3 SOLUTION RESPIRATORY (INHALATION) at 13:45

## 2019-12-29 NOTE — ED NOTES
Pt states he has been SOB with a productive cough since yesterday. Pt states he walked in and felt lightheaded and dizzy.   Registration reports syncopal episode in waiting room

## 2019-12-29 NOTE — DISCHARGE INSTRUCTIONS
Vasovagal Syncope: Care Instructions  Your Care Instructions    Vasovagal syncope (say \"nxz-kpx-USKTyrone BABCOCK-kuh-pee\")is sudden dizziness or fainting that can be set off by things such as pain, stress, fear, or trauma. You may sweat or feel lightheaded, sick to your stomach, or tingly. The problem causes the heart rate to slow and the blood vessels to widen, or dilate, for a short time. When this happens, blood pools in the lower body, and less blood goes to the brain. You can usually get relief by lying down with your legs raised (elevated). This helps more blood to flow to your brain and may help relieve symptoms like feeling dizzy. Some doctors may recommend a technique that involves tensing your fists and arms. This type of fainting is often easy to predict. For example, it happens to some people when they see blood or have to get a shot. They may feel symptoms before they faint. An episode of vasovagal syncope usually responds well to self-care. Other treatment often isn't needed. But if the fainting keeps happening, your doctor may suggest further treatments. Follow-up care is a key part of your treatment and safety. Be sure to make and go to all appointments, and call your doctor if you are having problems. It's also a good idea to know your test results and keep a list of the medicines you take. How can you care for yourself at home? · Drink plenty of fluids to prevent dehydration. If you have kidney, heart, or liver disease and have to limit fluids, talk with your doctor before you increase your fluid intake. · Try to avoid things that you think may set off vasovagal syncope. · Talk to your doctor about any medicines you take. Some medicines may increase the chance of this condition occurring. · If you feel symptoms, lie down with your legs raised. Talk to your doctor about what to do if your symptoms come back. When should you call for help?   Call 911 anytime you think you may need emergency care. For example, call if:    · You have symptoms of a heart problem. These may include:  ? Chest pain or pressure. ? Severe trouble breathing. ? A fast or irregular heartbeat.    Watch closely for changes in your health, and be sure to contact your doctor if:    · You have more episodes of fainting at home.     · You do not get better as expected. Where can you learn more? Go to http://jason-maritza.info/. Enter L754 in the search box to learn more about \"Vasovagal Syncope: Care Instructions. \"  Current as of: June 26, 2019  Content Version: 12.2  © 4711-7653 [employer]. Care instructions adapted under license by [employer] (which disclaims liability or warranty for this information). If you have questions about a medical condition or this instruction, always ask your healthcare professional. [address] any warranty or liability for your use of this information. Fainting: Care Instructions  Your Care Instructions    When you faint, or pass out, you lose consciousness for a short time. A brief drop in blood flow to the brain often causes it. When you fall or lie down, more blood flows to your brain and you regain consciousness. Emotional stress, pain, or overheating--especially if you have been standing--can make you faint. In these cases, fainting is usually not serious. But fainting can be a sign of a more serious problem. Your doctor may want you to have more tests to rule out other causes. The treatment you need depends on the reason why you fainted. The doctor has checked you carefully, but problems can develop later. If you notice any problems or new symptoms, get medical treatment right away. Follow-up care is a key part of your treatment and safety. Be sure to make and go to all appointments, and call your doctor if you are having problems.  It's also a good idea to know your test results and keep a list of the medicines you take. How can you care for yourself at home? · Drink plenty of fluids to prevent dehydration. If you have kidney, heart, or liver disease and have to limit fluids, talk with your doctor before you increase your fluid intake. When should you call for help? Call 911 anytime you think you may need emergency care. For example, call if:    · You have symptoms of a heart problem. These may include:  ? Chest pain or pressure. ? Severe trouble breathing. ? A fast or irregular heartbeat. ? Lightheadedness or sudden weakness. ? Coughing up pink, foamy mucus. ? Passing out. After you call 911, the  may tell you to chew 1 adult-strength or 2 to 4 low-dose aspirin. Wait for an ambulance. Do not try to drive yourself.     · You have symptoms of a stroke. These may include:  ? Sudden numbness, tingling, weakness, or loss of movement in your face, arm, or leg, especially on only one side of your body. ? Sudden vision changes. ? Sudden trouble speaking. ? Sudden confusion or trouble understanding simple statements. ? Sudden problems with walking or balance. ? A sudden, severe headache that is different from past headaches.     · You passed out (lost consciousness) again.    Watch closely for changes in your health, and be sure to contact your doctor if:    · You do not get better as expected. Where can you learn more? Go to http://jason-maritza.info/. Enter J508 in the search box to learn more about \"Fainting: Care Instructions. \"  Current as of: June 26, 2019  Content Version: 12.2  © 5644-0482 [employer]. Care instructions adapted under license by [employer] (which disclaims liability or warranty for this information). If you have questions about a medical condition or this instruction, always ask your healthcare professional. [address] any warranty or liability for your use of this information.     Patient Education Asthma Attack: Care Instructions  Your Care Instructions    During an asthma attack, the airways swell and narrow. This makes it hard to breathe. Severe asthma attacks can be life-threatening, but you can help prevent them by keeping your asthma under control and treating symptoms before they get bad. Symptoms include being short of breath, having chest tightness, coughing, and wheezing. Noting and treating these symptoms can also help you avoid future trips to the emergency room. The doctor has checked you carefully, but problems can develop later. If you notice any problems or new symptoms, get medical treatment right away. Follow-up care is a key part of your treatment and safety. Be sure to make and go to all appointments, and call your doctor if you are having problems. It's also a good idea to know your test results and keep a list of the medicines you take. How can you care for yourself at home? · Follow your asthma action plan to prevent and treat attacks. If you don't have an asthma action plan, work with your doctor to create one. · Take your asthma medicines exactly as prescribed. Talk to your doctor right away if you have any questions about how to take them. ? Use your quick-relief medicine when you have symptoms of an attack. Quick-relief medicine is usually an albuterol inhaler. Some people need to use quick-relief medicine before they exercise. ? Take your controller medicine every day, not just when you have symptoms. Controller medicine is usually an inhaled corticosteroid. The goal is to prevent problems before they occur. Don't use your controller medicine to treat an attack that has already started. It doesn't work fast enough to help. ? If your doctor prescribed corticosteroid pills to use during an attack, take them exactly as prescribed. It may take hours for the pills to work, but they may make the episode shorter and help you breathe better. ?  Keep your quick-relief medicine with you at all times. · Talk to your doctor before using other medicines. Some medicines, such as aspirin, can cause asthma attacks in some people. · If you have a peak flow meter, use it to check how well you are breathing. This can help you predict when an asthma attack is going to occur. Then you can take medicine to prevent the asthma attack or make it less severe. · Do not smoke or allow others to smoke around you. Avoid smoky places. Smoking makes asthma worse. If you need help quitting, talk to your doctor about stop-smoking programs and medicines. These can increase your chances of quitting for good. · Learn what triggers an asthma attack for you, and avoid the triggers when you can. Common triggers include colds, smoke, air pollution, dust, pollen, mold, pets, cockroaches, stress, and cold air. · Avoid colds and the flu. Get a pneumococcal vaccine shot. If you have had one before, ask your doctor if you need a second dose. Get a flu vaccine every fall. If you must be around people with colds or the flu, wash your hands often. When should you call for help? Call 911 anytime you think you may need emergency care. For example, call if:    · You have severe trouble breathing.    Call your doctor now or seek immediate medical care if:    · Your symptoms do not get better after you have followed your asthma action plan.     · You have new or worse trouble breathing.     · Your coughing and wheezing get worse.     · You cough up dark brown or bloody mucus (sputum).     · You have a new or higher fever.    Watch closely for changes in your health, and be sure to contact your doctor if:    · You need to use quick-relief medicine on more than 2 days a week (unless it is just for exercise).     · You cough more deeply or more often, especially if you notice more mucus or a change in the color of your mucus.     · You are not getting better as expected. Where can you learn more?   Go to http://jason-maritza.info/. Enter Y738 in the search box to learn more about \"Asthma Attack: Care Instructions. \"  Current as of: June 9, 2019  Content Version: 12.2  © 9329-1383 [employer]. Care instructions adapted under license by [employer] (which disclaims liability or warranty for this information). If you have questions about a medical condition or this instruction, always ask your healthcare professional. [place] [address] any warranty or liability for your use of this information.

## 2019-12-29 NOTE — ED NOTES
Patient passed out when walking into lobby. This RN to patient's side. Assessed patient. Pulse palpable and strong. Patient waking up speaking. Helped to wheelchair and brought to room. ODILIA Pizarro, also assessed patient in waiting room and helped to wheelchair.

## 2019-12-29 NOTE — ED NOTES
I performed a brief history of the patient here in triage and I have determined that pt will need further treatment and evaluation from the main side ER physician. I have placed initial orders based on the history to help in expediting patients care.        Visit Vitals  BP (!) 146/124   Pulse (!) 120   Resp 26   SpO2 96%      ODILIA Martinez 11:48 AM

## 2019-12-29 NOTE — ED PROVIDER NOTES
Date: 12/29/2019  Patient Name: Jose Mclean    History of Presenting Illness     No chief complaint on file. History Provided By: Patient    HPI/Chief Complaint: (Context):who presents with chief complaint of shortness of breath wheezing. Symptoms are severe for last 3 days with cough and congestion upper respiratory symptoms patient said he does vomit with severe cough. Multiple sick contacts  No intubations recently but as a child. Patient has severe asthma and states symptoms are gotten worse in 2 days. Patient not a smoker no smoke exposure no drugs or alcohol use  Patient's chest tightness in the right side is present  Patient has similar episodes in the past this is not new  Patient also there is no history of PE or DVT no calf pain. Patient states he also got really lightheaded in the lobby due to shortness of breath and fell down hit his head per her significant other the bedside there is no focal neurological deficits there is no significant headache no vomiting currently no focal deficits no blurry vision  Patient does not have any headache  -----    Patient's triage note is reviewed. Patient's heart rate of 120 and blood pressure 146/124 is documented and pulse ox of 96% on room air  Respiratory is 26  Triage nursing note is reviewed as well  Patient is albuterol  Patient's past medical history asthma COPD  Surgical history skin graft  Patient social history is no smoking      PCP: Darryl Singer MD    Current Facility-Administered Medications   Medication Dose Route Frequency Provider Last Rate Last Dose    albuterol-ipratropium (DUO-NEB) 2.5 MG-0.5 MG/3 ML  3 mL Nebulization Q30MIN Marcello Barrow MD   3 mL at 12/29/19 1304     Current Outpatient Medications   Medication Sig Dispense Refill    albuterol (VENTOLIN HFA) 90 mcg/actuation inhaler Take 2 Puffs by inhalation every four (4) hours as needed for Wheezing.  1 Inhaler 0    predniSONE (DELTASONE) 50 mg tablet Take 1 Tab by mouth daily for 5 days. 5 Tab 0    oxymetazoline (AFRIN, OXYMETAZOLINE,) 0.05 % nasal spray 2 Sprays by Both Nostrils route two (2) times a day for 3 days. 1 Each 0    omeprazole (PRILOSEC) 20 mg capsule Take 1 Cap by mouth daily. 30 Cap 0    cetirizine (ZYRTEC) 10 mg tablet Take 1 Tab by mouth daily. 90 Tab 3    budesonide-formoterol (SYMBICORT) 160-4.5 mcg/actuation HFAA Take 2 Puffs by inhalation two (2) times a day. 1 Inhaler 10    ipratropium-albuterol (COMBIVENT RESPIMAT)  mcg/actuation inhaler Take 1 Puff by inhalation every six (6) hours as needed for Wheezing or Shortness of Breath. 1 Inhaler 11    albuterol (PROVENTIL VENTOLIN) 2.5 mg /3 mL (0.083 %) nebulizer solution 3 mL by Nebulization route every four (4) hours as needed for Wheezing. 75 Each 3    traZODone (DESYREL) 50 mg tablet Take 1 Tab by mouth nightly. 90 Tab 0    venlafaxine-SR (EFFEXOR XR) 37.5 mg capsule Take 1 Cap by mouth daily. 90 Cap 0    omalizumab (XOLAIR) 150 mg solr 300 mg by SubCUTAneous route every fourteen (14) days. 300 mg 0    ALPRAZolam (XANAX) 0.25 mg tablet Take 1 Tab by mouth two (2) times daily as needed for Anxiety. Max Daily Amount: 0.5 mg. 12 Tab 0    cetirizine (ZYRTEC) 10 mg tablet Take 1 Tab by mouth daily. 90 Tab 3    fluticasone (FLONASE) 50 mcg/actuation nasal spray 1 [place] by Both Nostrils route two (2) times a day.  1 Bottle 11       Past History     Past Medical History:  Past Medical History:   Diagnosis Date    Asthma     Chronic obstructive pulmonary disease ([address]        Past Surgical History:  Past Surgical History:   Procedure Laterality Date    HX FREE SKIN GRAFT      HX ORTHOPAEDIC      facial reconstruction       Family History:  Family History   Adopted: Yes   Problem Relation Age of Onset    No Known Problems Mother     No Known Problems Father        Social History:  Social History     Tobacco Use    Smoking status: Never Smoker    Smokeless tobacco: Former User Substance Use Topics    Alcohol use: Yes    Drug use: No       Allergies: Allergies   Allergen Reactions    Penicillin G Anaphylaxis    Cat Dander Shortness of Breath         Review of Systems   Review of Systems   Constitutional: Negative for activity change, fatigue and fever. HENT: Negative for congestion and rhinorrhea. Eyes: Negative for visual disturbance. Respiratory: Positive for cough, shortness of breath and wheezing. Cardiovascular: Negative for chest pain and palpitations. Gastrointestinal: Positive for vomiting. Negative for abdominal pain, diarrhea and nausea. Genitourinary: Negative for dysuria and hematuria. Musculoskeletal: Negative for back pain. Skin: Negative for rash. Neurological: Negative for dizziness, weakness and light-headedness. Psychiatric/Behavioral: Negative for agitation. All other systems reviewed and are negative. Physical Exam     Physical Exam  Constitutional:       Appearance: He is well-developed. HENT:      Head: Normocephalic and atraumatic. Eyes:      Conjunctiva/sclera: Conjunctivae normal.      Pupils: Pupils are equal, round, and reactive to light. Neck:      Musculoskeletal: Normal range of motion and neck supple. Cardiovascular:      Rate and Rhythm: Normal rate and regular rhythm. Pulmonary:      Effort: Respiratory distress present. Breath sounds: No stridor. Wheezing present. No rhonchi or rales. Chest:      Chest wall: No tenderness. Abdominal:      General: Bowel sounds are normal.      Palpations: Abdomen is soft. Musculoskeletal: Normal range of motion. Lymphadenopathy:      Cervical: No cervical adenopathy. Skin:     General: Skin is warm. Neurological:      Mental Status: He is alert. Medical Decision Making   I am the first provider for this patient.     I reviewed the vital signs, available nursing notes, past medical history, past surgical history, family history and social history. Provider Notes (Medical Decision Making): Patient presents to the emergency department with moderate inspiratory expiratory wheezing with mild respiratory distress  Upper respiratory symptoms and vomiting and lightheadedness  Patient did have syncopal episode with 1 to 2 seconds episode of LOC patient is no focal deficits no headache no blurry vision no neck stiffness  I will check patient's EKG and labs  Patient will also get symptomatic relief as well  Patient does not have a pneumonia I will continue with the symptomatic relief iron improvement if patient does not improve will consider admission for this patient as well. Vital Signs-Reviewed the patient's vital signs. Pulse Oximetry Analysis -96%, room air, normal    Cardiac Monitor:  Rate/Rhythm 120, sinus tach is appreciated     EKG: Interpreted by the EP.  patient's EKG time is 1156  Heart rate of 117, normal intervals and axis, no sign of acute ischemia or infarction that can be appreciated on this EKG    Vitals:    12/29/19 1147 12/29/19 1230   BP: (!) 146/124    Pulse: (!) 120    Resp: 26    SpO2: 96% 98%       Records Reviewed: Nursing Notes    ED Course:      Patient reevaluated and feels much improved. Treatment treatments given  Patient states he feels much better and ready to go home. No headache no blurry vision no focal deficits patient's labs are reviewed with him all within normal range. Patient is completely asymptomatic and he knows to follow-up  He requested that I replenish his albuterol. Otherwise patient has no new complaints in the emergency department.   Diagnostic Study Results     Labs -     Recent Results (from the past 12 hour(s))   EKG, 12 LEAD, INITIAL    Collection Time: 12/29/19 11:52 AM   Result Value Ref Range    Ventricular Rate 117 BPM    Atrial Rate 117 BPM    P-R Interval 140 ms    QRS Duration 78 ms    Q-T Interval 338 ms    QTC Calculation (Bezet) 471 ms    Calculated P Axis 118 degrees    Calculated R Axis -90 degrees    Calculated T Axis 141 degrees    Diagnosis       Sinus tachycardia  Left axis deviation  Pulmonary disease pattern  Inferior infarct , age undetermined  T wave abnormality, consider lateral ischemia  Abnormal ECG  When compared with ECG of 31-DEC-2017 08:37,  Nonspecific T wave abnormality, worse in Inferior leads  T wave inversion now evident in Lateral leads     CBC WITH AUTOMATED DIFF    Collection Time: 12/29/19 12:14 PM   Result Value Ref Range    WBC 19.1 (H) 4.6 - 13.2 K/uL    RBC 5.18 4.70 - 5.50 M/uL    HGB 14.7 13.0 - 16.0 g/dL    HCT 44.9 36.0 - 48.0 %    MCV 86.7 74.0 - 97.0 FL    MCH 28.4 24.0 - 34.0 PG    MCHC 32.7 31.0 - 37.0 g/dL    RDW 12.8 11.6 - 14.5 %    PLATELET 918 314 - 283 K/uL    MPV 9.0 (L) 9.2 - 11.8 FL    NEUTROPHILS 79 (H) 40 - 73 %    LYMPHOCYTES 11 (L) 21 - 52 %    MONOCYTES 8 3 - 10 %    EOSINOPHILS 2 0 - 5 %    BASOPHILS 0 0 - 2 %    ABS. NEUTROPHILS 15.1 (H) 1.8 - 8.0 K/UL    ABS. LYMPHOCYTES 2.0 0.9 - 3.6 K/UL    ABS. MONOCYTES 1.6 (H) 0.05 - 1.2 K/UL    ABS. EOSINOPHILS 0.4 0.0 - 0.4 K/UL    ABS. BASOPHILS 0.1 0.0 - 0.1 K/UL    DF AUTOMATED     METABOLIC PANEL, COMPREHENSIVE    Collection Time: 12/29/19 12:14 PM   Result Value Ref Range    Sodium 140 136 - 145 mmol/L    Potassium 3.5 3.5 - 5.5 mmol/L    Chloride 110 100 - 111 mmol/L    CO2 24 21 - 32 mmol/L    Anion gap 6 3.0 - 18 mmol/L    Glucose 118 (H) 74 - 99 mg/dL    BUN 10 7.0 - 18 MG/DL    Creatinine 1.02 0.6 - 1.3 MG/DL    BUN/Creatinine ratio 10 (L) 12 - 20      GFR est AA >60 >60 ml/min/1.73m2    GFR est non-AA >60 >60 ml/min/1.73m2    Calcium 9.4 8.5 - 10.1 MG/DL    Bilirubin, total 0.2 0.2 - 1.0 MG/DL    ALT (SGPT) 26 16 - 61 U/L    AST (SGOT) 17 10 - 38 U/L    Alk.  phosphatase 83 45 - 117 U/L    Protein, total 5.8 (L) 6.4 - 8.2 g/dL    Albumin 2.4 (L) 3.4 - 5.0 g/dL    Globulin 3.4 2.0 - 4.0 g/dL    A-G Ratio 0.7 (L) 0.8 - 1.7     CARDIAC PANEL,(CK, CKMB & TROPONIN)    Collection Time: 12/29/19 12:14 PM   Result Value Ref Range    CK 91 39 - 308 U/L    CK - MB 3.3 <3.6 ng/ml    CK-MB Index 3.6 0.0 - 4.0 %    Troponin-I, QT <0.02 0.0 - 0.045 NG/ML       Radiologic Studies -   XR CHEST PORT   Final Result   IMPRESSION:      No active cardiopulmonary disease. CT Results  (Last 48 hours)    None        CXR Results  (Last 48 hours)               12/29/19 1200  XR CHEST PORT Final result    Impression:  IMPRESSION:       No active cardiopulmonary disease. Narrative:  EXAM: CHEST RADIOGRAPH       CLINICAL INDICATION/HISTORY: SOB   -Additional: None       COMPARISON: October 3, 2019       TECHNIQUE: Portable frontal view of the chest       _______________       FINDINGS:       SUPPORT DEVICES: None. HEART AND MEDIASTINUM: No appreciable cardiomegaly. Remaining mediastinal   contours within normal limits. LUNGS AND PLEURAL SPACES: Clear. No consolidation, mass or effusion. BONY THORAX AND SOFT TISSUES: Unremarkable.       _______________                     Discharge     Clinical Impression:   1. Respiratory distress    2. Syncope and collapse    3. Severe persistent asthma with status asthmaticus    4.  Severe persistent asthma without complication        Disposition:  Admit    It should be noted that I will be the provider of record for this patient  Opal Felix MD      Follow-up Information     Follow up With Specialties Details Why Contact Info    Db River MD Internal Medicine Call in 1 day Follow Up From Emergency Department [address] [place] [address] Golden Lowery MD Pulmonary Disease, Geriatric Medicine Call in 1 day Follow Up From Emergency Department [facility] [address]  [address] EMERGENCY DEPT Emergency Medicine  If symptoms worsen [address] [facility] [address].  [phone]          Current Discharge Medication List      START taking these medications    Details predniSONE (DELTASONE) 50 mg tablet Take 1 Tab by mouth daily for 5 days. Qty: 5 Tab, Refills: 0      oxymetazoline (AFRIN, OXYMETAZOLINE,) 0.05 % nasal spray 2 Sprays by Both Nostrils route two (2) times a day for 3 days. Qty: 1 Each, Refills: 0         CONTINUE these medications which have CHANGED    Details   albuterol (VENTOLIN HFA) 90 mcg/actuation inhaler Take 2 Puffs by inhalation every four (4) hours as needed for Wheezing. Qty: 1 Inhaler, Refills: 0    Associated Diagnoses: Severe persistent asthma without complication         CONTINUE these medications which have NOT CHANGED    Details   albuterol (PROVENTIL VENTOLIN) 2.5 mg /3 mL (0.083 %) nebulizer solution 3 mL by Nebulization route every four (4) hours as needed for Wheezing.   Qty: 75 Each, Refills: 3    Associated Diagnoses: Severe persistent asthma without complication

## 2020-03-03 ENCOUNTER — HOSPITAL ENCOUNTER (EMERGENCY)
Age: 30
Discharge: HOME | DRG: 141 | End: 2020-03-03
Attending: EMERGENCY MEDICINE | Admitting: EMERGENCY MEDICINE
Payer: MEDICAID

## 2020-03-03 VITALS
HEIGHT: 68 IN | BODY MASS INDEX: 30.31 KG/M2 | RESPIRATION RATE: 26 BRPM | SYSTOLIC BLOOD PRESSURE: 115 MMHG | TEMPERATURE: 98.4 F | WEIGHT: 200 LBS | OXYGEN SATURATION: 97 % | HEART RATE: 118 BPM | DIASTOLIC BLOOD PRESSURE: 65 MMHG

## 2020-03-03 DIAGNOSIS — Z91.14 NONCOMPLIANCE WITH MEDICATIONS: ICD-10-CM

## 2020-03-03 DIAGNOSIS — J45.21 MILD INTERMITTENT ASTHMA WITH ACUTE EXACERBATION: Primary | ICD-10-CM

## 2020-03-03 PROCEDURE — 74011000250 HC RX REV CODE- 250

## 2020-03-03 PROCEDURE — 74011000250 HC RX REV CODE- 250: Performed by: EMERGENCY MEDICINE

## 2020-03-03 PROCEDURE — 94640 AIRWAY INHALATION TREATMENT: CPT

## 2020-03-03 PROCEDURE — 74011636637 HC RX REV CODE- 636/637: Performed by: EMERGENCY MEDICINE

## 2020-03-03 PROCEDURE — 99284 EMERGENCY DEPT VISIT MOD MDM: CPT

## 2020-03-03 RX ORDER — ALBUTEROL SULFATE 0.83 MG/ML
SOLUTION RESPIRATORY (INHALATION)
Status: DISCONTINUED
Start: 2020-03-03 | End: 2020-03-03 | Stop reason: HOSPADM

## 2020-03-03 RX ORDER — ALBUTEROL SULFATE 0.83 MG/ML
5 SOLUTION RESPIRATORY (INHALATION)
Status: COMPLETED | OUTPATIENT
Start: 2020-03-03 | End: 2020-03-03

## 2020-03-03 RX ORDER — PREDNISONE 20 MG/1
60 TABLET ORAL DAILY
Qty: 15 TAB | Refills: 0 | Status: SHIPPED | OUTPATIENT
Start: 2020-03-03 | End: 2020-03-04

## 2020-03-03 RX ORDER — IPRATROPIUM BROMIDE AND ALBUTEROL SULFATE 2.5; .5 MG/3ML; MG/3ML
3 SOLUTION RESPIRATORY (INHALATION) ONCE
Status: COMPLETED | OUTPATIENT
Start: 2020-03-03 | End: 2020-03-03

## 2020-03-03 RX ORDER — IPRATROPIUM BROMIDE AND ALBUTEROL SULFATE 2.5; .5 MG/3ML; MG/3ML
SOLUTION RESPIRATORY (INHALATION)
Status: COMPLETED
Start: 2020-03-03 | End: 2020-03-03

## 2020-03-03 RX ORDER — PREDNISONE 20 MG/1
60 TABLET ORAL
Status: COMPLETED | OUTPATIENT
Start: 2020-03-03 | End: 2020-03-03

## 2020-03-03 RX ORDER — ALBUTEROL SULFATE 90 UG/1
2 AEROSOL, METERED RESPIRATORY (INHALATION)
Qty: 1 INHALER | Refills: 0 | Status: SHIPPED | OUTPATIENT
Start: 2020-03-03 | End: 2020-05-19 | Stop reason: SDUPTHER

## 2020-03-03 RX ADMIN — IPRATROPIUM BROMIDE AND ALBUTEROL SULFATE 3 ML: .5; 3 SOLUTION RESPIRATORY (INHALATION) at 04:00

## 2020-03-03 RX ADMIN — PREDNISONE 60 MG: 20 TABLET ORAL at 04:13

## 2020-03-03 RX ADMIN — IPRATROPIUM BROMIDE AND ALBUTEROL SULFATE 3 ML: 2.5; .5 SOLUTION RESPIRATORY (INHALATION) at 04:00

## 2020-03-03 RX ADMIN — IPRATROPIUM BROMIDE AND ALBUTEROL SULFATE 3 ML: .5; 3 SOLUTION RESPIRATORY (INHALATION) at 04:14

## 2020-03-03 RX ADMIN — ALBUTEROL SULFATE 5 MG: 2.5 SOLUTION RESPIRATORY (INHALATION) at 04:37

## 2020-03-03 NOTE — ED PROVIDER NOTES
[facility] EMERGENCY DEPT      4:06 AM    Date: 3/3/2020  Patient Name: Mellissa Mclean    History of Presenting Illness     Chief Complaint   Patient presents with    Wheezing       30 y.o. male with noted past medical history who presents to the emergency department with wheezing. The patient states he was in his usual state of health but is a known asthmatic. He started having some wheezing tonight and does not have his albuterol inhaler secondary to it being with his roommate and she is not around. Because that he did not have any type of breakthrough therapy for his asthma exacerbations. Shortness of breath got worse as well as wheezing and came to the ER for evaluation. No URI symptoms no fever or chills. Patient denies any other associated signs or symptoms. Patient denies any other complaints. Nursing notes regarding the HPI and triage nursing notes were reviewed. Prior medical records were reviewed. Current Facility-Administered Medications   Medication Dose Route Frequency Provider Last Rate Last Dose    albuterol-ipratropium (DUO-NEB) 2.5 mg-0.5 mg/3 ml nebulizer solution              Current Outpatient Medications   Medication Sig Dispense Refill    albuterol (VENTOLIN HFA) 90 mcg/actuation inhaler Take 2 Puffs by inhalation every four (4) hours as needed for Wheezing. 1 Inhaler 0    omeprazole (PRILOSEC) 20 mg capsule Take 1 Cap by mouth daily. 30 Cap 0    cetirizine (ZYRTEC) 10 mg tablet Take 1 Tab by mouth daily. 90 Tab 3    budesonide-formoterol (SYMBICORT) 160-4.5 mcg/actuation HFAA Take 2 Puffs by inhalation two (2) times a day. 1 Inhaler 10    ipratropium-albuterol (COMBIVENT RESPIMAT)  mcg/actuation inhaler Take 1 Puff by inhalation every six (6) hours as needed for Wheezing or Shortness of Breath.  1 Inhaler 11    albuterol (PROVENTIL VENTOLIN) 2.5 mg /3 mL (0.083 %) nebulizer solution 3 mL by Nebulization route every four (4) hours as needed for Wheezing. 75 Each 3    traZODone (DESYREL) 50 mg tablet Take 1 Tab by mouth nightly. 90 Tab 0    venlafaxine-SR (EFFEXOR XR) 37.5 mg capsule Take 1 Cap by mouth daily. 90 Cap 0    omalizumab (XOLAIR) 150 mg solr 300 mg by SubCUTAneous route every fourteen (14) days. 300 mg 0    ALPRAZolam (XANAX) 0.25 mg tablet Take 1 Tab by mouth two (2) times daily as needed for Anxiety. Max Daily Amount: 0.5 mg. 12 Tab 0    cetirizine (ZYRTEC) 10 mg tablet Take 1 Tab by mouth daily. 90 Tab 3    fluticasone (FLONASE) 50 mcg/actuation nasal spray 1 [place] by Both Nostrils route two (2) times a day. 1 Bottle 11       Past History     Past Medical History:  Past Medical History:   Diagnosis Date    Asthma     Chronic obstructive pulmonary disease ([address]        Past Surgical History:  Past Surgical History:   Procedure Laterality Date    HX FREE SKIN GRAFT      HX ORTHOPAEDIC      facial reconstruction       Family History:  Family History   Adopted: Yes   Problem Relation Age of Onset    No Known Problems Mother     No Known Problems Father        Social History:  Social History     Tobacco Use    Smoking status: Never Smoker    Smokeless tobacco: Former User   Substance Use Topics    Alcohol use: Yes    Drug use: No       Allergies: Allergies   Allergen Reactions    Penicillin G Anaphylaxis    Cat Dander Shortness of Breath       Patient's primary care provider (as noted in EPIC):  Zoey Salvador MD    Review of Systems   Constitutional: Negative for diaphoresis. HENT: Negative for congestion. Eyes: Negative for discharge. Respiratory: Positive for shortness of breath and wheezing. Negative for cough and stridor. Cardiovascular: Negative for chest pain and palpitations. Gastrointestinal: Negative for diarrhea. Genitourinary: Negative for flank pain. Musculoskeletal: Negative for back pain. Neurological: Negative for weakness. Psychiatric/Behavioral: Negative for hallucinations.    All other systems reviewed and are negative. Visit Vitals  /81   Pulse (!) 126   Temp 98.4 °F (36.9 °C)   Resp 26   Ht 5' 8\" (1.727 m)   Wt 90.7 kg (200 lb)   SpO2 96%   BMI 30.41 kg/m²       PHYSICAL EXAM:    CONSTITUTIONAL:  Alert, in no apparent distress;  well developed;  well nourished. HEAD:  Normocephalic, atraumatic. EYES:  EOMI. Non-icteric sclera. Normal conjunctiva. ENTM:  Nose:  no rhinorrhea. Throat:  no erythema or exudate, mucous membranes moist.  NECK:  No JVD. Supple    RESPIRATORY:  Expiratory wheezes with good air movement. No rales or rhonchi. CARDIOVASCULAR:  Regular rate and rhythm. No murmurs, rubs, or gallops. GI:  Normal bowel sounds, abdomen soft and non-tender. No rebound or guarding. BACK:  Non-tender. UPPER EXT:  Normal inspection. LOWER EXT:  No edema, no calf tenderness. Distal pulses intact. NEURO:  Moves all four extremities, and grossly normal motor exam.  SKIN:  No rashes;  Normal for age. PSYCH:  Alert and normal affect. DIFFERENTIAL DIAGNOSES/ MEDICAL DECISION MAKING:  Acute asthma exacerbation, acute bronchitis, pneumonia, upper respiratory infection, pulmonary embolism, bronchospasm, various cardiac etiologies verus numerous other etiologies versus combination of the above. Diagnostic Study Results     Abnormal lab results from this emergency department encounter:  Labs Reviewed - No data to display    Lab values for this patient within approximately the last 12 hours:  No results found for this or any previous visit (from the past 12 hour(s)). Radiologist and cardiologist interpretations if available at time of this note:  No results found. Medication(s) ordered for patient during this emergency visit encounter:  Medications   albuterol-ipratropium (DUO-NEB) 2.5 mg-0.5 mg/3 ml nebulizer solution (has no administration in time range)       Medical Decision Making     I am the first provider for this patient.     I reviewed the vital signs, available nursing notes, past medical history, past surgical history, family history and social history. Vital Signs:  Reviewed the patient's vital signs. ED COURSE AND MEDICAL DECISION MAKING:  Patient's breathing improved and wheezing resolved in the emergency department with the noted albuterol and atrovent HHN treatments. Patient's initial steroid therapy may have been started in the ED as well. Patient is well and can be discharged home. There are no other medical conditions that I believe are significantly contributing to the patient's shortness of breath except the noted acute asthma exacerbation and any noted triggers. IMPRESSION AND MEDICAL DECISION MAKING:  Based upon the patient's presentation with noted HPI and PE, along with the work up done in the emergency department, I believe that the patient is having an acute asthma exacerbation in an asthmatic patient. DIAGNOSIS:  1. Acute asthma exacerbation. SPECIFIC PATIENT INSTRUCTIONS FROM THE PHYSICIAN WHO TREATED YOU IN THE ER TODAY:  1. Return if any concerns or worsening of condition(s)  2. Prednisone as prescribed until finished. 3. Use your albuterol inhaler, if prescribed, and/or home nebulizer machine (if you have one) for wheezing. 4. FOLLOW UP APPOINTMENT:  Your primary doctor in 1-2 days. Patient is improved, resting quietly and comfortably. The patient will be discharged home. The patient was reassured that these symptoms do not appear to represent a serious or life threatening condition at this time. Warning signs of worsening condition were discussed and understood by the patient. Based on patient's age, coexisting illness, exam, and the results of this ED evaluation, the decision to treat as an outpatient was made. Based on the information available at time of discharge, acute pathology requiring immediate intervention was deemed relative unlikely.      While it is impossible to completely exclude the possibility of underlying serious disease or worsening of condition, I feel the relative likelihood is extremely low. I discussed this uncertainty with the patient, who understood ED evaluation and treatment and felt comfortable with the outpatient treatment plan. All questions regarding care, test results, and follow up were answered. The patient is stable and appropriate to discharge. They understand that they should return to the emergency department for any new or worsening symptoms. I stressed the importance of follow up for repeat assessment and possibly further evaluation/treatment. Dictation disclaimer:  Please note that this dictation was completed with Virtualmin, the shipbeat voice recognition software. Quite often unanticipated grammatical, syntax, homophones, and other interpretive errors are inadvertently transcribed by the computer software. Please disregard these errors. Please excuse any errors that have escaped final proofreading. Coding Diagnoses     Clinical Impression: No diagnosis found. Disposition     Disposition: Discharge. THAD Steve Board Certified Emergency Physician    Provider Attestation:  If a scribe was utilized in generation of this patient record, I personally performed the services described in the documentation, reviewed the documentation, as recorded by the scribe in my presence, and it accurately records the patient's history of presenting illness, review of systems, patient physical examination, and procedures performed by me as the attending physician. THAD Steve Board Certified Emergency Physician  3/3/2020.  4:07 AM

## 2020-03-03 NOTE — ED NOTES
Wheezing has improved. Still has some scattered wheezes. Pt states he is feeling much better and is ready to go home. I have reviewed discharge instructions with the patient. The patient verbalized understanding. Given rx and follow up info pt states understanding.   Ambulatory on dc

## 2020-03-03 NOTE — DISCHARGE INSTRUCTIONS
SPECIFIC PATIENT INSTRUCTIONS FROM THE PHYSICIAN WHO TREATED YOU IN THE ER TODAY:  1. Return if any concerns or worsening of condition(s)  2. Prednisone as prescribed until finished. 3. Use your albuterol inhaler, if prescribed, and/or home nebulizer machine (if you have one) for wheezing. 4. FOLLOW UP APPOINTMENT:  Your primary doctor in 1-2 days. Patient Education        Asthma Attack: Care Instructions  Your Care Instructions    During an asthma attack, the airways swell and narrow. This makes it hard to breathe. Severe asthma attacks can be life-threatening, but you can help prevent them by keeping your asthma under control and treating symptoms before they get bad. Symptoms include being short of breath, having chest tightness, coughing, and wheezing. Noting and treating these symptoms can also help you avoid future trips to the emergency room. The doctor has checked you carefully, but problems can develop later. If you notice any problems or new symptoms, get medical treatment right away. Follow-up care is a key part of your treatment and safety. Be sure to make and go to all appointments, and call your doctor if you are having problems. It's also a good idea to know your test results and keep a list of the medicines you take. How can you care for yourself at home? · Follow your asthma action plan to prevent and treat attacks. If you don't have an asthma action plan, work with your doctor to create one. · Take your asthma medicines exactly as prescribed. Talk to your doctor right away if you have any questions about how to take them. ? Use your quick-relief medicine when you have symptoms of an attack. Quick-relief medicine is usually an albuterol inhaler. Some people need to use quick-relief medicine before they exercise. ? Take your controller medicine every day, not just when you have symptoms. Controller medicine is usually an inhaled corticosteroid.  The goal is to prevent problems before they occur. Don't use your controller medicine to treat an attack that has already started. It doesn't work fast enough to help. ? If your doctor prescribed corticosteroid pills to use during an attack, take them exactly as prescribed. It may take hours for the pills to work, but they may make the episode shorter and help you breathe better. ? Keep your quick-relief medicine with you at all times. · Talk to your doctor before using other medicines. Some medicines, such as aspirin, can cause asthma attacks in some people. · If you have a peak flow meter, use it to check how well you are breathing. This can help you predict when an asthma attack is going to occur. Then you can take medicine to prevent the asthma attack or make it less severe. · Do not smoke or allow others to smoke around you. Avoid smoky places. Smoking makes asthma worse. If you need help quitting, talk to your doctor about stop-smoking programs and medicines. These can increase your chances of quitting for good. · Learn what triggers an asthma attack for you, and avoid the triggers when you can. Common triggers include colds, smoke, air pollution, dust, pollen, mold, pets, cockroaches, stress, and cold air. · Avoid colds and the flu. Get a pneumococcal vaccine shot. If you have had one before, ask your doctor if you need a second dose. Get a flu vaccine every fall. If you must be around people with colds or the flu, wash your hands often. When should you call for help? Call 911 anytime you think you may need emergency care.  For example, call if:    · You have severe trouble breathing.    Call your doctor now or seek immediate medical care if:    · Your symptoms do not get better after you have followed your asthma action plan.     · You have new or worse trouble breathing.     · Your coughing and wheezing get worse.     · You cough up dark brown or bloody mucus (sputum).     · You have a new or higher fever.    Watch closely for changes in your health, and be sure to contact your doctor if:    · You need to use quick-relief medicine on more than 2 days a week (unless it is just for exercise).     · You cough more deeply or more often, especially if you notice more mucus or a change in the color of your mucus.     · You are not getting better as expected. Where can you learn more? Go to http://jason-maritza.info/. Enter P067 in the search box to learn more about \"Asthma Attack: Care Instructions. \"  Current as of: June 9, 2019  Content Version: 12.2  © 7624-2381 [employer]. Care instructions adapted under license by [employer] (which disclaims liability or warranty for this information). If you have questions about a medical condition or this instruction, always ask your healthcare professional. [address] any warranty or liability for your use of this information. Patient Education        Learning About Asthma Triggers  What are asthma triggers? When you have asthma, certain things can make your symptoms worse. These are called triggers. Learn what triggers an asthma attack for you, and avoid the triggers when you can. Common triggers include colds, smoke, air pollution, dust, pollen, pets, stress, and cold air. How do asthma triggers affect you? Triggers can make it harder for your lungs to work as they should. They can lead to sudden breathing problems and other symptoms. When you are around a trigger, an asthma attack is more likely. If your symptoms are severe, you may need emergency treatment or have to go to the hospital for treatment. What can you do to avoid triggers? The first thing is to know your triggers. When you are having symptoms, note the things around you that might be causing them. Then look for patterns that may be triggering your symptoms. Record your triggers on a piece of paper or in an asthma diary.  When you have your list of possible triggers, work with your doctor to find ways to avoid them. Avoid colds and flu. Get a pneumococcal vaccine shot. If you have had one before, ask your doctor whether you need a second dose. Get a flu vaccine every year, as soon as it's available. If you must be around people with colds or the flu, wash your hands often. Here are some ways to avoid a few common triggers. · Do not smoke or allow others to smoke around you. If you need help quitting, talk to your doctor about stop-smoking programs and medicines. These can increase your chances of quitting for good. · If there is a lot of pollution, pollen, or dust outside, stay at home and keep your windows closed. Use an air conditioner or air filter in your home. Check your local weather report or newspaper for air quality and pollen reports. What else should you know? · Take your controller medicine every day, not just when you have symptoms. It helps prevent problems before they occur. · Your doctor may suggest that you check how well your lungs are working by measuring your peak expiratory flow (PEF) throughout the day. Your PEF may drop when you are near things that trigger symptoms. Where can you learn more? Go to http://jason-maritza.info/. Enter V424 in the search box to learn more about \"Learning About Asthma Triggers. \"  Current as of: June 9, 2019  Content Version: 12.2  © 3946-7326 Healthwise, Incorporated. Care instructions adapted under license by [employer] (which disclaims liability or warranty for this information). If you have questions about a medical condition or this instruction, always ask your healthcare professional. [place] [address] any warranty or liability for your use of this information. Patient Education     Asthma Action Plan: After Your Visit  Your Care Instructions  An asthma action plan is based on peak flow and asthma symptoms.  Sorting symptoms and peak flow into red, yellow, and green \"zones\" can help you know how bad your asthma is and what actions you should take. Work with your doctor to make your plan. An action plan may include:  · The peak flow readings and symptoms for each zone. · What medicines to take in each zone. · When to call a doctor. · A list of emergency contact numbers. · A list of your asthma triggers. Follow-up care is a key part of your treatment and safety. Be sure to make and go to all appointments, and call your doctor if you are having problems. It's also a good idea to know your test results and keep a list of the medicines you take. How can you care for yourself at home? · Take your daily medicines to help minimize long-term damage and avoid asthma attacks. · Check your peak flow every morning and evening. This is the best way to know how well your lungs are working. · Check your action plan to see what zone you are in.  ¨ If you are in the green zone, keep taking your daily asthma medicines as prescribed. ¨ If you are in the yellow zone, you may be having or will soon have an asthma attack. You may not have any symptoms, but your lungs are not working as well as they should. Take the medicines listed in your action plan. If you stay in the yellow zone, your doctor may need to increase the dose or add a medicine. ¨ If you are in the red zone, follow your action plan. If your symptoms or peak flow don't improve soon, you may need to go to the emergency room or be admitted to the hospital.  · Use an asthma diary. Write down your peak flow readings in the asthma diary. If you have an attack, write down what caused it (if you know), the symptoms, and what medicine you took. · Make sure you know how and when to call your doctor or go to the hospital.  · Take both the asthma action plan and the asthma diary--along with your peak flow meter and medicines--when you see your doctor. Tell your doctor if you are having trouble following your action plan.   When should you call for help? Call 911 anytime you think you may need emergency care. For example, call if:  · You have severe trouble breathing. Call your doctor now or seek immediate medical care if:  · Your symptoms do not get better after you have followed your asthma action plan. · You cough up yellow, dark brown, or bloody mucus (sputum). Watch closely for changes in your health, and be sure to contact your doctor if:  · Your coughing and wheezing get worse. · You need to use quick-relief medicine on more than 2 days a week (unless it is just for exercise). · You need help figuring out what is triggering your asthma attacks. Where can you learn more? Go to [employer].be  Enter B511 in the search box to learn more about \"Asthma Action Plan: After Your Visit. \"   © 7278-7667 Healthwise, Incorporated. Care instructions adapted under license by [employer] (which disclaims liability or warranty for this information). This care instruction is for use with your licensed healthcare professional. If you have questions about a medical condition or this instruction, always ask your healthcare professional. [place] [address] any warranty or liability for your use of this information. Content Version: 11.9.536033; Last Revised: March 9, 2012               Moprise Activation    Thank you for requesting access to Moprise. Please follow the instructions below to securely access and download your online medical record. Moprise allows you to send messages to your doctor, view your test results, renew your prescriptions, schedule appointments, and more. How Do I Sign Up? In your internet browser, go to [email or web address]. Click on the First Time User? Click Here link in the Sign In box. You will see the New Member Sign Up page. Enter your Moprise Access Code exactly as it appears below.  You will not need to use this code after you´ve completed the sign-up process. If you do not sign up before the expiration date, you must request a new code. virocyt Access Code: [email or web address]  Expires: 3/28/2019  2:27 PM (This is the date your virocyt access code will )    Enter the last four digits of your Social Security Number (xxxx) and Date of Birth (mm/dd/yyyy) as indicated and click Submit. You will be taken to the next sign-up page. Create a virocyt ID. This will be your virocyt login ID and cannot be changed, so think of one that is secure and easy to remember. Create a virocyt password. You can change your password at any time. Enter your Password Reset Question and Answer. This can be used at a later time if you forget your password. Enter your e-mail address. You will receive e-mail notification when new information is available in [address] Click Sign Up. You can now view and download portions of your medical record. Click the [employer] link to download a portable copy of your medical information. Additional Information    If you have questions, please visit the Frequently Asked Questions section of the virocyt website at [email or web address] Remember, virocyt is NOT to be used for urgent needs. For medical emergencies, dial 911.

## 2020-03-03 NOTE — ED TRIAGE NOTES
Pt states he has been having difficulty breathing for about a week. Worsening tonight. Did neb tx at home with no relief.   Wheezing throughout

## 2020-03-04 ENCOUNTER — HOSPITAL ENCOUNTER (INPATIENT)
Age: 30
LOS: 3 days | Discharge: HOME | DRG: 141 | End: 2020-03-07
Attending: EMERGENCY MEDICINE | Admitting: HOSPITALIST
Payer: MEDICAID

## 2020-03-04 ENCOUNTER — APPOINTMENT (OUTPATIENT)
Dept: GENERAL RADIOLOGY | Age: 30
DRG: 141 | End: 2020-03-04
Attending: EMERGENCY MEDICINE
Payer: MEDICAID

## 2020-03-04 DIAGNOSIS — J45.41 MODERATE PERSISTENT ASTHMA WITH ACUTE EXACERBATION: Primary | ICD-10-CM

## 2020-03-04 PROBLEM — J45.901 ACUTE BRONCHITIS WITH ASTHMA WITH ACUTE EXACERBATION: Status: ACTIVE | Noted: 2020-03-04

## 2020-03-04 PROBLEM — J20.9 ACUTE BRONCHITIS WITH ASTHMA WITH ACUTE EXACERBATION: Status: ACTIVE | Noted: 2020-03-04

## 2020-03-04 LAB
ANION GAP SERPL CALC-SCNC: 10 MMOL/L (ref 3–18)
BASOPHILS # BLD: 0.1 K/UL (ref 0–0.1)
BASOPHILS NFR BLD: 1 % (ref 0–2)
BUN SERPL-MCNC: 17 MG/DL (ref 7–18)
BUN/CREAT SERPL: 12 (ref 12–20)
CALCIUM SERPL-MCNC: 8.4 MG/DL (ref 8.5–10.1)
CHLORIDE SERPL-SCNC: 105 MMOL/L (ref 100–111)
CO2 SERPL-SCNC: 26 MMOL/L (ref 21–32)
CREAT SERPL-MCNC: 1.43 MG/DL (ref 0.6–1.3)
DIFFERENTIAL METHOD BLD: ABNORMAL
EOSINOPHIL # BLD: 0.6 K/UL (ref 0–0.4)
EOSINOPHIL NFR BLD: 4 % (ref 0–5)
ERYTHROCYTE [DISTWIDTH] IN BLOOD BY AUTOMATED COUNT: 12.9 % (ref 11.6–14.5)
GLUCOSE BLD STRIP.AUTO-MCNC: 124 MG/DL (ref 70–110)
GLUCOSE SERPL-MCNC: 132 MG/DL (ref 74–99)
HCT VFR BLD AUTO: 48.6 % (ref 36–48)
HGB BLD-MCNC: 16.1 G/DL (ref 13–16)
LYMPHOCYTES # BLD: 2.8 K/UL (ref 0.9–3.6)
LYMPHOCYTES NFR BLD: 18 % (ref 21–52)
MCH RBC QN AUTO: 28.3 PG (ref 24–34)
MCHC RBC AUTO-ENTMCNC: 33.1 G/DL (ref 31–37)
MCV RBC AUTO: 85.4 FL (ref 74–97)
MONOCYTES # BLD: 2 K/UL (ref 0.05–1.2)
MONOCYTES NFR BLD: 12 % (ref 3–10)
NEUTS SEG # BLD: 10.3 K/UL (ref 1.8–8)
NEUTS SEG NFR BLD: 65 % (ref 40–73)
PLATELET # BLD AUTO: 310 K/UL (ref 135–420)
PMV BLD AUTO: 9.9 FL (ref 9.2–11.8)
POTASSIUM SERPL-SCNC: 3.3 MMOL/L (ref 3.5–5.5)
RBC # BLD AUTO: 5.69 M/UL (ref 4.7–5.5)
SODIUM SERPL-SCNC: 141 MMOL/L (ref 136–145)
WBC # BLD AUTO: 15.7 K/UL (ref 4.6–13.2)

## 2020-03-04 PROCEDURE — 94640 AIRWAY INHALATION TREATMENT: CPT

## 2020-03-04 PROCEDURE — 96375 TX/PRO/DX INJ NEW DRUG ADDON: CPT

## 2020-03-04 PROCEDURE — 96365 THER/PROPH/DIAG IV INF INIT: CPT

## 2020-03-04 PROCEDURE — 85025 COMPLETE CBC W/AUTO DIFF WBC: CPT

## 2020-03-04 PROCEDURE — 74011250636 HC RX REV CODE- 250/636: Performed by: HOSPITALIST

## 2020-03-04 PROCEDURE — 93005 ELECTROCARDIOGRAM TRACING: CPT

## 2020-03-04 PROCEDURE — 74011000250 HC RX REV CODE- 250: Performed by: EMERGENCY MEDICINE

## 2020-03-04 PROCEDURE — 94660 CPAP INITIATION&MGMT: CPT

## 2020-03-04 PROCEDURE — 74011250636 HC RX REV CODE- 250/636: Performed by: EMERGENCY MEDICINE

## 2020-03-04 PROCEDURE — 94762 N-INVAS EAR/PLS OXIMTRY CONT: CPT

## 2020-03-04 PROCEDURE — 77030035694 HC MSK BIPAP FLL FAC PERFMAX PHIL -B

## 2020-03-04 PROCEDURE — 74011000250 HC RX REV CODE- 250

## 2020-03-04 PROCEDURE — 71045 X-RAY EXAM CHEST 1 VIEW: CPT

## 2020-03-04 PROCEDURE — 77010033678 HC OXYGEN DAILY

## 2020-03-04 PROCEDURE — 82962 GLUCOSE BLOOD TEST: CPT

## 2020-03-04 PROCEDURE — 99285 EMERGENCY DEPT VISIT HI MDM: CPT

## 2020-03-04 PROCEDURE — 74011250637 HC RX REV CODE- 250/637: Performed by: INTERNAL MEDICINE

## 2020-03-04 PROCEDURE — 74011000250 HC RX REV CODE- 250: Performed by: HOSPITALIST

## 2020-03-04 PROCEDURE — 5A09357 ASSISTANCE WITH RESPIRATORY VENTILATION, LESS THAN 24 CONSECUTIVE HOURS, CONTINUOUS POSITIVE AIRWAY PRESSURE: ICD-10-PCS | Performed by: EMERGENCY MEDICINE

## 2020-03-04 PROCEDURE — 80048 BASIC METABOLIC PNL TOTAL CA: CPT

## 2020-03-04 PROCEDURE — 65660000001 HC RM ICU INTERMED STEPDOWN

## 2020-03-04 RX ORDER — BUSPIRONE HYDROCHLORIDE 15 MG/1
15 TABLET ORAL 3 TIMES DAILY
COMMUNITY

## 2020-03-04 RX ORDER — ARFORMOTEROL TARTRATE 15 UG/2ML
15 SOLUTION RESPIRATORY (INHALATION)
Status: DISCONTINUED | OUTPATIENT
Start: 2020-03-04 | End: 2020-03-07 | Stop reason: HOSPADM

## 2020-03-04 RX ORDER — BUDESONIDE 0.5 MG/2ML
500 INHALANT ORAL
Status: DISCONTINUED | OUTPATIENT
Start: 2020-03-04 | End: 2020-03-07 | Stop reason: HOSPADM

## 2020-03-04 RX ORDER — MAGNESIUM SULFATE HEPTAHYDRATE 500 MG/ML
2 INJECTION, SOLUTION INTRAMUSCULAR; INTRAVENOUS
Status: DISCONTINUED | OUTPATIENT
Start: 2020-03-04 | End: 2020-03-04

## 2020-03-04 RX ORDER — ALPRAZOLAM 0.25 MG/1
0.25 TABLET ORAL
Status: DISCONTINUED | OUTPATIENT
Start: 2020-03-04 | End: 2020-03-05

## 2020-03-04 RX ORDER — TRAZODONE HYDROCHLORIDE 100 MG/1
50 TABLET ORAL
Status: DISCONTINUED | OUTPATIENT
Start: 2020-03-04 | End: 2020-03-04

## 2020-03-04 RX ORDER — MAGNESIUM SULFATE HEPTAHYDRATE 40 MG/ML
2 INJECTION, SOLUTION INTRAVENOUS ONCE
Status: COMPLETED | OUTPATIENT
Start: 2020-03-04 | End: 2020-03-04

## 2020-03-04 RX ORDER — PANTOPRAZOLE SODIUM 40 MG/10ML
40 INJECTION, POWDER, LYOPHILIZED, FOR SOLUTION INTRAVENOUS EVERY 24 HOURS
Status: DISCONTINUED | OUTPATIENT
Start: 2020-03-05 | End: 2020-03-07 | Stop reason: HOSPADM

## 2020-03-04 RX ORDER — VENLAFAXINE HYDROCHLORIDE 37.5 MG/1
37.5 CAPSULE, EXTENDED RELEASE ORAL DAILY
Status: DISCONTINUED | OUTPATIENT
Start: 2020-03-05 | End: 2020-03-04

## 2020-03-04 RX ORDER — IPRATROPIUM BROMIDE AND ALBUTEROL SULFATE 2.5; .5 MG/3ML; MG/3ML
SOLUTION RESPIRATORY (INHALATION)
Status: COMPLETED
Start: 2020-03-04 | End: 2020-03-04

## 2020-03-04 RX ORDER — CLONAZEPAM 0.5 MG/1
0.5 TABLET ORAL
COMMUNITY

## 2020-03-04 RX ORDER — IPRATROPIUM BROMIDE AND ALBUTEROL SULFATE 2.5; .5 MG/3ML; MG/3ML
3 SOLUTION RESPIRATORY (INHALATION)
Status: COMPLETED | OUTPATIENT
Start: 2020-03-04 | End: 2020-03-04

## 2020-03-04 RX ORDER — QUETIAPINE 300 MG/1
300 TABLET, FILM COATED, EXTENDED RELEASE ORAL
Status: DISCONTINUED | OUTPATIENT
Start: 2020-03-04 | End: 2020-03-05 | Stop reason: SDUPTHER

## 2020-03-04 RX ORDER — IPRATROPIUM BROMIDE AND ALBUTEROL SULFATE 2.5; .5 MG/3ML; MG/3ML
3 SOLUTION RESPIRATORY (INHALATION)
Status: DISCONTINUED | OUTPATIENT
Start: 2020-03-04 | End: 2020-03-06

## 2020-03-04 RX ORDER — ESTRADIOL 2 MG/1
2 TABLET ORAL DAILY
COMMUNITY

## 2020-03-04 RX ORDER — PANTOPRAZOLE SODIUM 40 MG/1
40 TABLET, DELAYED RELEASE ORAL ONCE
Status: COMPLETED | OUTPATIENT
Start: 2020-03-04 | End: 2020-03-04

## 2020-03-04 RX ORDER — PROGESTERONE 100 MG/1
100 CAPSULE ORAL DAILY
COMMUNITY

## 2020-03-04 RX ORDER — SODIUM CHLORIDE 0.9 % (FLUSH) 0.9 %
5-40 SYRINGE (ML) INJECTION AS NEEDED
Status: DISCONTINUED | OUTPATIENT
Start: 2020-03-04 | End: 2020-03-07 | Stop reason: HOSPADM

## 2020-03-04 RX ORDER — ALBUTEROL SULFATE 0.83 MG/ML
2.5 SOLUTION RESPIRATORY (INHALATION)
Status: COMPLETED | OUTPATIENT
Start: 2020-03-04 | End: 2020-03-04

## 2020-03-04 RX ORDER — QUETIAPINE FUMARATE 300 MG/1
300 TABLET, FILM COATED ORAL
COMMUNITY

## 2020-03-04 RX ORDER — ACETAMINOPHEN 325 MG/1
650 TABLET ORAL
Status: DISCONTINUED | OUTPATIENT
Start: 2020-03-04 | End: 2020-03-07 | Stop reason: HOSPADM

## 2020-03-04 RX ORDER — SPIRONOLACTONE 50 MG/1
50 TABLET, FILM COATED ORAL 2 TIMES DAILY
COMMUNITY

## 2020-03-04 RX ORDER — PRAZOSIN HYDROCHLORIDE 1 MG/1
1 CAPSULE ORAL
COMMUNITY

## 2020-03-04 RX ORDER — SODIUM CHLORIDE 0.9 % (FLUSH) 0.9 %
5-40 SYRINGE (ML) INJECTION EVERY 8 HOURS
Status: DISCONTINUED | OUTPATIENT
Start: 2020-03-04 | End: 2020-03-07 | Stop reason: HOSPADM

## 2020-03-04 RX ORDER — LANOLIN ALCOHOL/MO/W.PET/CERES
12 CREAM (GRAM) TOPICAL
Status: DISCONTINUED | OUTPATIENT
Start: 2020-03-04 | End: 2020-03-07 | Stop reason: HOSPADM

## 2020-03-04 RX ADMIN — QUETIAPINE FUMARATE 300 MG: 300 TABLET, EXTENDED RELEASE ORAL at 21:53

## 2020-03-04 RX ADMIN — IPRATROPIUM BROMIDE AND ALBUTEROL SULFATE 3 ML: .5; 3 SOLUTION RESPIRATORY (INHALATION) at 10:37

## 2020-03-04 RX ADMIN — IPRATROPIUM BROMIDE AND ALBUTEROL SULFATE 3 ML: .5; 3 SOLUTION RESPIRATORY (INHALATION) at 17:25

## 2020-03-04 RX ADMIN — METHYLPREDNISOLONE SODIUM SUCCINATE 125 MG: 125 INJECTION, POWDER, FOR SOLUTION INTRAMUSCULAR; INTRAVENOUS at 10:38

## 2020-03-04 RX ADMIN — ALBUTEROL SULFATE 2.5 MG: 2.5 SOLUTION RESPIRATORY (INHALATION) at 11:24

## 2020-03-04 RX ADMIN — IPRATROPIUM BROMIDE AND ALBUTEROL SULFATE 3 ML: .5; 3 SOLUTION RESPIRATORY (INHALATION) at 20:30

## 2020-03-04 RX ADMIN — IPRATROPIUM BROMIDE AND ALBUTEROL SULFATE 3 ML: .5; 3 SOLUTION RESPIRATORY (INHALATION) at 10:27

## 2020-03-04 RX ADMIN — ARFORMOTEROL TARTRATE 15 MCG: 15 SOLUTION RESPIRATORY (INHALATION) at 20:30

## 2020-03-04 RX ADMIN — MAGNESIUM SULFATE HEPTAHYDRATE 2 G: 40 INJECTION, SOLUTION INTRAVENOUS at 11:21

## 2020-03-04 RX ADMIN — BUDESONIDE 500 MCG: 0.5 SUSPENSION RESPIRATORY (INHALATION) at 20:30

## 2020-03-04 RX ADMIN — METHYLPREDNISOLONE SODIUM SUCCINATE 60 MG: 125 INJECTION, POWDER, FOR SOLUTION INTRAMUSCULAR; INTRAVENOUS at 18:42

## 2020-03-04 RX ADMIN — Medication 10 ML: at 21:55

## 2020-03-04 RX ADMIN — Medication 10 ML: at 18:42

## 2020-03-04 RX ADMIN — PANTOPRAZOLE SODIUM 40 MG: 40 TABLET, DELAYED RELEASE ORAL at 20:21

## 2020-03-04 NOTE — PROGRESS NOTES
Cpap    Within minutes on Bipap patient C/O difficulty      - with many adjustments to comfort patient he finally was comfortable of CPAP on ten . His Vt is > 1250.  Dr Jason Conteh notified

## 2020-03-04 NOTE — ED NOTES
Patient states he feels like he is going to black out. Patient diaphoretic. Instructed patient to slow respirations and breathe in through his nose and out through the mouth. Patient placed in low trendelenberg and fluids hung. States he has hx anxiety.  Dr. Lorie Santoyo made aware

## 2020-03-04 NOTE — H&P
Internal Medicine History and Physical          Subjective     HPI: Dinora Reed is a 30 y.o. male with a PMHx of PTSD, asthma who presented to the ED with the acute onset of SOB. He states this started about 1.5 weeks ago and has persistently gotten worse up until presentation. He had a non-productive cough as well. He had pleuritic pain from coughing. It got to the point that he came to ED yesterday early AM and was treated for several hours and sent home with oral prednisone. He returns today without improvement and still feeling very short of breath. He was not hypoxic, but he did have increased WOB and thus BIPAP was placed. He denies any fever or chills. He denies any sick contacts. Imaging in ED was normal. He will be admitted to step down for asthma exac. PMHx:  Past Medical History:   Diagnosis Date    Asthma     Chronic obstructive pulmonary disease (HCC)        PSurgHx:  Past Surgical History:   Procedure Laterality Date    HX FREE SKIN GRAFT      HX ORTHOPAEDIC      facial reconstruction       SocialHx:  Social History     Socioeconomic History    Marital status: SINGLE     Spouse name: Not on file    Number of children: Not on file    Years of education: Not on file    Highest education level: Not on file   Occupational History    Not on file   Social Needs    Financial resource strain: Not on file    Food insecurity:     Worry: Not on file     Inability: Not on file    Transportation needs:     Medical: Not on file     Non-medical: Not on file   Tobacco Use    Smoking status: Never Smoker    Smokeless tobacco: Former User   Substance and Sexual Activity    Alcohol use:  Yes    Drug use: No    Sexual activity: Not Currently   Lifestyle    Physical activity:     Days per week: Not on file     Minutes per session: Not on file    Stress: Not on file   Relationships    Social connections:     Talks on phone: Not on file     Gets together: Not on file     Attends Restorationism service: Not on file     Active member of club or organization: Not on file     Attends meetings of clubs or organizations: Not on file     Relationship status: Not on file    Intimate partner violence:     Fear of current or ex partner: Not on file     Emotionally abused: Not on file     Physically abused: Not on file     Forced sexual activity: Not on file   Other Topics Concern    Not on file   Social History Narrative    Not on file       Allergies: Allergies   Allergen Reactions    Penicillin G Anaphylaxis    Cat Dander Shortness of Breath       FamilyHx:  Family History   Adopted: Yes   Problem Relation Age of Onset    No Known Problems Mother     No Known Problems Father        Prior to Admission Medications   Prescriptions Last Dose Informant Patient Reported? Taking? ALPRAZolam (XANAX) 0.25 mg tablet   No No   Sig: Take 1 Tab by mouth two (2) times daily as needed for Anxiety. Max Daily Amount: 0.5 mg.   albuterol (PROVENTIL HFA, VENTOLIN HFA, PROAIR HFA) 90 mcg/actuation inhaler   No No   Sig: Take 2 Puffs by inhalation every four (4) hours as needed for Wheezing. budesonide-formoterol (SYMBICORT) 160-4.5 mcg/actuation HFAA   No No   Sig: Take 2 Puffs by inhalation two (2) times a day. cetirizine (ZYRTEC) 10 mg tablet   No No   Sig: Take 1 Tab by mouth daily. fluticasone (FLONASE) 50 mcg/actuation nasal spray   No No   Si [place] by Both Nostrils route two (2) times a day. ipratropium-albuteroL (COMBIVENT RESPIMAT)  mcg/actuation inhaler   No No   Sig: Take 1 Puff by inhalation every six (6) hours. omalizumab (XOLAIR) 150 mg solr   No No   Si mg by SubCUTAneous route every fourteen (14) days. omeprazole (PRILOSEC) 20 mg capsule   No No   Sig: Take 1 Cap by mouth daily. traZODone (DESYREL) 50 mg tablet   No No   Sig: Take 1 Tab by mouth nightly. venlafaxine-SR (EFFEXOR XR) 37.5 mg capsule   No No   Sig: Take 1 Cap by mouth daily.       Facility-Administered Medications: None       Review of Systems:  CONST: Fever, weight loss, fatigue or chills  HEENT: Recent changes in vision, vertigo, epistaxis, dysphagia and hoarseness  CV: Chest pain, palpitations, edema and varicosities  RESP: Cough, shortness of breath, wheezing, hemoptysis, snoring and reactive airway disease  GI: Nausea, vomiting, abdominal pain, change in bowel habits, hematochezia, melena, and GERD   : Hematuria, dysuria, frequency, urgency, nocturia and stress urinary incontinence   MS: Weakness, joint pain and arthritis  ENDO: Polyuria, polydipsia, polyphagia, poor wound healing, heat intolerance, cold intolerance  LYMPH/HEME: Anemia, bruising and history of blood transfusions  INTEG: Dermatitis, abnormal moles  NEURO: Dizziness, headache, fainting, seizures and stroke  PSYCH: Anxiety and depression      Objective      Visit Vitals  BP (!) 138/114   Pulse (!) 103   Temp 98 °F (36.7 °C)   Resp 28   SpO2 98%       Physical Exam:  General Appearance: NAD, conversant on BiPAPA  HENT: normocephalic/atraumatic, moist mucus membranes  Lungs: Diffuse exp wheeze with increased respiratory effort  Cardiovascular: RRR, no m/r/g, capillary refill < 2 sec, B/L DP/PT pulses +3/4  Abdomen: soft, non-tender, normal bowel sounds  Extremities: no cyanosis, no peripheral edema  Neuro: moves all extremities, no focal deficits  Psych: appropriate affect, alert and oriented to person, place and time    Laboratory Studies:  BMP:   Lab Results   Component Value Date/Time     03/04/2020 10:34 AM    K 3.3 (L) 03/04/2020 10:34 AM     03/04/2020 10:34 AM    CO2 26 03/04/2020 10:34 AM    AGAP 10 03/04/2020 10:34 AM     (H) 03/04/2020 10:34 AM    BUN 17 03/04/2020 10:34 AM    CREA 1.43 (H) 03/04/2020 10:34 AM    GFRAA >60 03/04/2020 10:34 AM    GFRNA 59 (L) 03/04/2020 10:34 AM     CBC:   Lab Results   Component Value Date/Time    WBC 15.7 (H) 03/04/2020 10:34 AM    HGB 16.1 (H) 03/04/2020 10:34 AM    HCT 48.6 (H) 03/04/2020 10:34 AM     03/04/2020 10:34 AM       Imaging Reviewed:  [facility] Chest Port    Result Date: 3/4/2020  EXAM: XR CHEST PORT CLINICAL INDICATION/HISTORY: Wheezing -Additional: None COMPARISON: 12/29/2019 TECHNIQUE: Frontal view of the chest _______________ FINDINGS: HEART AND MEDIASTINUM: Normal cardiac size and mediastinal contours. LUNGS AND PLEURAL SPACES: No focal pneumonic consolidation, pneumothorax, or pleural effusion. BONY THORAX AND SOFT TISSUES: No acute osseous abnormality _______________     IMPRESSION: No acute findings in the chest.         Assessment/Plan     Active Hospital Problems    Diagnosis Date Noted    Acute bronchitis with asthma with acute exacerbation 03/04/2020    PTSD (post-traumatic stress disorder) 11/19/2018    Anxiety 11/12/2018     - Blessing q4h  - Brovana, pulmicort  - IV steroids  - Suspect viral bronchitis, will hold on antibx  - Replete lytes  - Trend CBC  - Cont acceptable home medications for chronic conditions   - DVT protocol    I have personally reviewed all pertinent labs, films and EKGs that have officially resulted. I reviewed available electronic documentation outlining the initial presentation as well as the emergency room physician's encounter.     Cass Martin, DO  Internal Medicine, Hospitalist  Pager: [phone]

## 2020-03-04 NOTE — ED TRIAGE NOTES
Pt here yesterday for asthma exacerbation. States asthma continues to act up. Provider at bedside assessing patient, audible wheezing heard.

## 2020-03-04 NOTE — ED PROVIDER NOTES
EMERGENCY DEPARTMENT HISTORY AND PHYSICAL EXAM    10:27 AM      Date: 3/4/2020  Patient Name: Rico Mclean    History of Presenting Illness     Chief Complaint   Patient presents with    Wheezing         History Provided By: Patient    Chief Complaint: sob  Duration:  Days  Timing:  Worsening  Location: NA  Quality: Tightness  Severity: Moderate  Modifying Factors: none  Associated Symptoms: dry cough      Additional History (Context): Nanci Ruano is a 30 y.o. male with COPD and asthma who presents with shortness of breath. Patient states been ongoing for about 1 week but had worsened yesterday. Was seen in the ER early yesterday morning. Given 3 nebs and prednisone. He states his been waiting for the pharmacy to fill his prescriptions. Has still been using his albuterol inhaler nebulizer at home without any improvement. Denies any fever or chills. Reports a dry cough. Denies any history of DVT or PE. Denies any leg swelling. Does report prior hospitalizations for his asthma most recently about 1 year ago. Ports been having mild nasal congestion past 1 week. No recent travel. No recent surgery. Denies any alleviating or aggravating factors. No other complaints. PCP: Nelson Bryan MD    Current Outpatient Medications   Medication Sig Dispense Refill    predniSONE (DELTASONE) 20 mg tablet Take 60 mg by mouth daily for 5 days. With Breakfast 15 Tab 0    albuterol (PROVENTIL HFA, VENTOLIN HFA, PROAIR HFA) 90 mcg/actuation inhaler Take 2 Puffs by inhalation every four (4) hours as needed for Wheezing. 1 Inhaler 0    ipratropium-albuteroL (COMBIVENT RESPIMAT)  mcg/actuation inhaler Take 1 Puff by inhalation every six (6) hours. 1 Inhaler 0    omeprazole (PRILOSEC) 20 mg capsule Take 1 Cap by mouth daily. 30 Cap 0    cetirizine (ZYRTEC) 10 mg tablet Take 1 Tab by mouth daily.  90 Tab 3    budesonide-formoterol (SYMBICORT) 160-4.5 mcg/actuation HFAA Take 2 Puffs by inhalation two (2) times a day. 1 Inhaler 10    traZODone (DESYREL) 50 mg tablet Take 1 Tab by mouth nightly. 90 Tab 0    venlafaxine-SR (EFFEXOR XR) 37.5 mg capsule Take 1 Cap by mouth daily. 90 Cap 0    omalizumab (XOLAIR) 150 mg solr 300 mg by SubCUTAneous route every fourteen (14) days. 300 mg 0    ALPRAZolam (XANAX) 0.25 mg tablet Take 1 Tab by mouth two (2) times daily as needed for Anxiety. Max Daily Amount: 0.5 mg. 12 Tab 0    cetirizine (ZYRTEC) 10 mg tablet Take 1 Tab by mouth daily. 90 Tab 3    fluticasone (FLONASE) 50 mcg/actuation nasal spray 1 [place] by Both Nostrils route two (2) times a day. 1 Bottle 11       Past History     Past Medical History:  Past Medical History:   Diagnosis Date    Asthma     Chronic obstructive pulmonary disease ([facility] [address]        Past Surgical History:  Past Surgical History:   Procedure Laterality Date    HX FREE SKIN GRAFT      HX ORTHOPAEDIC      facial reconstruction       Family History:  Family History   Adopted: Yes   Problem Relation Age of Onset    No Known Problems Mother     No Known Problems Father        Social History:  Social History     Tobacco Use    Smoking status: Never Smoker    Smokeless tobacco: Former User   Substance Use Topics    Alcohol use: Yes    Drug use: No       Allergies: Allergies   Allergen Reactions    Penicillin G Anaphylaxis    Cat Dander Shortness of Breath         Review of Systems       Review of Systems   Constitutional: Negative for chills and fever. Respiratory: Positive for cough, shortness of breath and wheezing. Cardiovascular: Negative for chest pain and leg swelling. Gastrointestinal: Negative for nausea. All other systems reviewed and are negative. Physical Exam     Visit Vitals  BP (!) 138/114   Pulse (!) 103   Temp 98 °F (36.7 °C)   Resp 28   SpO2 98%       Physical Exam  Constitutional:       Appearance: He is well-developed.       Comments: Mild distress, is able to speak most sentences HENT:      Head: Normocephalic and atraumatic. Neck:      Musculoskeletal: Neck supple. Vascular: No JVD. Cardiovascular:      Rate and Rhythm: Regular rhythm. Tachycardia present. Pulmonary:      Effort: Tachypnea present. No respiratory distress. Breath sounds: Wheezing present. Comments: No retractions  Abdominal:      General: There is no distension. Palpations: Abdomen is soft. Tenderness: There is no abdominal tenderness. There is no guarding or rebound. Musculoskeletal:      Right lower leg: No edema. Left lower leg: No edema. Comments: No joint tenderness   Skin:     General: Skin is warm and dry. Findings: No erythema. Neurological:      Mental Status: He is alert and oriented to person, place, and time.    Psychiatric:         Judgment: Judgment normal.           Diagnostic Study Results     Labs -  Recent Results (from the past 12 hour(s))   EKG, 12 LEAD, INITIAL    Collection Time: 03/04/20 10:13 AM   Result Value Ref Range    Ventricular Rate 108 BPM    Atrial Rate 108 BPM    P-R Interval 140 ms    QRS Duration 80 ms    Q-T Interval 364 ms    QTC Calculation (Bezet) 487 ms    Calculated P Axis 73 degrees    Calculated R Axis -62 degrees    Calculated T Axis 57 degrees    Diagnosis       Sinus tachycardia  Left axis deviation  Abnormal ECG  When compared with ECG of 29-DEC-2019 11:52,  Criteria for Inferior infarct are no longer present  T wave inversion no longer evident in Lateral leads     CBC WITH AUTOMATED DIFF    Collection Time: 03/04/20 10:34 AM   Result Value Ref Range    WBC 15.7 (H) 4.6 - 13.2 K/uL    RBC 5.69 (H) 4.70 - 5.50 M/uL    HGB 16.1 (H) 13.0 - 16.0 g/dL    HCT 48.6 (H) 36.0 - 48.0 %    MCV 85.4 74.0 - 97.0 FL    MCH 28.3 24.0 - 34.0 PG    MCHC 33.1 31.0 - 37.0 g/dL    RDW 12.9 11.6 - 14.5 %    PLATELET 495 286 - 875 K/uL    MPV 9.9 9.2 - 11.8 FL    NEUTROPHILS 65 40 - 73 %    LYMPHOCYTES 18 (L) 21 - 52 %    MONOCYTES 12 (H) 3 - 10 % EOSINOPHILS 4 0 - 5 %    BASOPHILS 1 0 - 2 %    ABS. NEUTROPHILS 10.3 (H) 1.8 - 8.0 K/UL    ABS. LYMPHOCYTES 2.8 0.9 - 3.6 K/UL    ABS. MONOCYTES 2.0 (H) 0.05 - 1.2 K/UL    ABS. EOSINOPHILS 0.6 (H) 0.0 - 0.4 K/UL    ABS. BASOPHILS 0.1 0.0 - 0.1 K/UL    DF AUTOMATED     METABOLIC PANEL, BASIC    Collection Time: 03/04/20 10:34 AM   Result Value Ref Range    Sodium 141 136 - 145 mmol/L    Potassium 3.3 (L) 3.5 - 5.5 mmol/L    Chloride 105 100 - 111 mmol/L    CO2 26 21 - 32 mmol/L    Anion gap 10 3.0 - 18 mmol/L    Glucose 132 (H) 74 - 99 mg/dL    BUN 17 7.0 - 18 MG/DL    Creatinine 1.43 (H) 0.6 - 1.3 MG/DL    BUN/Creatinine ratio 12 12 - 20      GFR est AA >60 >60 ml/min/1.73m2    GFR est non-AA 59 (L) >60 ml/min/1.73m2    Calcium 8.4 (L) 8.5 - 10.1 MG/DL       Radiologic Studies -   XR CHEST PORT   Final Result   IMPRESSION:      No acute findings in the chest.         No acute process    Medical Decision Making   I am the first provider for this patient. I reviewed the vital signs, available nursing notes, past medical history, past surgical history, family history and social history. Vital Signs-Reviewed the patient's vital signs. Pulse Oximetry Analysis -  97 on room air (Interpretation)nl     Cardiac Monitor:  Rate: 105  Rhythm:  Sinus Tachycardia     EKG: Interpreted by the EP. Time Interpreted: 1013   Rate: 108   Rhythm: Sinus Tachycardia    Interpretation: left axis, sinus tach, no ST changes   Comparison: 12/29/2019, unchanged    Records Reviewed: Nursing Notes, Old Medical Records and Previous electrocardiograms (Time of Review: 10:27 AM)    ED Course: Progress Notes, Reevaluation, and Consults:      Provider Notes (Medical Decision Making): 78-year-old male past medical history of asthma presents with wheezing and shortness of breath. Seen yesterday for asthma exacerbation. Did not have chest x-rays will check this today and also rule out pneumonia versus pneumonia here.   No risk factors for PE or signs of DVT on exam.      11:15 AM  Discussed results with patient. Labs and imaging overall unremarkable. He feels only minimal relief after the first 2 nebs. Will give a third neb and dose of mag and then continue to observe. 11:59 AM  Patient feeling no improvement after 3 nebs. Still pretty significantly to pick tachypneic with wheezing. Will plan for BiPAP at this time. 12:12 PM  Consult with Dr. Viola Alanis, hospitalist, accepts patient for admission    Critical Care Time:  The services I provided to this patient were to treat and/or prevent clinically significant deterioration that could result in the failure of one or more body systems and/or organ systems due to respiratory distress    Services included the following:  -reviewing nursing notes and old charts  -vital sign assessments  -direct patient care  -medication orders and management  -interpreting and reviewing diagnostic studies/labs  -re-evaluations  -documentation time    Aggregate critical care time was 31 minutes, which includes only time during which I was engaged in work directly related to the patient's care as described above, whether I was at bedside or elsewhere in the Emergency Department. It did not include time spent performing other reported procedures or the services of residents, students, nurses, or advance practice providers. Bogdan Garcia DO    12:29 PM        For Hospitalized Patients:    1. Hospitalization Decision Time:  The decision to hospitalize the patient was made by Dr. Viola Alanis at [phone] on 3/4/2020    2. Aspirin: Aspirin was not given because the patient did not present with a stroke at the time of their Emergency Department evaluation    Diagnosis     Clinical Impression:   1.  Moderate persistent asthma with acute exacerbation        Disposition: admit

## 2020-03-04 NOTE — ED NOTES
TRANSFER - ED to INPATIENT REPORT:    Verbal report given to Lili(name) on Alfonzo Talbot  being transferred to stepdown(unit) for routine progression of care       Report consisted of patients Situation, Background, Assessment and   Recommendations(SBAR). SBAR report made available to receiving floor on this patient being transferred to stepdown   for routine progression of care       Admitting diagnosis Acute bronchitis with asthma with acute exacerbation [J20.9, J45.901]    Information from the following report(s) SBAR was made available to receiving floor. Lines:   Peripheral IV 03/04/20 Left Antecubital (Active)   Site Assessment Clean, dry, & intact 3/4/2020 10:35 AM   Phlebitis Assessment 0 3/4/2020 10:35 AM   Infiltration Assessment 0 3/4/2020 10:35 AM   Dressing Status Clean, dry, & intact 3/4/2020 10:35 AM   Hub Color/Line Status Pink;Flushed;Patent 3/4/2020 10:35 AM   Action Taken Blood drawn 3/4/2020 10:35 AM             Medication list updated today    Opportunity for questions and clarification was provided.       Patient is oriented to time, place, person and situation    Patient is  continent and ambulatory without assist     Valuables transported with patient     Patient transported with:   Monitor  Registered Nurse     MAP (Monitor): 83 =Monitored (most recent)  Vitals w/ MEWS Score (last day)     Date/Time MEWS Score Pulse Resp Temp BP Level of Consciousness SpO2    03/04/20 1330    88  24    110/75    99 %    03/04/20 1315    95  17    116/83    100 %    03/04/20 1300    (!) 101  29    117/73    100 %    03/04/20 1252              98 %    03/04/20 1215    96      (!) 126/94    99 %    03/04/20 1200    (!) 103  28    (!) 138/114    98 %    03/04/20 1145    (!) 105  19    (!) 123/92    99 %    03/04/20 1130    (!) 102      114/69    98 %    03/04/20 1124    (!) 102      116/78        03/04/20 1100    (!) 103  27    119/69    95 %    03/04/20 1031  4  (!) 104  30  98 °F (36.7 °C)  129/77  Alert  100 %    03/04/20 1024    (!) 124  26        97 %              Septic Patients:     Lactic Acid  Lab Results   Component Value Date    LACPOC 5.6 ([facility]) 09/05/2017

## 2020-03-05 LAB
ANION GAP SERPL CALC-SCNC: 8 MMOL/L (ref 3–18)
ATRIAL RATE: 108 BPM
BASOPHILS # BLD: 0 K/UL (ref 0–0.1)
BASOPHILS NFR BLD: 0 % (ref 0–2)
BUN SERPL-MCNC: 15 MG/DL (ref 7–18)
BUN/CREAT SERPL: 14 (ref 12–20)
CALCIUM SERPL-MCNC: 8 MG/DL (ref 8.5–10.1)
CALCULATED P AXIS, ECG09: 73 DEGREES
CALCULATED R AXIS, ECG10: -62 DEGREES
CALCULATED T AXIS, ECG11: 57 DEGREES
CHLORIDE SERPL-SCNC: 107 MMOL/L (ref 100–111)
CO2 SERPL-SCNC: 23 MMOL/L (ref 21–32)
CREAT SERPL-MCNC: 1.11 MG/DL (ref 0.6–1.3)
DIAGNOSIS, 93000: NORMAL
DIFFERENTIAL METHOD BLD: ABNORMAL
EOSINOPHIL # BLD: 0 K/UL (ref 0–0.4)
EOSINOPHIL NFR BLD: 0 % (ref 0–5)
ERYTHROCYTE [DISTWIDTH] IN BLOOD BY AUTOMATED COUNT: 12.8 % (ref 11.6–14.5)
GLUCOSE SERPL-MCNC: 135 MG/DL (ref 74–99)
HCT VFR BLD AUTO: 45.7 % (ref 36–48)
HGB BLD-MCNC: 15.2 G/DL (ref 13–16)
LYMPHOCYTES # BLD: 1.7 K/UL (ref 0.9–3.6)
LYMPHOCYTES NFR BLD: 7 % (ref 21–52)
MAGNESIUM SERPL-MCNC: 2.5 MG/DL (ref 1.6–2.6)
MCH RBC QN AUTO: 28 PG (ref 24–34)
MCHC RBC AUTO-ENTMCNC: 33.3 G/DL (ref 31–37)
MCV RBC AUTO: 84.3 FL (ref 74–97)
MONOCYTES # BLD: 0.8 K/UL (ref 0.05–1.2)
MONOCYTES NFR BLD: 3 % (ref 3–10)
NEUTS SEG # BLD: 21.1 K/UL (ref 1.8–8)
NEUTS SEG NFR BLD: 90 % (ref 40–73)
P-R INTERVAL, ECG05: 140 MS
PLATELET # BLD AUTO: 343 K/UL (ref 135–420)
PMV BLD AUTO: 9.7 FL (ref 9.2–11.8)
POTASSIUM SERPL-SCNC: 4.3 MMOL/L (ref 3.5–5.5)
Q-T INTERVAL, ECG07: 364 MS
QRS DURATION, ECG06: 80 MS
QTC CALCULATION (BEZET), ECG08: 487 MS
RBC # BLD AUTO: 5.42 M/UL (ref 4.7–5.5)
SODIUM SERPL-SCNC: 138 MMOL/L (ref 136–145)
VENTRICULAR RATE, ECG03: 108 BPM
WBC # BLD AUTO: 23.6 K/UL (ref 4.6–13.2)

## 2020-03-05 PROCEDURE — 80048 BASIC METABOLIC PNL TOTAL CA: CPT

## 2020-03-05 PROCEDURE — C9113 INJ PANTOPRAZOLE SODIUM, VIA: HCPCS | Performed by: INTERNAL MEDICINE

## 2020-03-05 PROCEDURE — 94761 N-INVAS EAR/PLS OXIMETRY MLT: CPT

## 2020-03-05 PROCEDURE — 74011000250 HC RX REV CODE- 250: Performed by: HOSPITALIST

## 2020-03-05 PROCEDURE — 36415 COLL VENOUS BLD VENIPUNCTURE: CPT

## 2020-03-05 PROCEDURE — 94640 AIRWAY INHALATION TREATMENT: CPT

## 2020-03-05 PROCEDURE — 74011250636 HC RX REV CODE- 250/636: Performed by: INTERNAL MEDICINE

## 2020-03-05 PROCEDURE — 74011250637 HC RX REV CODE- 250/637: Performed by: HOSPITALIST

## 2020-03-05 PROCEDURE — 83735 ASSAY OF MAGNESIUM: CPT

## 2020-03-05 PROCEDURE — 65660000000 HC RM CCU STEPDOWN

## 2020-03-05 PROCEDURE — 77010033678 HC OXYGEN DAILY

## 2020-03-05 PROCEDURE — 74011250636 HC RX REV CODE- 250/636: Performed by: HOSPITALIST

## 2020-03-05 PROCEDURE — 85025 COMPLETE CBC W/AUTO DIFF WBC: CPT

## 2020-03-05 PROCEDURE — 74011250637 HC RX REV CODE- 250/637: Performed by: INTERNAL MEDICINE

## 2020-03-05 PROCEDURE — 77030021352 HC CBL LD SYS DISP COVD -B

## 2020-03-05 RX ORDER — BUSPIRONE HYDROCHLORIDE 10 MG/1
15 TABLET ORAL EVERY 12 HOURS
Status: DISCONTINUED | OUTPATIENT
Start: 2020-03-05 | End: 2020-03-07 | Stop reason: HOSPADM

## 2020-03-05 RX ORDER — CLONAZEPAM 0.5 MG/1
0.5 TABLET ORAL
Status: DISCONTINUED | OUTPATIENT
Start: 2020-03-05 | End: 2020-03-07 | Stop reason: HOSPADM

## 2020-03-05 RX ORDER — QUETIAPINE FUMARATE 300 MG/1
300 TABLET, FILM COATED ORAL
Status: DISCONTINUED | OUTPATIENT
Start: 2020-03-05 | End: 2020-03-07 | Stop reason: HOSPADM

## 2020-03-05 RX ORDER — SPIRONOLACTONE 25 MG/1
50 TABLET ORAL 2 TIMES DAILY
Status: DISCONTINUED | OUTPATIENT
Start: 2020-03-05 | End: 2020-03-07 | Stop reason: HOSPADM

## 2020-03-05 RX ORDER — PRAZOSIN HYDROCHLORIDE 1 MG/1
1 CAPSULE ORAL
Status: DISCONTINUED | OUTPATIENT
Start: 2020-03-05 | End: 2020-03-07 | Stop reason: HOSPADM

## 2020-03-05 RX ORDER — POTASSIUM CHLORIDE 20 MEQ/1
40 TABLET, EXTENDED RELEASE ORAL
Status: DISCONTINUED | OUTPATIENT
Start: 2020-03-05 | End: 2020-03-05

## 2020-03-05 RX ADMIN — BUSPIRONE HYDROCHLORIDE 15 MG: 10 TABLET ORAL at 21:47

## 2020-03-05 RX ADMIN — METHYLPREDNISOLONE SODIUM SUCCINATE 60 MG: 125 INJECTION, POWDER, FOR SOLUTION INTRAMUSCULAR; INTRAVENOUS at 18:40

## 2020-03-05 RX ADMIN — IPRATROPIUM BROMIDE AND ALBUTEROL SULFATE 3 ML: .5; 3 SOLUTION RESPIRATORY (INHALATION) at 08:37

## 2020-03-05 RX ADMIN — ARFORMOTEROL TARTRATE 15 MCG: 15 SOLUTION RESPIRATORY (INHALATION) at 23:59

## 2020-03-05 RX ADMIN — IPRATROPIUM BROMIDE AND ALBUTEROL SULFATE 3 ML: .5; 3 SOLUTION RESPIRATORY (INHALATION) at 19:04

## 2020-03-05 RX ADMIN — PANTOPRAZOLE SODIUM 40 MG: 40 INJECTION, POWDER, FOR SOLUTION INTRAVENOUS at 08:21

## 2020-03-05 RX ADMIN — Medication 10 ML: at 06:35

## 2020-03-05 RX ADMIN — IPRATROPIUM BROMIDE AND ALBUTEROL SULFATE 3 ML: .5; 3 SOLUTION RESPIRATORY (INHALATION) at 23:12

## 2020-03-05 RX ADMIN — IPRATROPIUM BROMIDE AND ALBUTEROL SULFATE 3 ML: .5; 3 SOLUTION RESPIRATORY (INHALATION) at 16:18

## 2020-03-05 RX ADMIN — METHYLPREDNISOLONE SODIUM SUCCINATE 60 MG: 125 INJECTION, POWDER, FOR SOLUTION INTRAMUSCULAR; INTRAVENOUS at 00:28

## 2020-03-05 RX ADMIN — PRAZOSIN HYDROCHLORIDE 1 MG: 1 CAPSULE ORAL at 21:48

## 2020-03-05 RX ADMIN — Medication 10 ML: at 18:41

## 2020-03-05 RX ADMIN — METHYLPREDNISOLONE SODIUM SUCCINATE 60 MG: 125 INJECTION, POWDER, FOR SOLUTION INTRAMUSCULAR; INTRAVENOUS at 06:34

## 2020-03-05 RX ADMIN — IPRATROPIUM BROMIDE AND ALBUTEROL SULFATE 3 ML: .5; 3 SOLUTION RESPIRATORY (INHALATION) at 00:21

## 2020-03-05 RX ADMIN — SPIRONOLACTONE 50 MG: 25 TABLET ORAL at 21:47

## 2020-03-05 RX ADMIN — METHYLPREDNISOLONE SODIUM SUCCINATE 60 MG: 125 INJECTION, POWDER, FOR SOLUTION INTRAMUSCULAR; INTRAVENOUS at 23:12

## 2020-03-05 RX ADMIN — IPRATROPIUM BROMIDE AND ALBUTEROL SULFATE 3 ML: .5; 3 SOLUTION RESPIRATORY (INHALATION) at 04:11

## 2020-03-05 RX ADMIN — ARFORMOTEROL TARTRATE 15 MCG: 15 SOLUTION RESPIRATORY (INHALATION) at 08:36

## 2020-03-05 RX ADMIN — BUDESONIDE 500 MCG: 0.5 SUSPENSION RESPIRATORY (INHALATION) at 08:36

## 2020-03-05 RX ADMIN — MELATONIN 12 MG: at 21:28

## 2020-03-05 RX ADMIN — METHYLPREDNISOLONE SODIUM SUCCINATE 60 MG: 125 INJECTION, POWDER, FOR SOLUTION INTRAMUSCULAR; INTRAVENOUS at 12:31

## 2020-03-05 RX ADMIN — QUETIAPINE FUMARATE 300 MG: 300 TABLET ORAL at 21:48

## 2020-03-05 RX ADMIN — IPRATROPIUM BROMIDE AND ALBUTEROL SULFATE 3 ML: .5; 3 SOLUTION RESPIRATORY (INHALATION) at 12:00

## 2020-03-05 RX ADMIN — Medication 10 ML: at 21:49

## 2020-03-05 NOTE — PROGRESS NOTES
2000 Assumed care of pt after bedside report with pt lying in bed with HOB elevated. AAO x 4. MCCARTHY x 4. Monitor denotes NSR-ST. Lungs diminished in bases and some occasional expiratory wheezing. O2 in progress at 4 L/NC. Abd distended, obese, soft, nontender. Voiding in urinal as needed. 2100 Visitors at bedside with pt. Pt denies complaints. Denies SOB. 2200 Seroquel 300 mg po given as ordered for sleep. 03/05/2020 0000 Resting without complaints after earlier med. VSS. Denies SOB. 0200 Pt status unchanged. 0500 Dr. Marcelino Heller paged regarding recheck of pt's ear;ier K+ level . Orders given for labs. 0600 AM labs drawn and sent to lab.

## 2020-03-05 NOTE — ACP (ADVANCE CARE PLANNING)
assisted patient with the completion of an advance directive form today. Copied and charted in like chart. Copies given to patient for distribution to agent later.     [facility]   Spiritual Care   [phone]

## 2020-03-05 NOTE — ROUTINE PROCESS
1930 Bedside and Verbal shift change report given to Jayant Ruano (oncoming nurse) by Addison Carr RN 
 (offgoing nurse).  Report included the following information SBAR, Intake/Output, MAR, Recent Results and Cardiac Rhythm SR.

## 2020-03-05 NOTE — DIABETES MGMT
Nutrition:  Assessment/Plan:    Pt is 137% ideal weight;  BMI = 32.2 kg/m2 (obese weight classification). Pt appears well nourished and po intake is adequate. No nutrition related problems seen at this time. Noted food allergies and preferences. Blood glucose in target range, receiving steroids. S:  Pt reports his appetite is good. His usual weight is 200-210 lbs and it has been stable. Pt reports several food allergies: coconut, bananas, all melon, cucumbers. Pt denies having issues with chewing or swallowing.    O:  Ht - 5'8\"  Weight - 96.2 kg  Ideal weight - 70.0 kg    Diet:  Regular - RN reports pt took 100% breakfast this AM without tolerance problems  Medications include:  Solu Medrol 60 mg q 6 hours    Lab Results   Component Value Date/Time    Glucose 135 (H) 03/05/2020 06:45 AM    Glucose (POC) 124 (H) 03/04/2020 09:59 PM     Ramya Walsh RD, CDE   Office:  [address] [place] [address] Pager:  [phone]

## 2020-03-05 NOTE — PROGRESS NOTES
Problem: Falls - Risk of  Goal: *Absence of Falls  Description  Document Holly Blood Fall Risk and appropriate interventions in the flowsheet. Outcome: Progressing Towards Goal  Note: Fall Risk Interventions:            Medication Interventions: Evaluate medications/consider consulting pharmacy                   Problem: Patient Education: Go to Patient Education Activity  Goal: Patient/Family Education  Outcome: Progressing Towards Goal     Problem: Pressure Injury - Risk of  Goal: *Prevention of pressure injury  Description  Document Scott Scale and appropriate interventions in the flowsheet. Outcome: Progressing Towards Goal  Note: Pressure Injury Interventions:             Activity Interventions: Pressure redistribution bed/mattress(bed type)         Nutrition Interventions: Document food/fluid/supplement intake                     Problem: Pain  Goal: *Control of Pain  Outcome: Progressing Towards Goal  Goal: *PALLIATIVE CARE:  Alleviation of Pain  Outcome: Progressing Towards Goal     Problem: Gas Exchange - Impaired  Goal: *Absence of hypoxia  Outcome: Progressing Towards Goal

## 2020-03-05 NOTE — PROGRESS NOTES
Internal Medicine Progress Note    Patient's Name: Rony Wu  Admit Date: 3/4/2020  Length of Stay: 1      Assessment/Plan     Active Hospital Problems    Diagnosis Date Noted    Acute bronchitis with asthma with acute exacerbation 03/04/2020    PTSD (post-traumatic stress disorder) 11/19/2018    Anxiety 11/12/2018     - Duonebs q4h  - Brovana, pulmicort  - Cont IV steroids  - Suspect viral bronchitis, will hold on antibx  - Replete lytes  - Trend CBC  - Downgrade to tele  - Cont acceptable home medications for chronic conditions   - DVT protocol    I have personally reviewed all pertinent labs and films that have officially resulted over the last 24 hours. I have personally checked for all pending labs that are awaiting final results.     Subjective     Pt s/e @ bedside  No major events overnight  Feeling much better  Mild wheezing  Denies CP    Objective     Visit Vitals  /85   Pulse (!) 109   Temp 97.7 °F (36.5 °C)   Resp 24   Ht 5' 8\" (1.727 m)   Wt 96.2 kg (212 lb)   SpO2 99%   BMI 32.23 kg/m²       Physical Exam:  General Appearance: NAD, conversant  Lungs: Scattered wheezing with normal respiratory effort  CV: RRR, no m/r/g  Abdomen: soft, non-tender, normal bowel sounds  Extremities: no cyanosis, no peripheral edema  Neuro: No focal deficits, motor/sensory intact    Lab/Data Reviewed:  BMP:   Lab Results   Component Value Date/Time     03/05/2020 06:45 AM    K 4.3 03/05/2020 06:45 AM     03/05/2020 06:45 AM    CO2 23 03/05/2020 06:45 AM    AGAP 8 03/05/2020 06:45 AM     (H) 03/05/2020 06:45 AM    BUN 15 03/05/2020 06:45 AM    CREA 1.11 03/05/2020 06:45 AM    GFRAA >60 03/05/2020 06:45 AM    GFRNA >60 03/05/2020 06:45 AM     CBC:   Lab Results   Component Value Date/Time    WBC 23.6 (H) 03/05/2020 06:45 AM    HGB 15.2 03/05/2020 06:45 AM    HCT 45.7 03/05/2020 06:45 AM     03/05/2020 06:45 AM       Imaging Reviewed:  [facility] Chest Port    Result Date: 3/4/2020  EXAM: XR CHEST PORT CLINICAL INDICATION/HISTORY: Wheezing -Additional: None COMPARISON: 12/29/2019 TECHNIQUE: Frontal view of the chest _______________ FINDINGS: HEART AND MEDIASTINUM: Normal cardiac size and mediastinal contours. LUNGS AND PLEURAL SPACES: No focal pneumonic consolidation, pneumothorax, or pleural effusion.  BONY THORAX AND SOFT TISSUES: No acute osseous abnormality _______________     IMPRESSION: No acute findings in the chest.       Medications Reviewed:  Current Facility-Administered Medications   Medication Dose Route Frequency    ALPRAZolam (XANAX) tablet 0.25 mg  0.25 mg Oral BID PRN    sodium chloride (NS) flush 5-40 mL  5-40 mL IntraVENous Q8H    sodium chloride (NS) flush 5-40 mL  5-40 mL IntraVENous PRN    acetaminophen (TYLENOL) tablet 650 mg  650 mg Oral Q4H PRN    albuterol-ipratropium (DUO-NEB) 2.5 MG-0.5 MG/3 ML  3 mL Nebulization Q4H RT    methylPREDNISolone (PF) (Solu-MEDROL) injection 60 mg  60 mg IntraVENous Q6H    arformoteroL (BROVANA) neb solution 15 mcg  15 mcg Nebulization BID RT    budesonide (PULMICORT) 500 mcg/2 ml nebulizer suspension  500 mcg Nebulization BID RT    QUEtiapine SR (SEROquel XR) tablet 300 mg  300 mg Oral QHS    melatonin tablet 12 mg  12 mg Oral QHS PRN    pantoprazole (PROTONIX) injection 40 mg  40 mg IntraVENous Q24H           Narcisa Velazquez DO  Internal Medicine, Hospitalist  Pager: [phone]  [address] [place] [address]

## 2020-03-05 NOTE — PROGRESS NOTES
0800- Assumed care - Vitals stable - Remains in S-Tach - Does have wheezing , with complaints of tightness - Nebulizer treatment given with relief of SOB & tightness -   0900- had excellent appetite with breakfast - Dr. Carole Núñez in to see pt - and can transfer to Tele unit Remains on O2 4 Liters -  1100- Transferred Via WC in satisfactory condition to 3012 & report given to [id number] Call rn

## 2020-03-05 NOTE — PROGRESS NOTES
Problem: Discharge Planning  Goal: *Discharge to safe environment  Outcome: Resolved/Met     Home    Reason for Admission:   Acute bronchitis/asthma with acute exacerbation                  RUR Score:   16               PCP:First and Last name:  Dr. Adair Severin   Name of Practice:    Are you a current patient: Yes/No:  yes   Approximate date of last visit:   2/18/2019    Do you (patient/family) have any concerns for transition/discharge? Not @ this time                  Plan for utilizing home health:  Not indicated. Current Advanced Directive/Advance Care Plan:  Scanned in record. Transition of Care Plan:     Home with out-pt follow up. Chart reviewed. Met with pt., verified all demographics. Has Katya bonilla. : Leonor Fischer, room mate, whom he has made his MPOA, paperwork scanned in record. States he has NO family. Lives with friends. Uses no DME. Independent with ADL's prior to admit. His friend will pick him up @ discharge. Will cont to follow. Betsy Batista. Patient has designated his friend to participate in his/her discharge plan and to receive any needed information. Name:  Leonor Fischer  Address:  Phone number:  [phone]    Care Management Interventions  PCP Verified by CM: Yes(Dr. Catie Gonzalez)  Last Visit to PCP: 02/19/19  Palliative Care Criteria Met (RRAT>21 & CHF Dx)?: No  Mode of Transport at Discharge: Other (see comment)(friends)  Transition of Care Consult (CM Consult): Discharge Planning  Discharge Durable Medical Equipment: No  Physical Therapy Consult: No  Occupational Therapy Consult: No  Speech Therapy Consult: No  Current Support Network:  Other(lives with friends)  Confirm Follow Up Transport: Self  Discharge Location  Discharge Placement: Home

## 2020-03-05 NOTE — PROGRESS NOTES
assisted patient with the completion of an advance directive form today. Copied and charted in like chart with copies given to patient for distribution to agent later.     Jimmie Frias   Spiritual Care   [phone]

## 2020-03-05 NOTE — PROGRESS NOTES
1100  Received pt from ICU, alert and oriented came with 02 at 2 liters, SOB noted whi le talking and more when he went to the bathroom, no wheezing at this time but dry cough noted, bell with in reach. 1200  Up  To the bathroom, to void, SOB noted, no chest pain    1500  Resting and on his computer, no pain and no SOB. 1745  On and off wheezing, no chest pain. 1825  No  Chest pain, heart rate goes up to 100, per pt can not feel any difference but he was told his HR  Gets high. 1906  Was wheezing need his albuterol earlier, I talk to  RY, okay to give earlier. 1843  He  Calls he was SOB, went  to the room, 02 sat  At 2 liters was 96%, was slightly wheezing. Next  Treatment  Will be  2000, offered him   IS but refused, he said  It will make him more breathe harder.

## 2020-03-06 LAB
BASOPHILS # BLD: 0 K/UL (ref 0–0.1)
BASOPHILS NFR BLD: 0 % (ref 0–2)
DIFFERENTIAL METHOD BLD: ABNORMAL
EOSINOPHIL # BLD: 0 K/UL (ref 0–0.4)
EOSINOPHIL NFR BLD: 0 % (ref 0–5)
ERYTHROCYTE [DISTWIDTH] IN BLOOD BY AUTOMATED COUNT: 12.7 % (ref 11.6–14.5)
HCT VFR BLD AUTO: 43.6 % (ref 36–48)
HGB BLD-MCNC: 14.3 G/DL (ref 13–16)
LYMPHOCYTES # BLD: 1.2 K/UL (ref 0.9–3.6)
LYMPHOCYTES NFR BLD: 4 % (ref 21–52)
MCH RBC QN AUTO: 27.9 PG (ref 24–34)
MCHC RBC AUTO-ENTMCNC: 32.8 G/DL (ref 31–37)
MCV RBC AUTO: 85 FL (ref 74–97)
MONOCYTES # BLD: 0.9 K/UL (ref 0.05–1.2)
MONOCYTES NFR BLD: 3 % (ref 3–10)
NEUTS SEG # BLD: 25 K/UL (ref 1.8–8)
NEUTS SEG NFR BLD: 93 % (ref 40–73)
PLATELET # BLD AUTO: 378 K/UL (ref 135–420)
PMV BLD AUTO: 9.9 FL (ref 9.2–11.8)
RBC # BLD AUTO: 5.13 M/UL (ref 4.7–5.5)
WBC # BLD AUTO: 27.1 K/UL (ref 4.6–13.2)

## 2020-03-06 PROCEDURE — C9113 INJ PANTOPRAZOLE SODIUM, VIA: HCPCS | Performed by: INTERNAL MEDICINE

## 2020-03-06 PROCEDURE — 85025 COMPLETE CBC W/AUTO DIFF WBC: CPT

## 2020-03-06 PROCEDURE — 74011250637 HC RX REV CODE- 250/637: Performed by: HOSPITALIST

## 2020-03-06 PROCEDURE — 36415 COLL VENOUS BLD VENIPUNCTURE: CPT

## 2020-03-06 PROCEDURE — 77010033678 HC OXYGEN DAILY

## 2020-03-06 PROCEDURE — 74011250636 HC RX REV CODE- 250/636: Performed by: HOSPITALIST

## 2020-03-06 PROCEDURE — 65660000000 HC RM CCU STEPDOWN

## 2020-03-06 PROCEDURE — 74011000250 HC RX REV CODE- 250: Performed by: INTERNAL MEDICINE

## 2020-03-06 PROCEDURE — 94761 N-INVAS EAR/PLS OXIMETRY MLT: CPT

## 2020-03-06 PROCEDURE — 90471 IMMUNIZATION ADMIN: CPT

## 2020-03-06 PROCEDURE — 94640 AIRWAY INHALATION TREATMENT: CPT

## 2020-03-06 PROCEDURE — 74011250636 HC RX REV CODE- 250/636: Performed by: INTERNAL MEDICINE

## 2020-03-06 PROCEDURE — 74011000250 HC RX REV CODE- 250: Performed by: HOSPITALIST

## 2020-03-06 PROCEDURE — 90686 IIV4 VACC NO PRSV 0.5 ML IM: CPT | Performed by: HOSPITALIST

## 2020-03-06 RX ORDER — IPRATROPIUM BROMIDE AND ALBUTEROL SULFATE 2.5; .5 MG/3ML; MG/3ML
3 SOLUTION RESPIRATORY (INHALATION)
Status: DISCONTINUED | OUTPATIENT
Start: 2020-03-06 | End: 2020-03-07 | Stop reason: HOSPADM

## 2020-03-06 RX ORDER — IPRATROPIUM BROMIDE AND ALBUTEROL SULFATE 2.5; .5 MG/3ML; MG/3ML
3 SOLUTION RESPIRATORY (INHALATION)
Status: DISCONTINUED | OUTPATIENT
Start: 2020-03-06 | End: 2020-03-06

## 2020-03-06 RX ORDER — IPRATROPIUM BROMIDE AND ALBUTEROL SULFATE 2.5; .5 MG/3ML; MG/3ML
3 SOLUTION RESPIRATORY (INHALATION)
Status: COMPLETED | OUTPATIENT
Start: 2020-03-06 | End: 2020-03-06

## 2020-03-06 RX ORDER — IPRATROPIUM BROMIDE AND ALBUTEROL SULFATE 2.5; .5 MG/3ML; MG/3ML
SOLUTION RESPIRATORY (INHALATION)
Status: DISPENSED
Start: 2020-03-06 | End: 2020-03-06

## 2020-03-06 RX ADMIN — Medication 10 ML: at 14:00

## 2020-03-06 RX ADMIN — BUSPIRONE HYDROCHLORIDE 15 MG: 10 TABLET ORAL at 22:20

## 2020-03-06 RX ADMIN — ARFORMOTEROL TARTRATE 15 MCG: 15 SOLUTION RESPIRATORY (INHALATION) at 07:58

## 2020-03-06 RX ADMIN — BUSPIRONE HYDROCHLORIDE 15 MG: 10 TABLET ORAL at 09:35

## 2020-03-06 RX ADMIN — IPRATROPIUM BROMIDE AND ALBUTEROL SULFATE 3 ML: .5; 3 SOLUTION RESPIRATORY (INHALATION) at 19:46

## 2020-03-06 RX ADMIN — Medication 10 ML: at 22:22

## 2020-03-06 RX ADMIN — ARFORMOTEROL TARTRATE 15 MCG: 15 SOLUTION RESPIRATORY (INHALATION) at 19:52

## 2020-03-06 RX ADMIN — CLONAZEPAM 0.5 MG: 0.5 TABLET ORAL at 04:21

## 2020-03-06 RX ADMIN — PRAZOSIN HYDROCHLORIDE 1 MG: 1 CAPSULE ORAL at 22:30

## 2020-03-06 RX ADMIN — METHYLPREDNISOLONE SODIUM SUCCINATE 60 MG: 125 INJECTION, POWDER, FOR SOLUTION INTRAMUSCULAR; INTRAVENOUS at 13:01

## 2020-03-06 RX ADMIN — ACETAMINOPHEN 650 MG: 325 TABLET, FILM COATED ORAL at 15:43

## 2020-03-06 RX ADMIN — BUDESONIDE 500 MCG: 0.5 SUSPENSION RESPIRATORY (INHALATION) at 00:01

## 2020-03-06 RX ADMIN — BUDESONIDE 500 MCG: 0.5 SUSPENSION RESPIRATORY (INHALATION) at 07:58

## 2020-03-06 RX ADMIN — PANTOPRAZOLE SODIUM 40 MG: 40 INJECTION, POWDER, FOR SOLUTION INTRAVENOUS at 09:44

## 2020-03-06 RX ADMIN — CLONAZEPAM 0.5 MG: 0.5 TABLET ORAL at 22:21

## 2020-03-06 RX ADMIN — INFLUENZA VIRUS VACCINE 0.5 ML: 15; 15; 15; 15 SUSPENSION INTRAMUSCULAR at 04:46

## 2020-03-06 RX ADMIN — SPIRONOLACTONE 50 MG: 25 TABLET ORAL at 09:36

## 2020-03-06 RX ADMIN — METHYLPREDNISOLONE SODIUM SUCCINATE 60 MG: 125 INJECTION, POWDER, FOR SOLUTION INTRAMUSCULAR; INTRAVENOUS at 18:04

## 2020-03-06 RX ADMIN — SPIRONOLACTONE 50 MG: 25 TABLET ORAL at 18:03

## 2020-03-06 RX ADMIN — BUDESONIDE 500 MCG: 0.5 SUSPENSION RESPIRATORY (INHALATION) at 19:52

## 2020-03-06 RX ADMIN — ACETAMINOPHEN 650 MG: 325 TABLET, FILM COATED ORAL at 10:34

## 2020-03-06 RX ADMIN — IPRATROPIUM BROMIDE AND ALBUTEROL SULFATE 3 ML: .5; 3 SOLUTION RESPIRATORY (INHALATION) at 13:41

## 2020-03-06 RX ADMIN — METHYLPREDNISOLONE SODIUM SUCCINATE 60 MG: 125 INJECTION, POWDER, FOR SOLUTION INTRAMUSCULAR; INTRAVENOUS at 04:45

## 2020-03-06 RX ADMIN — QUETIAPINE FUMARATE 300 MG: 300 TABLET ORAL at 22:21

## 2020-03-06 RX ADMIN — Medication 10 ML: at 05:06

## 2020-03-06 RX ADMIN — IPRATROPIUM BROMIDE AND ALBUTEROL SULFATE 3 ML: .5; 3 SOLUTION RESPIRATORY (INHALATION) at 04:38

## 2020-03-06 RX ADMIN — IPRATROPIUM BROMIDE AND ALBUTEROL SULFATE 3 ML: .5; 3 SOLUTION RESPIRATORY (INHALATION) at 07:58

## 2020-03-06 NOTE — PROGRESS NOTES
Chart reviewed. Plan remains home when medically stable. Will cont to follow. Betsy Laguerre RN,ext [phone].

## 2020-03-06 NOTE — PROGRESS NOTES
2000= Received report and assumed care from [facility] . Pt AAOx4 , Pt watching T.V and on his computer. No resp distress noted at this time, No SOB or GOMEZ noted . Pt Denies pain. Pt up to the rest room w/o assist, gait stable when ambulating. Call bell at bed side at reach, bed in low position. No complaints noted at this time. 2140= Meds given see mar, Pt in room on the computer. 2350 = Pt mews score 3,  Pt hrt 111 . will  Continue to monitor. 0030= Pt in bed with eyes closed, easily awakened. 0130= New order given for scheduled breathing treatment. Changed to Q6 hours. 0420 = Pt up to the rest room SOB and GOMEZ noted. Dr. Anel Travis called for PRN of resp treatment , anxiety also noted Klonopin given. VS= BP= 106/73, p= 102, sat 94% 2 liters O2.   0510= No SOB or GOMEZ noted, Breathing treatment given , Pt resting and on the computer. Will continue  to monitor. .  0730= Bedside and Verbal shift change report given to Chantel Reece RN (oncoming nurse) by Jason Roa RN (offgoing nurse).  Report included the following information Intake/Output, MAR, Recent Results, Med Rec Status and Cardiac Rhythm ST.

## 2020-03-06 NOTE — PROGRESS NOTES
Internal Medicine Progress Note    Patient's Name: Delta Abarca  Admit Date: 3/4/2020  Length of Stay: 2      Assessment/Plan     Active Hospital Problems    Diagnosis Date Noted    Acute bronchitis with asthma with acute exacerbation 03/04/2020    PTSD (post-traumatic stress disorder) 11/19/2018    Anxiety 11/12/2018     - Afebrile overnight, WBCs up suspect 2/2 steroids  - Duonebs q4h  - Brovana, pulmicort  - Cont IV steroids  - Cont to observe off antibx  - Replete lytes PRN  - Trend CBC  - Cont acceptable home medications for chronic conditions   - DVT protocol    I have personally reviewed all pertinent labs and films that have officially resulted over the last 24 hours. I have personally checked for all pending labs that are awaiting final results. Subjective     Pt s/e @ bedside  No major events overnight  Took a step back today, feeling SOB  Denies CP    Objective     Visit Vitals  /62 (BP Patient Position: At rest)   Pulse (!) 104   Temp 97.5 °F (36.4 °C)   Resp 18   Ht 5' 8\" (1.727 m)   Wt 96.2 kg (212 lb)   SpO2 93%   BMI 32.23 kg/m²       Physical Exam:  General Appearance: NAD, conversant  Lungs: Scattered wheezing with normal respiratory effort  CV: RRR, no m/r/g  Abdomen: soft, non-tender, normal bowel sounds  Extremities: no cyanosis, no peripheral edema  Neuro: No focal deficits, motor/sensory intact    Lab/Data Reviewed:  BMP:   No results found for: NA, K, CL, CO2, AGAP, GLU, BUN, CREA, GFRAA, GFRNA  CBC:   Lab Results   Component Value Date/Time    WBC 27.1 (H) 03/06/2020 03:58 AM    HGB 14.3 03/06/2020 03:58 AM    HCT 43.6 03/06/2020 03:58 AM     03/06/2020 03:58 AM       Imaging Reviewed:  No results found.     Medications Reviewed:  Current Facility-Administered Medications   Medication Dose Route Frequency    albuterol-ipratropium (DUO-NEB) 2.5 MG-0.5 MG/3 ML  3 mL Nebulization Q6H RT    albuterol-ipratropium (DUO-NEB) 2.5 mg-0.5 mg/3 ml nebulizer solution  busPIRone (BUSPAR) tablet 15 mg  15 mg Oral Q12H    clonazePAM (KlonoPIN) tablet 0.5 mg  0.5 mg Oral QHS PRN    prazosin (MINIPRESS) capsule 1 mg  1 mg Oral QHS    QUEtiapine (SEROquel) tablet 300 mg  300 mg Oral QHS    spironolactone (ALDACTONE) tablet 50 mg  50 mg Oral BID    Please clarify patient's home dose and frequency of buspirone  1 Each Other BID    Please clarify patient's home dose and frequency of prazosin (MINIPRESS) capsule  1 Each Other BID    sodium chloride (NS) flush 5-40 mL  5-40 mL IntraVENous Q8H    sodium chloride (NS) flush 5-40 mL  5-40 mL IntraVENous PRN    acetaminophen (TYLENOL) tablet 650 mg  650 mg Oral Q4H PRN    methylPREDNISolone (PF) (Solu-MEDROL) injection 60 mg  60 mg IntraVENous Q6H    arformoteroL (BROVANA) neb solution 15 mcg  15 mcg Nebulization BID RT    budesonide (PULMICORT) 500 mcg/2 ml nebulizer suspension  500 mcg Nebulization BID RT    melatonin tablet 12 mg  12 mg Oral QHS PRN    pantoprazole (PROTONIX) injection 40 mg  40 mg IntraVENous Q24H           Primo Lucero DO  Internal Medicine, Hospitalist  Pager: [phone]  [address] [place] [address]

## 2020-03-06 NOTE — PROGRESS NOTES
Problem: Falls - Risk of  Goal: *Absence of Falls  Description  Document Cassi Rico Fall Risk and appropriate interventions in the flowsheet. Outcome: Progressing Towards Goal  Note: Fall Risk Interventions:            Medication Interventions: Patient to call before getting OOB    Elimination Interventions: Call light in reach, Bed/chair exit alarm              Problem: Patient Education: Go to Patient Education Activity  Goal: Patient/Family Education  Outcome: Progressing Towards Goal     Problem: Pressure Injury - Risk of  Goal: *Prevention of pressure injury  Description  Document Scott Scale and appropriate interventions in the flowsheet. Outcome: Progressing Towards Goal  Note: Pressure Injury Interventions:             Activity Interventions: Increase time out of bed    Mobility Interventions: HOB 30 degrees or less    Nutrition Interventions: Document food/fluid/supplement intake                     Problem: Pain  Goal: *Control of Pain  Outcome: Progressing Towards Goal     Problem: Gas Exchange - Impaired  Goal: *Absence of hypoxia  Outcome: Progressing Towards Goal

## 2020-03-06 NOTE — PROGRESS NOTES
Patient anxious and having difficulty breathing, diaphoretic with expiratory wheezing. Clonazepam 0.5 mg administered orally for decrease anxiety.

## 2020-03-06 NOTE — PROGRESS NOTES
Bedside and Verbal shift change report given to SHLOMO Nicholson (oncoming nurse) by Phyllis Dunlap (offgoing nurse). Report included the following information SBAR, Kardex, MAR and Recent Results. Patient resting at in bed denies pain skin intact, prefers to be called Shanta     1300 performed chest percussion for 3 minutes with student nurse to help with secretion. 1700 patient cough up thick yellow sputum, states he's starting to feel better.    report given to [facility]

## 2020-03-06 NOTE — CDMP QUERY
Patient admitted with  An Acute exacerbation of asthma, noted to have Bipap administered on admission Please indicate one of the following: ? Acute Respiratory Failure with hypoxia POA now resolved ? Acute Respiratory Failure with hypercapnia POA now resolved ? Acute Respiratory Failure with hypoxia and hypercapnia POA now resolved 
? Other explanation ? Unable to determine Risk: 29 yo male with PMH COPD, asthma Indications: Per ED note 11:15 AM 
Discussed results with patient. Labs and imaging overall unremarkable. He feels only minimal relief after the first 2 nebs. Will give a third neb and dose of mag and then continue to observe. 
  
11:59 AM 
Patient feeling no improvement after 3 nebs. Still pretty significantly to pick tachypneic with wheezing. Will plan for BiPAP at this time. 
  
Admitted to [facility] Unit on Bipap/CPAP Treatment: Stepdown care, Duo neb treatments, Solu-Medrol, inhalers Thank you, 
Ulises Jon RN [phone]

## 2020-03-06 NOTE — PROGRESS NOTES
Problem: Falls - Risk of  Goal: *Absence of Falls  Description  Document Jaida Andino Fall Risk and appropriate interventions in the flowsheet. Outcome: Progressing Towards Goal  Note: Fall Risk Interventions:            Medication Interventions: Patient to call before getting OOB    Elimination Interventions: Call light in reach, Bed/chair exit alarm              Problem: Patient Education: Go to Patient Education Activity  Goal: Patient/Family Education  Outcome: Progressing Towards Goal     Problem: Pressure Injury - Risk of  Goal: *Prevention of pressure injury  Description  Document Scott Scale and appropriate interventions in the flowsheet. Outcome: Progressing Towards Goal  Note: Pressure Injury Interventions:             Activity Interventions: Increase time out of bed    Mobility Interventions: HOB 30 degrees or less    Nutrition Interventions: Document food/fluid/supplement intake                     Problem: Pain  Goal: *Control of Pain  Outcome: Progressing Towards Goal  Goal: *PALLIATIVE CARE:  Alleviation of Pain  Outcome: Progressing Towards Goal     Problem: Gas Exchange - Impaired  Goal: *Absence of hypoxia  Outcome: Progressing Towards Goal

## 2020-03-07 VITALS
HEART RATE: 86 BPM | BODY MASS INDEX: 32.13 KG/M2 | SYSTOLIC BLOOD PRESSURE: 133 MMHG | OXYGEN SATURATION: 95 % | RESPIRATION RATE: 16 BRPM | DIASTOLIC BLOOD PRESSURE: 79 MMHG | HEIGHT: 68 IN | TEMPERATURE: 97.6 F | WEIGHT: 212 LBS

## 2020-03-07 LAB
BASOPHILS # BLD: 0 K/UL (ref 0–0.1)
BASOPHILS NFR BLD: 0 % (ref 0–2)
DIFFERENTIAL METHOD BLD: ABNORMAL
EOSINOPHIL # BLD: 0 K/UL (ref 0–0.4)
EOSINOPHIL NFR BLD: 0 % (ref 0–5)
ERYTHROCYTE [DISTWIDTH] IN BLOOD BY AUTOMATED COUNT: 12.6 % (ref 11.6–14.5)
HCT VFR BLD AUTO: 43.6 % (ref 36–48)
HGB BLD-MCNC: 14.4 G/DL (ref 13–16)
LYMPHOCYTES # BLD: 1.3 K/UL (ref 0.9–3.6)
LYMPHOCYTES NFR BLD: 5 % (ref 21–52)
MCH RBC QN AUTO: 28 PG (ref 24–34)
MCHC RBC AUTO-ENTMCNC: 33 G/DL (ref 31–37)
MCV RBC AUTO: 84.7 FL (ref 74–97)
MONOCYTES # BLD: 0.5 K/UL (ref 0.05–1.2)
MONOCYTES NFR BLD: 2 % (ref 3–10)
NEUTS SEG # BLD: 23.5 K/UL (ref 1.8–8)
NEUTS SEG NFR BLD: 93 % (ref 40–73)
PLATELET # BLD AUTO: 363 K/UL (ref 135–420)
PLATELET COMMENTS,PCOM: ABNORMAL
PMV BLD AUTO: 9.7 FL (ref 9.2–11.8)
RBC # BLD AUTO: 5.15 M/UL (ref 4.7–5.5)
RBC MORPH BLD: ABNORMAL
WBC # BLD AUTO: 25.3 K/UL (ref 4.6–13.2)

## 2020-03-07 PROCEDURE — 36415 COLL VENOUS BLD VENIPUNCTURE: CPT

## 2020-03-07 PROCEDURE — 94761 N-INVAS EAR/PLS OXIMETRY MLT: CPT

## 2020-03-07 PROCEDURE — 74011000250 HC RX REV CODE- 250: Performed by: HOSPITALIST

## 2020-03-07 PROCEDURE — 74011250636 HC RX REV CODE- 250/636: Performed by: INTERNAL MEDICINE

## 2020-03-07 PROCEDURE — 74011250636 HC RX REV CODE- 250/636: Performed by: HOSPITALIST

## 2020-03-07 PROCEDURE — 74011250637 HC RX REV CODE- 250/637: Performed by: HOSPITALIST

## 2020-03-07 PROCEDURE — C9113 INJ PANTOPRAZOLE SODIUM, VIA: HCPCS | Performed by: INTERNAL MEDICINE

## 2020-03-07 PROCEDURE — 94640 AIRWAY INHALATION TREATMENT: CPT

## 2020-03-07 PROCEDURE — 85025 COMPLETE CBC W/AUTO DIFF WBC: CPT

## 2020-03-07 RX ORDER — PREDNISONE 10 MG/1
TABLET ORAL
Qty: 55 TAB | Refills: 0 | Status: SHIPPED | OUTPATIENT
Start: 2020-03-07 | End: 2020-04-08 | Stop reason: ALTCHOICE

## 2020-03-07 RX ORDER — IPRATROPIUM BROMIDE AND ALBUTEROL SULFATE 2.5; .5 MG/3ML; MG/3ML
3 SOLUTION RESPIRATORY (INHALATION)
Qty: 30 NEBULE | Refills: 0 | Status: SHIPPED | OUTPATIENT
Start: 2020-03-07 | End: 2020-04-08

## 2020-03-07 RX ADMIN — BUSPIRONE HYDROCHLORIDE 15 MG: 10 TABLET ORAL at 09:20

## 2020-03-07 RX ADMIN — PANTOPRAZOLE SODIUM 40 MG: 40 INJECTION, POWDER, FOR SOLUTION INTRAVENOUS at 06:23

## 2020-03-07 RX ADMIN — Medication 10 ML: at 13:25

## 2020-03-07 RX ADMIN — IPRATROPIUM BROMIDE AND ALBUTEROL SULFATE 3 ML: .5; 3 SOLUTION RESPIRATORY (INHALATION) at 01:24

## 2020-03-07 RX ADMIN — METHYLPREDNISOLONE SODIUM SUCCINATE 60 MG: 125 INJECTION, POWDER, FOR SOLUTION INTRAMUSCULAR; INTRAVENOUS at 05:36

## 2020-03-07 RX ADMIN — ARFORMOTEROL TARTRATE 15 MCG: 15 SOLUTION RESPIRATORY (INHALATION) at 08:18

## 2020-03-07 RX ADMIN — METHYLPREDNISOLONE SODIUM SUCCINATE 60 MG: 125 INJECTION, POWDER, FOR SOLUTION INTRAMUSCULAR; INTRAVENOUS at 00:35

## 2020-03-07 RX ADMIN — SPIRONOLACTONE 50 MG: 25 TABLET ORAL at 09:21

## 2020-03-07 RX ADMIN — Medication 10 ML: at 05:36

## 2020-03-07 RX ADMIN — BUDESONIDE 500 MCG: 0.5 SUSPENSION RESPIRATORY (INHALATION) at 08:18

## 2020-03-07 RX ADMIN — IPRATROPIUM BROMIDE AND ALBUTEROL SULFATE 3 ML: .5; 3 SOLUTION RESPIRATORY (INHALATION) at 08:18

## 2020-03-07 RX ADMIN — METHYLPREDNISOLONE SODIUM SUCCINATE 60 MG: 125 INJECTION, POWDER, FOR SOLUTION INTRAMUSCULAR; INTRAVENOUS at 13:24

## 2020-03-07 NOTE — PROGRESS NOTES
1930 = Received Pt AAOX4, no resp distress noted. Pt denies pain at this time. 2000= Pt watching T.v  And on his computer . 2230 = Meds given as ordered. 0000= Pt watching T.V, and on his computer, Denies pain. 0400= Pt asleep in bed, easily awakened, will continue to monitor. 0600= No change noted, meds given. 0735= Report given to [address] [place] [address]

## 2020-03-07 NOTE — PROGRESS NOTES
Problem: Discharge Planning  Goal: *Discharge to safe environment  Outcome: Resolved/Met     Home    Care Management Interventions  PCP Verified by CM: Yes(Dr. Nicolas Jenkins)  Last Visit to PCP: 02/19/19  Palliative Care Criteria Met (RRAT>21 & CHF Dx)?: No  Mode of Transport at Discharge: Other (see comment)(friends)  Transition of Care Consult (CM Consult): Discharge Planning  Discharge Durable Medical Equipment: No  Physical Therapy Consult: No  Occupational Therapy Consult: No  Speech Therapy Consult: No  Current Support Network: Other(lives with friends)  Confirm Follow Up Transport: Self  Discharge Location  Discharge Placement: Home     DEBORAH Rae  [facility]  [phone], Pager [phone]  [email or web address]

## 2020-03-07 NOTE — PROGRESS NOTES
Problem: Falls - Risk of  Goal: *Absence of Falls  Description  Document Neeta Sanford Fall Risk and appropriate interventions in the flowsheet. Outcome: Progressing Towards Goal  Note: Fall Risk Interventions:            Medication Interventions: Patient to call before getting OOB    Elimination Interventions: Call light in reach              Problem: Patient Education: Go to Patient Education Activity  Goal: Patient/Family Education  Outcome: Progressing Towards Goal     Problem: Pressure Injury - Risk of  Goal: *Prevention of pressure injury  Description  Document Scott Scale and appropriate interventions in the flowsheet. Outcome: Progressing Towards Goal  Note: Pressure Injury Interventions:             Activity Interventions: Increase time out of bed    Mobility Interventions: HOB 30 degrees or less    Nutrition Interventions: Document food/fluid/supplement intake                     Problem: Pain  Goal: *Control of Pain  Outcome: Progressing Towards Goal       Problem: Gas Exchange - Impaired  Goal: *Absence of hypoxia  Outcome: Progressing Towards Goal

## 2020-03-07 NOTE — DISCHARGE INSTRUCTIONS
Patient Education        Learning About Anxiety Disorders  What are anxiety disorders? Anxiety disorders are a type of medical problem. They cause severe anxiety. When you feel anxious, you feel that something bad is about to happen. This feeling interferes with your life. These disorders include:  · Generalized anxiety disorder. You feel worried and stressed about many everyday events and activities. This goes on for several months and disrupts your life on most days. · Panic disorder. You have repeated panic attacks. A panic attack is a sudden, intense fear or anxiety. It may make you feel short of breath. Your heart may pound. · Social anxiety disorder. You feel very anxious about what you will say or do in front of people. For example, you may be scared to talk or eat in public. This problem affects your daily life. · Phobias. You are very scared of a specific object, situation, or activity. For example, you may fear spiders, high places, or small spaces. What are the symptoms? Generalized anxiety disorder  Symptoms may include:  · Feeling worried and stressed about many things almost every day. · Feeling tired or irritable. You may have a hard time concentrating. · Having headaches or muscle aches. · Having a hard time getting to sleep or staying asleep. Panic disorder  You may have repeated panic attacks when there is no reason for feeling afraid. You may change your daily activities because you worry that you will have another attack. Symptoms may include:  · Intense fear, terror, or anxiety. · Trouble breathing or very fast breathing. · Chest pain or tightness. · A heartbeat that races or is not regular. Social anxiety disorder  Symptoms may include:  · Fear about a social situation, such as eating in front of others or speaking in public. You may worry a lot. Or you may be afraid that something bad will happen. · Anxiety that can cause you to blush, sweat, and feel shaky.   · A heartbeat that is faster than normal.  · A hard time focusing. Phobias  Symptoms may include:  · More fear than most people of being around an object, being in a situation, or doing an activity. You might also be stressed about the chance of being around the thing you fear. · Worry about losing control, panicking, fainting, or having physical symptoms like a faster heartbeat when you are around the situation or object. How are these disorders treated? Anxiety disorders can be treated with medicines or counseling. A combination of both may be used. Medicines may include:  · Antidepressants. These may help your symptoms by keeping chemicals in your brain in balance. · Benzodiazepines. These may give you short-term relief of your symptoms. Some people use cognitive-behavioral therapy. A therapist helps you learn to change stressful or bad thoughts into helpful thoughts. Lead a healthy lifestyle  A healthy lifestyle may help you feel better. · Get at least 30 minutes of exercise on most days of the week. Walking is a good choice. · Eat a healthy diet. Include fruits, vegetables, lean proteins, and whole grains in your diet each day. · Try to go to bed at the same time every night. Try for 8 hours of sleep a night. · Find ways to manage stress. Try relaxation exercises. · Avoid alcohol and illegal drugs. Follow-up care is a key part of your treatment and safety. Be sure to make and go to all appointments, and call your doctor if you are having problems. It's also a good idea to know your test results and keep a list of the medicines you take. Where can you learn more? Go to http://jason-maritza.info/. Enter E068 in the search box to learn more about \"Learning About Anxiety Disorders. \"  Current as of: May 28, 2019  Content Version: 12.2  © 2402-0497 [employer].  Care instructions adapted under license by [employer] (which disclaims liability or warranty for this information). If you have questions about a medical condition or this instruction, always ask your healthcare professional. [address] any warranty or liability for your use of this information. Patient Education     Learning About Asthma Triggers  What are triggers? When you have asthma, certain things can make your symptoms worse. These are called triggers. They include:  · Cigarette smoke or air pollution. · Things you are allergic to, such as:  ¨ Pollen, mold, or dust mites. ¨ Pet hair, skin, or saliva. · Illnesses, like colds, flu, or pneumonia. · Exercise. · Dry, cold air. How do triggers affect asthma? Triggers can make it harder for your lungs to work as they should and can lead to sudden difficulty breathing and other symptoms. When you are around a trigger, an asthma attack is more likely. If your symptoms are severe, you may need emergency treatment or have to go to the hospital for treatment. If you know what your triggers are and can avoid them, you may be able to prevent asthma attacks, reduce how often you have them, and make them less severe. What can you do to avoid triggers? The first thing is to know your triggers. When you are having symptoms, note the things around you that might be causing them. Then look for patterns in what may be triggering your symptoms. Record your triggers on a piece of paper or in an asthma diary. When you have your list of possible triggers, work with your doctor to find ways to avoid them. You also can check how well your lungs are working by measuring your peak expiratory flow (PEF) throughout the day. Your PEF may drop when you are near things that trigger symptoms. Here are some ways to avoid a few common triggers. · Do not smoke or allow others to smoke around you. If you need help quitting, talk to your doctor about stop-smoking programs and medicines. These can increase your chances of quitting for good.   · If there is a lot of pollution, pollen, or dust outside, stay at home and keep your windows closed. Use an air conditioner or air filter in your home. Check your local weather report or newspaper for air quality and pollen reports. · Get a flu shot every year. Talk to your doctor about getting a pneumococcal shot. Wash your hands often to prevent infections. · Avoid exercising outdoors in cold weather. If you are outdoors in cold weather, wear a scarf around your face and breathe through your nose. How can you manage an asthma attack? · If you have an asthma action plan, follow the plan. In general:  ¨ Use your quick-relief inhaler as directed by your doctor. If your symptoms do not get better after you use your medicine, have someone take you to the emergency room. Call an ambulance if needed. ¨ If your doctor has given you other inhaled medicines or steroid pills, take them as directed. Where can you learn more? Go to [employer]  Enter M564 in the search box to learn more about \"Learning About Asthma Triggers. \"   © 3033-1582 [employer]. Care instructions adapted under license by [employer] (which disclaims liability or warranty for this information). This care instruction is for use with your licensed healthcare professional. If you have questions about a medical condition or this instruction, always ask your healthcare professional. [place] [address] any warranty or liability for your use of this information. Content Version: 49.9.245039; Last Revised: February 23, 2012                 Patient Education        Asthma Attack: Care Instructions  Your Care Instructions    During an asthma attack, the airways swell and narrow. This makes it hard to breathe. Severe asthma attacks can be life-threatening, but you can help prevent them by keeping your asthma under control and treating symptoms before they get bad.  Symptoms include being short of breath, having chest tightness, coughing, and wheezing. Noting and treating these symptoms can also help you avoid future trips to the emergency room. The doctor has checked you carefully, but problems can develop later. If you notice any problems or new symptoms, get medical treatment right away. Follow-up care is a key part of your treatment and safety. Be sure to make and go to all appointments, and call your doctor if you are having problems. It's also a good idea to know your test results and keep a list of the medicines you take. How can you care for yourself at home? · Follow your asthma action plan to prevent and treat attacks. If you don't have an asthma action plan, work with your doctor to create one. · Take your asthma medicines exactly as prescribed. Talk to your doctor right away if you have any questions about how to take them. ? Use your quick-relief medicine when you have symptoms of an attack. Quick-relief medicine is usually an albuterol inhaler. Some people need to use quick-relief medicine before they exercise. ? Take your controller medicine every day, not just when you have symptoms. Controller medicine is usually an inhaled corticosteroid. The goal is to prevent problems before they occur. Don't use your controller medicine to treat an attack that has already started. It doesn't work fast enough to help. ? If your doctor prescribed corticosteroid pills to use during an attack, take them exactly as prescribed. It may take hours for the pills to work, but they may make the episode shorter and help you breathe better. ? Keep your quick-relief medicine with you at all times. · Talk to your doctor before using other medicines. Some medicines, such as aspirin, can cause asthma attacks in some people. · If you have a peak flow meter, use it to check how well you are breathing. This can help you predict when an asthma attack is going to occur.  Then you can take medicine to prevent the asthma attack or make it less severe. · Do not smoke or allow others to smoke around you. Avoid smoky places. Smoking makes asthma worse. If you need help quitting, talk to your doctor about stop-smoking programs and medicines. These can increase your chances of quitting for good. · Learn what triggers an asthma attack for you, and avoid the triggers when you can. Common triggers include colds, smoke, air pollution, dust, pollen, mold, pets, cockroaches, stress, and cold air. · Avoid colds and the flu. Get a pneumococcal vaccine shot. If you have had one before, ask your doctor if you need a second dose. Get a flu vaccine every fall. If you must be around people with colds or the flu, wash your hands often. When should you call for help? Call 911 anytime you think you may need emergency care. For example, call if:    · You have severe trouble breathing.    Call your doctor now or seek immediate medical care if:    · Your symptoms do not get better after you have followed your asthma action plan.     · You have new or worse trouble breathing.     · Your coughing and wheezing get worse.     · You cough up dark brown or bloody mucus (sputum).     · You have a new or higher fever.    Watch closely for changes in your health, and be sure to contact your doctor if:    · You need to use quick-relief medicine on more than 2 days a week (unless it is just for exercise).     · You cough more deeply or more often, especially if you notice more mucus or a change in the color of your mucus.     · You are not getting better as expected. Where can you learn more? Go to http://jason-maritza.info/. Enter P873 in the search box to learn more about \"Asthma Attack: Care Instructions. \"  Current as of: June 9, 2019  Content Version: 12.2  © 3819-1771 [employer]. Care instructions adapted under license by [employer] (which disclaims liability or warranty for this information).  If you have questions about a medical condition or this instruction, always ask your healthcare professional. [address] any warranty or liability for your use of this information. Patient Education        COPD and Asthma: Care Instructions  Your Care Instructions    Some people who have chronic obstructive pulmonary disease (COPD) also have asthma. Both of these problems can damage your lungs. This makes it very important to control them. Asthma causes the airways that lead to the lungs to swell and become narrow. This makes it hard to breathe. You may wheeze or cough. If you have a bad attack, you may need emergency care. There are two parts to treating asthma. · Controlling asthma over the long term. · Treating attacks when they occur. You and your doctor can make an asthma treatment plan that will help. This plan tells you the medicines you take every day to reduce the swelling in your airways and prevent attacks. It also tells you what to do if you have an asthma attack. Follow-up care is a key part of your treatment and safety. Be sure to make and go to all appointments, and call your doctor if you are having problems. It's also a good idea to know your test results and keep a list of the medicines you take. How can you care for yourself at home? To control asthma over the long term  Medicines  Controller medicines reduce swelling in your lungs. They also prevent asthma attacks. Take your controller medicine exactly as prescribed. Talk to your doctor if you have any problems with your medicine. · Inhaled corticosteroid is a common and effective controller medicine. Using it the right way can prevent or reduce most side effects. · Take your controller medicine every day, not just when you have symptoms. This helps prevent problems before they occur. · Always bring your asthma medicine with you when you travel.   · Your doctor may prescribe long-acting medicine that combines a corticosteroid with a beta2-agonist. Follow your doctor's instructions exactly about how to take a long-acting medicine. Examples include:  ? Fluticasone and salmeterol (Advair). ? Budesonide and formoterol (Symbicort). · Do not depend on your controller medicines to stop an asthma attack that has already started. They do not work fast enough to help. · Your doctor may also prescribe anticholinergic inhalers. These include ipratropium (Atrovent) and tiotropium (Spiriva). Education  · Learn what sets off an asthma attack. Avoid these triggers when you can. Common triggers include smoke, air pollution, pollen, animal dander, colds, stress, and cold air. · Do not smoke. Smoking can make COPD and asthma worse. If you need help quitting, talk to your doctor about stop-smoking programs and medicines. These can increase your chances of quitting for good. · Check yourself for symptoms to know which step to follow in your action plan. Watch for things like being short of breath, having chest tightness, coughing, and wheezing. Also notice if symptoms wake you up at night or if you get tired quickly when you exercise. · You may want to learn how to use a peak flow meter. This measures how open your airways are. It may help you know when you will have an asthma attack. To treat attacks when they occur  Use your asthma action plan when you have an attack. Your quick-relief medicine, such as albuterol, will stop an asthma attack. It relaxes the muscles that get tight around the airways. · Take your quick-relief medicine exactly as prescribed. Talk with your doctor if you have any problems with your medicine. · Keep this medicine with you at all times. · You may need to use this medicine before you exercise. If your doctor prescribed corticosteroid pills to use during an attack, take them as directed. They may take hours to work, but they may shorten the attack and help you breathe better. When should you call for help?   Call [phone] anytime you think you may need emergency care. For example, call if:    · You have severe trouble breathing.    Call your doctor now or seek immediate medical care if:    · You have new or worse shortness of breath.     · You are coughing more deeply or more often, especially if you notice more mucus or a change in the color of your mucus.     · You cough up blood.     · You have new or increased swelling in your legs or belly.     · You have a fever.     · You have used your quick-relief medicine but you are still short of breath.    Watch closely for changes in your health, and be sure to contact your doctor if you have any problems. Where can you learn more? Go to http://jason-maritza.info/. Enter A350 in the search box to learn more about \"COPD and Asthma: Care Instructions. \"  Current as of: June 9, 2019  Content Version: 12.2  © 7747-1339 [employer]. Care instructions adapted under license by [employer] (which disclaims liability or warranty for this information). If you have questions about a medical condition or this instruction, always ask your healthcare professional. [address] any warranty or liability for your use of this information.

## 2020-03-07 NOTE — PROGRESS NOTES
Internal Medicine Discharge Summary        Patient: Joss Garcia    YOB: 1990    Age:  30 y.o. Admit Date: 3/4/2020    Discharge Date: 3/7/2020    LOS:  LOS: 3 days     Discharge To:  Home    Consults: None    Admission Diagnoses: Acute bronchitis with asthma with acute exacerbation [J20.9, J45.901]    Discharge Diagnoses:    Problem List as of 3/7/2020 Date Reviewed: 4/12/2019          Codes Class Noted - Resolved    * (Principal) Acute bronchitis with asthma with acute exacerbation ICD-10-CM: J20.9, J45.901  ICD-9-CM: 466.0, 493.92  3/4/2020 - Present        PTSD (post-traumatic stress disorder) ICD-10-CM: F43.10  ICD-9-CM: 309.81  11/19/2018 - Present        Anxiety ICD-10-CM: F41.9  ICD-9-CM: 300.00  11/12/2018 - Present        Severe persistent asthma without complication [id number]: S64.22  ICD-9-CM: 493.90  1/9/2018 - Present        Leukocytosis ICD-10-CM: D72.829  ICD-9-CM: 288.60  1/9/2018 - Present        RESOLVED: Erythrocytosis ICD-10-CM: D75.1  ICD-9-CM: 289.0  11/12/2018 - 11/19/2018        RESOLVED: Respiratory failure ([address] ICD-10-CM: J96.90  ICD-9-CM: 518.81  11/11/2018 - 11/19/2018        RESOLVED: Severe asthma with acute exacerbation ICD-10-CM: J45.901  ICD-9-CM: 493.92  11/11/2018 - 11/19/2018        RESOLVED: Acute severe exacerbation of asthma ICD-10-CM: J45.901  ICD-9-CM: 493.92  12/31/2017 - 1/9/2018        RESOLVED: Acute respiratory acidosis ICD-10-CM: E87.2  ICD-9-CM: 276.2  12/31/2017 - 1/9/2018        RESOLVED: Status asthmaticus ICD-10-CM: J45.902  ICD-9-CM: 493.91  9/5/2017 - 1/9/2018        RESOLVED: Lactic acidosis ICD-10-CM: G75.9  ICD-9-CM: 276.2  9/5/2017 - 1/9/2018        RESOLVED: Metabolic acidosis with respiratory alkalosis ICD-10-CM: E87.2, E87.3  ICD-9-CM: 276.2, 276.3  9/5/2017 - 1/9/2018              Discharge Condition:  Improved    Procedures: None         HPI: Joss Garcia is a 30 y.o. male with a PMHx of PTSD, asthma who presented to the ED with the acute onset of SOB. He states this started about 1.5 weeks ago and has persistently gotten worse up until presentation. He had a non-productive cough as well. He had pleuritic pain from coughing. It got to the point that he came to ED yesterday early AM and was treated for several hours and sent home with oral prednisone. He returns today without improvement and still feeling very short of breath. He was not hypoxic, but he did have increased WOB and thus BIPAP was placed. He denies any fever or chills. He denies any sick contacts. Imaging in ED was normal. He will be admitted to step down for asthma exac. Hospital Course:    Acute exacerbation of asthma 2/2 viral bronchitis - Initially treated with BiPAP but able to transition off. Treated with IV steroids, duonebs around the clock. Also on brovana/pulmicort. He improved over the next 72 hours. He was no longer wheezing on exam and eager to get home. He was transitioned to oral prednisone taper. He was afebrile. His white count was up and likely 2/2 steroids, and was trending down on discharge    The rest of the patient's chronic conditions were managed appropriately during their admission. They were medically stable at the time of discharge.     Visit Vitals  /79 (BP 1 Location: Right arm, BP Patient Position: Sitting)   Pulse 86   Temp 97.6 °F (36.4 °C)   Resp 16   Ht 5' 8\" (1.727 m)   Wt 96.2 kg (212 lb)   SpO2 95%   BMI 32.23 kg/m²       Physical Exam at Discharge:  General Appearance: NAD, conversant  HENT: normocephalic/atraumatic, moist mucus membranes  Lungs: CTA with normal respiratory effort  CV: RRR, no m/r/g  Abdomen: soft, non-tender, normal bowel sounds  Extremities: no cyanosis, no peripheral edema  Neuro: moves all extremities, no focal deficits  Psych: appropriate affect, alert and oriented to person, place and time    Labs Prior to Discharge:  Labs: Results:       Chemistry Recent Labs 03/05/20  0645   *      K 4.3      CO2 23   BUN 15   CREA 1.11   CA 8.0*   AGAP 8   BUCR 14      CBC w/Diff Recent Labs     03/07/20  0353 03/06/20  0358 03/05/20  0645   WBC 25.3* 27.1* 23.6*   RBC 5.15 5.13 5.42   HGB 14.4 14.3 15.2   HCT 43.6 43.6 45.7    378 343   GRANS 93* 93* 90*   LYMPH 5* 4* 7*   EOS 0 0 0      Cardiac Enzymes No results for input(s): CPK, CKND1, JENNIFER in the last 72 hours. No lab exists for component: CKRMB, TROIP   Coagulation No results for input(s): PTP, INR, APTT, INREXT in the last 72 hours. Lipid Panel No results found for: CHOL, CHOLPOCT, CHOLX, CHLST, CHOLV, 156308, HDL, HDLP, LDL, LDLC, DLDLP, 035914, VLDLC, VLDL, TGLX, TRIGL, TRIGP, TGLPOCT, CHHD, CHHDX   BNP No results for input(s): BNPP in the last 72 hours. Liver Enzymes No results for input(s): TP, ALB, TBIL, AP, SGOT, GPT in the last 72 hours. No lab exists for component: DBIL   Thyroid Studies No results found for: T4, T3U, TSH, TSHEXT         Significant Imaging:  Xr Chest Port    Result Date: 3/4/2020  EXAM: XR CHEST PORT CLINICAL INDICATION/HISTORY: Wheezing -Additional: None COMPARISON: 12/29/2019 TECHNIQUE: Frontal view of the chest _______________ FINDINGS: HEART AND MEDIASTINUM: Normal cardiac size and mediastinal contours. LUNGS AND PLEURAL SPACES: No focal pneumonic consolidation, pneumothorax, or pleural effusion.  BONY THORAX AND SOFT TISSUES: No acute osseous abnormality _______________     IMPRESSION: No acute findings in the chest.            Discharge Medications:     Current Discharge Medication List      START taking these medications    Details   predniSONE (DELTASONE) 10 mg tablet Take 5 tabs PO daily x 5 d, then 4 tabs PO daily x 4 d, then 3 tabs PO daily x 3 d, then 2 tabs PO daily x 2 d, then 1 tab PO daily x 1 d  Qty: 55 Tab, Refills: 0      albuterol-ipratropium (DUO-NEB) 2.5 mg-0.5 mg/3 ml nebu 3 mL by Nebulization route every four (4) hours as needed for Wheezing. Qty: 30 Nebule, Refills: 0         CONTINUE these medications which have NOT CHANGED    Details   clonazePAM (KLONOPIN) 0.5 mg tablet Take 0.5 mg by mouth nightly as needed for Anxiety. progesterone (PROMETRIUM) 100 mg capsule Take 100 mg by mouth daily. spironolactone (ALDACTONE) 50 mg tablet Take 50 mg by mouth two (2) times a day. QUEtiapine (SEROQUEL) 300 mg tablet Take 300 mg by mouth nightly. busPIRone (BUSPAR) 15 mg tablet Take 15 mg by mouth three (3) times daily. estradioL (ESTRACE) 2 mg tablet Take 2 mg by mouth daily. prazosin (MINIPRESS) 1 mg capsule Take 1 mg by mouth nightly. albuterol (PROVENTIL HFA, VENTOLIN HFA, PROAIR HFA) 90 mcg/actuation inhaler Take 2 Puffs by inhalation every four (4) hours as needed for Wheezing. Qty: 1 Inhaler, Refills: 0      ipratropium-albuteroL (COMBIVENT RESPIMAT)  mcg/actuation inhaler Take 1 Puff by inhalation every six (6) hours. Qty: 1 Inhaler, Refills: 0      cetirizine (ZYRTEC) 10 mg tablet Take 1 Tab by mouth daily. Qty: 90 Tab, Refills: 3    Associated Diagnoses: Severe persistent asthma without complication      budesonide-formoterol (SYMBICORT) 160-4.5 mcg/actuation HFAA Take 2 Puffs by inhalation two (2) times a day. Qty: 1 Inhaler, Refills: 10    Associated Diagnoses: Severe persistent asthma without complication      traZODone (DESYREL) 50 mg tablet Take 1 Tab by mouth nightly. Qty: 90 Tab, Refills: 0    Associated Diagnoses: Anxiety      ALPRAZolam (XANAX) 0.25 mg tablet Take 1 Tab by mouth two (2) times daily as needed for Anxiety. Max Daily Amount: 0.5 mg.  Qty: 12 Tab, Refills: 0    Associated Diagnoses: Anxiety      fluticasone (FLONASE) 50 mcg/actuation nasal spray 1 [place] by Both Nostrils route two (2) times a day.   Qty: 1 Bottle, Refills: 11    Associated Diagnoses: Poorly controlled severe persistent asthma without complication; Elevated IgE level             Activity: Activity as tolerated    Diet: Resume previous diet    Wound Care: None needed    Follow-up:   Please follow up with your PCP within 7 days to discuss your recent hospitalization. Patient to arrange.          Total time spent including time spent on final examination and discharge discussion, discharge documentation and records reviewed and medication reconciliation: > 30 minutes    Georgina Mcgee DO  Internal Medicine, Hospitalist  Pager: [phone] [facility]

## 2020-03-08 NOTE — ROUTINE PROCESS
0720-report received, no distress noted, voiced no complaints at this time. 0901-assessment completed, call bell within reach, no distress noted. 1230-no change in condition. Discharge instructions given. Verbalized understanding, discharge home via wheel chair.

## 2020-03-14 NOTE — DISCHARGE SUMMARY
Internal Medicine Discharge Summary        Patient: Lencho Henry    YOB: 1990    Age:  30 y.o. Admit Date: 3/4/2020    Discharge Date: 3/13/2020    LOS:  LOS: 3 days     Discharge To:  Home    Consults: None    Admission Diagnoses: Acute bronchitis with asthma with acute exacerbation [J20.9, J45.901]    Discharge Diagnoses:    Problem List as of 3/7/2020 Date Reviewed: 4/12/2019          Codes Class Noted - Resolved    Severe persistent asthma without complication TTV-92-: D85.05  ICD-9-CM: 493.90  1/9/2018 - Present        Leukocytosis ICD-10-CM: D72.829  ICD-9-CM: 288.60  1/9/2018 - Present        RESOLVED: Erythrocytosis ICD-10-CM: D75.1  ICD-9-CM: 289.0  11/12/2018 - 11/19/2018        RESOLVED: Respiratory failure ([address] ICD-10-CM: J96.90  ICD-9-CM: 518.81  11/11/2018 - 11/19/2018        RESOLVED: Severe asthma with acute exacerbation ICD-10-CM: J45.901  ICD-9-CM: 493.92  11/11/2018 - 11/19/2018        RESOLVED: Acute severe exacerbation of asthma ICD-10-CM: J45.901  ICD-9-CM: 493.92  12/31/2017 - 1/9/2018        RESOLVED: Acute respiratory acidosis ICD-10-CM: E87.2  ICD-9-CM: 276.2  12/31/2017 - 1/9/2018        RESOLVED: Status asthmaticus ICD-10-CM: J45.902  ICD-9-CM: 493.91  9/5/2017 - 1/9/2018        RESOLVED: Lactic acidosis ICD-10-CM: N79.5  ICD-9-CM: 276.2  9/5/2017 - 1/9/2018        RESOLVED: Metabolic acidosis with respiratory alkalosis ICD-10-CM: E87.2, E87.3  ICD-9-CM: 276.2, 276.3  9/5/2017 - 1/9/2018        * (Principal) Acute bronchitis with asthma with acute exacerbation ICD-10-CM: J20.9, J45.901  ICD-9-CM: 466.0, 493.92  3/4/2020 - Present        PTSD (post-traumatic stress disorder) ICD-10-CM: F43.10  ICD-9-CM: 309.81  11/19/2018 - Present        Anxiety ICD-10-CM: F41.9  ICD-9-CM: 300.00  11/12/2018 - Present              Discharge Condition:  Improved    Procedures: None         HPI: Lencho Henry is a 30 y.o. male with a PMHx of PTSD, asthma who presented to the ED with the acute onset of SOB. He states this started about 1.5 weeks ago and has persistently gotten worse up until presentation. He had a non-productive cough as well. He had pleuritic pain from coughing. It got to the point that he came to ED yesterday early AM and was treated for several hours and sent home with oral prednisone. He returns today without improvement and still feeling very short of breath. He was not hypoxic, but he did have increased WOB and thus BIPAP was placed. He denies any fever or chills. He denies any sick contacts. Imaging in ED was normal. He will be admitted to step down for asthma exac. Hospital Course:    Acute exacerbation of asthma 2/2 viral bronchitis - Initially treated with BiPAP but able to transition off. Treated with IV steroids, duonebs around the clock. Also on brovana/pulmicort. He improved over the next 72 hours. He was no longer wheezing on exam and eager to get home. He was transitioned to oral prednisone taper. He was afebrile. His white count was up and likely 2/2 steroids, and was trending down on discharge    The rest of the patient's chronic conditions were managed appropriately during their admission. They were medically stable at the time of discharge.     Visit Vitals  /79 (BP 1 Location: Right arm, BP Patient Position: Sitting)   Pulse 86   Temp 97.6 °F (36.4 °C)   Resp 16   Ht 5' 8\" (1.727 m)   Wt 96.2 kg (212 lb)   SpO2 95%   BMI 32.23 kg/m²       Physical Exam at Discharge:  General Appearance: NAD, conversant  HENT: normocephalic/atraumatic, moist mucus membranes  Lungs: CTA with normal respiratory effort  CV: RRR, no m/r/g  Abdomen: soft, non-tender, normal bowel sounds  Extremities: no cyanosis, no peripheral edema  Neuro: moves all extremities, no focal deficits  Psych: appropriate affect, alert and oriented to person, place and time    Labs Prior to Discharge:  Labs: Results:       Chemistry No results for input(s): GLU, NA, K, CL, CO2, BUN, CREA, CA, AGAP, BUCR, TBIL, GPT, AP, TP, ALB, GLOB, AGRAT in the last 72 hours. CBC w/Diff No results for input(s): WBC, RBC, HGB, HCT, PLT, GRANS, LYMPH, EOS, HGBEXT, HCTEXT, PLTEXT, HGBEXT, HCTEXT, PLTEXT in the last 72 hours. Cardiac Enzymes No results for input(s): CPK, CKND1, JENNIFER in the last 72 hours. No lab exists for component: CKRMB, TROIP   Coagulation No results for input(s): PTP, INR, APTT, INREXT, INREXT in the last 72 hours. Lipid Panel No results found for: CHOL, CHOLPOCT, CHOLX, CHLST, CHOLV, 147609, HDL, HDLP, LDL, LDLC, DLDLP, 720793, VLDLC, VLDL, TGLX, TRIGL, TRIGP, TGLPOCT, CHHD, CHHDX   BNP No results for input(s): BNPP in the last 72 hours. Liver Enzymes No results for input(s): TP, ALB, TBIL, AP, SGOT, GPT in the last 72 hours. No lab exists for component: DBIL   Thyroid Studies No results found for: T4, T3U, TSH, TSHEXT, TSHEXT         Significant Imaging:  Xr Chest Port    Result Date: 3/4/2020  EXAM: XR CHEST PORT CLINICAL INDICATION/HISTORY: Wheezing -Additional: None COMPARISON: 12/29/2019 TECHNIQUE: Frontal view of the chest _______________ FINDINGS: HEART AND MEDIASTINUM: Normal cardiac size and mediastinal contours. LUNGS AND PLEURAL SPACES: No focal pneumonic consolidation, pneumothorax, or pleural effusion.  BONY THORAX AND SOFT TISSUES: No acute osseous abnormality _______________     IMPRESSION: No acute findings in the chest.            Discharge Medications:     Discharge Medication List as of 3/7/2020  1:36 PM      START taking these medications    Details   predniSONE (DELTASONE) 10 mg tablet Take 5 tabs PO daily x 5 d, then 4 tabs PO daily x 4 d, then 3 tabs PO daily x 3 d, then 2 tabs PO daily x 2 d, then 1 tab PO daily x 1 d, Print, Disp-55 Tab, R-0      albuterol-ipratropium (DUO-NEB) 2.5 mg-0.5 mg/3 ml nebu 3 mL by Nebulization route every four (4) hours as needed for Wheezing., [place], Disp-30 Nebule, R-0         CONTINUE these medications which have NOT CHANGED    Details   clonazePAM (KLONOPIN) 0.5 mg tablet Take 0.5 mg by mouth nightly as needed for Anxiety. , Historical Med      progesterone (PROMETRIUM) 100 mg capsule Take 100 mg by mouth daily. , Historical Med      spironolactone (ALDACTONE) 50 mg tablet Take 50 mg by mouth two (2) times a day., Historical Med      QUEtiapine (SEROQUEL) 300 mg tablet Take 300 mg by mouth nightly., Historical Med      busPIRone (BUSPAR) 15 mg tablet Take 15 mg by mouth three (3) times daily. , Historical Med      estradioL (ESTRACE) 2 mg tablet Take 2 mg by mouth daily. , Historical Med      prazosin (MINIPRESS) 1 mg capsule Take 1 mg by mouth nightly., Historical Med      albuterol (PROVENTIL HFA, VENTOLIN HFA, PROAIR HFA) 90 mcg/actuation inhaler Take 2 Puffs by inhalation every four (4) hours as needed for Wheezing., Print, Disp-1 Inhaler, R-0      ipratropium-albuteroL (COMBIVENT RESPIMAT)  mcg/actuation inhaler Take 1 Puff by inhalation every six (6) hours. , Print, Disp-1 Inhaler, R-0      cetirizine (ZYRTEC) 10 mg tablet Take 1 Tab by mouth daily. , Normal, Disp-90 Tab, R-3      budesonide-formoterol (SYMBICORT) 160-4.5 mcg/actuation HFAA Take 2 Puffs by inhalation two (2) times a day., Normal, Disp-1 Inhaler, R-10      traZODone (DESYREL) 50 mg tablet Take 1 Tab by mouth nightly., Normal, Disp-90 Tab, R-0      ALPRAZolam (XANAX) 0.25 mg tablet Take 1 Tab by mouth two (2) times daily as needed for Anxiety. Max Daily Amount: 0.5 mg., Print, Disp-12 Tab, R-0      fluticasone (FLONASE) 50 mcg/actuation nasal spray 1 [place] by Both Nostrils route two (2) times a day., Normal, Disp-1 Bottle, R-11             Activity: Activity as tolerated    Diet: Resume previous diet    Wound Care: None needed    Follow-up:   Please follow up with your PCP within 7 days to discuss your recent hospitalization. Patient to arrange.          Total time spent including time spent on final examination and discharge discussion, discharge documentation and records reviewed and medication reconciliation: > 30 minutes    Ashley Bartholomew DO  Internal Medicine, Hospitalist  Pager: [phone] [facility]

## 2020-04-03 NOTE — PROGRESS NOTES
Chief Complaint   Patient presents with    Asthma     patient is here today complaining of severely out of control asthma. he states that he has been using one prednisone about every third day to keep from having an attack.  Injection     Mr. Jackie Gillis is also here today for his 3rd round of xolair injections. 1. Have you been to the ER, urgent care clinic since your last visit? Hospitalized since your last visit? No    2. Have you seen or consulted any other health care providers outside of the [address] [place] [address] since your last visit? Include any pap smears or colon screening.  No The patient tells me she has been dieting and trying to lose weight.  She tells me she weighs 198 lb (89.8 kg) at this time.  This is down 10.2 kg since her last appointment a year ago.  Continue diet, exercise and weight loss were encouraged.  The association between asthma and obesity was rediscussed.

## 2020-04-08 ENCOUNTER — HOSPITAL ENCOUNTER (EMERGENCY)
Age: 30
Discharge: HOME | End: 2020-04-08
Attending: EMERGENCY MEDICINE
Payer: MEDICAID

## 2020-04-08 ENCOUNTER — APPOINTMENT (OUTPATIENT)
Dept: GENERAL RADIOLOGY | Age: 30
End: 2020-04-08
Attending: EMERGENCY MEDICINE
Payer: MEDICAID

## 2020-04-08 VITALS
OXYGEN SATURATION: 93 % | HEART RATE: 105 BPM | SYSTOLIC BLOOD PRESSURE: 104 MMHG | TEMPERATURE: 97.8 F | DIASTOLIC BLOOD PRESSURE: 67 MMHG | RESPIRATION RATE: 24 BRPM

## 2020-04-08 DIAGNOSIS — J45.41 MODERATE PERSISTENT ASTHMA WITH ACUTE EXACERBATION IN ADULT: Primary | ICD-10-CM

## 2020-04-08 PROCEDURE — 96375 TX/PRO/DX INJ NEW DRUG ADDON: CPT

## 2020-04-08 PROCEDURE — 74011250637 HC RX REV CODE- 250/637: Performed by: EMERGENCY MEDICINE

## 2020-04-08 PROCEDURE — 74011250636 HC RX REV CODE- 250/636: Performed by: EMERGENCY MEDICINE

## 2020-04-08 PROCEDURE — 94640 AIRWAY INHALATION TREATMENT: CPT

## 2020-04-08 PROCEDURE — 99285 EMERGENCY DEPT VISIT HI MDM: CPT

## 2020-04-08 PROCEDURE — 96374 THER/PROPH/DIAG INJ IV PUSH: CPT

## 2020-04-08 PROCEDURE — 74011000250 HC RX REV CODE- 250: Performed by: EMERGENCY MEDICINE

## 2020-04-08 RX ORDER — IPRATROPIUM BROMIDE 0.5 MG/2.5ML
0.5 SOLUTION RESPIRATORY (INHALATION)
Status: COMPLETED | OUTPATIENT
Start: 2020-04-08 | End: 2020-04-08

## 2020-04-08 RX ORDER — DIPHENHYDRAMINE HYDROCHLORIDE 50 MG/ML
12.5 INJECTION, SOLUTION INTRAMUSCULAR; INTRAVENOUS
Status: COMPLETED | OUTPATIENT
Start: 2020-04-08 | End: 2020-04-08

## 2020-04-08 RX ORDER — MAGNESIUM SULFATE HEPTAHYDRATE 40 MG/ML
2 INJECTION, SOLUTION INTRAVENOUS ONCE
Status: COMPLETED | OUTPATIENT
Start: 2020-04-08 | End: 2020-04-08

## 2020-04-08 RX ORDER — METOCLOPRAMIDE HYDROCHLORIDE 5 MG/ML
5 INJECTION INTRAMUSCULAR; INTRAVENOUS
Status: COMPLETED | OUTPATIENT
Start: 2020-04-08 | End: 2020-04-08

## 2020-04-08 RX ORDER — PREDNISONE 10 MG/1
TABLET ORAL
Qty: 63 TAB | Refills: 0 | Status: SHIPPED | OUTPATIENT
Start: 2020-04-08 | End: 2020-05-19 | Stop reason: ALTCHOICE

## 2020-04-08 RX ORDER — ONDANSETRON 2 MG/ML
4 INJECTION INTRAMUSCULAR; INTRAVENOUS
Status: COMPLETED | OUTPATIENT
Start: 2020-04-08 | End: 2020-04-08

## 2020-04-08 RX ORDER — IPRATROPIUM BROMIDE AND ALBUTEROL SULFATE 2.5; .5 MG/3ML; MG/3ML
3 SOLUTION RESPIRATORY (INHALATION)
Qty: 30 NEBULE | Refills: 3 | Status: SHIPPED | OUTPATIENT
Start: 2020-04-08 | End: 2020-05-19 | Stop reason: SDUPTHER

## 2020-04-08 RX ORDER — CLONAZEPAM 0.5 MG/1
0.5 TABLET ORAL
Status: COMPLETED | OUTPATIENT
Start: 2020-04-08 | End: 2020-04-08

## 2020-04-08 RX ORDER — ALBUTEROL SULFATE 0.83 MG/ML
5 SOLUTION RESPIRATORY (INHALATION)
Status: COMPLETED | OUTPATIENT
Start: 2020-04-08 | End: 2020-04-08

## 2020-04-08 RX ORDER — IPRATROPIUM BROMIDE AND ALBUTEROL SULFATE 2.5; .5 MG/3ML; MG/3ML
3 SOLUTION RESPIRATORY (INHALATION)
Qty: 30 NEBULE | Refills: 3 | Status: SHIPPED | OUTPATIENT
Start: 2020-04-08 | End: 2020-04-08

## 2020-04-08 RX ADMIN — METOCLOPRAMIDE 5 MG: 5 INJECTION, SOLUTION INTRAMUSCULAR; INTRAVENOUS at 05:07

## 2020-04-08 RX ADMIN — ALBUTEROL SULFATE 5 MG: 2.5 SOLUTION RESPIRATORY (INHALATION) at 04:35

## 2020-04-08 RX ADMIN — IPRATROPIUM BROMIDE 0.5 MG: 0.5 SOLUTION RESPIRATORY (INHALATION) at 05:01

## 2020-04-08 RX ADMIN — DIPHENHYDRAMINE HYDROCHLORIDE 12.5 MG: 50 INJECTION, SOLUTION INTRAMUSCULAR; INTRAVENOUS at 04:58

## 2020-04-08 RX ADMIN — METHYLPREDNISOLONE SODIUM SUCCINATE 125 MG: 125 INJECTION, POWDER, FOR SOLUTION INTRAMUSCULAR; INTRAVENOUS at 04:42

## 2020-04-08 RX ADMIN — SODIUM CHLORIDE 1000 ML: 900 INJECTION, SOLUTION INTRAVENOUS at 05:08

## 2020-04-08 RX ADMIN — ALBUTEROL SULFATE 5 MG: 2.5 SOLUTION RESPIRATORY (INHALATION) at 05:01

## 2020-04-08 RX ADMIN — ONDANSETRON 4 MG: 2 INJECTION INTRAMUSCULAR; INTRAVENOUS at 04:58

## 2020-04-08 RX ADMIN — IPRATROPIUM BROMIDE 0.5 MG: 0.5 SOLUTION RESPIRATORY (INHALATION) at 04:36

## 2020-04-08 RX ADMIN — MAGNESIUM SULFATE HEPTAHYDRATE 2 G: 40 INJECTION, SOLUTION INTRAVENOUS at 04:49

## 2020-04-08 RX ADMIN — CLONAZEPAM 0.5 MG: 0.5 TABLET ORAL at 05:07

## 2020-04-08 NOTE — ED PROVIDER NOTES
Fernie Ortega is a 35 y.o. male with history of severe asthma with complaints of increased shortness of breath the last couple days along with wheezing. No productive cough or hemoptysis. No fever. Patient used his nebulizer 3 times tonight without significant relief. Patient was admitted approximately 1 month ago here and did complete steroid taper. No recent chest pain or increased leg pain or swelling. Symptoms are worse with exertion. Patient was given a nebulizer treatment prior to arrival with minimal relief    The history is provided by the patient. Past Medical History:   Diagnosis Date    Asthma     Chronic obstructive pulmonary disease (HCC)        Past Surgical History:   Procedure Laterality Date    HX FREE SKIN GRAFT      HX ORTHOPAEDIC      facial reconstruction         Family History:   Adopted: Yes   Problem Relation Age of Onset    No Known Problems Mother     No Known Problems Father        Social History     Socioeconomic History    Marital status: SINGLE     Spouse name: Not on file    Number of children: Not on file    Years of education: Not on file    Highest education level: Not on file   Occupational History    Not on file   Social Needs    Financial resource strain: Not on file    Food insecurity     Worry: Not on file     Inability: Not on file    Transportation needs     Medical: Not on file     Non-medical: Not on file   Tobacco Use    Smoking status: Never Smoker    Smokeless tobacco: Former User   Substance and Sexual Activity    Alcohol use:  Yes    Drug use: No    Sexual activity: Not Currently   Lifestyle    Physical activity     Days per week: Not on file     Minutes per session: Not on file    Stress: Not on file   Relationships    Social connections     Talks on phone: Not on file     Gets together: Not on file     Attends L[employer]-day service: Not on file     Active member of club or organization: Not on file     Attends meetings of clubs or organizations: Not on file     Relationship status: Not on file    Intimate partner violence     Fear of current or ex partner: Not on file     Emotionally abused: Not on file     Physically abused: Not on file     Forced sexual activity: Not on file   Other Topics Concern    Not on file   Social History Narrative    Not on file         ALLERGIES: Other food; Penicillin g; and Cat dander    Review of Systems   Constitutional: Negative for fever. HENT: Negative for sore throat and trouble swallowing. Eyes: Negative for visual disturbance. Respiratory: Positive for cough, chest tightness, shortness of breath and wheezing. Cardiovascular: Negative for chest pain and leg swelling. Gastrointestinal: Negative for abdominal pain. Genitourinary: Negative for difficulty urinating. Musculoskeletal: Negative for gait problem. Skin: Negative for rash. Allergic/Immunologic: Negative for immunocompromised state. Neurological: Negative for syncope. Psychiatric/Behavioral: Positive for sleep disturbance. Vitals:    04/08/20 0445 04/08/20 0500 04/08/20 0501 04/08/20 0515   BP: 121/82 118/77 121/82 104/67   Pulse:   (!) 111    Resp:       Temp:       SpO2: 92% 97%  92%            Physical Exam  Vitals signs and nursing note reviewed. Constitutional:       General: He is in acute distress. Appearance: He is not ill-appearing, toxic-appearing or diaphoretic. HENT:      Head: Normocephalic and atraumatic. Right Ear: External ear normal.      Left Ear: External ear normal.      Nose: Nose normal.      Mouth/Throat:      Pharynx: No oropharyngeal exudate. Eyes:      Conjunctiva/sclera: Conjunctivae normal.   Neck:      Musculoskeletal: Normal range of motion. Cardiovascular:      Rate and Rhythm: Regular rhythm. Tachycardia present. Heart sounds: Normal heart sounds. Pulmonary:      Effort: Tachypnea, accessory muscle usage and retractions present. No respiratory distress. Breath sounds: Decreased breath sounds and wheezing present. Abdominal:      Palpations: Abdomen is soft. Tenderness: There is no abdominal tenderness. Musculoskeletal: Normal range of motion. Right lower leg: No edema. Left lower leg: No edema. Skin:     General: Skin is warm and dry. Capillary Refill: Capillary refill takes less than 2 seconds. Neurological:      Mental Status: He is alert and oriented to person, place, and time.    Psychiatric:         Behavior: Behavior normal.          MDM       Procedures    Vitals:  Patient Vitals for the past 12 hrs:   Temp Pulse Resp BP SpO2   04/08/20 0515    104/67 92 %   04/08/20 0501  (!) 111  121/82    04/08/20 0500    118/77 97 %   04/08/20 0445    121/82 92 %   04/08/20 0430    112/76 96 %   04/08/20 0428 97.8 °F (36.6 °C) (!) 114 24 120/69 93 %         Medications ordered:   Medications   sodium chloride 0.9 % bolus infusion 1,000 mL (1,000 mL IntraVENous New Bag 4/8/20 0508)   albuterol (PROVENTIL VENTOLIN) nebulizer solution 5 mg (5 mg Nebulization Given 4/8/20 0501)   ipratropium (ATROVENT) 0.02 % nebulizer solution 0.5 mg (0.5 mg Nebulization Given 4/8/20 0436)   albuterol (PROVENTIL VENTOLIN) nebulizer solution 5 mg (5 mg Nebulization Given 4/8/20 0435)   methylPREDNISolone (PF) (Solu-MEDROL) injection 125 mg (125 mg IntraVENous Given 4/8/20 0442)   magnesium sulfate 2 g/50 ml IVPB (premix or compounded) (0 g IntraVENous IV Completed 4/8/20 0503)   ipratropium (ATROVENT) 0.02 % nebulizer solution 0.5 mg (0.5 mg Nebulization Given 4/8/20 0501)   clonazePAM (KlonoPIN) tablet 0.5 mg (0.5 mg Oral Given 4/8/20 0507)   ondansetron (ZOFRAN) injection 4 mg (4 mg IntraVENous Given 4/8/20 0458)   metoclopramide HCl (REGLAN) injection 5 mg (5 mg IntraVENous Given 4/8/20 0507)   diphenhydrAMINE (BENADRYL) injection 12.5 mg (12.5 mg IntraVENous Given 4/8/20 5764)         Lab findings:  No results found for this or any previous visit (from the past 12 hour(s)). EKG interpretation by ED Physician:      X-Ray, CT or other radiology findings or impressions:  XR CHEST PORT    (Results Pending)       Progress notes, Consult notes or additional Procedure notes:   Significant improvement after breathing treatments here. Will hold on x-ray at this time. Patient got nauseated after the magnesium. Will place patient on steroid taper for several days at home. Do not feel he requires admission or other work-up    I have discussed with patient and/or family/sig other the results, interpretation of any imaging if performed, suspected diagnosis and treatment plan to include instructions regarding the diagnoses listed to which understanding was expressed with all questions answered      Reevaluation of patient:   stable    Disposition:  Diagnosis:   1. Moderate persistent asthma with acute exacerbation in adult        Disposition: home      Follow-up Information     Follow up With Specialties Details Why Contact Info    Nancie Mchugh MD Internal Medicine Schedule an appointment as soon as possible for a visit  [id number] [place] [address]  [address] [place] [address] DEPT Emergency Medicine  If symptoms worsen [address] [facility].  [phone]            Patient's Medications   Start Taking    PREDNISONE (DELTASONE) 10 MG TABLET    6 p.o. daily for 3 days, 5 p.o. daily for 3 days, 4 p.o. daily for 3 days, 3 p.o. daily for 3 days, 2 p.o. daily for 3 days, 1 p.o. daily for 3 days,   Continue Taking    ALBUTEROL (PROVENTIL HFA, VENTOLIN HFA, PROAIR HFA) 90 MCG/ACTUATION INHALER    Take 2 Puffs by inhalation every four (4) hours as needed for Wheezing. BUDESONIDE-FORMOTEROL (SYMBICORT) 160-4.5 MCG/ACTUATION HFAA    Take 2 Puffs by inhalation two (2) times a day. BUSPIRONE (BUSPAR) 15 MG TABLET    Take 15 mg by mouth three (3) times daily.     CETIRIZINE (ZYRTEC) 10 MG TABLET    Take 1 Tab by mouth daily.    CLONAZEPAM (KLONOPIN) 0.5 MG TABLET    Take 0.5 mg by mouth nightly as needed for Anxiety. ESTRADIOL (ESTRACE) 2 MG TABLET    Take 2 mg by mouth daily. FLUTICASONE (FLONASE) 50 MCG/ACTUATION NASAL SPRAY    1 [place] by Both Nostrils route two (2) times a day. IPRATROPIUM-ALBUTEROL (COMBIVENT RESPIMAT)  MCG/ACTUATION INHALER    Take 1 Puff by inhalation every six (6) hours. PRAZOSIN (MINIPRESS) 1 MG CAPSULE    Take 1 mg by mouth nightly. PROGESTERONE (PROMETRIUM) 100 MG CAPSULE    Take 100 mg by mouth daily. QUETIAPINE (SEROQUEL) 300 MG TABLET    Take 300 mg by mouth nightly. SPIRONOLACTONE (ALDACTONE) 50 MG TABLET    Take 50 mg by mouth two (2) times a day. TRAZODONE (DESYREL) 50 MG TABLET    Take 1 Tab by mouth nightly. These Medications have changed    Modified Medication Previous Medication    ALBUTEROL-IPRATROPIUM (DUO-NEB) 2.5 MG-0.5 MG/3 ML NEBU albuterol-ipratropium (DUO-NEB) 2.5 mg-0.5 mg/3 ml nebu       3 mL by Nebulization route every four (4) hours as needed for Wheezing. 3 mL by Nebulization route every four (4) hours as needed for Wheezing. Stop Taking    ALPRAZOLAM (XANAX) 0.25 MG TABLET    Take 1 Tab by mouth two (2) times daily as needed for Anxiety. Max Daily Amount: 0.5 mg.     PREDNISONE (DELTASONE) 10 MG TABLET    Take 5 tabs PO daily x 5 d, then 4 tabs PO daily x 4 d, then 3 tabs PO daily x 3 d, then 2 tabs PO daily x 2 d, then 1 tab PO daily x 1 d

## 2020-04-08 NOTE — DISCHARGE INSTRUCTIONS
Patient Education        Asthma in Adults: Care Instructions  Your Care Instructions    During an asthma attack, your airways swell and narrow as a reaction to certain things (triggers). This makes it hard to breathe. You may be able to prevent asthma attacks if you avoid the things that set off your asthma symptoms. Keeping your asthma under control and treating symptoms before they get bad can help you avoid severe attacks. If you can control your asthma, you may be able to do all of your normal daily activities. You may also avoid asthma attacks and trips to the hospital.  Follow-up care is a key part of your treatment and safety. Be sure to make and go to all appointments, and call your doctor if you are having problems. It's also a good idea to know your test results and keep a list of the medicines you take. How can you care for yourself at home? · Follow your asthma action plan so you can manage your symptoms at home. An asthma action plan will help you prevent and control airway reactions and will tell you what to do during an asthma attack. If you do not have an asthma action plan, work with your doctor to build one. · Take your asthma medicine exactly as prescribed. Medicine plays an important role in controlling asthma. Talk to your doctor right away if you have any questions about what to take and how to take it. ? Use your quick-relief medicine when you have symptoms of an attack. Quick-relief medicine often is an albuterol inhaler. Some people need to use quick-relief medicine before they exercise. ? Take your controller medicine every day, not just when you have symptoms. Controller medicine is usually an inhaled corticosteroid. The goal is to prevent problems before they occur. Do not use your controller medicine to try to treat an attack that has already started. It does not work fast enough to help.   ? If your doctor prescribed corticosteroid pills to use during an attack, take them as directed. They may take hours to work, but they may shorten the attack and help you breathe better. ? Keep your quick-relief medicine with you at all times. · Talk to your doctor before using other medicines. Some medicines, such as aspirin, can cause asthma attacks in some people. · Check yourself for asthma symptoms to know which step to follow in your action plan. Watch for things like being short of breath, having chest tightness, coughing, and wheezing. Also notice if symptoms wake you up at night or if you get tired quickly when you exercise. · If you have a peak flow meter, use it to check how well you are breathing. This can help you predict when an asthma attack is going to occur. Then you can take medicine to prevent the asthma attack or make it less severe. · See your doctor regularly. These visits will help you learn more about asthma and what you can do to control it. Your doctor will monitor your treatment to make sure the medicine is helping you. · Keep track of your asthma attacks and your treatment. After you have had an attack, write down what triggered it, what helped end it, and any concerns you have about your asthma action plan. Take your diary when you see your doctor. You can then review your asthma action plan and decide if it is working. · Do not smoke or allow others to smoke around you. Avoid smoky places. Smoking makes asthma worse. If you need help quitting, talk to your doctor about stop-smoking programs and medicines. These can increase your chances of quitting for good. · Learn what triggers an asthma attack for you, and avoid the triggers when you can. Common triggers include colds, smoke, air pollution, dust, pollen, mold, pets, cockroaches, stress, and cold air. · Avoid colds and the flu. Get a pneumococcal vaccine shot. If you have had one before, ask your doctor whether you need a second dose. Get a flu vaccine every fall.  If you must be around people with colds or the flu, wash your hands often. When should you call for help? Call 911 anytime you think you may need emergency care. For example, call if:    · You have severe trouble breathing.    Call your doctor now or seek immediate medical care if:    · Your symptoms do not get better after you have followed your asthma action plan.     · You cough up yellow, dark brown, or bloody mucus (sputum).    Watch closely for changes in your health, and be sure to contact your doctor if:    · Your coughing and wheezing get worse.     · You need to use quick-relief medicine on more than 2 days a week (unless it is just for exercise).     · You need help figuring out what is triggering your asthma attacks. Where can you learn more? Go to http://jason-maritza.info/  Enter P597 in the search box to learn more about \"Asthma in Adults: Care Instructions. \"  Current as of: June 9, 2019Content Version: 12.4  © 5413-3937 [employer]. Care instructions adapted under license by [employer] (which disclaims liability or warranty for this information). If you have questions about a medical condition or this instruction, always ask your healthcare professional. [address] any warranty or liability for your use of this information.

## 2020-05-03 ENCOUNTER — HOSPITAL ENCOUNTER (EMERGENCY)
Age: 30
Discharge: HOME | End: 2020-05-03
Attending: EMERGENCY MEDICINE
Payer: MEDICAID

## 2020-05-03 VITALS
RESPIRATION RATE: 20 BRPM | HEART RATE: 130 BPM | TEMPERATURE: 98.5 F | SYSTOLIC BLOOD PRESSURE: 126 MMHG | DIASTOLIC BLOOD PRESSURE: 83 MMHG | OXYGEN SATURATION: 95 %

## 2020-05-03 VITALS
DIASTOLIC BLOOD PRESSURE: 79 MMHG | SYSTOLIC BLOOD PRESSURE: 103 MMHG | HEART RATE: 141 BPM | WEIGHT: 218 LBS | OXYGEN SATURATION: 97 % | BODY MASS INDEX: 33.04 KG/M2 | TEMPERATURE: 97.1 F | RESPIRATION RATE: 24 BRPM | HEIGHT: 68 IN

## 2020-05-03 DIAGNOSIS — J45.21 MILD INTERMITTENT ASTHMA WITH ACUTE EXACERBATION: Primary | ICD-10-CM

## 2020-05-03 PROCEDURE — 94640 AIRWAY INHALATION TREATMENT: CPT

## 2020-05-03 PROCEDURE — 99283 EMERGENCY DEPT VISIT LOW MDM: CPT

## 2020-05-03 PROCEDURE — 74011000250 HC RX REV CODE- 250

## 2020-05-03 PROCEDURE — 74011000250 HC RX REV CODE- 250: Performed by: EMERGENCY MEDICINE

## 2020-05-03 PROCEDURE — 99284 EMERGENCY DEPT VISIT MOD MDM: CPT

## 2020-05-03 PROCEDURE — 74011636637 HC RX REV CODE- 636/637: Performed by: EMERGENCY MEDICINE

## 2020-05-03 RX ORDER — IPRATROPIUM BROMIDE AND ALBUTEROL SULFATE 2.5; .5 MG/3ML; MG/3ML
SOLUTION RESPIRATORY (INHALATION)
Status: COMPLETED
Start: 2020-05-03 | End: 2020-05-03

## 2020-05-03 RX ORDER — IPRATROPIUM BROMIDE AND ALBUTEROL SULFATE 2.5; .5 MG/3ML; MG/3ML
3 SOLUTION RESPIRATORY (INHALATION)
Status: COMPLETED | OUTPATIENT
Start: 2020-05-03 | End: 2020-05-03

## 2020-05-03 RX ORDER — IPRATROPIUM BROMIDE AND ALBUTEROL SULFATE 2.5; .5 MG/3ML; MG/3ML
3 SOLUTION RESPIRATORY (INHALATION) ONCE
Status: COMPLETED | OUTPATIENT
Start: 2020-05-03 | End: 2020-05-03

## 2020-05-03 RX ORDER — PREDNISONE 20 MG/1
60 TABLET ORAL DAILY
Qty: 15 TAB | Refills: 0 | Status: SHIPPED | OUTPATIENT
Start: 2020-05-03 | End: 2020-05-08

## 2020-05-03 RX ORDER — PREDNISONE 50 MG/1
100 TABLET ORAL
Status: COMPLETED | OUTPATIENT
Start: 2020-05-03 | End: 2020-05-03

## 2020-05-03 RX ORDER — ALBUTEROL SULFATE 90 UG/1
2 AEROSOL, METERED RESPIRATORY (INHALATION)
Qty: 1 INHALER | Refills: 0 | Status: SHIPPED | OUTPATIENT
Start: 2020-05-03 | End: 2020-05-19 | Stop reason: SDUPTHER

## 2020-05-03 RX ADMIN — IPRATROPIUM BROMIDE AND ALBUTEROL SULFATE 3 ML: .5; 3 SOLUTION RESPIRATORY (INHALATION) at 12:43

## 2020-05-03 RX ADMIN — PREDNISONE 100 MG: 50 TABLET ORAL at 12:43

## 2020-05-03 RX ADMIN — IPRATROPIUM BROMIDE AND ALBUTEROL SULFATE 3 ML: .5; 3 SOLUTION RESPIRATORY (INHALATION) at 08:09

## 2020-05-03 RX ADMIN — IPRATROPIUM BROMIDE AND ALBUTEROL SULFATE 3 ML: .5; 3 SOLUTION RESPIRATORY (INHALATION) at 08:08

## 2020-05-03 RX ADMIN — IPRATROPIUM BROMIDE AND ALBUTEROL SULFATE 3 ML: 2.5; .5 SOLUTION RESPIRATORY (INHALATION) at 08:08

## 2020-05-03 NOTE — ED NOTES
I have reviewed discharge instructions with the patient. The patient verbalized understanding. Discussed with patient having someone at the residence where his new scripts from earlier visit take them and drop them off at the pharmacy. Pt stated he will call and have someone drop them off so he can pick them up when he leaves here for this discharge.

## 2020-05-03 NOTE — DISCHARGE INSTRUCTIONS
SPECIFIC PATIENT INSTRUCTIONS FROM THE PHYSICIAN WHO TREATED YOU IN THE ER TODAY:  1. Return if any concerns or worsening of condition(s)  2. Prednisone as prescribed until finished. 3. Use your albuterol inhaler, if prescribed, and/or home nebulizer machine (if you have one) for wheezing. 4. FOLLOW UP APPOINTMENT:  Your primary doctor in 1-2 days. Patient Education        Asthma Attack: Care Instructions  Your Care Instructions    During an asthma attack, the airways swell and narrow. This makes it hard to breathe. Severe asthma attacks can be life-threatening, but you can help prevent them by keeping your asthma under control and treating symptoms before they get bad. Symptoms include being short of breath, having chest tightness, coughing, and wheezing. Noting and treating these symptoms can also help you avoid future trips to the emergency room. The doctor has checked you carefully, but problems can develop later. If you notice any problems or new symptoms, get medical treatment right away. Follow-up care is a key part of your treatment and safety. Be sure to make and go to all appointments, and call your doctor if you are having problems. It's also a good idea to know your test results and keep a list of the medicines you take. How can you care for yourself at home? · Follow your asthma action plan to prevent and treat attacks. If you don't have an asthma action plan, work with your doctor to create one. · Take your asthma medicines exactly as prescribed. Talk to your doctor right away if you have any questions about how to take them. ? Use your quick-relief medicine when you have symptoms of an attack. Quick-relief medicine is usually an albuterol inhaler. Some people need to use quick-relief medicine before they exercise. ? Take your controller medicine every day, not just when you have symptoms. Controller medicine is usually an inhaled corticosteroid.  The goal is to prevent problems before they occur. Don't use your controller medicine to treat an attack that has already started. It doesn't work fast enough to help. ? If your doctor prescribed corticosteroid pills to use during an attack, take them exactly as prescribed. It may take hours for the pills to work, but they may make the episode shorter and help you breathe better. ? Keep your quick-relief medicine with you at all times. · Talk to your doctor before using other medicines. Some medicines, such as aspirin, can cause asthma attacks in some people. · If you have a peak flow meter, use it to check how well you are breathing. This can help you predict when an asthma attack is going to occur. Then you can take medicine to prevent the asthma attack or make it less severe. · Do not smoke or allow others to smoke around you. Avoid smoky places. Smoking makes asthma worse. If you need help quitting, talk to your doctor about stop-smoking programs and medicines. These can increase your chances of quitting for good. · Learn what triggers an asthma attack for you, and avoid the triggers when you can. Common triggers include colds, smoke, air pollution, dust, pollen, mold, pets, cockroaches, stress, and cold air. · Avoid colds and the flu. Get a pneumococcal vaccine shot. If you have had one before, ask your doctor if you need a second dose. Get a flu vaccine every fall. If you must be around people with colds or the flu, wash your hands often. When should you call for help? Call 911 anytime you think you may need emergency care.  For example, call if:    · You have severe trouble breathing.    Call your doctor now or seek immediate medical care if:    · Your symptoms do not get better after you have followed your asthma action plan.     · You have new or worse trouble breathing.     · Your coughing and wheezing get worse.     · You cough up dark brown or bloody mucus (sputum).     · You have a new or higher fever.    Watch closely for changes in your health, and be sure to contact your doctor if:    · You need to use quick-relief medicine on more than 2 days a week (unless it is just for exercise).     · You cough more deeply or more often, especially if you notice more mucus or a change in the color of your mucus.     · You are not getting better as expected. Where can you learn more? Go to http://www.gray.com/  Enter W144 in the search box to learn more about \"Asthma Attack: Care Instructions. \"  Current as of: June 9, 2019Content Version: 12.4  © 8888-1800 [employer]. Care instructions adapted under license by [employer] (which disclaims liability or warranty for this information). If you have questions about a medical condition or this instruction, always ask your healthcare professional. [address] any warranty or liability for your use of this information. Patient Education     Learning About Asthma Triggers  What are triggers? When you have asthma, certain things can make your symptoms worse. These are called triggers. They include:  · Cigarette smoke or air pollution. · Things you are allergic to, such as:  ¨ Pollen, mold, or dust mites. ¨ Pet hair, skin, or saliva. · Illnesses, like colds, flu, or pneumonia. · Exercise. · Dry, cold air. How do triggers affect asthma? Triggers can make it harder for your lungs to work as they should and can lead to sudden difficulty breathing and other symptoms. When you are around a trigger, an asthma attack is more likely. If your symptoms are severe, you may need emergency treatment or have to go to the hospital for treatment. If you know what your triggers are and can avoid them, you may be able to prevent asthma attacks, reduce how often you have them, and make them less severe. What can you do to avoid triggers? The first thing is to know your triggers.   When you are having symptoms, note the things around you that might be causing them. Then look for patterns in what may be triggering your symptoms. Record your triggers on a piece of paper or in an asthma diary. When you have your list of possible triggers, work with your doctor to find ways to avoid them. You also can check how well your lungs are working by measuring your peak expiratory flow (PEF) throughout the day. Your PEF may drop when you are near things that trigger symptoms. Here are some ways to avoid a few common triggers. · Do not smoke or allow others to smoke around you. If you need help quitting, talk to your doctor about stop-smoking programs and medicines. These can increase your chances of quitting for good. · If there is a lot of pollution, pollen, or dust outside, stay at home and keep your windows closed. Use an air conditioner or air filter in your home. Check your local weather report or newspaper for air quality and pollen reports. · Get a flu shot every year. Talk to your doctor about getting a pneumococcal shot. Wash your hands often to prevent infections. · Avoid exercising outdoors in cold weather. If you are outdoors in cold weather, wear a scarf around your face and breathe through your nose. How can you manage an asthma attack? · If you have an asthma action plan, follow the plan. In general:  ¨ Use your quick-relief inhaler as directed by your doctor. If your symptoms do not get better after you use your medicine, have someone take you to the emergency room. Call an ambulance if needed. ¨ If your doctor has given you other inhaled medicines or steroid pills, take them as directed. Where can you learn more? Go to Cloudacc.be  Enter M564 in the search box to learn more about \"Learning About Asthma Triggers. \"   © 3518-6701 [employer]. Care instructions adapted under license by [facility] (which disclaims liability or warranty for this information).  This care instruction is for use with your licensed healthcare professional. If you have questions about a medical condition or this instruction, always ask your healthcare professional. [place] [address] any warranty or liability for your use of this information. Content Version: 64.0.439092; Last Revised: February 23, 2012                 Patient Education     Asthma Action Plan: After Your Child's Visit  Your Care Instructions  An asthma action plan is based on peak flow and asthma symptoms. Sorting symptoms and peak flow into red, yellow, and green \"zones\" can help you know how bad your child's asthma is and what actions you should take. Work with the doctor to make the plan. An action plan may include:  · The peak flow readings and symptoms for each zone. · What medicines your child should take in each zone. · When to call a doctor. · A list of emergency contact numbers. · A list of your child's asthma triggers. Follow-up care is a key part of your child's treatment and safety. Be sure to make and go to all appointments, and call your doctor if your child is having problems. It's also a good idea to know your child's test results and keep a list of the medicines your child takes. How can you care for your child at home? · Make sure your child takes his or her daily medicines to help minimize long-term damage and avoid asthma attacks. · Check your child's peak flow as often as your doctor suggests. This is the best way to know how well the lungs are working. · Check the action plan to see what zone your child is in.  ¨ If your child is in the green zone, he or she should keep taking daily asthma medicines as prescribed. ¨ If your child is in the yellow zone, he or she may be having or will soon have an asthma attack. There may not be any symptoms, but your child's lungs are not working as well as they should. Make sure your child takes the medicines listed in the action plan.  If your child stays in the yellow zone, your doctor may need to increase the dose or add a medicine. ¨ If your child is in the red zone, follow the action plan. If symptoms or peak flow don't improve soon, your child may need to go to the emergency room or be admitted to the hospital.  · Use an asthma diary. Write down your child's peak flow readings in the asthma diary. If your child has an attack, write down what caused it (if you know), the symptoms, and what medicine your child took. · Make sure you know how and when to call your doctor or go to the hospital.  · Take both the asthma action plan and the asthma diary--along with the peak flow meter and medicines--when you take your child to the doctor. Tell the doctor if your child is having trouble following the action plan. When should you call for help? Call 911 anytime you think your child may need emergency care. For example, call if:  · Your child has severe trouble breathing. Signs may include the chest sinking in, using belly muscles to breathe, or nostrils flaring while your child is struggling to breathe. Call your doctor now or seek immediate medical care if:  · Your child has an asthma attack and does not get better after you use the action plan. · Your child coughs up yellow, dark brown, or bloody mucus (sputum). Watch closely for changes in your child's health, and be sure to contact your doctor if:  · Your child's wheezing and coughing get worse. · Your child needs quick-relief medicine on more than 2 days a week (unless it is just for exercise). · Your child has any new symptoms, such as a fever. Where can you learn more? Go to iPourit.be  Enter U006 in the search box to learn more about \"Asthma Action Plan: After Your Child's Visit. \"   © 3852-0258 [employer]wise[employer]. Care instructions adapted under license by [facility] (which disclaims liability or warranty for this information).  This care instruction is for use with your licensed healthcare professional. If you have questions about a medical condition or this instruction, always ask your healthcare professional. [place] [address] any warranty or liability for your use of this information. Content Version: 38.8.761793; Last Revised: 2012               [employer] Activation    Thank you for requesting access to [employer]. Please follow the instructions below to securely access and download your online medical record. [employer] allows you to send messages to your doctor, view your test results, renew your prescriptions, schedule appointments, and more. How Do I Sign Up? In your internet browser, go to [email or web address]. Click on the First Time User? Click Here link in the Sign In box. You will see the New Member Sign Up page. Enter your [employer] Access Code exactly as it appears below. You will not need to use this code after you´ve completed the sign-up process. If you do not sign up before the expiration date, you must request a new code. [employer] Access Code: [email or web address]  Expires: 3/28/2019  2:27 PM (This is the date your [employer] access code will )    Enter the last four digits of your Social Security Number (xxxx) and Date of Birth (mm/dd/yyyy) as indicated and click Submit. You will be taken to the next sign-up page. Create a [employer] ID. This will be your [employer] login ID and cannot be changed, so think of one that is secure and easy to remember. Create a [employer] password. You can change your password at any time. Enter your Password Reset Question and Answer. This can be used at a later time if you forget your password. Enter your e-mail address. You will receive e-mail notification when new information is available in [address] Click Sign Up. You can now view and download portions of your medical record. Click the [place] link to download a portable copy of your medical information.     Additional Information    If you have questions, please visit the Frequently Asked Questions section of the [employer] website at [email or web address] Remember, [employer] is NOT to be used for urgent needs. For medical emergencies, dial 911.

## 2020-05-03 NOTE — ED TRIAGE NOTES
Patient arrives w c/o increasing SOB this morning, states he used Albuterol rescue MDI 10 minutes prior to arrival without relief of symptoms.

## 2020-05-03 NOTE — ED TRIAGE NOTES
Pt returns after discharge 2.5 hours ago. States he asthma is flaring again. Pt has not picked up Rx's as of yet. Pt is much calmer than earlier and speaking in full sentences.

## 2020-05-03 NOTE — DISCHARGE INSTRUCTIONS
SPECIFIC PATIENT INSTRUCTIONS FROM THE PHYSICIAN WHO TREATED YOU IN THE ER TODAY:  1. Return if any concerns or worsening of condition(s)  2. Prednisone as previously prescribed until finished. 3. Use your albuterol inhaler, if previously prescribed, and/or home nebulizer machine (if you have one) for wheezing. 4. FOLLOW UP APPOINTMENT:  Your primary doctor in 1-2 days. Patient Education        Asthma Attack: Care Instructions  Your Care Instructions    During an asthma attack, the airways swell and narrow. This makes it hard to breathe. Severe asthma attacks can be life-threatening, but you can help prevent them by keeping your asthma under control and treating symptoms before they get bad. Symptoms include being short of breath, having chest tightness, coughing, and wheezing. Noting and treating these symptoms can also help you avoid future trips to the emergency room. The doctor has checked you carefully, but problems can develop later. If you notice any problems or new symptoms, get medical treatment right away. Follow-up care is a key part of your treatment and safety. Be sure to make and go to all appointments, and call your doctor if you are having problems. It's also a good idea to know your test results and keep a list of the medicines you take. How can you care for yourself at home? · Follow your asthma action plan to prevent and treat attacks. If you don't have an asthma action plan, work with your doctor to create one. · Take your asthma medicines exactly as prescribed. Talk to your doctor right away if you have any questions about how to take them. ? Use your quick-relief medicine when you have symptoms of an attack. Quick-relief medicine is usually an albuterol inhaler. Some people need to use quick-relief medicine before they exercise. ? Take your controller medicine every day, not just when you have symptoms. Controller medicine is usually an inhaled corticosteroid.  The goal is to prevent problems before they occur. Don't use your controller medicine to treat an attack that has already started. It doesn't work fast enough to help. ? If your doctor prescribed corticosteroid pills to use during an attack, take them exactly as prescribed. It may take hours for the pills to work, but they may make the episode shorter and help you breathe better. ? Keep your quick-relief medicine with you at all times. · Talk to your doctor before using other medicines. Some medicines, such as aspirin, can cause asthma attacks in some people. · If you have a peak flow meter, use it to check how well you are breathing. This can help you predict when an asthma attack is going to occur. Then you can take medicine to prevent the asthma attack or make it less severe. · Do not smoke or allow others to smoke around you. Avoid smoky places. Smoking makes asthma worse. If you need help quitting, talk to your doctor about stop-smoking programs and medicines. These can increase your chances of quitting for good. · Learn what triggers an asthma attack for you, and avoid the triggers when you can. Common triggers include colds, smoke, air pollution, dust, pollen, mold, pets, cockroaches, stress, and cold air. · Avoid colds and the flu. Get a pneumococcal vaccine shot. If you have had one before, ask your doctor if you need a second dose. Get a flu vaccine every fall. If you must be around people with colds or the flu, wash your hands often. When should you call for help? Call 911 anytime you think you may need emergency care. For example, call if:    · You have severe trouble breathing.    Call your doctor now or seek immediate medical care if:    · Your symptoms do not get better after you have followed your asthma action plan.     · You have new or worse trouble breathing.     · Your coughing and wheezing get worse.     · You cough up dark brown or bloody mucus (sputum).     · You have a new or higher fever.  Watch closely for changes in your health, and be sure to contact your doctor if:    · You need to use quick-relief medicine on more than 2 days a week (unless it is just for exercise).     · You cough more deeply or more often, especially if you notice more mucus or a change in the color of your mucus.     · You are not getting better as expected. Where can you learn more? Go to http://www.gray.com/  Enter G679 in the search box to learn more about \"Asthma Attack: Care Instructions. \"  Current as of: June 9, 2019Content Version: 12.4  © 6842-4425 [employer]. Care instructions adapted under license by [employer] (which disclaims liability or warranty for this information). If you have questions about a medical condition or this instruction, always ask your healthcare professional. [address] any warranty or liability for your use of this information. Patient Education     Learning About Asthma Triggers  What are triggers? When you have asthma, certain things can make your symptoms worse. These are called triggers. They include:  · Cigarette smoke or air pollution. · Things you are allergic to, such as:  ¨ Pollen, mold, or dust mites. ¨ Pet hair, skin, or saliva. · Illnesses, like colds, flu, or pneumonia. · Exercise. · Dry, cold air. How do triggers affect asthma? Triggers can make it harder for your lungs to work as they should and can lead to sudden difficulty breathing and other symptoms. When you are around a trigger, an asthma attack is more likely. If your symptoms are severe, you may need emergency treatment or have to go to the hospital for treatment. If you know what your triggers are and can avoid them, you may be able to prevent asthma attacks, reduce how often you have them, and make them less severe. What can you do to avoid triggers? The first thing is to know your triggers.   When you are having symptoms, note the things around you that might be causing them. Then look for patterns in what may be triggering your symptoms. Record your triggers on a piece of paper or in an asthma diary. When you have your list of possible triggers, work with your doctor to find ways to avoid them. You also can check how well your lungs are working by measuring your peak expiratory flow (PEF) throughout the day. Your PEF may drop when you are near things that trigger symptoms. Here are some ways to avoid a few common triggers. · Do not smoke or allow others to smoke around you. If you need help quitting, talk to your doctor about stop-smoking programs and medicines. These can increase your chances of quitting for good. · If there is a lot of pollution, pollen, or dust outside, stay at home and keep your windows closed. Use an air conditioner or air filter in your home. Check your local weather report or newspaper for air quality and pollen reports. · Get a flu shot every year. Talk to your doctor about getting a pneumococcal shot. Wash your hands often to prevent infections. · Avoid exercising outdoors in cold weather. If you are outdoors in cold weather, wear a scarf around your face and breathe through your nose. How can you manage an asthma attack? · If you have an asthma action plan, follow the plan. In general:  ¨ Use your quick-relief inhaler as directed by your doctor. If your symptoms do not get better after you use your medicine, have someone take you to the emergency room. Call an ambulance if needed. ¨ If your doctor has given you other inhaled medicines or steroid pills, take them as directed. Where can you learn more? Go to [employer]  Enter M564 in the search box to learn more about \"Learning About Asthma Triggers. \"   © 4780-2872 [employer]. Care instructions adapted under license by [facility] (which disclaims liability or warranty for this information).  This care instruction is for use with your licensed healthcare professional. If you have questions about a medical condition or this instruction, always ask your healthcare professional. [place] [address] any warranty or liability for your use of this information. Content Version: 24.9.795751; Last Revised: February 23, 2012                 Patient Education     Asthma Action Plan: After Your Child's Visit  Your Care Instructions  An asthma action plan is based on peak flow and asthma symptoms. Sorting symptoms and peak flow into red, yellow, and green \"zones\" can help you know how bad your child's asthma is and what actions you should take. Work with the doctor to make the plan. An action plan may include:  · The peak flow readings and symptoms for each zone. · What medicines your child should take in each zone. · When to call a doctor. · A list of emergency contact numbers. · A list of your child's asthma triggers. Follow-up care is a key part of your child's treatment and safety. Be sure to make and go to all appointments, and call your doctor if your child is having problems. It's also a good idea to know your child's test results and keep a list of the medicines your child takes. How can you care for your child at home? · Make sure your child takes his or her daily medicines to help minimize long-term damage and avoid asthma attacks. · Check your child's peak flow as often as your doctor suggests. This is the best way to know how well the lungs are working. · Check the action plan to see what zone your child is in.  ¨ If your child is in the green zone, he or she should keep taking daily asthma medicines as prescribed. ¨ If your child is in the yellow zone, he or she may be having or will soon have an asthma attack. There may not be any symptoms, but your child's lungs are not working as well as they should. Make sure your child takes the medicines listed in the action plan.  If your child stays in the yellow zone, your doctor may need to increase the dose or add a medicine. ¨ If your child is in the red zone, follow the action plan. If symptoms or peak flow don't improve soon, your child may need to go to the emergency room or be admitted to the hospital.  · Use an asthma diary. Write down your child's peak flow readings in the asthma diary. If your child has an attack, write down what caused it (if you know), the symptoms, and what medicine your child took. · Make sure you know how and when to call your doctor or go to the hospital.  · Take both the asthma action plan and the asthma diary--along with the peak flow meter and medicines--when you take your child to the doctor. Tell the doctor if your child is having trouble following the action plan. When should you call for help? Call 911 anytime you think your child may need emergency care. For example, call if:  · Your child has severe trouble breathing. Signs may include the chest sinking in, using belly muscles to breathe, or nostrils flaring while your child is struggling to breathe. Call your doctor now or seek immediate medical care if:  · Your child has an asthma attack and does not get better after you use the action plan. · Your child coughs up yellow, dark brown, or bloody mucus (sputum). Watch closely for changes in your child's health, and be sure to contact your doctor if:  · Your child's wheezing and coughing get worse. · Your child needs quick-relief medicine on more than 2 days a week (unless it is just for exercise). · Your child has any new symptoms, such as a fever. Where can you learn more? Go to [employer].be  Enter R392 in the search box to learn more about \"Asthma Action Plan: After Your Child's Visit. \"   © 0207-4144 Healthwise, Incorporated. Care instructions adapted under license by [employer] (which disclaims liability or warranty for this information).  This care instruction is for use with your licensed healthcare professional. If you have questions about a medical condition or this instruction, always ask your healthcare professional. [place] [address] any warranty or liability for your use of this information. Content Version: 58.9.550061; Last Revised: 2012               [employer] Activation    Thank you for requesting access to [employer]. Please follow the instructions below to securely access and download your online medical record. Duel allows you to send messages to your doctor, view your test results, renew your prescriptions, schedule appointments, and more. How Do I Sign Up? In your internet browser, go to [email or web address] "[employer]"/[employer]. Click on the First Time User? Click Here link in the Sign In box. You will see the New Member Sign Up page. Enter your Duel Access Code exactly as it appears below. You will not need to use this code after you´ve completed the sign-up process. If you do not sign up before the expiration date, you must request a new code. Duel Access Code: [email or web address]  Expires: 3/28/2019  2:27 PM (This is the date your Duel access code will )    Enter the last four digits of your Social Security Number (xxxx) and Date of Birth (mm/dd/yyyy) as indicated and click Submit. You will be taken to the next sign-up page. Create a Duel ID. This will be your Duel login ID and cannot be changed, so think of one that is secure and easy to remember. Create a Duel password. You can change your password at any time. Enter your Password Reset Question and Answer. This can be used at a later time if you forget your password. Enter your e-mail address. You will receive e-mail notification when new information is available in [address] Click Sign Up. You can now view and download portions of your medical record.   Click the [facility] link to download a portable copy of your medical information. Additional Information    If you have questions, please visit the Frequently Asked Questions section of the [employer] website at [email or web address] Remember, [employer] is NOT to be used for urgent needs. For medical emergencies, dial 911.

## 2020-05-03 NOTE — ED NOTES
Patient placed in closed room, initially standing in tripod position, SPO2 dropped into high 80s, placed on NRB at 12 l/m with significant relief, SPO2 in mid 90s. Given nebulizer treatments, now sitting on bed utilizing smart phone, states significant relief of symptoms, SPO2 98%. Denies home oxygen use.

## 2020-05-03 NOTE — ED NOTES
Pt states he woke up 2 hours prior to arrival with SOB and cough. Pt denies any fever or pain.   Pt sats on RA 85 %

## 2020-05-03 NOTE — ED PROVIDER NOTES
[facility] EMERGENCY DEPT      7:59 AM    Date: 5/3/2020  Patient Name: Demario Mclean    History of Presenting Illness     Chief Complaint   Patient presents with    Shortness of Breath       35 y.o. male with noted past medical history who presents to the emergency department with wheezing and shortness of breath. The patient is a known COPD and asthma patient. He states that 2 hours ago when he woke up he had sudden onset of shortness of breath that did not improve with his home inhaler. Subsequently that he came to the ER for evaluation treatment. He denies any URI symptoms include no cough fever or myalgias. He states histypical COPD or asthma exacerbation but is worse. Patient denies any travel to areas that are Level 3 as noted by the [facility] for Covid 19 risk. The patient denies recent travel outside [place] and denies recent travel to areas large social gatherings. The patient denies any known history of Covid 19 exposure. Patient denies any other associated signs or symptoms. Patient denies any other complaints. Nursing notes regarding the HPI and triage nursing notes were reviewed. Prior medical records were reviewed.      Current Facility-Administered Medications   Medication Dose Route Frequency Provider Last Rate Last Dose    albuterol-ipratropium (DUO-NEB) 2.5 MG-0.5 MG/3 ML  3 mL Nebulization NOW Dacia Gilbert MD        albuterol-ipratropium (DUO-NEB) 2.5 MG-0.5 MG/3 ML  3 mL Nebulization NOW Dacia Gilbert MD        albuterol-ipratropium (DUO-NEB) 2.5 MG-0.5 MG/3 ML  3 mL Nebulization NOW Dacia Gilbert MD        albuterol-ipratropium (DUO-NEB) 2.5 mg-0.5 mg/3 ml nebulizer solution              Current Outpatient Medications   Medication Sig Dispense Refill    predniSONE (DELTASONE) 10 mg tablet 6 p.o. daily for 3 days, 5 p.o. daily for 3 days, 4 p.o. daily for 3 days, 3 p.o. daily for 3 days, 2 p.o. daily for 3 days, 1 p.o. daily for 3 days, 63 Tab 0    albuterol-ipratropium (DUO-NEB) 2.5 mg-0.5 mg/3 ml nebu 3 mL by Nebulization route every four (4) hours as needed for Wheezing. 30 Nebule 3    clonazePAM (KLONOPIN) 0.5 mg tablet Take 0.5 mg by mouth nightly as needed for Anxiety.  progesterone (PROMETRIUM) 100 mg capsule Take 100 mg by mouth daily.  spironolactone (ALDACTONE) 50 mg tablet Take 50 mg by mouth two (2) times a day.  QUEtiapine (SEROQUEL) 300 mg tablet Take 300 mg by mouth nightly.  busPIRone (BUSPAR) 15 mg tablet Take 15 mg by mouth three (3) times daily.  estradioL (ESTRACE) 2 mg tablet Take 2 mg by mouth daily.  prazosin (MINIPRESS) 1 mg capsule Take 1 mg by mouth nightly.  albuterol (PROVENTIL HFA, VENTOLIN HFA, PROAIR HFA) 90 mcg/actuation inhaler Take 2 Puffs by inhalation every four (4) hours as needed for Wheezing. 1 Inhaler 0    ipratropium-albuteroL (COMBIVENT RESPIMAT)  mcg/actuation inhaler Take 1 Puff by inhalation every six (6) hours. 1 Inhaler 0    cetirizine (ZYRTEC) 10 mg tablet Take 1 Tab by mouth daily. 90 Tab 3    budesonide-formoterol (SYMBICORT) 160-4.5 mcg/actuation HFAA Take 2 Puffs by inhalation two (2) times a day. 1 Inhaler 10    traZODone (DESYREL) 50 mg tablet Take 1 Tab by mouth nightly. 90 Tab 0    fluticasone (FLONASE) 50 mcg/actuation nasal spray 1 [place] by Both Nostrils route two (2) times a day.  1 Bottle 11       Past History     Past Medical History:  Past Medical History:   Diagnosis Date    Asthma     Chronic obstructive pulmonary disease ([address]        Past Surgical History:  Past Surgical History:   Procedure Laterality Date    HX FREE SKIN GRAFT      HX ORTHOPAEDIC      facial reconstruction       Family History:  Family History   Adopted: Yes   Problem Relation Age of Onset    No Known Problems Mother     No Known Problems Father        Social History:  Social History     Tobacco Use    Smoking status: Never Smoker    Smokeless tobacco: Former User   Substance Use Topics    Alcohol use: Yes    Drug use: No       Allergies: Allergies   Allergen Reactions    Other Food Rash     Coconut, banana,  Melon (cantaloupe, honeydew, watermelon), cucumbers    Penicillin G Anaphylaxis    Cat Dander Shortness of Breath       Patient's primary care provider (as noted in EPIC):  Juhi Jaramillo MD    Review of Systems   Constitutional: Negative for diaphoresis. HENT: Negative for congestion. Eyes: Negative for discharge. Respiratory: Positive for shortness of breath and wheezing. Negative for stridor. Cardiovascular: Negative for chest pain, palpitations and leg swelling. Gastrointestinal: Negative for diarrhea. Genitourinary: Negative for flank pain. Musculoskeletal: Negative for back pain. Neurological: Negative for weakness. Psychiatric/Behavioral: Negative for hallucinations. All other systems reviewed and are negative. Visit Vitals  Pulse (!) 141   Temp 97.1 °F (36.2 °C)   Resp 24   Ht 5' 8\" (1.727 m)   Wt 98.9 kg (218 lb)   SpO2 95%   BMI 33.15 kg/m²       PHYSICAL EXAM:    CONSTITUTIONAL:  Alert, in no apparent distress;  well developed;  well nourished. HEAD:  Normocephalic, atraumatic. EYES:  EOMI. Non-icteric sclera. Normal conjunctiva. ENTM:  Nose:  no rhinorrhea. Throat:  no erythema or exudate, mucous membranes moist.  NECK:  No JVD. Supple    RESPIRATORY: Minimal expiratory wheezes with good air movement. No rales or rhonchi. CARDIOVASCULAR:  Regular rate and rhythm. No murmurs, rubs, or gallops. GI:  Normal bowel sounds, abdomen soft and non-tender. No rebound or guarding. BACK:  Non-tender. UPPER EXT:  Normal inspection. LOWER EXT:  No edema, no calf tenderness. Distal pulses intact. NEURO:  Moves all four extremities, and grossly normal motor exam.  SKIN:  No rashes;  Normal for age. PSYCH:  Alert and normal affect.     DIFFERENTIAL DIAGNOSES/ MEDICAL DECISION MAKING:  Acute asthma exacerbation, acute bronchitis, pneumonia, upper respiratory infection, pulmonary embolism, bronchospasm, various cardiac etiologies verus numerous other etiologies versus combination of the above. Diagnostic Study Results     Abnormal lab results from this emergency department encounter:  Labs Reviewed - No data to display    Lab values for this patient within approximately the last 12 hours:  No results found for this or any previous visit (from the past 12 hour(s)). Radiologist and cardiologist interpretations if available at time of this note:  No results found. Medication(s) ordered for patient during this emergency visit encounter:  Medications   albuterol-ipratropium (DUO-NEB) 2.5 MG-0.5 MG/3 ML (has no administration in time range)   albuterol-ipratropium (DUO-NEB) 2.5 MG-0.5 MG/3 ML (has no administration in time range)   albuterol-ipratropium (DUO-NEB) 2.5 MG-0.5 MG/3 ML (has no administration in time range)   albuterol-ipratropium (DUO-NEB) 2.5 mg-0.5 mg/3 ml nebulizer solution (has no administration in time range)       Medical Decision Making     I am the first provider for this patient. I reviewed the vital signs, available nursing notes, past medical history, past surgical history, family history and social history. Vital Signs:  Reviewed the patient's vital signs. ED COURSE AND MEDICAL DECISION MAKING:  Patient's breathing improved and wheezing resolved in the emergency department with the noted albuterol and atrovent HHN treatments. Patient's initial steroid therapy may have been started in the ED as well. Patient is well and can be discharged home. There are no other medical conditions that I believe are significantly contributing to the patient's shortness of breath except the noted acute asthma exacerbation and any noted triggers.     IMPRESSION AND MEDICAL DECISION MAKING:  Based upon the patient's presentation with noted HPI and PE, along with the work up done in the emergency department, I believe that the patient is having an acute asthma exacerbation in an asthmatic patient. DIAGNOSIS:  1. Acute asthma exacerbation. SPECIFIC PATIENT INSTRUCTIONS FROM THE PHYSICIAN WHO TREATED YOU IN THE ER TODAY:  1. Return if any concerns or worsening of condition(s)  2. Prednisone as prescribed until finished. 3. Use your albuterol inhaler, if prescribed, and/or home nebulizer machine (if you have one) for wheezing. 4. FOLLOW UP APPOINTMENT:  Your primary doctor in 1-2 days. Patient is improved, resting quietly and comfortably. The patient will be discharged home. The patient was reassured that these symptoms do not appear to represent a serious or life threatening condition at this time. Warning signs of worsening condition were discussed and understood by the patient. Based on patient's age, coexisting illness, exam, and the results of this ED evaluation, the decision to treat as an outpatient was made. Based on the information available at time of discharge, acute pathology requiring immediate intervention was deemed relative unlikely. While it is impossible to completely exclude the possibility of underlying serious disease or worsening of condition, I feel the relative likelihood is extremely low. I discussed this uncertainty with the patient, who understood ED evaluation and treatment and felt comfortable with the outpatient treatment plan. All questions regarding care, test results, and follow up were answered. The patient is stable and appropriate to discharge. They understand that they should return to the emergency department for any new or worsening symptoms. I stressed the importance of follow up for repeat assessment and possibly further evaluation/treatment. Dictation disclaimer:  Please note that this dictation was completed with [employer], the [employer] voice recognition software.   Quite often unanticipated grammatical, syntax, homophones, and other interpretive errors are inadvertently transcribed by the computer software. Please disregard these errors. Please excuse any errors that have escaped final proofreading. Coding Diagnoses     Clinical Impression:   1. Mild intermittent asthma with acute exacerbation        Disposition     Disposition: Discharge. THAD Ross Board Certified Emergency Physician    Provider Attestation:  If a scribe was utilized in generation of this patient record, I personally performed the services described in the documentation, reviewed the documentation, as recorded by the scribe in my presence, and it accurately records the patient's history of presenting illness, review of systems, patient physical examination, and procedures performed by me as the attending physician. THAD Ross Board Certified Emergency Physician  5/3/2020.  8:04 AM

## 2020-05-03 NOTE — ED PROVIDER NOTES
[facility] EMERGENCY DEPT      12:11 PM    Date: 5/3/2020  Patient Name: Katelyn Mclean    History of Presenting Illness     Chief Complaint   Patient presents with    Wheezing       35 y.o. male with noted past medical history who presents to the emergency department with shortness of breath and wheezing. Patient was recently seen earlier this morning in department azithromycin department for wheezing from his underlying asthma and COPD. He responded well to albuterol treatments. However he was more fixated on that initial visit on getting oxygen. Negative prescriptions. Return to ER with shortness of breath and wheezing. He states that he feels better with oxygen. Patient denies any other associated signs or symptoms. Patient denies any other complaints. Nursing notes regarding the HPI and triage nursing notes were reviewed. Prior medical records were reviewed. Current Outpatient Medications   Medication Sig Dispense Refill    predniSONE (DELTASONE) 20 mg tablet Take 60 mg by mouth daily for 5 days. With Breakfast 15 Tab 0    albuterol (PROVENTIL HFA, VENTOLIN HFA, PROAIR HFA) 90 mcg/actuation inhaler Take 2 Puffs by inhalation every four (4) hours as needed for Wheezing. 1 Inhaler 0    predniSONE (DELTASONE) 10 mg tablet 6 p.o. daily for 3 days, 5 p.o. daily for 3 days, 4 p.o. daily for 3 days, 3 p.o. daily for 3 days, 2 p.o. daily for 3 days, 1 p.o. daily for 3 days, 63 Tab 0    albuterol-ipratropium (DUO-NEB) 2.5 mg-0.5 mg/3 ml nebu 3 mL by Nebulization route every four (4) hours as needed for Wheezing. 30 Nebule 3    clonazePAM (KLONOPIN) 0.5 mg tablet Take 0.5 mg by mouth nightly as needed for Anxiety.  progesterone (PROMETRIUM) 100 mg capsule Take 100 mg by mouth daily.  spironolactone (ALDACTONE) 50 mg tablet Take 50 mg by mouth two (2) times a day.  QUEtiapine (SEROQUEL) 300 mg tablet Take 300 mg by mouth nightly.       busPIRone (BUSPAR) 15 mg tablet Take 15 mg by mouth three (3) times daily.  estradioL (ESTRACE) 2 mg tablet Take 2 mg by mouth daily.  prazosin (MINIPRESS) 1 mg capsule Take 1 mg by mouth nightly.  albuterol (PROVENTIL HFA, VENTOLIN HFA, PROAIR HFA) 90 mcg/actuation inhaler Take 2 Puffs by inhalation every four (4) hours as needed for Wheezing. 1 Inhaler 0    ipratropium-albuteroL (COMBIVENT RESPIMAT)  mcg/actuation inhaler Take 1 Puff by inhalation every six (6) hours. 1 Inhaler 0    cetirizine (ZYRTEC) 10 mg tablet Take 1 Tab by mouth daily. 90 Tab 3    budesonide-formoterol (SYMBICORT) 160-4.5 mcg/actuation HFAA Take 2 Puffs by inhalation two (2) times a day. 1 Inhaler 10    traZODone (DESYREL) 50 mg tablet Take 1 Tab by mouth nightly. 90 Tab 0    fluticasone (FLONASE) 50 mcg/actuation nasal spray 1 [place] by Both Nostrils route two (2) times a day. 1 Bottle 11       Past History     Past Medical History:  Past Medical History:   Diagnosis Date    Asthma     Chronic obstructive pulmonary disease ([facility] [address]        Past Surgical History:  Past Surgical History:   Procedure Laterality Date    HX FREE SKIN GRAFT      HX ORTHOPAEDIC      facial reconstruction       Family History:  Family History   Adopted: Yes   Problem Relation Age of Onset    No Known Problems Mother     No Known Problems Father        Social History:  Social History     Tobacco Use    Smoking status: Never Smoker    Smokeless tobacco: Former User   Substance Use Topics    Alcohol use: Yes    Drug use: No       Allergies: Allergies   Allergen Reactions    Other Food Rash     Coconut, banana,  Melon (cantaloupe, honeydew, watermelon), cucumbers    Penicillin G Anaphylaxis    Cat Dander Shortness of Breath       Patient's primary care provider (as noted in EPIC):  Rhina Lepe MD    Review of Systems   Constitutional: Negative for diaphoresis. HENT: Negative for congestion. Eyes: Negative for discharge.    Respiratory: Positive for shortness of breath and wheezing. Negative for stridor. Cardiovascular: Negative for chest pain, palpitations and leg swelling. Gastrointestinal: Negative for diarrhea. Genitourinary: Negative for flank pain. Musculoskeletal: Negative for back pain. Neurological: Negative for weakness. Psychiatric/Behavioral: Negative for hallucinations. All other systems reviewed and are negative. Visit Vitals  /83 (BP 1 Location: Left arm, BP Patient Position: Sitting)   Pulse (!) 130   Temp 98.5 °F (36.9 °C)   Resp 20   SpO2 95%       PHYSICAL EXAM:    CONSTITUTIONAL:  Alert, in no apparent distress;  well developed;  well nourished. HEAD:  Normocephalic, atraumatic. EYES:  EOMI. Non-icteric sclera. Normal conjunctiva. ENTM:  Nose:  no rhinorrhea. Throat:  no erythema or exudate, mucous membranes moist.  NECK:  No JVD. Supple    RESPIRATORY:  Expiratory wheezes with good air movement. No rales or rhonchi. CARDIOVASCULAR:  Regular rate and rhythm. No murmurs, rubs, or gallops. GI:  Normal bowel sounds, abdomen soft and non-tender. No rebound or guarding. BACK:  Non-tender. UPPER EXT:  Normal inspection. LOWER EXT:  No edema, no calf tenderness. Distal pulses intact. NEURO:  Moves all four extremities, and grossly normal motor exam.  SKIN:  No rashes;  Normal for age. PSYCH:  Alert and normal affect. DIFFERENTIAL DIAGNOSES/ MEDICAL DECISION MAKING:  Acute asthma exacerbation, acute bronchitis, pneumonia, upper respiratory infection, pulmonary embolism, bronchospasm, various cardiac etiologies verus numerous other etiologies versus combination of the above. Diagnostic Study Results     Abnormal lab results from this emergency department encounter:  Labs Reviewed - No data to display    Lab values for this patient within approximately the last 12 hours:  No results found for this or any previous visit (from the past 12 hour(s)).     Radiologist and cardiologist interpretations if available at time of this note:  No results found. Medication(s) ordered for patient during this emergency visit encounter:  Medications - No data to display    Medical Decision Making     I am the first provider for this patient. I reviewed the vital signs, available nursing notes, past medical history, past surgical history, family history and social history. Vital Signs:  Reviewed the patient's vital signs. ED COURSE AND MEDICAL DECISION MAKING:  Patient's breathing improved and wheezing resolved in the emergency department with the noted albuterol and atrovent HHN treatments. Patient's initial steroid therapy may have been started in the ED as well. Patient is well and can be discharged home. There are no other medical conditions that I believe are significantly contributing to the patient's shortness of breath except the noted acute asthma exacerbation and any noted triggers. 1:23 PM  Patient breathing much better after noted albuterol Atrovent treatments as well as prednisone given in the ER. The patient is safe to discharge home. IMPRESSION AND MEDICAL DECISION MAKING:  Based upon the patient's presentation with noted HPI and PE, along with the work up done in the emergency department, I believe that the patient is having an acute asthma exacerbation in an asthmatic patient. DIAGNOSIS:  1. Acute asthma exacerbation. SPECIFIC PATIENT INSTRUCTIONS FROM THE PHYSICIAN WHO TREATED YOU IN THE ER TODAY:  1. Return if any concerns or worsening of condition(s)  2. Prednisone as previously prescribed until finished. 3. Use your albuterol inhaler, if previously prescribed, and/or home nebulizer machine (if you have one) for wheezing. 4. FOLLOW UP APPOINTMENT:  Your primary doctor in 1-2 days. Patient is improved, resting quietly and comfortably. The patient will be discharged home.      The patient was reassured that these symptoms do not appear to represent a serious or life threatening condition at this time. Warning signs of worsening condition were discussed and understood by the patient. Based on patient's age, coexisting illness, exam, and the results of this ED evaluation, the decision to treat as an outpatient was made. Based on the information available at time of discharge, acute pathology requiring immediate intervention was deemed relative unlikely. While it is impossible to completely exclude the possibility of underlying serious disease or worsening of condition, I feel the relative likelihood is extremely low. I discussed this uncertainty with the patient, who understood ED evaluation and treatment and felt comfortable with the outpatient treatment plan. All questions regarding care, test results, and follow up were answered. The patient is stable and appropriate to discharge. They understand that they should return to the emergency department for any new or worsening symptoms. I stressed the importance of follow up for repeat assessment and possibly further evaluation/treatment. Dictation disclaimer:  Please note that this dictation was completed with [employer], the computer voice recognition software. Quite often unanticipated grammatical, syntax, homophones, and other interpretive errors are inadvertently transcribed by the computer software. Please disregard these errors. Please excuse any errors that have escaped final proofreading. Coding Diagnoses     Clinical Impression:   1. Mild intermittent asthma with acute exacerbation        Disposition     Disposition:  Discharge. Daryle Sage L. Edwena Lazier, M.D.   SHIRLEY Board Certified Emergency Physician    Provider Attestation:  If a scribe was utilized in generation of this patient record, I personally performed the services described in the documentation, reviewed the documentation, as recorded by the scribe in my presence, and it accurately records the patient's history of presenting illness, review of systems, patient physical examination, and procedures performed by me as the attending physician. Sami James Ma, M.D.   AB Board Certified Emergency Physician  5/3/2020.  12:17 PM

## 2020-05-04 ENCOUNTER — PATIENT OUTREACH (OUTPATIENT)
Dept: CASE MANAGEMENT | Age: 30
End: 2020-05-04

## 2020-05-04 NOTE — PROGRESS NOTES
Patient contacted regarding COVID-19  risk. CTN Attempt to contact patient via telephone on 5/4/20 for  follow up. Unable to reach. Left message on voicemail with office contact information. No Patient medical information left on message.

## 2020-05-11 ENCOUNTER — HOSPITAL ENCOUNTER (EMERGENCY)
Age: 30
Discharge: HOME | End: 2020-05-11
Attending: EMERGENCY MEDICINE
Payer: MEDICAID

## 2020-05-11 VITALS
SYSTOLIC BLOOD PRESSURE: 116 MMHG | DIASTOLIC BLOOD PRESSURE: 75 MMHG | RESPIRATION RATE: 16 BRPM | WEIGHT: 210 LBS | HEART RATE: 89 BPM | HEIGHT: 68 IN | OXYGEN SATURATION: 95 % | BODY MASS INDEX: 31.83 KG/M2 | TEMPERATURE: 99.6 F

## 2020-05-11 DIAGNOSIS — L03.317 CELLULITIS OF BUTTOCK: Primary | ICD-10-CM

## 2020-05-11 PROCEDURE — 75810000289 HC I&D ABSCESS SIMP/COMP/MULT

## 2020-05-11 PROCEDURE — 99282 EMERGENCY DEPT VISIT SF MDM: CPT

## 2020-05-11 RX ORDER — OXYCODONE AND ACETAMINOPHEN 5; 325 MG/1; MG/1
1 TABLET ORAL
Qty: 6 TAB | Refills: 0 | Status: SHIPPED | OUTPATIENT
Start: 2020-05-11 | End: 2020-05-14

## 2020-05-11 RX ORDER — SULFAMETHOXAZOLE AND TRIMETHOPRIM 800; 160 MG/1; MG/1
1 TABLET ORAL 2 TIMES DAILY
Qty: 14 TAB | Refills: 0 | Status: SHIPPED | OUTPATIENT
Start: 2020-05-11 | End: 2020-05-18

## 2020-05-11 NOTE — DISCHARGE INSTRUCTIONS

## 2020-05-11 NOTE — ED PROVIDER NOTES
EMERGENCY DEPARTMENT HISTORY AND PHYSICAL EXAM    Date: 5/11/2020  Patient Name: Manpreet Mclean    History of Presenting Illness     Chief Complaint   Patient presents with    Abscess         History Provided By: Patient    Chief Complaint: Abscess  Duration: 5 days  Timing: Gradual  Location: Gluteal cleft  Quality: Tender  Severity: 8 out of 10  Modifying Factors: Worse when sitting  Associated Symptoms: none     Surgery    Additional History (Context): Lula Gr is a 35 y.o. male with a history of asthma and COPD who presents today for history as listed above. Patient states he has had these in the past and typically warm compresses help. He did try warm compresses and a needle without relief. Denies any fevers or chills. States he has never had to have surgery for this. PCP: Nancie Mchugh MD    Current Outpatient Medications   Medication Sig Dispense Refill    trimethoprim-sulfamethoxazole (Bactrim DS) 160-800 mg per tablet Take 1 Tab by mouth two (2) times a day for 7 days. 14 Tab 0    oxyCODONE-acetaminophen (Percocet) 5-325 mg per tablet Take 1 Tab by mouth every six (6) hours as needed for Pain for up to 3 days. Max Daily Amount: 4 Tabs. 6 Tab 0    albuterol (PROVENTIL HFA, VENTOLIN HFA, PROAIR HFA) 90 mcg/actuation inhaler Take 2 Puffs by inhalation every four (4) hours as needed for Wheezing. 1 Inhaler 0    predniSONE (DELTASONE) 10 mg tablet 6 p.o. daily for 3 days, 5 p.o. daily for 3 days, 4 p.o. daily for 3 days, 3 p.o. daily for 3 days, 2 p.o. daily for 3 days, 1 p.o. daily for 3 days, 63 Tab 0    albuterol-ipratropium (DUO-NEB) 2.5 mg-0.5 mg/3 ml nebu 3 mL by Nebulization route every four (4) hours as needed for Wheezing. 30 Nebule 3    clonazePAM (KLONOPIN) 0.5 mg tablet Take 0.5 mg by mouth nightly as needed for Anxiety.  progesterone (PROMETRIUM) 100 mg capsule Take 100 mg by mouth daily.       spironolactone (ALDACTONE) 50 mg tablet Take 50 mg by mouth two (2) times a day.  QUEtiapine (SEROQUEL) 300 mg tablet Take 300 mg by mouth nightly.  busPIRone (BUSPAR) 15 mg tablet Take 15 mg by mouth three (3) times daily.  estradioL (ESTRACE) 2 mg tablet Take 2 mg by mouth daily.  prazosin (MINIPRESS) 1 mg capsule Take 1 mg by mouth nightly.  albuterol (PROVENTIL HFA, VENTOLIN HFA, PROAIR HFA) 90 mcg/actuation inhaler Take 2 Puffs by inhalation every four (4) hours as needed for Wheezing. 1 Inhaler 0    ipratropium-albuteroL (COMBIVENT RESPIMAT)  mcg/actuation inhaler Take 1 Puff by inhalation every six (6) hours. 1 Inhaler 0    cetirizine (ZYRTEC) 10 mg tablet Take 1 Tab by mouth daily. 90 Tab 3    budesonide-formoterol (SYMBICORT) 160-4.5 mcg/actuation HFAA Take 2 Puffs by inhalation two (2) times a day. 1 Inhaler 10    traZODone (DESYREL) 50 mg tablet Take 1 Tab by mouth nightly. 90 Tab 0    fluticasone (FLONASE) 50 mcg/actuation nasal spray 1 [place] by Both Nostrils route two (2) times a day. 1 Bottle 11       Past History     Past Medical History:  Past Medical History:   Diagnosis Date    Asthma     Chronic obstructive pulmonary disease ([facility] [address]        Past Surgical History:  Past Surgical History:   Procedure Laterality Date    HX FREE SKIN GRAFT      HX ORTHOPAEDIC      facial reconstruction       Family History:  Family History   Adopted: Yes   Problem Relation Age of Onset    No Known Problems Mother     No Known Problems Father        Social History:  Social History     Tobacco Use    Smoking status: Never Smoker    Smokeless tobacco: Former User   Substance Use Topics    Alcohol use: Yes    Drug use: No       Allergies: Allergies   Allergen Reactions    Other Food Rash     Coconut, banana,  Melon (cantaloupe, honeydew, watermelon), cucumbers    Penicillin G Anaphylaxis    Cat Dander Shortness of Breath         Review of Systems   Review of Systems   Constitutional: Negative for chills and fever.    HENT: Negative for congestion, rhinorrhea and sore throat. Respiratory: Negative for cough and shortness of breath. Cardiovascular: Negative for chest pain. Gastrointestinal: Negative for abdominal pain, blood in stool, constipation, diarrhea, nausea and vomiting. Genitourinary: Negative for dysuria, frequency and hematuria. Musculoskeletal: Negative for back pain and myalgias. Skin: Positive for color change. Negative for rash and wound. Neurological: Negative for dizziness and headaches. All other systems reviewed and are negative. All Other Systems Negative  Physical Exam     Vitals:    05/11/20 1755   BP: 116/75   Pulse: (!) 109   Resp: 16   Temp: 99.6 °F (37.6 °C)   SpO2: 95%   Weight: 95.3 kg (210 lb)   Height: 5' 8\" (1.727 m)     Physical Exam  Vitals signs and nursing note reviewed. Constitutional:       General: He is not in acute distress. Appearance: He is well-developed. He is not diaphoretic. HENT:      Head: Normocephalic and atraumatic. Eyes:      Conjunctiva/sclera: Conjunctivae normal.   Neck:      Musculoskeletal: Normal range of motion and neck supple. Cardiovascular:      Rate and Rhythm: Normal rate and regular rhythm. Heart sounds: Normal heart sounds. Pulmonary:      Effort: Pulmonary effort is normal. No respiratory distress. Breath sounds: Normal breath sounds. Chest:      Chest wall: No tenderness. Abdominal:      General: Bowel sounds are normal. There is no distension. Palpations: Abdomen is soft. Tenderness: There is no abdominal tenderness. There is no guarding or rebound. Musculoskeletal: Normal range of motion. General: No deformity. Skin:     General: Skin is warm and dry. Findings: Erythema present. Neurological:      Mental Status: He is alert and oriented to person, place, and time.            Diagnostic Study Results     Labs -   No results found for this or any previous visit (from the past 12 hour(s)). Radiologic Studies -   No orders to display     CT Results  (Last 48 hours)    None        CXR Results  (Last 48 hours)    None            Medical Decision Making   I am the first provider for this patient. I reviewed the vital signs, available nursing notes, past medical history, past surgical history, family history and social history. Vital Signs-Reviewed the patient's vital signs. Records Reviewed: Nursing Notes and Old Medical Records     Procedures: None   I&D Abcess Simple  Date/Time: 5/11/2020 7:33 PM  Performed by: ODILIA Nicholson  Authorized by: ODILIA Nicholson     Consent:     Consent obtained:  Verbal    Consent given by:  Patient    Risks discussed:  Bleeding, incomplete drainage, pain, infection and damage to other organs    Alternatives discussed:  No treatment and delayed treatment  Location:     Type:  Abscess    Location: Gluteal cleft and left buttock. Pre-procedure details:     Skin preparation:  Betadine  Anesthesia (see MAR for exact dosages): Anesthesia method:  Local infiltration    Local anesthetic:  Lidocaine 1% w/o epi  Procedure type:     Complexity:  Simple  Procedure details:     Incision types:  Stab incision    Scalpel blade:  11    Drainage:  Bloody    Drainage amount:  Scant    Wound treatment:  Wound left open    Packing materials:  None  Post-procedure details:     Patient tolerance of procedure: Tolerated well, no immediate complications        Provider Notes (Medical Decision Making):     Differential: Abscess, cellulitis, pilonidal cyst      Plan: Did discuss multiple options with patient to include I&D versus course of antibiotic trial however patient would prefer to attempt an I&D. I&D was unsuccessful, will discharge home with antibiotics and pain medication. Have advised patient to continue warm compresses and to return as needed for wound check  And/or possible need for another I&D.   Have also encourage close PCP follow-up. MED RECONCILIATION:  No current facility-administered medications for this encounter. Current Outpatient Medications   Medication Sig    trimethoprim-sulfamethoxazole (Bactrim DS) 160-800 mg per tablet Take 1 Tab by mouth two (2) times a day for 7 days.  oxyCODONE-acetaminophen (Percocet) 5-325 mg per tablet Take 1 Tab by mouth every six (6) hours as needed for Pain for up to 3 days. Max Daily Amount: 4 Tabs.  albuterol (PROVENTIL HFA, VENTOLIN HFA, PROAIR HFA) 90 mcg/actuation inhaler Take 2 Puffs by inhalation every four (4) hours as needed for Wheezing.  predniSONE (DELTASONE) 10 mg tablet 6 p.o. daily for 3 days, 5 p.o. daily for 3 days, 4 p.o. daily for 3 days, 3 p.o. daily for 3 days, 2 p.o. daily for 3 days, 1 p.o. daily for 3 days,    albuterol-ipratropium (DUO-NEB) 2.5 mg-0.5 mg/3 ml nebu 3 mL by Nebulization route every four (4) hours as needed for Wheezing.  clonazePAM (KLONOPIN) 0.5 mg tablet Take 0.5 mg by mouth nightly as needed for Anxiety.  progesterone (PROMETRIUM) 100 mg capsule Take 100 mg by mouth daily.  spironolactone (ALDACTONE) 50 mg tablet Take 50 mg by mouth two (2) times a day.  QUEtiapine (SEROQUEL) 300 mg tablet Take 300 mg by mouth nightly.  busPIRone (BUSPAR) 15 mg tablet Take 15 mg by mouth three (3) times daily.  estradioL (ESTRACE) 2 mg tablet Take 2 mg by mouth daily.  prazosin (MINIPRESS) 1 mg capsule Take 1 mg by mouth nightly.  albuterol (PROVENTIL HFA, VENTOLIN HFA, PROAIR HFA) 90 mcg/actuation inhaler Take 2 Puffs by inhalation every four (4) hours as needed for Wheezing.  ipratropium-albuteroL (COMBIVENT RESPIMAT)  mcg/actuation inhaler Take 1 Puff by inhalation every six (6) hours.  cetirizine (ZYRTEC) 10 mg tablet Take 1 Tab by mouth daily.  budesonide-formoterol (SYMBICORT) 160-4.5 mcg/actuation HFAA Take 2 Puffs by inhalation two (2) times a day.     traZODone (DESYREL) 50 mg tablet Take 1 Tab by mouth nightly.  fluticasone (FLONASE) 50 mcg/actuation nasal spray 1 [place] by Both Nostrils route two (2) times a day. Disposition:  Home     DISCHARGE NOTE:   Pt has been reexamined. Patient has no new complaints, changes, or physical findings. Care plan outlined and precautions discussed. Results of workup were reviewed with the patient. All medications were reviewed with the patient. All of pt's questions and concerns were addressed. Patient was instructed and agrees to follow up with PCP as well as to return to the ED upon further deterioration. Patient is ready to go home. Follow-up Information     Follow up With Specialties Details Why [address] [facility] EMERGENCY DEPT Emergency Medicine  As needed, For wound re-check [address] [place] [address]    Patience Brown MD Internal Medicine Schedule an appointment as soon as possible for a visit  [id number] [facility]  [address] [place] [address]  [phone]            Current Discharge Medication List      START taking these medications    Details   trimethoprim-sulfamethoxazole (Bactrim DS) 160-800 mg per tablet Take 1 Tab by mouth two (2) times a day for 7 days. Qty: 14 Tab, Refills: 0      oxyCODONE-acetaminophen (Percocet) 5-325 mg per tablet Take 1 Tab by mouth every six (6) hours as needed for Pain for up to 3 days. Max Daily Amount: 4 Tabs. Qty: 6 Tab, Refills: 0    Associated Diagnoses: Cellulitis of buttock                 Diagnosis     Clinical Impression:   1. Cellulitis of buttock          \"Please note that this dictation was completed with "[employer] the [employer] voice recognition software. Quite often unanticipated grammatical, syntax, homophones, and other interpretive errors are inadvertently transcribed by the computer software. Please disregard these errors. Please excuse any errors that have escaped final proofreading. \"

## 2020-05-12 ENCOUNTER — PATIENT OUTREACH (OUTPATIENT)
Dept: CASE MANAGEMENT | Age: 30
End: 2020-05-12

## 2020-05-12 NOTE — PROGRESS NOTES
Patient contacted regarding recent discharge and COVID-19 risk     CTN Attempt to contact patient via telephone on 5/12/20 for follow up. Unable to reach. Left message on voicemail with office contact information. No Patient medical information left on message.

## 2020-05-13 ENCOUNTER — PATIENT OUTREACH (OUTPATIENT)
Dept: CASE MANAGEMENT | Age: 30
End: 2020-05-13

## 2020-05-13 NOTE — PROGRESS NOTES
Patient contacted regarding recent discharge and COVID-19 risk     Care Transition Nurse/ Ambulatory Care Manager contacted the patient by telephone to perform post discharge assessment. Verified name and  with patient as identifiers. Patient states that he is okay. Patient reported that he is taking antibiotic and Pain medications as prescribed. Patient reported that he will call PCP office to schedule follow up appt. CDC guidelines,  and Red flags on when to go back to ED (Chest pain, difficulty breathing, shortness of breath, high fevers and/or worsening of symptoms) were reviewed and discuss with Pt. Pt. Verbalized and repeated back understanding. Patient has following risk factors of: COPD and asthma. CTN/ACM reviewed discharge instructions, medical action plan and red flags related to discharge diagnosis. Reviewed and educated them on any new and changed medications related to discharge diagnosis. Advised obtaining a 90-day supply of all daily and as-needed medications. Education provided regarding infection prevention, and signs and symptoms of COVID-19 and when to seek medical attention with patient who verbalized understanding. Discussed exposure protocols and quarantine from [address] you at higher risk for severe illness  and given an opportunity for questions and concerns. The patient agrees to contact the COVID-19 hotline [phone] or PCP office for questions related to their healthcare. CTN/ACM provided contact information for future reference. From CDC: Are you at higher risk for severe illness?  Wash your hands often.  Avoid close contact (6 feet, which is about two arm lengths) with people who are sick.  Put distance between yourself and other people if COVID-19 is spreading in your community.  Clean and disinfect frequently touched surfaces.  Avoid all cruise travel and non-essential air travel.    Call your healthcare professional if you have concerns about COVID-19 and your underlying condition or if you are sick. For more information on steps you can take to protect yourself, see CDC's How to [address] [facility] 600 for follow-up call in 7-14 days based on severity of symptoms and risk factors.

## 2020-05-19 ENCOUNTER — VIRTUAL VISIT (OUTPATIENT)
Dept: FAMILY MEDICINE CLINIC | Age: 30
End: 2020-05-19

## 2020-05-19 DIAGNOSIS — J45.50 SEVERE PERSISTENT ASTHMA WITHOUT COMPLICATION: Primary | ICD-10-CM

## 2020-05-19 PROBLEM — J45.901 ACUTE BRONCHITIS WITH ASTHMA WITH ACUTE EXACERBATION: Status: RESOLVED | Noted: 2020-03-04 | Resolved: 2020-05-19

## 2020-05-19 PROBLEM — J20.9 ACUTE BRONCHITIS WITH ASTHMA WITH ACUTE EXACERBATION: Status: RESOLVED | Noted: 2020-03-04 | Resolved: 2020-05-19

## 2020-05-19 RX ORDER — ALBUTEROL SULFATE 90 UG/1
2 AEROSOL, METERED RESPIRATORY (INHALATION)
Qty: 1 INHALER | Refills: 2 | Status: SHIPPED | OUTPATIENT
Start: 2020-05-19 | End: 2020-06-11

## 2020-05-19 RX ORDER — IPRATROPIUM BROMIDE AND ALBUTEROL SULFATE 2.5; .5 MG/3ML; MG/3ML
3 SOLUTION RESPIRATORY (INHALATION)
Qty: 30 NEBULE | Refills: 3 | Status: SHIPPED | OUTPATIENT
Start: 2020-05-19 | End: 2020-06-11

## 2020-05-19 RX ORDER — ALBUTEROL SULFATE 0.83 MG/ML
2.5 SOLUTION RESPIRATORY (INHALATION)
Qty: 30 NEBULE | Refills: 3 | OUTPATIENT
Start: 2020-05-19 | End: 2020-06-11

## 2020-05-19 NOTE — PROGRESS NOTES
Josey Public presents today for   Chief Complaint   Patient presents with    Asthma       Patient presents for Telehealth Medicine via [employer]. me    Depression Screening:  3 most recent PHQ Screens 2/18/2019   Little interest or pleasure in doing things Not at all   Feeling down, depressed, irritable, or hopeless Not at all   Total Score PHQ 2 0       Learning Assessment:  Learning Assessment 1/9/2018   PRIMARY LEARNER Patient   HIGHEST LEVEL OF EDUCATION - PRIMARY LEARNER  GRADUATED HIGH SCHOOL OR GED   BARRIERS PRIMARY LEARNER NONE   CO-LEARNER CAREGIVER No   PRIMARY LANGUAGE ENGLISH   LEARNER PREFERENCE PRIMARY DEMONSTRATION   ANSWERED BY patient   RELATIONSHIP SELF       Abuse Screening:  Abuse Screening Questionnaire 1/9/2018   Do you ever feel afraid of your partner? N   Are you in a relationship with someone who physically or mentally threatens you? N   Is it safe for you to go home? Y       Fall Risk  No flowsheet data found. Health Maintenance reviewed and discussed and ordered per Provider. Health Maintenance Due   Topic Date Due    DTaP/Tdap/Td series (1 - Tdap) 03/24/2012   . Coordination of Care:  1. Have you been to the ER, urgent care clinic since your last visit? Hospitalized since your last visit? yes    2. Have you seen or consulted any other health care providers outside of the [address] [place] [address] since your last visit? Include any pap smears or colon screening. no          Consent:  He and/or health care decision maker is aware that that he may receive a bill for this telephone service, depending on his insurance coverage, and has provided verbal consent to proceed:  Yes

## 2020-05-19 NOTE — PROGRESS NOTES
Genny Yoo is a 35 y.o. male who was seen by synchronous (real-time) audio-video technology using doxy. me on 5/19/2020. Location of the patient: Home    Location of the provider: [place]    Consent:  He and/or health care decision maker is aware that that he may receive a bill for this telehealth service, depending on his insurance coverage, and has provided verbal consent to proceed: Yes    Subjective:   Genny Yoo is a 35 y.o. male who presents today for management of    Chief Complaint   Patient presents with    Asthma       Asthma Review:  The patient is being seen for follow up of asthma, not currently in exacerbation. Asthma symptoms occur: daily, infrequently. Wheezing when present is described as moderate to severe and easily relieved with rescue bronchodilator. Current limitations in activity from asthma: none. Frequency of use of quick-relief meds: 3-10 times per day. Regimen compliance: The patient reports adherence to this regimen. Patient does not smoke cigarettes. Patient has had several ER visits for asthma exacerbations. He is finishing up on a prednisone course. Problem List  Patient Active Problem List    Diagnosis Date Noted    PTSD (post-traumatic stress disorder) 11/19/2018    Anxiety 11/12/2018    Severe persistent asthma without complication 42/29/9495    Leukocytosis 01/09/2018       Current Medications  Current Outpatient Medications   Medication Sig    albuterol-ipratropium (DUO-NEB) 2.5 mg-0.5 mg/3 ml nebu 3 mL by Nebulization route every four (4) hours as needed for Wheezing.  albuterol (PROVENTIL HFA, VENTOLIN HFA, PROAIR HFA) 90 mcg/actuation inhaler Take 2 Puffs by inhalation every four (4) hours as needed for Wheezing.  ipratropium-albuteroL (Combivent Respimat)  mcg/actuation inhaler Take 1 Puff by inhalation every six (6) hours.     albuterol (PROVENTIL VENTOLIN) 2.5 mg /3 mL (0.083 %) nebu 3 mL by Nebulization route every four (4) hours as needed for Wheezing.  clonazePAM (KLONOPIN) 0.5 mg tablet Take 0.5 mg by mouth nightly as needed for Anxiety.  progesterone (PROMETRIUM) 100 mg capsule Take 100 mg by mouth daily.  spironolactone (ALDACTONE) 50 mg tablet Take 50 mg by mouth two (2) times a day.  QUEtiapine (SEROQUEL) 300 mg tablet Take 300 mg by mouth nightly.  busPIRone (BUSPAR) 15 mg tablet Take 15 mg by mouth three (3) times daily.  estradioL (ESTRACE) 2 mg tablet Take 2 mg by mouth daily.  prazosin (MINIPRESS) 1 mg capsule Take 1 mg by mouth nightly. Take 3 tabs before bed.  cetirizine (ZYRTEC) 10 mg tablet Take 1 Tab by mouth daily.  budesonide-formoterol (SYMBICORT) 160-4.5 mcg/actuation HFAA Take 2 Puffs by inhalation two (2) times a day.  fluticasone (FLONASE) 50 mcg/actuation nasal spray 1 [place] by Both Nostrils route two (2) times a day.  traZODone (DESYREL) 50 mg tablet Take 1 Tab by mouth nightly. No current facility-administered medications for this visit. Allergies/Drug Reactions  Allergies   Allergen Reactions    Other Food Rash     Coconut, banana,  Melon (cantaloupe, honeydew, watermelon), cucumbers    Horse Dander Hives    Penicillin G Anaphylaxis    Cat Dander Shortness of Breath        Social History  Social History     Socioeconomic History    Marital status: SINGLE     Spouse name: Not on file    Number of children: Not on file    Years of education: Not on file    Highest education level: Not on file   Occupational History    Not on file   Social Needs    Financial resource strain: Not on file    Food insecurity     Worry: Not on file     Inability: Not on file    Transportation needs     Medical: Not on file     Non-medical: Not on file   Tobacco Use    Smoking status: Never Smoker    Smokeless tobacco: Former User   Substance and Sexual Activity    Alcohol use:  Yes    Drug use: No    Sexual activity: Not Currently Lifestyle    Physical activity     Days per week: Not on file     Minutes per session: Not on file    Stress: Not on file   Relationships    Social connections     Talks on phone: Not on file     Gets together: Not on file     Attends Jew service: Not on file     Active member of club or organization: Not on file     Attends meetings of clubs or organizations: Not on file     Relationship status: Not on file    Intimate partner violence     Fear of current or ex partner: Not on file     Emotionally abused: Not on file     Physically abused: Not on file     Forced sexual activity: Not on file   Other Topics Concern    Not on file   Social History Narrative    Not on file       Review of Systems  Review of Systems   Constitutional: Negative for chills, fever, malaise/fatigue and weight loss. Respiratory: Positive for cough, sputum production, shortness of breath and wheezing. Cardiovascular: Positive for chest pain. Negative for palpitations and leg swelling. Gastrointestinal: Negative. Genitourinary: Negative. Musculoskeletal: Negative. Neurological: Negative. Objective:     General: alert, cooperative, no distress   Mental  status: mental status: alert, oriented to person, place, and time, normal mood, behavior, speech, dress, motor activity, and thought processes   Resp: resp: normal effort and no respiratory distress   Neuro: neuro: no gross deficits   Skin: skin: no discoloration or lesions of concern on visible areas     Due to this being a TeleHealth evaluation, many elements of the physical examination are unable to be assessed. Assessment & Plan:   1. Severe persistent asthma without complication  - finish prednisone course  - start taking Combivent every 6 hours  - continue Symbicort and PRN albuterol  - REFERRAL TO PULMONARY DISEASE      Follow-up and Dispositions    · Return in about 1 month (around 6/19/2020) for ROV.          We discussed the expected course, resolution and complications of the diagnosis(es) in detail. Medication risks, benefits, costs, interactions, and alternatives were discussed as indicated. I advised him to contact the office if his condition worsens, changes or fails to improve as anticipated. He expressed understanding with the diagnosis(es) and plan. Pursuant to the emergency declaration under the [address] [place] [address] [facility] authority and the [employer] and [employer] Act, this Virtual  Visit was conducted, with patient's consent, to reduce the patient's risk of exposure to COVID-19 and provide continuity of care for an established patient. Services were provided through a video synchronous discussion virtually to substitute for in-person clinic visit.     Melba Duong MD

## 2020-05-22 ENCOUNTER — HOSPITAL ENCOUNTER (EMERGENCY)
Age: 30
Discharge: HOME | End: 2020-05-22
Attending: EMERGENCY MEDICINE
Payer: MEDICAID

## 2020-05-22 VITALS
TEMPERATURE: 98.3 F | OXYGEN SATURATION: 94 % | SYSTOLIC BLOOD PRESSURE: 119 MMHG | DIASTOLIC BLOOD PRESSURE: 86 MMHG | HEART RATE: 111 BPM | RESPIRATION RATE: 20 BRPM

## 2020-05-22 DIAGNOSIS — J45.21 MILD INTERMITTENT ASTHMA WITH ACUTE EXACERBATION: ICD-10-CM

## 2020-05-22 DIAGNOSIS — F41.9 ANXIETY: Primary | ICD-10-CM

## 2020-05-22 PROCEDURE — 74011250637 HC RX REV CODE- 250/637: Performed by: EMERGENCY MEDICINE

## 2020-05-22 PROCEDURE — 99285 EMERGENCY DEPT VISIT HI MDM: CPT

## 2020-05-22 PROCEDURE — 74011636637 HC RX REV CODE- 636/637: Performed by: EMERGENCY MEDICINE

## 2020-05-22 RX ORDER — LORAZEPAM 1 MG/1
1 TABLET ORAL
Status: COMPLETED | OUTPATIENT
Start: 2020-05-22 | End: 2020-05-22

## 2020-05-22 RX ORDER — LORAZEPAM 2 MG/ML
1 INJECTION INTRAMUSCULAR
Status: DISCONTINUED | OUTPATIENT
Start: 2020-05-22 | End: 2020-05-22

## 2020-05-22 RX ORDER — PREDNISONE 20 MG/1
20 TABLET ORAL
Status: COMPLETED | OUTPATIENT
Start: 2020-05-22 | End: 2020-05-22

## 2020-05-22 RX ADMIN — LORAZEPAM 1 MG: 1 TABLET ORAL at 08:44

## 2020-05-22 RX ADMIN — PREDNISONE 20 MG: 20 TABLET ORAL at 08:32

## 2020-05-22 NOTE — DISCHARGE INSTRUCTIONS

## 2020-05-22 NOTE — PROGRESS NOTES
attempted to completed the initial Spiritual Assessment of the patient in bed 14 of the emergency room and offer Pastoral Care support but found patient already in isolation due to possible droplet plus precaution in place. Patient does not have any Religion/cultural needs that will affect patients preferences in health care. Chaplains will continue to follow and will provide pastoral care on an as needed/requested basis.     [facility] Department  [phone]

## 2020-05-22 NOTE — ED PROVIDER NOTES
EMERGENCY DEPARTMENT HISTORY AND PHYSICAL EXAM    8:13 AM      Date: 5/22/2020  Patient Name: Luis Fernando Mclean    History of Presenting Illness     Chief Complaint   Patient presents with    Wheezing         History Provided By: Patient      Additional History (Context): Obdulia Christian is a 35 y.o. male who presents with acute asthma exacerbation. Patient has a history of asthma at home has had multiple ED visits for same has his own home nebulizers. Woke up approximately 6 AM what he felt he was in respiratory distress took 40 mg of prednisone of his own medications as well as2 albuterol's and 2 duo nebs when he was not improved he called 911. Patient gave him 1 DuoNeb in route with a nonrebreather patient states he feels much improved at this time. She denies any tobacco alcohol recreational drug use he denies any fevers chills any exposure to COVID. Denies any productive cough or chest pain    PCP: Bhupinder Aguero MD      Current Outpatient Medications   Medication Sig Dispense Refill    albuterol-ipratropium (DUO-NEB) 2.5 mg-0.5 mg/3 ml nebu 3 mL by Nebulization route every four (4) hours as needed for Wheezing. 30 Nebule 3    albuterol (PROVENTIL HFA, VENTOLIN HFA, PROAIR HFA) 90 mcg/actuation inhaler Take 2 Puffs by inhalation every four (4) hours as needed for Wheezing. 1 Inhaler 2    ipratropium-albuteroL (Combivent Respimat)  mcg/actuation inhaler Take 1 Puff by inhalation every six (6) hours. 1 Inhaler 2    albuterol (PROVENTIL VENTOLIN) 2.5 mg /3 mL (0.083 %) nebu 3 mL by Nebulization route every four (4) hours as needed for Wheezing. 30 Nebule 3    clonazePAM (KLONOPIN) 0.5 mg tablet Take 0.5 mg by mouth nightly as needed for Anxiety.  progesterone (PROMETRIUM) 100 mg capsule Take 100 mg by mouth daily.  spironolactone (ALDACTONE) 50 mg tablet Take 50 mg by mouth two (2) times a day.  QUEtiapine (SEROQUEL) 300 mg tablet Take 300 mg by mouth nightly.  busPIRone (BUSPAR) 15 mg tablet Take 15 mg by mouth three (3) times daily.  estradioL (ESTRACE) 2 mg tablet Take 2 mg by mouth daily.  prazosin (MINIPRESS) 1 mg capsule Take 1 mg by mouth nightly. Take 3 tabs before bed.  cetirizine (ZYRTEC) 10 mg tablet Take 1 Tab by mouth daily. 90 Tab 3    budesonide-formoterol (SYMBICORT) 160-4.5 mcg/actuation HFAA Take 2 Puffs by inhalation two (2) times a day. 1 Inhaler 10    traZODone (DESYREL) 50 mg tablet Take 1 Tab by mouth nightly. 90 Tab 0    fluticasone (FLONASE) 50 mcg/actuation nasal spray 1 [place] by Both Nostrils route two (2) times a day. 1 Bottle 11       Past History     Past Medical History:  Past Medical History:   Diagnosis Date    Asthma     Chronic obstructive pulmonary disease ([facility] [address]        Past Surgical History:  Past Surgical History:   Procedure Laterality Date    HX FREE SKIN GRAFT      HX ORTHOPAEDIC      facial reconstruction       Family History:  Family History   Adopted: Yes   Problem Relation Age of Onset    No Known Problems Mother     No Known Problems Father        Social History:  Social History     Tobacco Use    Smoking status: Never Smoker    Smokeless tobacco: Former User   Substance Use Topics    Alcohol use: Yes    Drug use: No       Allergies: Allergies   Allergen Reactions    Other Food Rash     Coconut, banana,  Melon (cantaloupe, honeydew, watermelon), cucumbers    Horse Dander Hives    Penicillin G Anaphylaxis    Cat Dander Shortness of Breath         Review of Systems       Review of Systems   Constitutional: Negative for chills and fever. HENT: Negative for congestion. Respiratory: Positive for cough, chest tightness, shortness of breath and wheezing. Cardiovascular: Negative for chest pain. Gastrointestinal: Negative for abdominal pain, nausea and vomiting. Skin: Negative for rash. Neurological: Negative for dizziness, syncope and weakness.    All other systems reviewed and are negative. Physical Exam     Visit Vitals  /86   Pulse (!) 111   Temp 98.3 °F (36.8 °C)   Resp 20   SpO2 94%       Physical Exam  Vitals signs and nursing note reviewed. Constitutional:       General: He is not in acute distress. Appearance: He is well-developed. Comments: Appears anxious pursed lips   HENT:      Head: Normocephalic and atraumatic. Eyes:      General: No scleral icterus. Conjunctiva/sclera: Conjunctivae normal.      Pupils: Pupils are equal, round, and reactive to light. Neck:      Musculoskeletal: Normal range of motion and neck supple. Cardiovascular:      Rate and Rhythm: Regular rhythm. Tachycardia present. Heart sounds: Normal heart sounds. No murmur. Pulmonary:      Effort: Pulmonary effort is normal. No respiratory distress. Breath sounds: Normal breath sounds. No wheezing. Comments: No accessory muscle use lungs are clear  Abdominal:      General: Bowel sounds are normal. There is no distension. Palpations: Abdomen is soft. Tenderness: There is no abdominal tenderness. Lymphadenopathy:      Cervical: No cervical adenopathy. Skin:     General: Skin is warm and dry. Findings: No rash. Neurological:      Mental Status: He is alert and oriented to person, place, and time. Coordination: Coordination normal.   Psychiatric:         Behavior: Behavior normal.           Diagnostic Study Results     Labs -  No results found for this or any previous visit (from the past 12 hour(s)). Radiologic Studies -   No orders to display         Medical Decision Making   I am the first provider for this patient. I reviewed the vital signs, available nursing notes, past medical history, past surgical history, family history and social history. Vital Signs-Reviewed the patient's vital signs.       EKG:    Records Reviewed: Nursing Notes and Old Medical Records (Time of Review: 8:13 AM)    ED Course: Progress Notes, Reevaluation, and Consults:      Provider Notes (Medical Decision Making):   MDM  Number of Diagnoses or Management Options  Anxiety:   Mild intermittent asthma with acute exacerbation:   Diagnosis management comments: Asthma exacerbation now possibly improved patient is very anxious however he has good air movement with no wheeze states he has a history of anxiety associated with these as well will continue to observe off nonrebreather maintaining sats    9:07 AM  Patient is sitting up in [place] no distress sats are 95% on room air will discharge home       Amount and/or Complexity of Data Reviewed  Tests in the radiology section of CPT®: ordered and reviewed            Critical Care Time:       Diagnosis     Clinical Impression:   1. Anxiety    2. Mild intermittent asthma with acute exacerbation        Disposition: Home    Follow-up Information     Follow up With Specialties Details Why [address] [facility] EMERGENCY DEPT Emergency Medicine  As needed, If symptoms worsen [address] [address]    Júnior Vasquez MD Internal Medicine Schedule an appointment as soon as possible for a visit for ED follow up appointment  [id number] [facility]  [address] [facility]  [phone]             Patient's Medications   Start Taking    No medications on file   Continue Taking    ALBUTEROL (PROVENTIL HFA, VENTOLIN HFA, PROAIR HFA) 90 MCG/ACTUATION INHALER    Take 2 Puffs by inhalation every four (4) hours as needed for Wheezing. ALBUTEROL (PROVENTIL VENTOLIN) 2.5 MG /3 ML (0.083 %) NEBU    3 mL by Nebulization route every four (4) hours as needed for Wheezing. ALBUTEROL-IPRATROPIUM (DUO-NEB) 2.5 MG-0.5 MG/3 ML NEBU    3 mL by Nebulization route every four (4) hours as needed for Wheezing. BUDESONIDE-FORMOTEROL (SYMBICORT) 160-4.5 MCG/ACTUATION HFAA    Take 2 Puffs by inhalation two (2) times a day. BUSPIRONE (BUSPAR) 15 MG TABLET    Take 15 mg by mouth three (3) times daily.     CETIRIZINE (ZYRTEC) 10 MG TABLET    Take 1 Tab by mouth daily. CLONAZEPAM (KLONOPIN) 0.5 MG TABLET    Take 0.5 mg by mouth nightly as needed for Anxiety. ESTRADIOL (ESTRACE) 2 MG TABLET    Take 2 mg by mouth daily. FLUTICASONE (FLONASE) 50 MCG/ACTUATION NASAL SPRAY    1 [place] by Both Nostrils route two (2) times a day. IPRATROPIUM-ALBUTEROL (COMBIVENT RESPIMAT)  MCG/ACTUATION INHALER    Take 1 Puff by inhalation every six (6) hours. PRAZOSIN (MINIPRESS) 1 MG CAPSULE    Take 1 mg by mouth nightly. Take 3 tabs before bed. PROGESTERONE (PROMETRIUM) 100 MG CAPSULE    Take 100 mg by mouth daily. QUETIAPINE (SEROQUEL) 300 MG TABLET    Take 300 mg by mouth nightly. SPIRONOLACTONE (ALDACTONE) 50 MG TABLET    Take 50 mg by mouth two (2) times a day. TRAZODONE (DESYREL) 50 MG TABLET    Take 1 Tab by mouth nightly. These Medications have changed    No medications on file   Stop Taking    No medications on file     _______________________________    Please note that this dictation was completed with Exuru!, the computer voice recognition software. Quite often unanticipated grammatical, syntax, homophones, and other interpretive errors are inadvertently transcribed by the computer software. Please disregard these errors. Please excuse any errors that have escaped final proofreading.

## 2020-05-22 NOTE — ED NOTES
Pt refusing iv access, states does not want IV. Pt states \" I am already feeling better. \"  When encouraged to receive IV fluids as ordered by MD, Pt states \"I can hydrate myself by drinking water. \"  Provider made aware.

## 2020-05-23 ENCOUNTER — PATIENT OUTREACH (OUTPATIENT)
Dept: CASE MANAGEMENT | Age: 30
End: 2020-05-23

## 2020-05-23 NOTE — PROGRESS NOTES
5/23/2020 1:57 PM     CTN attempted to contact patient for hospital follow up. Patient admitted to [facility] on 5/22/2020 and discharged on 5/22/2020 for asthma. Message left introducing myself, the purpose of the call and giving my contact information. Requested that patient call back at his earliest convenience.

## 2020-05-23 NOTE — PROGRESS NOTES
Patient contacted regarding recent discharge and COVID-19 risk. Discussed COVID-19 related testing which was not done at this time. Test results were not done. Patient informed of results, if available? no    Care Transition Nurse/ Ambulatory Care Manager contacted the patient by telephone to perform post discharge assessment. Verified name and  with patient as identifiers. Patient has following risk factors of: COPD, asthma and anxiety and PTSD. CTN/ACM reviewed discharge instructions, medical action plan and red flags related to discharge diagnosis. Reviewed and educated them on any new and changed medications related to discharge diagnosis. Advised obtaining a 90-day supply of all daily and as-needed medications. Education provided regarding infection prevention, and signs and symptoms of COVID-19 and when to seek medical attention with patient who verbalized understanding. Discussed exposure protocols and quarantine from [address] you at higher risk for severe illness  and given an opportunity for questions and concerns. The patient agrees to contact the COVID-19 hotline [phone] or PCP office for questions related to their healthcare. CTN/ACM provided contact information for future reference. From CDC: Are you at higher risk for severe illness?  Wash your hands often.  Avoid close contact (6 feet, which is about two arm lengths) with people who are sick.  Put distance between yourself and other people if COVID-19 is spreading in your community.  Clean and disinfect frequently touched surfaces.  Avoid all cruise travel and non-essential air travel.  Call your healthcare professional if you have concerns about COVID-19 and your underlying condition or if you are sick.     For more information on steps you can take to protect yourself, see CDC's How to Protect Yourself      Patient/family/caregiver given information for [facility] and agrees to enroll no  Patient's preferred e-mail:  Patient wants to research and will decide if he wants to participate. Patient's preferred phone number: [phone]  Based on Loop alert triggers, patient will be contacted by nurse care manager for worsening symptoms. Patient stated that he is doing well at present. He appreciated the call that was given to check on him. Plan for follow-up call in 5-7 days based on severity of symptoms and risk factors.

## 2020-05-29 ENCOUNTER — PATIENT OUTREACH (OUTPATIENT)
Dept: CASE MANAGEMENT | Age: 30
End: 2020-05-29

## 2020-05-29 NOTE — PROGRESS NOTES
Call placed to patient to see how he was feeling. He stated that he is doing well at present. No complaints at present. Encouraged patient to wash his hands frequently and to wipe things down in the home. Will follow.

## 2020-06-01 ENCOUNTER — HOSPITAL ENCOUNTER (OUTPATIENT)
Dept: LAB | Age: 30
Discharge: HOME | End: 2020-06-01
Payer: MEDICAID

## 2020-06-01 LAB
BASOPHILS # BLD: 0.1 K/UL (ref 0–0.1)
BASOPHILS NFR BLD: 1 % (ref 0–2)
DIFFERENTIAL METHOD BLD: ABNORMAL
EOSINOPHIL # BLD: 0.7 K/UL (ref 0–0.4)
EOSINOPHIL NFR BLD: 5 % (ref 0–5)
ERYTHROCYTE [DISTWIDTH] IN BLOOD BY AUTOMATED COUNT: 13.6 % (ref 11.6–14.5)
HCT VFR BLD AUTO: 49 % (ref 36–48)
HGB BLD-MCNC: 16 G/DL (ref 13–16)
LYMPHOCYTES # BLD: 3 K/UL (ref 0.9–3.6)
LYMPHOCYTES NFR BLD: 24 % (ref 21–52)
MCH RBC QN AUTO: 26.8 PG (ref 24–34)
MCHC RBC AUTO-ENTMCNC: 32.7 G/DL (ref 31–37)
MCV RBC AUTO: 82.2 FL (ref 74–97)
MONOCYTES # BLD: 1.3 K/UL (ref 0.05–1.2)
MONOCYTES NFR BLD: 10 % (ref 3–10)
NEUTS SEG # BLD: 7.6 K/UL (ref 1.8–8)
NEUTS SEG NFR BLD: 60 % (ref 40–73)
PLATELET # BLD AUTO: 331 K/UL (ref 135–420)
PMV BLD AUTO: 9.7 FL (ref 9.2–11.8)
RBC # BLD AUTO: 5.96 M/UL (ref 4.7–5.5)
WBC # BLD AUTO: 12.7 K/UL (ref 4.6–13.2)

## 2020-06-01 PROCEDURE — 86003 ALLG SPEC IGE CRUDE XTRC EA: CPT

## 2020-06-01 PROCEDURE — 36415 COLL VENOUS BLD VENIPUNCTURE: CPT

## 2020-06-01 PROCEDURE — 82785 ASSAY OF IGE: CPT

## 2020-06-01 PROCEDURE — 85025 COMPLETE CBC W/AUTO DIFF WBC: CPT

## 2020-06-04 ENCOUNTER — PATIENT OUTREACH (OUTPATIENT)
Dept: CASE MANAGEMENT | Age: 30
End: 2020-06-04

## 2020-06-04 LAB
A ALTERNATA IGE QN: <0.1 KU/L
A FUMIGATUS IGE QN: <0.1 KU/L
AMER ROACH IGE QN: <0.1 KU/L
AMER SYCAMORE IGE QN: <0.1 KU/L
BAHIA GRASS IGE QN: <0.1 KU/L
BERMUDA GRASS IGE QN: 0.11 KU/L
BOXELDER IGE QN: 0.12 KU/L
C HERBARUM IGE QN: <0.1 KU/L
CAT DANDER IGG QN: 39 KU/L
CLASS DESCRIPTION, 600268: ABNORMAL
COMMON RAGWEED IGE QN: <0.1 KU/L
D FARINAE IGE QN: <0.1 KU/L
D PTERONYSS IGE QN: <0.1 KU/L
DEPRECATED IGE QN: <0.1 KU/L
DOG DANDER IGE QN: 4.49 KU/L
ENGL PLANTAIN IGE QN: <0.1 KU/L
IGE SERPL-ACNC: 151 IU/ML (ref 6–495)
JOHNSON GRASS IGE QN: <0.1 KU/L
M RACEMOSUS IGE QN: <0.1 KU/L
MT JUNIPER IGE QN: 0.17 KU/L
MUGWORT IGE QN: <0.1 KU/L
NETTLE IGE QN: <0.1 KU/L
P NOTATUM IGE QN: <0.1 KU/L
S BOTRYOSUM IGE QN: <0.1 KU/L
SHEEP SORREL IGE QN: <0.1 KU/L
SWEET GUM IGE QN: <0.1 KU/L
TIMOTHY IGE QN: <0.1 KU/L
WHITE BIRCH IGE QN: <0.1 KU/L
WHITE ELM IGG QN: <0.1 KU/L
WHITE HICKORY IGE QN: <0.1 KU/L
WHITE MULBERRY IGE QN: <0.1 KU/L
WHITE OAK IGE QN: <0.1 KU/L

## 2020-06-04 NOTE — PROGRESS NOTES
Patient resolved from Transition of Care episode on 6/4/2020  Discussed COVID-19 related testing which was not done at this time. Test results were not done. Patient informed of results, if available? no     Patient/family has been provided the following resources and education related to COVID-19:                         Signs, symptoms and red flags related to COVID-19            CDC exposure and quarantine guidelines            [facility] exposure contact - [phone]            Contact for their local Department of Health                 Patient currently reports that the following symptoms have improved:  shortness of breath, wheezing and anxiety. Patient doing better at present. No complaints. No further outreach scheduled with this CTN/ACM/LPN/HC/ MA. Episode of Care resolved. Patient has this CTN/ACM/LPN/HC/MA contact information if future needs arise.

## 2020-06-10 ENCOUNTER — HOSPITAL ENCOUNTER (EMERGENCY)
Age: 30
Discharge: HOME | End: 2020-06-11
Attending: EMERGENCY MEDICINE
Payer: MEDICAID

## 2020-06-10 ENCOUNTER — APPOINTMENT (OUTPATIENT)
Dept: GENERAL RADIOLOGY | Age: 30
End: 2020-06-10
Attending: EMERGENCY MEDICINE
Payer: MEDICAID

## 2020-06-10 DIAGNOSIS — J45.51 SEVERE PERSISTENT ASTHMA WITH ACUTE EXACERBATION IN ADULT: Primary | ICD-10-CM

## 2020-06-10 DIAGNOSIS — J45.50 SEVERE PERSISTENT ASTHMA WITHOUT COMPLICATION: ICD-10-CM

## 2020-06-10 LAB
ALBUMIN SERPL-MCNC: 3.4 G/DL (ref 3.4–5)
ALBUMIN/GLOB SERPL: 0.9 {RATIO} (ref 0.8–1.7)
ALP SERPL-CCNC: 89 U/L (ref 45–117)
ALT SERPL-CCNC: 43 U/L (ref 16–61)
ANION GAP SERPL CALC-SCNC: 10 MMOL/L (ref 3–18)
AST SERPL-CCNC: 23 U/L (ref 10–38)
BILIRUB SERPL-MCNC: 0.4 MG/DL (ref 0.2–1)
BUN SERPL-MCNC: 10 MG/DL (ref 7–18)
BUN/CREAT SERPL: 8 (ref 12–20)
CALCIUM SERPL-MCNC: 9.3 MG/DL (ref 8.5–10.1)
CHLORIDE SERPL-SCNC: 106 MMOL/L (ref 100–111)
CO2 SERPL-SCNC: 26 MMOL/L (ref 21–32)
CREAT SERPL-MCNC: 1.33 MG/DL (ref 0.6–1.3)
GLOBULIN SER CALC-MCNC: 3.8 G/DL (ref 2–4)
GLUCOSE SERPL-MCNC: 93 MG/DL (ref 74–99)
POTASSIUM SERPL-SCNC: 4 MMOL/L (ref 3.5–5.5)
PROT SERPL-MCNC: 7.2 G/DL (ref 6.4–8.2)
SODIUM SERPL-SCNC: 142 MMOL/L (ref 136–145)

## 2020-06-10 PROCEDURE — 96365 THER/PROPH/DIAG IV INF INIT: CPT

## 2020-06-10 PROCEDURE — 99284 EMERGENCY DEPT VISIT MOD MDM: CPT

## 2020-06-10 PROCEDURE — 74011000250 HC RX REV CODE- 250: Performed by: EMERGENCY MEDICINE

## 2020-06-10 PROCEDURE — 94640 AIRWAY INHALATION TREATMENT: CPT

## 2020-06-10 PROCEDURE — 74011000250 HC RX REV CODE- 250

## 2020-06-10 PROCEDURE — 74011250636 HC RX REV CODE- 250/636: Performed by: EMERGENCY MEDICINE

## 2020-06-10 PROCEDURE — 85025 COMPLETE CBC W/AUTO DIFF WBC: CPT

## 2020-06-10 PROCEDURE — 96375 TX/PRO/DX INJ NEW DRUG ADDON: CPT

## 2020-06-10 PROCEDURE — 74011250637 HC RX REV CODE- 250/637: Performed by: EMERGENCY MEDICINE

## 2020-06-10 PROCEDURE — 71045 X-RAY EXAM CHEST 1 VIEW: CPT

## 2020-06-10 PROCEDURE — 80053 COMPREHEN METABOLIC PANEL: CPT

## 2020-06-10 RX ORDER — IPRATROPIUM BROMIDE AND ALBUTEROL SULFATE 2.5; .5 MG/3ML; MG/3ML
3 SOLUTION RESPIRATORY (INHALATION)
Status: COMPLETED | OUTPATIENT
Start: 2020-06-10 | End: 2020-06-10

## 2020-06-10 RX ORDER — IPRATROPIUM BROMIDE 0.5 MG/2.5ML
0.5 SOLUTION RESPIRATORY (INHALATION)
Status: COMPLETED | OUTPATIENT
Start: 2020-06-10 | End: 2020-06-10

## 2020-06-10 RX ORDER — CLONAZEPAM 0.5 MG/1
1 TABLET ORAL
Status: COMPLETED | OUTPATIENT
Start: 2020-06-10 | End: 2020-06-10

## 2020-06-10 RX ORDER — ALBUTEROL SULFATE 0.83 MG/ML
5 SOLUTION RESPIRATORY (INHALATION)
Status: COMPLETED | OUTPATIENT
Start: 2020-06-10 | End: 2020-06-10

## 2020-06-10 RX ORDER — ALBUTEROL SULFATE 0.83 MG/ML
10 SOLUTION RESPIRATORY (INHALATION)
Status: COMPLETED | OUTPATIENT
Start: 2020-06-10 | End: 2020-06-10

## 2020-06-10 RX ORDER — MAGNESIUM SULFATE HEPTAHYDRATE 40 MG/ML
2 INJECTION, SOLUTION INTRAVENOUS ONCE
Status: COMPLETED | OUTPATIENT
Start: 2020-06-10 | End: 2020-06-10

## 2020-06-10 RX ORDER — IPRATROPIUM BROMIDE AND ALBUTEROL SULFATE 2.5; .5 MG/3ML; MG/3ML
SOLUTION RESPIRATORY (INHALATION)
Status: COMPLETED
Start: 2020-06-10 | End: 2020-06-10

## 2020-06-10 RX ADMIN — IPRATROPIUM BROMIDE AND ALBUTEROL SULFATE 3 ML: .5; 3 SOLUTION RESPIRATORY (INHALATION) at 21:17

## 2020-06-10 RX ADMIN — IPRATROPIUM BROMIDE 0.5 MG: 0.5 SOLUTION RESPIRATORY (INHALATION) at 21:18

## 2020-06-10 RX ADMIN — SODIUM CHLORIDE 1000 ML: 900 INJECTION, SOLUTION INTRAVENOUS at 21:25

## 2020-06-10 RX ADMIN — ALBUTEROL SULFATE 5 MG: 2.5 SOLUTION RESPIRATORY (INHALATION) at 21:19

## 2020-06-10 RX ADMIN — IPRATROPIUM BROMIDE AND ALBUTEROL SULFATE 3 ML: 2.5; .5 SOLUTION RESPIRATORY (INHALATION) at 21:17

## 2020-06-10 RX ADMIN — IPRATROPIUM BROMIDE 0.5 MG: 0.5 SOLUTION RESPIRATORY (INHALATION) at 21:17

## 2020-06-10 RX ADMIN — METHYLPREDNISOLONE SODIUM SUCCINATE 125 MG: 125 INJECTION, POWDER, FOR SOLUTION INTRAMUSCULAR; INTRAVENOUS at 21:19

## 2020-06-10 RX ADMIN — ALBUTEROL SULFATE 10 MG: 2.5 SOLUTION RESPIRATORY (INHALATION) at 22:51

## 2020-06-10 RX ADMIN — ALBUTEROL SULFATE 5 MG: 2.5 SOLUTION RESPIRATORY (INHALATION) at 21:18

## 2020-06-10 RX ADMIN — MAGNESIUM SULFATE HEPTAHYDRATE 2 G: 40 INJECTION, SOLUTION INTRAVENOUS at 21:19

## 2020-06-10 RX ADMIN — CLONAZEPAM 1 MG: 0.5 TABLET ORAL at 22:51

## 2020-06-11 ENCOUNTER — PATIENT OUTREACH (OUTPATIENT)
Dept: CASE MANAGEMENT | Age: 30
End: 2020-06-11

## 2020-06-11 VITALS
OXYGEN SATURATION: 100 % | DIASTOLIC BLOOD PRESSURE: 87 MMHG | HEART RATE: 111 BPM | SYSTOLIC BLOOD PRESSURE: 130 MMHG | TEMPERATURE: 98.1 F | RESPIRATION RATE: 26 BRPM

## 2020-06-11 LAB
BASOPHILS # BLD: 0.2 K/UL (ref 0–0.1)
BASOPHILS NFR BLD: 1 % (ref 0–3)
DIFFERENTIAL METHOD BLD: ABNORMAL
EOSINOPHIL # BLD: 1.5 K/UL (ref 0–0.4)
EOSINOPHIL NFR BLD: 8 % (ref 0–5)
ERYTHROCYTE [DISTWIDTH] IN BLOOD BY AUTOMATED COUNT: 14.3 % (ref 11.6–14.5)
HCT VFR BLD AUTO: 50.6 % (ref 36–48)
HGB BLD-MCNC: 16.6 G/DL (ref 13–16)
LYMPHOCYTES # BLD: 5.1 K/UL (ref 0.8–3.5)
LYMPHOCYTES NFR BLD: 28 % (ref 20–51)
MCH RBC QN AUTO: 26.9 PG (ref 24–34)
MCHC RBC AUTO-ENTMCNC: 32.8 G/DL (ref 31–37)
MCV RBC AUTO: 82.1 FL (ref 74–97)
MONOCYTES # BLD: 2 K/UL (ref 0–1)
MONOCYTES NFR BLD: 11 % (ref 2–9)
NEUTS SEG # BLD: 9.5 K/UL (ref 1.8–8)
NEUTS SEG NFR BLD: 52 % (ref 42–75)
PLATELET # BLD AUTO: 335 K/UL (ref 135–420)
PMV BLD AUTO: 10.5 FL (ref 9.2–11.8)
RBC # BLD AUTO: 6.16 M/UL (ref 4.7–5.5)
RBC MORPH BLD: ABNORMAL
WBC # BLD AUTO: 18.3 K/UL (ref 4.6–13.2)

## 2020-06-11 RX ORDER — FLUTICASONE PROPIONATE AND SALMETEROL 250; 50 UG/1; UG/1
1 POWDER RESPIRATORY (INHALATION) EVERY 12 HOURS
Qty: 1 INHALER | Refills: 3 | Status: SHIPPED | OUTPATIENT
Start: 2020-06-11

## 2020-06-11 RX ORDER — ALBUTEROL SULFATE 90 UG/1
2 AEROSOL, METERED RESPIRATORY (INHALATION)
Qty: 1 INHALER | Refills: 2 | Status: SHIPPED | OUTPATIENT
Start: 2020-06-11

## 2020-06-11 RX ORDER — IPRATROPIUM BROMIDE AND ALBUTEROL SULFATE 2.5; .5 MG/3ML; MG/3ML
3 SOLUTION RESPIRATORY (INHALATION)
Qty: 30 NEBULE | Refills: 3 | Status: SHIPPED | OUTPATIENT
Start: 2020-06-11

## 2020-06-11 RX ORDER — PREDNISONE 10 MG/1
TABLET ORAL
Qty: 63 TAB | Refills: 0 | Status: SHIPPED | OUTPATIENT
Start: 2020-06-11

## 2020-06-11 NOTE — PROGRESS NOTES
Patient contacted regarding COVID-19  risk. Discussed COVID-19 related testing which was not done at this time. Test results were not done. Patient informed of results, if available?      Care Transition Nurse/ Ambulatory Care Manager contacted the patient by telephone to perform post discharge assessment. Verified name and  with patient as identifiers. Provided introduction to self, and explanation of the CTN/ACM role, and reason for call due to risk factors for infection and/or exposure to COVID-19. Symptoms reviewed with patient who verbalized the following symptoms: cough and shortness of breath. Due to no new or worsening symptoms encounter was not routed to provider for escalation. Discussed follow-up appointments. If no appointment was previously scheduled, appointment scheduling offered: no patient states that he plans to call PCP today to schedule appointment  [facility] follow up appointment(s): No future appointments. Non-[facility] follow up appointment(s): NA      Patient has following risk factors of: asthma. CTN/ACM reviewed discharge instructions, medical action plan and red flags such as increased shortness of breath, increasing fever and signs of decompensation with patient who verbalized understanding. Discussed exposure protocols and quarantine with CDC Guidelines What to do if you are sick with coronavirus disease 2019.  Patient was given an opportunity for questions and concerns. The patient agrees to contact the [facility] exposure line [phone], [facility]  ([phone]) and PCP office for questions related to their healthcare. CTN/ACM provided contact information for future needs. Reviewed and educated patient on any new and changed medications related to discharge diagnosis.     Patient/family/caregiver given information for [facility] and agrees to enroll no  Patient's preferred e-mail:  NA  Patient's preferred phone number: NA  Based on Loop alert triggers, patient will be contacted by nurse care manager for worsening symptoms. Plan for follow-up call in 5-7 days based on severity of symptoms and risk factors.

## 2020-06-11 NOTE — ED PROVIDER NOTES
Charmayne Coward is a 35 y.o. male with history of asthma with complaints of increased shortness of breath started today. Patient used his nebulizer twice without much relief. He denies any productive cough, recent sick contacts. Symptoms are worse with exertion and deep breathing. Patient has a history of recurrent ER visits for asthma exacerbation and was admitted earlier this year. He was intubated as a child before but has had no intubation since that time. He denies any nausea or vomiting. Patient states the chest is very tight but has no other discomfort. No new pain or swelling in his legs. He denies fever as well. The history is provided by the patient. Past Medical History:   Diagnosis Date    Asthma     Chronic obstructive pulmonary disease (HCC)        Past Surgical History:   Procedure Laterality Date    HX FREE SKIN GRAFT      HX ORTHOPAEDIC      facial reconstruction         Family History:   Adopted: Yes   Problem Relation Age of Onset    No Known Problems Mother     No Known Problems Father        Social History     Socioeconomic History    Marital status: SINGLE     Spouse name: Not on file    Number of children: Not on file    Years of education: Not on file    Highest education level: Not on file   Occupational History    Not on file   Social Needs    Financial resource strain: Not on file    Food insecurity     Worry: Not on file     Inability: Not on file    Transportation needs     Medical: Not on file     Non-medical: Not on file   Tobacco Use    Smoking status: Never Smoker    Smokeless tobacco: Former User   Substance and Sexual Activity    Alcohol use:  Yes    Drug use: No    Sexual activity: Not Currently   Lifestyle    Physical activity     Days per week: Not on file     Minutes per session: Not on file    Stress: Not on file   Relationships    Social connections     Talks on phone: Not on file     Gets together: Not on file     Attends Jehovah's witness service: Not on file     Active member of club or organization: Not on file     Attends meetings of clubs or organizations: Not on file     Relationship status: Not on file    Intimate partner violence     Fear of current or ex partner: Not on file     Emotionally abused: Not on file     Physically abused: Not on file     Forced sexual activity: Not on file   Other Topics Concern    Not on file   Social History Narrative    Not on file         ALLERGIES: Other food; Horse dander; Penicillin g; and Cat dander    Review of Systems   Constitutional: Negative for fever. HENT: Negative for sore throat. Eyes: Negative for visual disturbance. Respiratory: Positive for cough, chest tightness, shortness of breath and wheezing. Cardiovascular: Negative for leg swelling. Gastrointestinal: Negative for abdominal pain. Genitourinary: Negative for difficulty urinating. Musculoskeletal: Negative for gait problem. Skin: Negative for rash. Allergic/Immunologic: Negative for immunocompromised state. Neurological: Negative for syncope. Psychiatric/Behavioral: Positive for sleep disturbance. Vitals:    06/10/20 2103 06/10/20 2115 06/10/20 2130 06/10/20 2145   BP: (!) 126/96 156/78 140/83 140/74   Pulse: (!) 111      Resp: 26      Temp: 98.1 °F (36.7 °C)      SpO2: 100%               Physical Exam  Vitals signs and nursing note reviewed. Constitutional:       General: He is in acute distress. Appearance: He is obese. He is ill-appearing. He is not toxic-appearing or diaphoretic. HENT:      Head: Normocephalic and atraumatic. Right Ear: External ear normal.      Left Ear: External ear normal.      Nose: Nose normal.      Mouth/Throat:      Pharynx: No oropharyngeal exudate. Eyes:      Conjunctiva/sclera: Conjunctivae normal.   Neck:      Musculoskeletal: Normal range of motion. Cardiovascular:      Rate and Rhythm: Regular rhythm. Tachycardia present.       Heart sounds: Normal heart sounds. Pulmonary:      Effort: Tachypnea, accessory muscle usage, respiratory distress and retractions present. Breath sounds: Decreased air movement present. Decreased breath sounds and wheezing present. Abdominal:      Palpations: Abdomen is soft. Tenderness: There is no abdominal tenderness. Musculoskeletal:      Right lower leg: No edema. Left lower leg: No edema. Skin:     General: Skin is warm and dry. Capillary Refill: Capillary refill takes less than 2 seconds. Neurological:      Mental Status: He is alert and oriented to person, place, and time.    Psychiatric:         Behavior: Behavior normal.          MDM       Procedures  Vitals:  Patient Vitals for the past 12 hrs:   Temp Pulse Resp BP SpO2   06/10/20 2145    140/74    06/10/20 2130    140/83    06/10/20 2115    156/78    06/10/20 2103 98.1 °F (36.7 °C) (!) 111 26 (!) 126/96 100 %         Medications ordered:   Medications   albuterol-ipratropium (DUO-NEB) 2.5 MG-0.5 MG/3 ML (3 mL Nebulization Given 6/10/20 2117)   albuterol (PROVENTIL VENTOLIN) nebulizer solution 5 mg (5 mg Nebulization Given 6/10/20 2119)   albuterol (PROVENTIL VENTOLIN) nebulizer solution 5 mg (5 mg Nebulization Given 6/10/20 2118)   ipratropium (ATROVENT) 0.02 % nebulizer solution 0.5 mg (0.5 mg Nebulization Given 6/10/20 2118)   ipratropium (ATROVENT) 0.02 % nebulizer solution 0.5 mg (0.5 mg Nebulization Given 6/10/20 2117)   methylPREDNISolone (PF) (Solu-MEDROL) injection 125 mg (125 mg IntraVENous Given 6/10/20 2119)   magnesium sulfate 2 g/50 ml IVPB (premix or compounded) (0 g IntraVENous IV Completed 6/10/20 2134)   sodium chloride 0.9 % bolus infusion 1,000 mL (1,000 mL IntraVENous New Bag 6/10/20 2125)   albuterol (PROVENTIL VENTOLIN) nebulizer solution 10 mg (10 mg Nebulization Given 6/10/20 2251)   clonazePAM (KlonoPIN) tablet 1 mg (1 mg Oral Given 6/10/20 2251)         Lab findings:  Recent Results (from the past 12 hour(s))   CBC WITH AUTOMATED DIFF    Collection Time: 06/10/20  9:16 PM   Result Value Ref Range    WBC 18.3 (H) 4.6 - 13.2 K/uL    RBC 6.16 (H) 4.70 - 5.50 M/uL    HGB 16.6 (H) 13.0 - 16.0 g/dL    HCT 50.6 (H) 36.0 - 48.0 %    MCV 82.1 74.0 - 97.0 FL    MCH 26.9 24.0 - 34.0 PG    MCHC 32.8 31.0 - 37.0 g/dL    RDW 14.3 11.6 - 14.5 %    PLATELET 222 179 - 138 K/uL    MPV 10.5 9.2 - 11.8 FL    NEUTROPHILS PENDING %    LYMPHOCYTES PENDING %    MONOCYTES PENDING %    EOSINOPHILS PENDING %    BASOPHILS PENDING %    ABS. NEUTROPHILS PENDING K/UL    ABS. LYMPHOCYTES PENDING K/UL    ABS. MONOCYTES PENDING K/UL    ABS. EOSINOPHILS PENDING K/UL    ABS. BASOPHILS PENDING K/UL    DF PENDING    METABOLIC PANEL, COMPREHENSIVE    Collection Time: 06/10/20  9:16 PM   Result Value Ref Range    Sodium 142 136 - 145 mmol/L    Potassium 4.0 3.5 - 5.5 mmol/L    Chloride 106 100 - 111 mmol/L    CO2 26 21 - 32 mmol/L    Anion gap 10 3.0 - 18 mmol/L    Glucose 93 74 - 99 mg/dL    BUN 10 7.0 - 18 MG/DL    Creatinine 1.33 (H) 0.6 - 1.3 MG/DL    BUN/Creatinine ratio 8 (L) 12 - 20      GFR est AA >60 >60 ml/min/1.73m2    GFR est non-AA >60 >60 ml/min/1.73m2    Calcium 9.3 8.5 - 10.1 MG/DL    Bilirubin, total 0.4 0.2 - 1.0 MG/DL    ALT (SGPT) 43 16 - 61 U/L    AST (SGOT) 23 10 - 38 U/L    Alk. phosphatase 89 45 - 117 U/L    Protein, total 7.2 6.4 - 8.2 g/dL    Albumin 3.4 3.4 - 5.0 g/dL    Globulin 3.8 2.0 - 4.0 g/dL    A-G Ratio 0.9 0.8 - 1.7         EKG interpretation by ED Physician:    Pulse ox: 100% room air, normal      X-Ray, CT or other radiology findings or impressions:  XR CHEST PORT    (Results Pending)   No acute process per my interpretation    Progress notes, Consult notes or additional Procedure notes:   Patient significantly improved after multiple treatments and multiple reassessments. Do not feel he requires admission or other work-up at this time.   Will place back on prednisone and refill his medications    ED Critical Care Note    System at risk for life threatening failure: Neuro, cardiac, pulmonary  Associated problems: Tachycardia, severe asthma attack, leukocytosis    Critical Care services provided: Bedside management of severe asthma attack along with bedside reassessment and documentation  Excluded procedures (time not included in critical care): Pulse ox interpretation    Total Critical Care Time (in minutes) 34    I have discussed with patient and/or family/sig other the results, interpretation of any imaging if performed, suspected diagnosis and treatment plan to include instructions regarding the diagnoses listed to which understanding was expressed with all questions answered            Reevaluation of patient:   stable    Disposition:  Diagnosis:   1. Severe persistent asthma with acute exacerbation in adult    2. Severe persistent asthma without complication        Disposition: home    Follow-up Information     Follow up With Specialties Details Why Contact Info    Bhupinder Aguero MD Internal Medicine Schedule an appointment as soon as possible for a visit  [id number] [address]      Roland Jasso MD Pulmonary Disease, Urgent Care, Internal Medicine Schedule an appointment as soon as possible for a visit  [facility] [address] [facility] EMERGENCY DEPT Emergency Medicine  If symptoms worsen [phone] [facility] 76.  [phone]            Patient's Medications   Start Taking    FLUTICASONE PROPION-SALMETEROL (ADVAIR/WIXELA) 250-50 MCG/DOSE DISKUS INHALER    Take 1 Puff by inhalation every twelve (12) hours. PREDNISONE (DELTASONE) 10 MG TABLET    6 p.o. daily for 3 days, 5 p.o. daily for 3 days, 4 p.o. daily for 3 days, 3 p.o. daily for 3 days, 2 p.o. daily for 3 days, 1 p.o. daily for 3 days,   Continue Taking    BUSPIRONE (BUSPAR) 15 MG TABLET    Take 15 mg by mouth three (3) times daily.     CETIRIZINE (ZYRTEC) 10 MG TABLET    Take 1 Tab by mouth daily. CLONAZEPAM (KLONOPIN) 0.5 MG TABLET    Take 0.5 mg by mouth nightly as needed for Anxiety. ESTRADIOL (ESTRACE) 2 MG TABLET    Take 2 mg by mouth daily. FLUTICASONE (FLONASE) 50 MCG/ACTUATION NASAL SPRAY    1 [place] by Both Nostrils route two (2) times a day. PRAZOSIN (MINIPRESS) 1 MG CAPSULE    Take 1 mg by mouth nightly. Take 3 tabs before bed. PROGESTERONE (PROMETRIUM) 100 MG CAPSULE    Take 100 mg by mouth daily. QUETIAPINE (SEROQUEL) 300 MG TABLET    Take 300 mg by mouth nightly. SPIRONOLACTONE (ALDACTONE) 50 MG TABLET    Take 50 mg by mouth two (2) times a day. TRAZODONE (DESYREL) 50 MG TABLET    Take 1 Tab by mouth nightly. These Medications have changed    Modified Medication Previous Medication    ALBUTEROL (PROVENTIL HFA, VENTOLIN HFA, PROAIR HFA) 90 MCG/ACTUATION INHALER albuterol (PROVENTIL HFA, VENTOLIN HFA, PROAIR HFA) 90 mcg/actuation inhaler       Take 2 Puffs by inhalation every four (4) hours as needed for Wheezing. Take 2 Puffs by inhalation every four (4) hours as needed for Wheezing. ALBUTEROL-IPRATROPIUM (DUO-NEB) 2.5 MG-0.5 MG/3 ML NEBU albuterol-ipratropium (DUO-NEB) 2.5 mg-0.5 mg/3 ml nebu       3 mL by Nebulization route every four (4) hours as needed for Wheezing. 3 mL by Nebulization route every four (4) hours as needed for Wheezing. IPRATROPIUM-ALBUTEROL (COMBIVENT RESPIMAT)  MCG/ACTUATION INHALER ipratropium-albuteroL (Combivent Respimat)  mcg/actuation inhaler       Take 1 Puff by inhalation every six (6) hours. Take 1 Puff by inhalation every six (6) hours. Stop Taking    ALBUTEROL (PROVENTIL VENTOLIN) 2.5 MG /3 ML (0.083 %) NEBU    3 mL by Nebulization route every four (4) hours as needed for Wheezing. BUDESONIDE-FORMOTEROL (SYMBICORT) 160-4.5 MCG/ACTUATION HFAA    Take 2 Puffs by inhalation two (2) times a day.

## 2020-06-11 NOTE — DISCHARGE INSTRUCTIONS
Patient Education        Asthma Attack: Care Instructions  Your Care Instructions     During an asthma attack, the airways swell and narrow. This makes it hard to breathe. Severe asthma attacks can be life-threatening, but you can help prevent them by keeping your asthma under control and treating symptoms before they get bad. Symptoms include being short of breath, having chest tightness, coughing, and wheezing. Noting and treating these symptoms can also help you avoid future trips to the emergency room. The doctor has checked you carefully, but problems can develop later. If you notice any problems or new symptoms, get medical treatment right away. Follow-up care is a key part of your treatment and safety. Be sure to make and go to all appointments, and call your doctor if you are having problems. It's also a good idea to know your test results and keep a list of the medicines you take. How can you care for yourself at home? · Follow your asthma action plan to prevent and treat attacks. If you don't have an asthma action plan, work with your doctor to create one. · Take your asthma medicines exactly as prescribed. Talk to your doctor right away if you have any questions about how to take them. ? Use your quick-relief medicine when you have symptoms of an attack. Quick-relief medicine is usually an albuterol inhaler. Some people need to use quick-relief medicine before they exercise. ? Take your controller medicine every day, not just when you have symptoms. Controller medicine is usually an inhaled corticosteroid. The goal is to prevent problems before they occur. Don't use your controller medicine to treat an attack that has already started. It doesn't work fast enough to help. ? If your doctor prescribed corticosteroid pills to use during an attack, take them exactly as prescribed. It may take hours for the pills to work, but they may make the episode shorter and help you breathe better. ?  Keep your quick-relief medicine with you at all times. · Talk to your doctor before using other medicines. Some medicines, such as aspirin, can cause asthma attacks in some people. · If you have a peak flow meter, use it to check how well you are breathing. This can help you predict when an asthma attack is going to occur. Then you can take medicine to prevent the asthma attack or make it less severe. · Do not smoke or allow others to smoke around you. Avoid smoky places. Smoking makes asthma worse. If you need help quitting, talk to your doctor about stop-smoking programs and medicines. These can increase your chances of quitting for good. · Learn what triggers an asthma attack for you, and avoid the triggers when you can. Common triggers include colds, smoke, air pollution, dust, pollen, mold, pets, cockroaches, stress, and cold air. · Avoid colds and the flu. Get a pneumococcal vaccine shot. If you have had one before, ask your doctor if you need a second dose. Get a flu vaccine every fall. If you must be around people with colds or the flu, wash your hands often. When should you call for help? [id number] anytime you think you may need emergency care. For example, call if:  · You have severe trouble breathing. Call your doctor now or seek immediate medical care if:  · Your symptoms do not get better after you have followed your asthma action plan. · You have new or worse trouble breathing. · Your coughing and wheezing get worse. · You cough up dark brown or bloody mucus (sputum). · You have a new or higher fever. Watch closely for changes in your health, and be sure to contact your doctor if:  · You need to use quick-relief medicine on more than 2 days a week (unless it is just for exercise). · You cough more deeply or more often, especially if you notice more mucus or a change in the color of your mucus. · You are not getting better as expected. Where can you learn more?   Go to http://jason-maritza.info/  Enter [id number] in the search box to learn more about \"Asthma Attack: Care Instructions. \"  Current as of: February 24, 2020               Content Version: 12.5  © 2006-2020 [employer]. Care instructions adapted under license by [employer] (which disclaims liability or warranty for this information). If you have questions about a medical condition or this instruction, always ask your healthcare professional. [place] [address] any warranty or liability for your use of this information. Patient Education        Asthma in Adults: Care Instructions  Your Care Instructions     During an asthma attack, your airways swell and narrow as a reaction to certain things (triggers). This makes it hard to breathe. You may be able to prevent asthma attacks if you avoid the things that set off your asthma symptoms. Keeping your asthma under control and treating symptoms before they get bad can help you avoid severe attacks. If you can control your asthma, you may be able to do all of your normal daily activities. You may also avoid asthma attacks and trips to the hospital.  Follow-up care is a key part of your treatment and safety. Be sure to make and go to all appointments, and call your doctor if you are having problems. It's also a good idea to know your test results and keep a list of the medicines you take. How can you care for yourself at home? · Follow your asthma action plan so you can manage your symptoms at home. An asthma action plan will help you prevent and control airway reactions and will tell you what to do during an asthma attack. If you do not have an asthma action plan, work with your doctor to build one. · Take your asthma medicine exactly as prescribed. Medicine plays an important role in controlling asthma. Talk to your doctor right away if you have any questions about what to take and how to take it. ?  Use your quick-relief medicine when you have symptoms of an attack. Quick-relief medicine often is an albuterol inhaler. Some people need to use quick-relief medicine before they exercise. ? Take your controller medicine every day, not just when you have symptoms. Controller medicine is usually an inhaled corticosteroid. The goal is to prevent problems before they occur. Do not use your controller medicine to try to treat an attack that has already started. It does not work fast enough to help. ? If your doctor prescribed corticosteroid pills to use during an attack, take them as directed. They may take hours to work, but they may shorten the attack and help you breathe better. ? Keep your quick-relief medicine with you at all times. · Talk to your doctor before using other medicines. Some medicines, such as aspirin, can cause asthma attacks in some people. · Check yourself for asthma symptoms to know which step to follow in your action plan. Watch for things like being short of breath, having chest tightness, coughing, and wheezing. Also notice if symptoms wake you up at night or if you get tired quickly when you exercise. · If you have a peak flow meter, use it to check how well you are breathing. This can help you predict when an asthma attack is going to occur. Then you can take medicine to prevent the asthma attack or make it less severe. · See your doctor regularly. These visits will help you learn more about asthma and what you can do to control it. Your doctor will monitor your treatment to make sure the medicine is helping you. · Keep track of your asthma attacks and your treatment. After you have had an attack, write down what triggered it, what helped end it, and any concerns you have about your asthma action plan. Take your diary when you see your doctor. You can then review your asthma action plan and decide if it is working. · Do not smoke or allow others to smoke around you. Avoid smoky places. Smoking makes asthma worse. If you need help quitting, talk to your doctor about stop-smoking programs and medicines. These can increase your chances of quitting for good. · Learn what triggers an asthma attack for you, and avoid the triggers when you can. Common triggers include colds, smoke, air pollution, dust, pollen, mold, pets, cockroaches, stress, and cold air. · Avoid colds and the flu. Get a pneumococcal vaccine shot. If you have had one before, ask your doctor whether you need a second dose. Get a flu vaccine every fall. If you must be around people with colds or the flu, wash your hands often. When should you call for help? [id number] anytime you think you may need emergency care. For example, call if:  · You have severe trouble breathing. Call your doctor now or seek immediate medical care if:  · Your symptoms do not get better after you have followed your asthma action plan. · You cough up yellow, dark brown, or bloody mucus (sputum). Watch closely for changes in your health, and be sure to contact your doctor if:  · Your coughing and wheezing get worse. · You need to use quick-relief medicine on more than 2 days a week (unless it is just for exercise). · You need help figuring out what is triggering your asthma attacks. Where can you learn more? Go to http://jason-maritza.info/  Enter P597 in the search box to learn more about \"Asthma in Adults: Care Instructions. \"  Current as of: February 24, 2020               Content Version: 12.5  © 5237-6973 [employer]. Care instructions adapted under license by [employer] (which disclaims liability or warranty for this information). If you have questions about a medical condition or this instruction, always ask your healthcare professional. [place] [address] any warranty or liability for your use of this information.

## 2020-06-12 ENCOUNTER — TELEPHONE (OUTPATIENT)
Dept: INTERNAL MEDICINE CLINIC | Age: 30
End: 2020-06-12

## 2020-06-12 NOTE — TELEPHONE ENCOUNTER
Early this morning, pt called out to his roommate to call 911. The roommate responded and found the pt very short of breath. He then collapsed after saying something like \"it's over\"  EMS found him in complete arrest and was not able to revive him and pronounced him  at the scene. Given his age I asked the police to check with the MEs office to see if they felt they wanted to be involved. If not, will send death certificate to Dr. Dave Rai. Pt did have severe asthma and has been having recent exacerbations. And several recent ER visits.  I do not see any COVID testing recorded

## 2021-03-22 ENCOUNTER — TELEPHONE (OUTPATIENT)
Dept: PULMONOLOGY | Age: 31
End: 2021-03-22

## 2021-03-22 NOTE — TELEPHONE ENCOUNTER
Chadd Lai from [employer] called asking to speak with nurse re patient assistance. This patient gets assistance from them for his medications and his paperwork is about to . Pt has not been seen since 19 and has not followed up with anyone in our office since Dr. Jeffrey Varma has left. She would like to know how to proceed with his application for assistance.  Please call [phone]

## 2021-03-23 NOTE — TELEPHONE ENCOUNTER
Per [facility], they have not shipped anything for this patient since 2019.  They will close the case out

## 2023-01-01 NOTE — PATIENT INSTRUCTIONS

## 2023-02-27 NOTE — ED NOTES
Patient does not appear to be in as much distress as when he arrived, able to laugh and joke now, 
 Carac Counseling:  I discussed with the patient the risks of Carac including but not limited to erythema, scaling, itching, weeping, crusting, and pain.